# Patient Record
Sex: FEMALE | Race: WHITE | Employment: OTHER | URBAN - NONMETROPOLITAN AREA
[De-identification: names, ages, dates, MRNs, and addresses within clinical notes are randomized per-mention and may not be internally consistent; named-entity substitution may affect disease eponyms.]

---

## 2017-06-29 ENCOUNTER — TELEPHONE (OUTPATIENT)
Dept: PULMONOLOGY | Age: 67
End: 2017-06-29

## 2017-10-31 ENCOUNTER — OFFICE VISIT (OUTPATIENT)
Dept: PHYSICAL MEDICINE AND REHAB | Age: 67
End: 2017-10-31
Payer: MEDICARE

## 2017-10-31 VITALS
HEART RATE: 88 BPM | DIASTOLIC BLOOD PRESSURE: 83 MMHG | SYSTOLIC BLOOD PRESSURE: 170 MMHG | WEIGHT: 160.05 LBS | BODY MASS INDEX: 29.45 KG/M2 | HEIGHT: 62 IN

## 2017-10-31 DIAGNOSIS — M47.816 SPONDYLOSIS OF LUMBAR REGION WITHOUT MYELOPATHY OR RADICULOPATHY: Primary | ICD-10-CM

## 2017-10-31 DIAGNOSIS — M48.061 SPINAL STENOSIS OF LUMBAR REGION WITHOUT NEUROGENIC CLAUDICATION: ICD-10-CM

## 2017-10-31 DIAGNOSIS — M46.1 SI (SACROILIAC) JOINT INFLAMMATION (HCC): ICD-10-CM

## 2017-10-31 PROCEDURE — 4040F PNEUMOC VAC/ADMIN/RCVD: CPT | Performed by: NURSE PRACTITIONER

## 2017-10-31 PROCEDURE — G8419 CALC BMI OUT NRM PARAM NOF/U: HCPCS | Performed by: NURSE PRACTITIONER

## 2017-10-31 PROCEDURE — G8427 DOCREV CUR MEDS BY ELIG CLIN: HCPCS | Performed by: NURSE PRACTITIONER

## 2017-10-31 PROCEDURE — G8598 ASA/ANTIPLAT THER USED: HCPCS | Performed by: NURSE PRACTITIONER

## 2017-10-31 PROCEDURE — G8484 FLU IMMUNIZE NO ADMIN: HCPCS | Performed by: NURSE PRACTITIONER

## 2017-10-31 PROCEDURE — 99215 OFFICE O/P EST HI 40 MIN: CPT | Performed by: NURSE PRACTITIONER

## 2017-10-31 PROCEDURE — G8400 PT W/DXA NO RESULTS DOC: HCPCS | Performed by: NURSE PRACTITIONER

## 2017-10-31 PROCEDURE — 3014F SCREEN MAMMO DOC REV: CPT | Performed by: NURSE PRACTITIONER

## 2017-10-31 PROCEDURE — 3017F COLORECTAL CA SCREEN DOC REV: CPT | Performed by: NURSE PRACTITIONER

## 2017-10-31 PROCEDURE — 1036F TOBACCO NON-USER: CPT | Performed by: NURSE PRACTITIONER

## 2017-10-31 PROCEDURE — 1090F PRES/ABSN URINE INCON ASSESS: CPT | Performed by: NURSE PRACTITIONER

## 2017-10-31 PROCEDURE — 1123F ACP DISCUSS/DSCN MKR DOCD: CPT | Performed by: NURSE PRACTITIONER

## 2017-10-31 RX ORDER — LOSARTAN POTASSIUM 50 MG/1
50 TABLET ORAL DAILY
Refills: 0 | COMMUNITY
Start: 2017-10-20

## 2017-10-31 ASSESSMENT — ENCOUNTER SYMPTOMS
DIARRHEA: 0
BACK PAIN: 1
CONSTIPATION: 0
NAUSEA: 0
ABDOMINAL PAIN: 0
PHOTOPHOBIA: 0
SINUS PRESSURE: 0
CHEST TIGHTNESS: 0
VOMITING: 0
SORE THROAT: 0
SHORTNESS OF BREATH: 0
COLOR CHANGE: 0
RHINORRHEA: 0
EYE PAIN: 0
WHEEZING: 0
COUGH: 0

## 2017-10-31 NOTE — LETTER
[facility]  [address]  Phone: [phone]  Fax: [address], CNP        October 31, 2017     Karol Sanabria MD  [facility] [address]    Patient: Jenny Metz  MR Number: [id number]  YOB: 1950  Date of Visit: 10/31/2017    Dear Dr. Carlos Ellison: Thank you for the request for consultation for [facility] to me for the evaluation of low back pain. Below are the relevant portions of my assessment and plan of care. If you have questions, please do not hesitate to call me. I look forward to following Suly Husbands along with you.     Sincerely,        Tammy Gonzalez CNP

## 2017-10-31 NOTE — PROGRESS NOTES
[facility]  [facility]  [address]  Dept: [phone]  Dept Fax: [phone]  Loc: [phone]    Visit Date: 10/31/2017    Jenny Metz is a 79 y.o. female who is referred for pain management evaluation and treatment per Dr. Hermilo Guadalupe. CAGE and CAGE-AID Questions   1. In the last three months, have you felt you should cut down or stop drinking or using drugs? Yes []        No [x]     2. In the last three months, has anyone annoyed you or gotten on your nerves by telling you to cut down or stop drinking or using drugs? Yes []        No [x]     3. In the last three months, have you felt guilty or bad about how much you drink or use drugs? Yes []        No [x]     4. In the last three months, have you been waking up wanting to have an alcoholic drink or use drugs? Yes []        No [x]        Opioid Risk Tool:  Clinician Form       1. Family History of Substance Abuse: Female Male    Alcohol   []1   []3    Illegal drugs   [x]2   [x]3    Prescription drugs     []4   []4   2. Personal History of Substance Abuse:          Alcohol   []3   []3    Illegal drugs   []4   []4    Prescription drugs     []5   []5   3. Age (art box if between 12 and 39):     []1   []1   4. History of Preadolescent Sexual Abuse:     []3   []0   5. Psychological Disease:      Attention deficit disorder, obsessive-compulsive disorder, bipolar, schizophrenia   []2   []2      Depression     []1   []1    Scoring Totals 2      Total Score  Low Risk  Moderate Risk  High Risk   Risk Category   0 - 3   4 - 7   8 or Above      Patient states symptoms interfere with:  A.  General Activity:  yes   B. Mood: yes    C. Walking Ability:   yes   D. Normal Work (Includes both work outside the home and housework):   yes    E.  Relations with Other People:  yes   F. Sleep:   yes   G.  Enjoyment of Life:  yes       HPI:     Chief Complaint: low back pain    HPI  New patient here foramina.            L2-3:  Endplate spondylosis.  Decreased disc height, dehydration and      moderate circumferential disc bulge.  Degenerative changes of the      bilateral facet joints.  Severe narrowing of the central canal and      moderate narrowing of the bilateral intervertebral neural foramina more      prominent on the right side.            L3-4:  Endplate spondylosis.  Decreased disc height, dehydration and      moderate circumferential disc bulge.  Degenerative changes of the      bilateral facet joints.  Severe narrowing of the central canal and      moderate narrowing of the bilateral intervertebral neural foramina more      prominent on the right side.            L4-5:  Endplate spondylosis.  Decreased disc height, dehydration and small      circumferential disc bulge.  Degenerative changes of the bilateral facet      joints.  Mild narrowing of the central canal and bilateral intervertebral      neural foramina.            L5-S1:  Normal endplates.  Normal disc height, hydration and morphology.      Degenerative changes of the bilateral facet joints.  Normal central canal      and bilateral lateral recesses.  Normal bilateral intervertebral neural      foramina.            Normal visualized sacral ala.            There is a left renal cyst.      ___________________________________            IMPRESSION:      Multilevel degenerative changes, as described above.      Severe spinal canal stenosis at L2-3 and L3-4.           The patient is allergic to chantix [varenicline tartrate]; codeine; lipitor; simvastatin; and tiotropium bromide monohydrate. Past Medical History  Di King  has a past medical history of Bladder dysfunction; CAD (coronary artery disease); Cerebrovascular disease; CHF (congestive heart failure) ([facility]); COPD (chronic obstructive pulmonary disease) ([facility] [address] DDD (degenerative disc disease); Depression; GERD (gastroesophageal reflux disease); Hyperlipidemia; Hypertension; Obesity;  Other screening mammogram; Pap smear, as part of routine gynecological examination; Pap smear, as part of routine gynecological examination; Peripheral edema; and Unspecified sleep apnea. Past Surgical History  The patient  has a past surgical history that includes Dilation and curettage of uterus; Colonoscopy (7-2009); eye surgery (2013); back surgery; and other surgical history (2016). Family History  This patient's family history includes Alzheimer's Disease in her father; COPD in her sister; Cancer in her brother and daughter; Heart Disease in her father and mother; Heart Failure in her father and mother; Parkinsonism in her father; Stroke in her brother. Social History  Radha Mccain  reports that she quit smoking about 6 years ago. Her smoking use included Cigarettes. She has a 80.00 pack-year smoking history. She has never used smokeless tobacco. She reports that she does not drink alcohol or use drugs.     Medications    Current Outpatient Prescriptions:     losartan (COZAAR) 50 MG tablet, , Disp: , Rfl: 0    trimethoprim-polymyxin b (POLYTRIM) 81430-8.1 UNIT/ML-% ophthalmic solution, Place 2 drops into both eyes every 4 hours While awake, Disp: 1 Bottle, Rfl: 0    albuterol sulfate HFA (PROAIR HFA) 108 (90 BASE) MCG/ACT inhaler, Inhale 2 puffs into the lungs every 6 hours as needed for Shortness of Breath, Disp: 1 Inhaler, Rfl: 11    albuterol (PROVENTIL) (2.5 MG/3ML) 0.083% nebulizer solution, Take 3 mLs by nebulization every 6 hours as needed for Wheezing or Shortness of Breath, Disp: 120 mL, Rfl: 11    ibuprofen (ADVIL;MOTRIN) 400 MG tablet, Take 1 tablet by mouth every 6 hours as needed for Pain, Disp: 90 tablet, Rfl: 0    omeprazole (PRILOSEC) 40 MG capsule, Take 1 capsule by mouth daily, Disp: 30 capsule, Rfl: 1    fluticasone (FLONASE) 50 MCG/ACT nasal spray, 1 spray by Nasal route 2 times daily (Patient taking differently: 1 spray by Nasal route 2 times daily as needed ), Disp: 3 Bottle, Rfl: 1   busPIRone (BUSPAR) 5 MG tablet, Take 1 tablet by mouth 2 times daily (Patient taking differently: Take 5 mg by mouth 2 times daily as needed ), Disp: 60 tablet, Rfl: 3    pseudoephedrine (SUDAFED) 120 MG CR tablet, Take 120 mg by mouth every 12 hours as needed , Disp: , Rfl:     OXYGEN,  Inhale into the lungs 3 lpm, per patient at home and when walking, Disp: , Rfl:     Handicap Placard MISC, by Does not apply route., Disp: 1 each, Rfl: 0    Nebulizer MISC, by Does not apply route., Disp: 1 each, Rfl: 0    aspirin 81 MG EC tablet, Take 81 mg by mouth daily. , Disp: , Rfl:     Respiratory Therapy Supplies (NEBULIZER COMPRESSOR) KIT, 1 kit by Does not apply route once for 1 dose, Disp: 1 kit, Rfl: 0    Subjective:      Review of Systems   Constitutional: Positive for activity change. Negative for appetite change, chills, diaphoresis, fatigue, fever and unexpected weight change. HENT: Negative for congestion, ear pain, hearing loss, mouth sores, nosebleeds, rhinorrhea, sinus pressure and sore throat. Eyes: Negative for photophobia, pain and visual disturbance. Respiratory: Negative for cough, chest tightness, shortness of breath and wheezing. Cardiovascular: Negative for chest pain and palpitations. Gastrointestinal: Negative for abdominal pain, constipation, diarrhea, nausea and vomiting. Endocrine: Negative for cold intolerance, heat intolerance, polydipsia, polyphagia and polyuria. Genitourinary: Negative for decreased urine volume, difficulty urinating, frequency and hematuria. Musculoskeletal: Positive for arthralgias, back pain, gait problem and myalgias. Negative for joint swelling, neck pain and neck stiffness. Skin: Negative for color change and rash. Allergic/Immunologic: Negative for food allergies and immunocompromised state. Neurological: Positive for weakness and numbness.  Negative for dizziness, tremors, seizures, syncope, facial asymmetry, speech difficulty, light-headedness and headaches. Hematological: Does not bruise/bleed easily. Psychiatric/Behavioral: Negative for agitation, behavioral problems, confusion, decreased concentration, dysphoric mood, hallucinations, self-injury, sleep disturbance and suicidal ideas. The patient is not nervous/anxious and is not hyperactive. Objective:     Vitals:    10/31/17 1022   BP: (!) 170/83   Site: Right Arm   Position: Sitting   Pulse: 88   Weight: 160 lb 0.9 oz (72.6 kg)   Height: 5' 2.01\" (1.575 m)       Physical Exam   Constitutional: She is oriented to person, place, and time. She appears well-developed and well-nourished. No distress. HENT:   Head: Normocephalic and atraumatic. Right Ear: External ear normal.   Left Ear: External ear normal.   Nose: Nose normal.   Mouth/Throat: Oropharynx is clear and moist. No oropharyngeal exudate. Eyes: Conjunctivae and EOM are normal. Pupils are equal, round, and reactive to light. Right eye exhibits no discharge. Left eye exhibits no discharge. No scleral icterus. Neck: Normal range of motion and full passive range of motion without pain. Neck supple. No muscular tenderness present. No neck rigidity. No edema, no erythema and normal range of motion present. No thyromegaly present. Cardiovascular: Normal rate, regular rhythm, normal heart sounds and intact distal pulses. Exam reveals no gallop and no friction rub. No murmur heard. Pulmonary/Chest: Effort normal. No respiratory distress. She has decreased breath sounds in the right upper field, the right middle field, the right lower field, the left upper field, the left middle field and the left lower field. She has wheezes in the right upper field and the left upper field. She has no rales. She exhibits no tenderness. 2 liters NC portable oxygen   Abdominal: Soft. Bowel sounds are normal. She exhibits no distension. There is no tenderness. There is no rebound and no guarding.    Musculoskeletal:        Right shoulder: She exhibits decreased range of motion and tenderness. Left shoulder: She exhibits decreased range of motion and tenderness. Right elbow: Normal.       Left elbow: Normal.        Right wrist: Normal.        Left wrist: Normal.        Right hip: Normal.        Left hip: She exhibits decreased range of motion, decreased strength and tenderness. Right knee: Normal.        Left knee: Normal.        Cervical back: She exhibits decreased range of motion, tenderness, pain and spasm. Thoracic back: She exhibits tenderness. Lumbar back: She exhibits tenderness, pain and spasm. Back:         Right upper leg: She exhibits tenderness. Left upper leg: She exhibits tenderness. Neurological: She is alert and oriented to person, place, and time. She has normal strength and normal reflexes. She is not disoriented. She displays no atrophy. No cranial nerve deficit or sensory deficit. She exhibits normal muscle tone. She displays a negative Romberg sign. Coordination and gait normal. She displays no Babinski's sign on the right side. SLR-  Motor 5/5 BUE BLE   Skin: Skin is warm. No rash noted. She is not diaphoretic. No erythema. No pallor. Psychiatric: She has a normal mood and affect. Her speech is normal and behavior is normal. Judgment and thought content normal. Her mood appears not anxious. Her affect is not angry, not blunt, not labile and not inappropriate. She is not agitated, not aggressive, not hyperactive, not slowed, not withdrawn, not actively hallucinating and not combative. Thought content is not paranoid and not delusional. Cognition and memory are normal. Cognition and memory are not impaired. She does not express impulsivity or inappropriate judgment. She does not exhibit a depressed mood. She expresses no homicidal and no suicidal ideation. She expresses no suicidal plans and no homicidal plans.  She exhibits normal recent memory and normal remote

## 2017-11-14 ENCOUNTER — TELEPHONE (OUTPATIENT)
Dept: OPERATING ROOM | Age: 67
End: 2017-11-14

## 2017-11-20 ENCOUNTER — OFFICE VISIT (OUTPATIENT)
Dept: PULMONOLOGY | Age: 67
End: 2017-11-20
Payer: MEDICARE

## 2017-11-20 VITALS
OXYGEN SATURATION: 92 % | TEMPERATURE: 98 F | SYSTOLIC BLOOD PRESSURE: 138 MMHG | DIASTOLIC BLOOD PRESSURE: 64 MMHG | HEIGHT: 62 IN | BODY MASS INDEX: 28.82 KG/M2 | HEART RATE: 107 BPM | WEIGHT: 156.6 LBS

## 2017-11-20 DIAGNOSIS — J01.90 ACUTE SINUSITIS, RECURRENCE NOT SPECIFIED, UNSPECIFIED LOCATION: ICD-10-CM

## 2017-11-20 DIAGNOSIS — J96.11 CHRONIC RESPIRATORY FAILURE WITH HYPOXIA (HCC): ICD-10-CM

## 2017-11-20 DIAGNOSIS — G47.33 OSA ON CPAP: Primary | ICD-10-CM

## 2017-11-20 DIAGNOSIS — J41.1 MUCOPURULENT CHRONIC BRONCHITIS (HCC): ICD-10-CM

## 2017-11-20 DIAGNOSIS — J42 CHRONIC BRONCHITIS, UNSPECIFIED CHRONIC BRONCHITIS TYPE (HCC): ICD-10-CM

## 2017-11-20 DIAGNOSIS — Z87.891 HISTORY OF TOBACCO ABUSE: ICD-10-CM

## 2017-11-20 DIAGNOSIS — Z87.891 PERSONAL HISTORY OF TOBACCO USE: ICD-10-CM

## 2017-11-20 DIAGNOSIS — Z99.89 OSA ON CPAP: Primary | ICD-10-CM

## 2017-11-20 PROCEDURE — G0296 VISIT TO DETERM LDCT ELIG: HCPCS | Performed by: NURSE PRACTITIONER

## 2017-11-20 PROCEDURE — 99214 OFFICE O/P EST MOD 30 MIN: CPT | Performed by: NURSE PRACTITIONER

## 2017-11-20 RX ORDER — ALBUTEROL SULFATE 90 UG/1
2 AEROSOL, METERED RESPIRATORY (INHALATION) EVERY 6 HOURS PRN
Qty: 1 INHALER | Refills: 11 | Status: ON HOLD | OUTPATIENT
Start: 2017-11-20 | End: 2018-07-01

## 2017-11-20 RX ORDER — ALBUTEROL SULFATE 2.5 MG/3ML
2.5 SOLUTION RESPIRATORY (INHALATION) EVERY 6 HOURS PRN
Qty: 120 ML | Refills: 11 | Status: SHIPPED | OUTPATIENT
Start: 2017-11-20 | End: 2018-09-25 | Stop reason: SDUPTHER

## 2017-11-20 ASSESSMENT — ENCOUNTER SYMPTOMS
HEMOPTYSIS: 0
COUGH: 1
SHORTNESS OF BREATH: 1
SPUTUM PRODUCTION: 1
VOMITING: 0
NAUSEA: 0
DIARRHEA: 0
SINUS PAIN: 1
WHEEZING: 1
ABDOMINAL PAIN: 0
EYES NEGATIVE: 1
SORE THROAT: 1

## 2017-11-20 NOTE — PROGRESS NOTES
[facility]    Patient: Darren Castillo, 79 y.o.   : 2017   Previous  Pt of Dr. Clarissa Gitelman   Patient presents with    COPD     1 yr f/u no tests, Q N-17.75 M-3, having a hard time off and on, usually first thing in the morning and at night, has a cold right now, 3L continuous, was on advair a while ago, but stopped because she didn't think she needed it because her breathing was doing so well    Medication Refill     albuterol neb, proair        HPI  Don Bonds is here for 1 year COPD follow up, currently using:  Albuterol 0.083 % Nebulizer Q6H PRN- has increased using 2 times per day right now past 2 weeks. Albuterol 108 (ProAir) HFA- as needed, 1-2 times per day  Was taking Advair- quit due to feeling better about 2 years ago. 80 pk/yr smoking history - quit in - is exposed to second hand smoke daily   Has been on 1 week of Amoxicillin for increased cough, prescribed by per PCP, just got 1 more week refill. Not on steroids. Productive cough with thick yellow phelm. Has had fever off and on, has not checked actual temp. No hemoptysis,  Denies ill contacts. Has not had influenza vaccine but is planning on getting from her PCP. Has had pneumonia vaccine at age 72. Has been more SOB with her recent cold. Is on 3L/min NC O2 continuously at home. Progress History:   Since last visit any new medical issues? No  New ER or hospitlal visits? No  Any new or changes in medicines? Yes amoxicillin for sinusitis  Using inhalers? Yes (see above)  Are they helpful?  Yes - did feel better when she was on Advair in past   Past Medical hx   PMH:  Past Medical History:   Diagnosis Date    Bladder dysfunction     CAD (coronary artery disease)     Cerebrovascular disease     [facility])     COPD (chronic obstructive pulmonary disease) ([facility])     uses oxygen 24 hours per day    DDD (degenerative disc inhaler Inhale 2 puffs into the lungs every 6 hours as needed for Shortness of Breath 1 Inhaler 11    amoxicillin (AMOXIL) 875 MG tablet Take 875 mg by mouth 2 times daily      losartan (COZAAR) 50 MG tablet   0    trimethoprim-polymyxin b (POLYTRIM) 01936-2.1 UNIT/ML-% ophthalmic solution Place 2 drops into both eyes every 4 hours While awake 1 Bottle 0    ibuprofen (ADVIL;MOTRIN) 400 MG tablet Take 1 tablet by mouth every 6 hours as needed for Pain 90 tablet 0    omeprazole (PRILOSEC) 40 MG capsule Take 1 capsule by mouth daily 30 capsule 1    fluticasone (FLONASE) 50 MCG/ACT nasal spray 1 spray by Nasal route 2 times daily (Patient taking differently: 1 spray by Nasal route 2 times daily as needed ) 3 Bottle 1    busPIRone (BUSPAR) 5 MG tablet Take 1 tablet by mouth 2 times daily (Patient taking differently: Take 5 mg by mouth 2 times daily as needed ) 60 tablet 3    pseudoephedrine (SUDAFED) 120 MG CR tablet Take 120 mg by mouth every 12 hours as needed       OXYGEN   Inhale into the lungs 3 lpm, per patient at home and when walking      Handicap Placard MISC by Does not apply route. 1 each 0    Nebulizer MISC by Does not apply route. 1 each 0    aspirin 81 MG EC tablet Take 81 mg by mouth daily.  Respiratory Therapy Supplies (NEBULIZER COMPRESSOR) KIT 1 kit by Does not apply route once for 1 dose 1 kit 0     No current facility-administered medications for this visit. [place] Setting ROS   Review of Systems   Constitutional: Negative for chills, fever, malaise/fatigue and weight loss. HENT: Positive for congestion, sinus pain and sore throat. Negative for ear discharge and ear pain. Eyes: Negative. Respiratory: Positive for cough, sputum production, shortness of breath and wheezing. Negative for hemoptysis. Cardiovascular: Negative for chest pain, palpitations and leg swelling. Gastrointestinal: Negative for abdominal pain, diarrhea, nausea and vomiting. Genitourinary: Negative. risk from radiation exposure. Patients with comorbidities resulting in life expectancy of < 10 years, or that would preclude treatment of an abnormality identified on CT, should not be screened due to lack of benefit.     To obtain maximal benefit from this screening, smoking cessation and long-term abstinence from smoking is critical

## 2017-12-12 ENCOUNTER — APPOINTMENT (OUTPATIENT)
Dept: GENERAL RADIOLOGY | Age: 67
End: 2017-12-12
Attending: PAIN MEDICINE
Payer: MEDICARE

## 2017-12-12 ENCOUNTER — ANESTHESIA EVENT (OUTPATIENT)
Dept: OPERATING ROOM | Age: 67
End: 2017-12-12
Payer: MEDICARE

## 2017-12-12 ENCOUNTER — HOSPITAL ENCOUNTER (OUTPATIENT)
Age: 67
Setting detail: OUTPATIENT SURGERY
Discharge: HOME | End: 2017-12-12
Attending: PAIN MEDICINE | Admitting: PAIN MEDICINE
Payer: MEDICARE

## 2017-12-12 ENCOUNTER — ANESTHESIA (OUTPATIENT)
Dept: OPERATING ROOM | Age: 67
End: 2017-12-12
Payer: MEDICARE

## 2017-12-12 VITALS
OXYGEN SATURATION: 96 % | HEIGHT: 62 IN | SYSTOLIC BLOOD PRESSURE: 134 MMHG | WEIGHT: 157.4 LBS | HEART RATE: 89 BPM | DIASTOLIC BLOOD PRESSURE: 66 MMHG | BODY MASS INDEX: 28.97 KG/M2 | RESPIRATION RATE: 12 BRPM | TEMPERATURE: 98.9 F

## 2017-12-12 VITALS — TEMPERATURE: 97.7 F | DIASTOLIC BLOOD PRESSURE: 64 MMHG | SYSTOLIC BLOOD PRESSURE: 160 MMHG | OXYGEN SATURATION: 95 %

## 2017-12-12 PROCEDURE — A6402 STERILE GAUZE <= 16 SQ IN: HCPCS | Performed by: PAIN MEDICINE

## 2017-12-12 PROCEDURE — 3600000058 HC PAIN LEVEL 5 BASE: Performed by: PAIN MEDICINE

## 2017-12-12 PROCEDURE — 7100000011 HC PHASE II RECOVERY - ADDTL 15 MIN: Performed by: PAIN MEDICINE

## 2017-12-12 PROCEDURE — 3209999900 FLUORO FOR SURGICAL PROCEDURES

## 2017-12-12 PROCEDURE — 64635 DESTROY LUMB/SAC FACET JNT: CPT | Performed by: PAIN MEDICINE

## 2017-12-12 PROCEDURE — 3600000059 HC PAIN LEVEL 5 ADDL 15 MIN: Performed by: PAIN MEDICINE

## 2017-12-12 PROCEDURE — 2500000003 HC RX 250 WO HCPCS: Performed by: PAIN MEDICINE

## 2017-12-12 PROCEDURE — 3700000001 HC ADD 15 MINUTES (ANESTHESIA): Performed by: PAIN MEDICINE

## 2017-12-12 PROCEDURE — 3700000000 HC ANESTHESIA ATTENDED CARE: Performed by: PAIN MEDICINE

## 2017-12-12 PROCEDURE — 64636 DESTROY L/S FACET JNT ADDL: CPT | Performed by: PAIN MEDICINE

## 2017-12-12 PROCEDURE — 7100000010 HC PHASE II RECOVERY - FIRST 15 MIN: Performed by: PAIN MEDICINE

## 2017-12-12 PROCEDURE — 6360000002 HC RX W HCPCS: Performed by: PAIN MEDICINE

## 2017-12-12 PROCEDURE — 6360000002 HC RX W HCPCS: Performed by: NURSE ANESTHETIST, CERTIFIED REGISTERED

## 2017-12-12 RX ORDER — LIDOCAINE HYDROCHLORIDE 10 MG/ML
INJECTION, SOLUTION EPIDURAL; INFILTRATION; INTRACAUDAL; PERINEURAL PRN
Status: DISCONTINUED | OUTPATIENT
Start: 2017-12-12 | End: 2017-12-12 | Stop reason: HOSPADM

## 2017-12-12 RX ORDER — BUPIVACAINE HYDROCHLORIDE 2.5 MG/ML
INJECTION, SOLUTION EPIDURAL; INFILTRATION; INTRACAUDAL PRN
Status: DISCONTINUED | OUTPATIENT
Start: 2017-12-12 | End: 2017-12-12 | Stop reason: HOSPADM

## 2017-12-12 RX ORDER — BETAMETHASONE SODIUM PHOSPHATE AND BETAMETHASONE ACETATE 3; 3 MG/ML; MG/ML
INJECTION, SUSPENSION INTRA-ARTICULAR; INTRALESIONAL; INTRAMUSCULAR; SOFT TISSUE PRN
Status: DISCONTINUED | OUTPATIENT
Start: 2017-12-12 | End: 2017-12-12 | Stop reason: HOSPADM

## 2017-12-12 RX ORDER — FENTANYL CITRATE 50 UG/ML
INJECTION, SOLUTION INTRAMUSCULAR; INTRAVENOUS PRN
Status: DISCONTINUED | OUTPATIENT
Start: 2017-12-12 | End: 2017-12-12 | Stop reason: SDUPTHER

## 2017-12-12 RX ADMIN — FENTANYL CITRATE 50 MCG: 50 INJECTION, SOLUTION INTRAMUSCULAR; INTRAVENOUS at 11:40

## 2017-12-12 RX ADMIN — PROPOFOL 40 MG: 10 INJECTION, EMULSION INTRAVENOUS at 11:40

## 2017-12-12 ASSESSMENT — PULMONARY FUNCTION TESTS
PIF_VALUE: 0

## 2017-12-12 ASSESSMENT — PAIN DESCRIPTION - DESCRIPTORS: DESCRIPTORS: ACHING;CONSTANT;THROBBING

## 2017-12-12 ASSESSMENT — PAIN SCALES - GENERAL: PAINLEVEL_OUTOF10: 4

## 2017-12-12 ASSESSMENT — PAIN - FUNCTIONAL ASSESSMENT: PAIN_FUNCTIONAL_ASSESSMENT: 0-10

## 2017-12-12 ASSESSMENT — ENCOUNTER SYMPTOMS: SHORTNESS OF BREATH: 1

## 2017-12-12 NOTE — OP NOTE
pain relief with confirmatory median branch blocks. Hence we decided to do radiofrequency abalation of median branches for long term pain releif. The procedure and risks  were discussed with the patient and an informed consent was obtained. Procedure:  Left side    A meaningful communication was kept up with the patient throughout the procedure. The patient is placed in prone position and skin over the back was prepped and draped in sterile manner. Then using fluoroscopy the junction of the transverse process of the vertebra with the superior process of the facet joint was observed and the view was optimized. The skin and deep tissues posterior were infiltrated with 12 ml of  1% xylocaine. The RF canula with the 10 mm active tip was introduced through the skin wheal under fluoroscopy guidance such that the tip of the needle lies in the groove of the transverse process with the superior processes of the facet joint. Then a lateral view of the lumbar spine was obtained to make sure the tip of needle is not in the neural foramen. Then electric impedence was checked to make sure it is acceptable. Then a sensory stimulus was applied at 50 Hz up to 1 volt and concordant pain symptoms were reproduced. Then a motor stimulus was applied at 2 Hz up to 2 volts and no motor stimulation was seen in lower extremities. Some multifidus stimulus was seen. Then after negative aspiration a mixture of Celestone 12 mg and 0.25%  Marcaine 2 cc was injected through the needle. Then a initial lesion was done at 80 degrees centigrade for 90 seconds. For L5 median branch block the junction of the ala of  the sacrum with the superior articular process of the facet joint was taken as a reference point.   For the L4 median branch the junction of the transverse process of L5 with the superolateral possible facet joint was taken as a reference point and for S1 median branch the most lateral and superior aspect of S1 foramina was

## 2017-12-12 NOTE — PROGRESS NOTES
1154-  Patient arrived to phase II via cart. Spontaneous respirations even and unlabored. Placed on monitor--VSS. Report received from [facility].    1155-  Assessment completed. Patient is alert and oriented x4. Injection site clean and dry. IV capped off. Patient states pain is tolerable at this time. [address] given to patient. Family brought to room. 1106-  Belongings brought to patient. 1215-  Discharge instructions given. Understanding verbalized. 1218-  Patient discharged in stable condition with all belongings.

## 2017-12-12 NOTE — ANESTHESIA PRE PROCEDURE
Inhale into the lungs 3 lpm, per patient at home and when walking   Yes Historical Provider, MD   Handicap Placard MISC by Does not apply route. 6/6/14  Yes Veda Clay PA-C   Nebulizer MISC by Does not apply route. 10/16/12  Yes Tiburcio Marinelli MD   aspirin 81 MG EC tablet Take 81 mg by mouth daily. Yes Historical Provider, MD   Respiratory Therapy Supplies (NEBULIZER COMPRESSOR) KIT 1 kit by Does not apply route once for 1 dose 6/29/17 6/29/17  Long Tierney MD       Current medications:    Current Facility-Administered Medications   Medication Dose Route Frequency Provider Last Rate Last Dose    betamethasone acetate-betamethasone sodium phosphate (CELESTONE) injection    PRN Raz Mccain MD   12 mg at 12/12/17 1040    bupivacaine (PF) (MARCAINE) 0.25 % injection    PRN Raz Mccain MD   1.5 mL at 12/12/17 1040    lidocaine PF 1 % injection    PRN Raz Mccain MD   3 mL at 12/12/17 1047       Allergies:     Allergies   Allergen Reactions    Chantix [Varenicline Tartrate] Itching    Codeine Itching     Pt cant remember    Lipitor Itching    Simvastatin Itching     Stomach pain    Tiotropium Bromide Monohydrate Itching       Problem List:    Patient Active Problem List   Diagnosis Code    GERD (gastroesophageal reflux disease) K21.9    Hyperlipidemia E78.5    Peripheral edema R60.9    CHF (congestive heart failure) ([facility]) I50.9    Depression F32.9    DDD (degenerative disc disease) [id number]    CAD (coronary artery disease) I25.10    Cerebrovascular disease I67.9    Diabetes type 2, controlled ([facility] [address] E11.9    Bladder dysfunction N31.9    COPD (chronic obstructive pulmonary disease) ([facility]) J44.9    Nasal congestion R09.81    Acute sinusitis J01.90    Hypertrophy of both inferior nasal turbinates J34.3    Osteoarthritis of lumbar spine M47.816    History of panniculitis Z87.39    Hypertension I10    Dyspnea R06.00    Sinus disease J34.9    Obstructive sleep apnea on CPAP G47.33, Z99.89    Snoring R06.83    Obesity (BMI 30.0-34. 9) E66.9    Witnessed apneic spells R06.81    Chronic respiratory failure with hypoxia (HCC) J96.11       Past Medical History:        Diagnosis Date    Bladder dysfunction     CAD (coronary artery disease)     Cerebrovascular disease     CHF (congestive heart failure) (HCC)     COPD (chronic obstructive pulmonary disease) (HCC)     uses oxygen 24 hours per day    DDD (degenerative disc disease)     Depression     GERD (gastroesophageal reflux disease)     Hyperlipidemia     Hypertension     Obesity     Other screening mammogram 2011    Pap smear, as part of routine gynecological examination 2007    Pap smear, as part of routine gynecological examination 2007    Peripheral edema     with ascites    PONV (postoperative nausea and vomiting)     Unspecified sleep apnea     c-pap at 10 cm wtr       Past Surgical History:        Procedure Laterality Date    COLONOSCOPY  7-2009    DILATION AND CURETTAGE OF UTERUS  1970's    EYE SURGERY  2013    Laser surgery     OTHER SURGICAL HISTORY  2016    burning nerves in back - lumbar @ [place]       Social History:    Social History   Substance Use Topics    Smoking status: Former Smoker     Packs/day: 2.00     Years: 40.00     Types: Cigarettes     Quit date: 11/14/2010    Smokeless tobacco: Never Used    Alcohol use No      Comment: pt hasn't drank since 2007                                Counseling given: Not Answered      Vital Signs (Current):   Vitals:    12/12/17 0957   BP: 134/64   Pulse: 84   Resp: 18   Temp: 98.2 °F (36.8 °C)   TempSrc: Temporal   SpO2: 96%   Weight: 157 lb 6.4 oz (71.4 kg)   Height: 5' 2\" (1.575 m)                                              BP Readings from Last 3 Encounters:   12/12/17 134/64   11/20/17 138/64   10/31/17 (!) 170/83       NPO Status: Time of last liquid consumption: 2000                        Time of last solid consumption: 2000                        Date of last liquid consumption: 12/11/17                        Date of last solid food consumption: 12/11/17    BMI:   Wt Readings from Last 3 Encounters:   12/12/17 157 lb 6.4 oz (71.4 kg)   11/20/17 156 lb 9.6 oz (71 kg)   10/31/17 160 lb 0.9 oz (72.6 kg)     Body mass index is 28.79 kg/m². CBC:   Lab Results   Component Value Date    WBC 10.6 07/09/2014    RBC 3.36 07/09/2014    RBC 4.30 03/31/2012    HGB 10.1 07/09/2014    HCT 30.5 07/09/2014    MCV 90.8 07/09/2014    RDW 13.7 07/09/2014     07/09/2014       CMP:   Lab Results   Component Value Date     07/27/2015    K 4.5 07/27/2015    CL 93 07/27/2015    CO2 37 07/27/2015    BUN 8 07/27/2015    CREATININE 0.5 09/29/2015    LABGLOM >90 09/29/2015    GLUCOSE 111 07/27/2015    PROT 7.7 07/27/2015    CALCIUM 9.5 07/27/2015    BILITOT 0.3 07/27/2015    BILITOT NEGATIVE 03/31/2012    ALKPHOS 84 07/27/2015    AST 17 07/27/2015    ALT 12 07/27/2015       POC Tests: No results for input(s): POCGLU, POCNA, POCK, POCCL, POCBUN, POCHEMO, POCHCT in the last 72 hours. Coags: No results found for: PROTIME, INR, APTT    HCG (If Applicable): No results found for: PREGTESTUR, PREGSERUM, HCG, HCGQUANT     ABGs: No results found for: PHART, PO2ART, VOB4FPD, HFN9QVW, BEART, Y1IEQSZS     Type & Screen (If Applicable):  No results found for: LABABO, [address]    Anesthesia Evaluation  Patient summary reviewed and Nursing notes reviewed   history of anesthetic complications: PONV.   Airway: Mallampati: II  TM distance: >3 FB   Neck ROM: full  Mouth opening: > = 3 FB Dental:    (+) upper dentures      Pulmonary:normal exam  breath sounds clear to auscultation  (+) COPD:  shortness of breath: chronic,  sleep apnea: on CPAP,                            ROS comment: Home O2 3 LPM 24/7   Cardiovascular:  Exercise tolerance: good (>4 METS),   (+) hypertension:, CAD:, CHF:,         Rhythm: regular  Rate: normal                    Neuro/Psych:
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
## 2018-01-15 ENCOUNTER — HOSPITAL ENCOUNTER (OUTPATIENT)
Dept: PULMONOLOGY | Age: 68
Discharge: HOME | End: 2018-01-15
Payer: MEDICARE

## 2018-02-05 ENCOUNTER — TELEPHONE (OUTPATIENT)
Dept: PHYSICAL MEDICINE AND REHAB | Age: 68
End: 2018-02-05

## 2018-02-05 NOTE — TELEPHONE ENCOUNTER
Returned call to victorino to reschedule procedure and she said she would call back when ready to schedule

## 2018-02-07 ENCOUNTER — HOSPITAL ENCOUNTER (OUTPATIENT)
Dept: PULMONOLOGY | Age: 68
Discharge: HOME | End: 2018-02-07
Payer: MEDICARE

## 2018-03-11 ENCOUNTER — HOSPITAL ENCOUNTER (INPATIENT)
Age: 68
LOS: 2 days | Discharge: HOME | DRG: 189 | End: 2018-03-13
Attending: FAMILY MEDICINE | Admitting: FAMILY MEDICINE
Payer: MEDICARE

## 2018-03-11 ENCOUNTER — APPOINTMENT (OUTPATIENT)
Dept: GENERAL RADIOLOGY | Age: 68
DRG: 189 | End: 2018-03-11
Payer: MEDICARE

## 2018-03-11 ENCOUNTER — APPOINTMENT (OUTPATIENT)
Dept: CT IMAGING | Age: 68
DRG: 189 | End: 2018-03-11
Payer: MEDICARE

## 2018-03-11 DIAGNOSIS — J96.02 ACUTE HYPERCAPNIC RESPIRATORY FAILURE (HCC): ICD-10-CM

## 2018-03-11 DIAGNOSIS — J44.1 COPD EXACERBATION (HCC): Primary | ICD-10-CM

## 2018-03-11 PROBLEM — J96.00 ACUTE RESPIRATORY FAILURE (HCC): Status: ACTIVE | Noted: 2018-03-11

## 2018-03-11 LAB
ALLEN TEST: POSITIVE
ALLEN TEST: POSITIVE
ANION GAP SERPL CALCULATED.3IONS-SCNC: 6 MEQ/L (ref 8–16)
AVERAGE GLUCOSE: 108 MG/DL (ref 70–126)
BASE EXCESS (CALCULATED): 16.4 MMOL/L (ref -2.5–2.5)
BASE EXCESS (CALCULATED): 18.1 MMOL/L (ref -2.5–2.5)
BASOPHILS # BLD: 0.3 %
BASOPHILS ABSOLUTE: 0 THOU/MM3 (ref 0–0.1)
BUN BLDV-MCNC: 8 MG/DL (ref 7–22)
CALCIUM SERPL-MCNC: 9.8 MG/DL (ref 8.5–10.5)
CHLORIDE BLD-SCNC: 90 MEQ/L (ref 98–111)
CO2: 45 MEQ/L (ref 23–33)
COLLECTED BY:: ABNORMAL
COLLECTED BY:: ABNORMAL
CREAT SERPL-MCNC: 0.5 MG/DL (ref 0.4–1.2)
D-DIMER QUANTITATIVE: 705 NG/ML FEU (ref 0–500)
DEVICE: ABNORMAL
DEVICE: ABNORMAL
EKG ATRIAL RATE: 99 BPM
EKG P AXIS: 72 DEGREES
EKG P-R INTERVAL: 176 MS
EKG Q-T INTERVAL: 350 MS
EKG QRS DURATION: 94 MS
EKG QTC CALCULATION (BAZETT): 449 MS
EKG R AXIS: 48 DEGREES
EKG T AXIS: 79 DEGREES
EKG VENTRICULAR RATE: 99 BPM
EOSINOPHIL # BLD: 4 %
EOSINOPHILS ABSOLUTE: 0.4 THOU/MM3 (ref 0–0.4)
FLU A ANTIGEN: NEGATIVE
FLU B ANTIGEN: NEGATIVE
GFR SERPL CREATININE-BSD FRML MDRD: > 90 ML/MIN/1.73M2
GLUCOSE BLD-MCNC: 125 MG/DL (ref 70–108)
HBA1C MFR BLD: 5.6 % (ref 4.4–6.4)
HCO3: 47 MMOL/L (ref 23–28)
HCO3: 50 MMOL/L (ref 23–28)
HCT VFR BLD CALC: 36.7 % (ref 37–47)
HEMOGLOBIN: 11.5 GM/DL (ref 12–16)
IFIO2: 35
IFIO2: 4
LACTIC ACID: 1.4 MMOL/L (ref 0.5–2.2)
LIPASE: 13.2 U/L (ref 5.6–51.3)
LYMPHOCYTES # BLD: 26.6 %
LYMPHOCYTES ABSOLUTE: 2.7 THOU/MM3 (ref 1–4.8)
MCH RBC QN AUTO: 28.5 PG (ref 27–31)
MCHC RBC AUTO-ENTMCNC: 31.4 GM/DL (ref 33–37)
MCV RBC AUTO: 90.6 FL (ref 81–99)
MONOCYTES # BLD: 11 %
MONOCYTES ABSOLUTE: 1.1 THOU/MM3 (ref 0.4–1.3)
NUCLEATED RED BLOOD CELLS: 0 /100 WBC
O2 SATURATION: 91 %
O2 SATURATION: 97 %
OSMOLALITY CALCULATION: 281.1 MOSMOL/KG (ref 275–300)
PCO2: 114 MMHG (ref 35–45)
PCO2: 95 MMHG (ref 35–45)
PDW BLD-RTO: 13.9 % (ref 11.5–14.5)
PH BLOOD GAS: 7.25 (ref 7.35–7.45)
PH BLOOD GAS: 7.3 (ref 7.35–7.45)
PLATELET # BLD: 283 THOU/MM3 (ref 130–400)
PMV BLD AUTO: 8.5 FL (ref 7.4–10.4)
PO2: 109 MMHG (ref 71–104)
PO2: 72 MMHG (ref 71–104)
POTASSIUM SERPL-SCNC: 3.9 MEQ/L (ref 3.5–5.2)
PRO-BNP: 145.7 PG/ML (ref 0–900)
PROCALCITONIN: 0.02 NG/ML (ref 0.01–0.09)
RBC # BLD: 4.05 MILL/MM3 (ref 4.2–5.4)
SEG NEUTROPHILS: 58.1 %
SEGMENTED NEUTROPHILS ABSOLUTE COUNT: 6 THOU/MM3 (ref 1.8–7.7)
SET PEEP: 6 MMHG
SET PRESS SUPP: 12 CMH2O
SODIUM BLD-SCNC: 141 MEQ/L (ref 135–145)
SOURCE, BLOOD GAS: ABNORMAL
SOURCE, BLOOD GAS: ABNORMAL
TOTAL CK: 85 U/L (ref 30–135)
TROPONIN T: < 0.01 NG/ML
TROPONIN T: < 0.01 NG/ML
TSH SERPL DL<=0.05 MIU/L-ACNC: 1.18 UIU/ML (ref 0.4–4.2)
WBC # BLD: 10.3 THOU/MM3 (ref 4.8–10.8)

## 2018-03-11 PROCEDURE — 94660 CPAP INITIATION&MGMT: CPT

## 2018-03-11 PROCEDURE — 6370000000 HC RX 637 (ALT 250 FOR IP): Performed by: FAMILY MEDICINE

## 2018-03-11 PROCEDURE — 85025 COMPLETE CBC W/AUTO DIFF WBC: CPT

## 2018-03-11 PROCEDURE — 36415 COLL VENOUS BLD VENIPUNCTURE: CPT

## 2018-03-11 PROCEDURE — 80048 BASIC METABOLIC PNL TOTAL CA: CPT

## 2018-03-11 PROCEDURE — 84443 ASSAY THYROID STIM HORMONE: CPT

## 2018-03-11 PROCEDURE — 99285 EMERGENCY DEPT VISIT HI MDM: CPT

## 2018-03-11 PROCEDURE — 96374 THER/PROPH/DIAG INJ IV PUSH: CPT

## 2018-03-11 PROCEDURE — 84484 ASSAY OF TROPONIN QUANT: CPT

## 2018-03-11 PROCEDURE — 82803 BLOOD GASES ANY COMBINATION: CPT

## 2018-03-11 PROCEDURE — 6360000002 HC RX W HCPCS: Performed by: FAMILY MEDICINE

## 2018-03-11 PROCEDURE — 87804 INFLUENZA ASSAY W/OPTIC: CPT

## 2018-03-11 PROCEDURE — 85379 FIBRIN DEGRADATION QUANT: CPT

## 2018-03-11 PROCEDURE — 93005 ELECTROCARDIOGRAM TRACING: CPT | Performed by: FAMILY MEDICINE

## 2018-03-11 PROCEDURE — 83880 ASSAY OF NATRIURETIC PEPTIDE: CPT

## 2018-03-11 PROCEDURE — 71045 X-RAY EXAM CHEST 1 VIEW: CPT

## 2018-03-11 PROCEDURE — 6360000004 HC RX CONTRAST MEDICATION: Performed by: FAMILY MEDICINE

## 2018-03-11 PROCEDURE — 36600 WITHDRAWAL OF ARTERIAL BLOOD: CPT

## 2018-03-11 PROCEDURE — 83690 ASSAY OF LIPASE: CPT

## 2018-03-11 PROCEDURE — 84145 PROCALCITONIN (PCT): CPT

## 2018-03-11 PROCEDURE — 99223 1ST HOSP IP/OBS HIGH 75: CPT | Performed by: FAMILY MEDICINE

## 2018-03-11 PROCEDURE — 82550 ASSAY OF CK (CPK): CPT

## 2018-03-11 PROCEDURE — 71275 CT ANGIOGRAPHY CHEST: CPT

## 2018-03-11 PROCEDURE — 94640 AIRWAY INHALATION TREATMENT: CPT

## 2018-03-11 PROCEDURE — 2140000000 HC CCU INTERMEDIATE R&B

## 2018-03-11 PROCEDURE — 83605 ASSAY OF LACTIC ACID: CPT

## 2018-03-11 PROCEDURE — 83036 HEMOGLOBIN GLYCOSYLATED A1C: CPT

## 2018-03-11 RX ORDER — PANTOPRAZOLE SODIUM 40 MG/1
40 TABLET, DELAYED RELEASE ORAL
Status: DISCONTINUED | OUTPATIENT
Start: 2018-03-12 | End: 2018-03-13 | Stop reason: HOSPADM

## 2018-03-11 RX ORDER — ASPIRIN 81 MG/1
81 TABLET ORAL DAILY
Status: DISCONTINUED | OUTPATIENT
Start: 2018-03-11 | End: 2018-03-13 | Stop reason: HOSPADM

## 2018-03-11 RX ORDER — SODIUM CHLORIDE 0.9 % (FLUSH) 0.9 %
10 SYRINGE (ML) INJECTION EVERY 12 HOURS SCHEDULED
Status: DISCONTINUED | OUTPATIENT
Start: 2018-03-11 | End: 2018-03-13 | Stop reason: HOSPADM

## 2018-03-11 RX ORDER — ACETAMINOPHEN 325 MG/1
650 TABLET ORAL EVERY 4 HOURS PRN
Status: DISCONTINUED | OUTPATIENT
Start: 2018-03-11 | End: 2018-03-13 | Stop reason: HOSPADM

## 2018-03-11 RX ORDER — LOSARTAN POTASSIUM 50 MG/1
50 TABLET ORAL DAILY
Status: DISCONTINUED | OUTPATIENT
Start: 2018-03-11 | End: 2018-03-13 | Stop reason: HOSPADM

## 2018-03-11 RX ORDER — FLUTICASONE PROPIONATE 50 MCG
1 SPRAY, SUSPENSION (ML) NASAL DAILY
Status: DISCONTINUED | OUTPATIENT
Start: 2018-03-11 | End: 2018-03-13 | Stop reason: HOSPADM

## 2018-03-11 RX ORDER — ONDANSETRON 2 MG/ML
4 INJECTION INTRAMUSCULAR; INTRAVENOUS EVERY 6 HOURS PRN
Status: DISCONTINUED | OUTPATIENT
Start: 2018-03-11 | End: 2018-03-13 | Stop reason: HOSPADM

## 2018-03-11 RX ORDER — METHYLPREDNISOLONE SODIUM SUCCINATE 125 MG/2ML
125 INJECTION, POWDER, LYOPHILIZED, FOR SOLUTION INTRAMUSCULAR; INTRAVENOUS ONCE
Status: COMPLETED | OUTPATIENT
Start: 2018-03-11 | End: 2018-03-11

## 2018-03-11 RX ORDER — OMEPRAZOLE 40 MG/1
40 CAPSULE, DELAYED RELEASE ORAL DAILY
Status: DISCONTINUED | OUTPATIENT
Start: 2018-03-11 | End: 2018-03-11 | Stop reason: CLARIF

## 2018-03-11 RX ORDER — ALBUTEROL SULFATE 2.5 MG/3ML
2.5 SOLUTION RESPIRATORY (INHALATION) EVERY 6 HOURS PRN
Status: DISCONTINUED | OUTPATIENT
Start: 2018-03-11 | End: 2018-03-13 | Stop reason: HOSPADM

## 2018-03-11 RX ORDER — ALBUTEROL SULFATE 90 UG/1
2 AEROSOL, METERED RESPIRATORY (INHALATION) EVERY 6 HOURS PRN
Status: DISCONTINUED | OUTPATIENT
Start: 2018-03-11 | End: 2018-03-13 | Stop reason: HOSPADM

## 2018-03-11 RX ORDER — METHYLPREDNISOLONE SODIUM SUCCINATE 40 MG/ML
40 INJECTION, POWDER, LYOPHILIZED, FOR SOLUTION INTRAMUSCULAR; INTRAVENOUS EVERY 6 HOURS
Status: DISCONTINUED | OUTPATIENT
Start: 2018-03-12 | End: 2018-03-12

## 2018-03-11 RX ORDER — SODIUM CHLORIDE 0.9 % (FLUSH) 0.9 %
10 SYRINGE (ML) INJECTION PRN
Status: DISCONTINUED | OUTPATIENT
Start: 2018-03-11 | End: 2018-03-13 | Stop reason: HOSPADM

## 2018-03-11 RX ORDER — BUSPIRONE HYDROCHLORIDE 5 MG/1
5 TABLET ORAL 2 TIMES DAILY
Status: DISCONTINUED | OUTPATIENT
Start: 2018-03-11 | End: 2018-03-13 | Stop reason: HOSPADM

## 2018-03-11 RX ADMIN — IOPAMIDOL 80 ML: 755 INJECTION, SOLUTION INTRAVENOUS at 18:42

## 2018-03-11 RX ADMIN — ALBUTEROL SULFATE 2.5 MG: 2.5 SOLUTION RESPIRATORY (INHALATION) at 17:37

## 2018-03-11 RX ADMIN — METHYLPREDNISOLONE SODIUM SUCCINATE 125 MG: 125 INJECTION, POWDER, FOR SOLUTION INTRAMUSCULAR; INTRAVENOUS at 17:47

## 2018-03-11 RX ADMIN — BUSPIRONE HYDROCHLORIDE 5 MG: 5 TABLET ORAL at 23:33

## 2018-03-11 RX ADMIN — LOSARTAN POTASSIUM 50 MG: 50 TABLET, FILM COATED ORAL at 21:12

## 2018-03-11 RX ADMIN — ASPIRIN 81 MG: 81 TABLET, COATED ORAL at 23:33

## 2018-03-11 ASSESSMENT — ENCOUNTER SYMPTOMS
SHORTNESS OF BREATH: 1
COUGH: 0
WHEEZING: 0
DIARRHEA: 0
RHINORRHEA: 0
ABDOMINAL PAIN: 0
NAUSEA: 0
SORE THROAT: 0
VOMITING: 0
EYE PAIN: 0
BACK PAIN: 0
EYE DISCHARGE: 0

## 2018-03-11 NOTE — ED NOTES
Patient presents to ED with daughter. Sung states she is very short of breath and it started yesterday. Snug states she wears 3L 02 all the time except for her portbale O2 is a 2L because it burns her nose.  Patient to room and hooked up to monitor and is 74% and turned up to 5 L O2. sung states she is very short of breath and respiratory called      Rocky Jenkins RN  03/11/18 [id number]

## 2018-03-11 NOTE — ED PROVIDER NOTES
[facility]  eMERGENCY dEPARTMENT eNCOUnter          CHIEF COMPLAINT       Chief Complaint   Patient presents with    Shortness of Breath       Nurses Notes reviewed and I agree except as noted in the HPI. HISTORY OF PRESENT ILLNESS    Rosemarie Rhodes is a 79 y.o. female who presents to the Emergency Department for the evaluation of shortness of breath. The patient states she has been having shortness of  breath for a week. The patient states she had an appointment with her PCP march 5, 2018 who diagnosed her with sinusitis and was prescribed amoxicillin and informed her she was pre-daibetic. The patient states her shortness of breath has been worsening. She states she has COPD. She states she takes 3L of oxygen continuously at home. She states prior to arrival to the ED she took albuterol that did not improve her shortness of breath. She states she is being treated by a pulmonologist. Family states today the patient was walking to the bathroom where she had weakness of her upper and lower extremities. She denies having leg edema, chest pain, abdominal pain, diaphoresis, or back pain. The HPI was provided by the patient. REVIEW OF SYSTEMS     Review of Systems   Constitutional: Negative for appetite change, chills, fatigue and fever. HENT: Negative for congestion, ear pain, rhinorrhea and sore throat. Eyes: Negative for pain, discharge and visual disturbance. Respiratory: Positive for shortness of breath. Negative for cough and wheezing. Cardiovascular: Negative for chest pain, palpitations and leg swelling. Gastrointestinal: Negative for abdominal pain, diarrhea, nausea and vomiting. Genitourinary: Negative for difficulty urinating, dysuria and vaginal discharge. Musculoskeletal: Negative for arthralgias, back pain, joint swelling and neck pain. Skin: Negative for pallor and rash. Neurological: Positive for weakness (upper and lower ).  Negative for dizziness, syncope, omeprazole (PRILOSEC) 40 MG capsule Take 1 capsule by mouth daily  Qty: 30 capsule, Refills: 1      fluticasone (FLONASE) 50 MCG/ACT nasal spray 1 spray by Nasal route 2 times daily  Qty: 3 Bottle, Refills: 1      busPIRone (BUSPAR) 5 MG tablet Take 1 tablet by mouth 2 times daily  Qty: 60 tablet, Refills: 3      pseudoephedrine (SUDAFED) 120 MG CR tablet Take 120 mg by mouth every 12 hours as needed       aspirin 81 MG EC tablet Take 81 mg by mouth daily. fluticasone-salmeterol (ADVAIR DISKUS) 250-50 MCG/DOSE AEPB Inhale 1 puff into the lungs every 12 hours  Qty: 60 each, Refills: 5      Respiratory Therapy Supplies (NEBULIZER COMPRESSOR) KIT 1 kit by Does not apply route once for 1 dose  Qty: 1 kit, Refills: 0      OXYGEN   Inhale into the lungs 3 lpm, per patient at home and when walking      Handicap Placard MISC by Does not apply route. Qty: 1 each, Refills: 0      Nebulizer MISC by Does not apply route. Qty: 1 each, Refills: 0    Comments: Nebulizer supplies             ALLERGIES     is allergic to chantix [varenicline tartrate]; codeine; lipitor; simvastatin; and tiotropium bromide monohydrate. FAMILY HISTORY     indicated that her mother is . She indicated that her father is . She indicated that the status of her sister is unknown. She indicated that the status of her brother is unknown. She indicated that the status of her daughter is unknown.    family history includes Alzheimer's Disease in her father; COPD in her sister; Cancer in her brother and daughter; Heart Disease in her father and mother; Heart Failure in her father and mother; Parkinsonism in her father; Stroke in her brother. SOCIAL HISTORY      reports that she quit smoking about 7 years ago. Her smoking use included Cigarettes. She has a 80.00 pack-year smoking history. She has never used smokeless tobacco. She reports that she does not drink alcohol or use drugs.     PHYSICAL EXAM     INITIAL VITALS:  height is

## 2018-03-12 LAB
ALLEN TEST: POSITIVE
ANION GAP SERPL CALCULATED.3IONS-SCNC: 9 MEQ/L (ref 8–16)
BASE EXCESS (CALCULATED): 15 MMOL/L (ref -2.5–2.5)
BASOPHILS # BLD: 0.1 %
BASOPHILS ABSOLUTE: 0 THOU/MM3 (ref 0–0.1)
BUN BLDV-MCNC: 13 MG/DL (ref 7–22)
CALCIUM SERPL-MCNC: 9.6 MG/DL (ref 8.5–10.5)
CHLORIDE BLD-SCNC: 89 MEQ/L (ref 98–111)
CO2: 41 MEQ/L (ref 23–33)
COLLECTED BY:: ABNORMAL
CREAT SERPL-MCNC: 0.5 MG/DL (ref 0.4–1.2)
DEVICE: ABNORMAL
EOSINOPHIL # BLD: 0.1 %
EOSINOPHILS ABSOLUTE: 0 THOU/MM3 (ref 0–0.4)
GFR SERPL CREATININE-BSD FRML MDRD: > 90 ML/MIN/1.73M2
GLUCOSE BLD-MCNC: 150 MG/DL (ref 70–108)
GLUCOSE BLD-MCNC: 165 MG/DL (ref 70–108)
GLUCOSE BLD-MCNC: 198 MG/DL (ref 70–108)
GLUCOSE BLD-MCNC: 199 MG/DL (ref 70–108)
GLUCOSE BLD-MCNC: 213 MG/DL (ref 70–108)
HCO3: 45 MMOL/L (ref 23–28)
HCT VFR BLD CALC: 33.1 % (ref 37–47)
HEMOGLOBIN: 11 GM/DL (ref 12–16)
IFIO2: 35
LYMPHOCYTES # BLD: 12 %
LYMPHOCYTES ABSOLUTE: 1 THOU/MM3 (ref 1–4.8)
MCH RBC QN AUTO: 29.7 PG (ref 27–31)
MCHC RBC AUTO-ENTMCNC: 33.1 GM/DL (ref 33–37)
MCV RBC AUTO: 89.8 FL (ref 81–99)
MODE: ABNORMAL
MONOCYTES # BLD: 1.6 %
MONOCYTES ABSOLUTE: 0.1 THOU/MM3 (ref 0.4–1.3)
NUCLEATED RED BLOOD CELLS: 0 /100 WBC
O2 SATURATION: 92 %
PCO2: 85 MMHG (ref 35–45)
PDW BLD-RTO: 13.5 % (ref 11.5–14.5)
PH BLOOD GAS: 7.33 (ref 7.35–7.45)
PLATELET # BLD: 256 THOU/MM3 (ref 130–400)
PMV BLD AUTO: 8.9 FL (ref 7.4–10.4)
PO2: 74 MMHG (ref 71–104)
POTASSIUM REFLEX MAGNESIUM: 5.1 MEQ/L (ref 3.5–5.2)
RBC # BLD: 3.69 MILL/MM3 (ref 4.2–5.4)
SEG NEUTROPHILS: 86.2 %
SEGMENTED NEUTROPHILS ABSOLUTE COUNT: 7.3 THOU/MM3 (ref 1.8–7.7)
SET PEEP: 6 MMHG
SET PRESS SUPP: 12 CMH2O
SODIUM BLD-SCNC: 139 MEQ/L (ref 135–145)
SOURCE, BLOOD GAS: ABNORMAL
TROPONIN T: < 0.01 NG/ML
WBC # BLD: 8.5 THOU/MM3 (ref 4.8–10.8)

## 2018-03-12 PROCEDURE — 84484 ASSAY OF TROPONIN QUANT: CPT

## 2018-03-12 PROCEDURE — 6370000000 HC RX 637 (ALT 250 FOR IP): Performed by: INTERNAL MEDICINE

## 2018-03-12 PROCEDURE — 94640 AIRWAY INHALATION TREATMENT: CPT

## 2018-03-12 PROCEDURE — 99233 SBSQ HOSP IP/OBS HIGH 50: CPT | Performed by: INTERNAL MEDICINE

## 2018-03-12 PROCEDURE — 36415 COLL VENOUS BLD VENIPUNCTURE: CPT

## 2018-03-12 PROCEDURE — 6370000000 HC RX 637 (ALT 250 FOR IP): Performed by: FAMILY MEDICINE

## 2018-03-12 PROCEDURE — 85025 COMPLETE CBC W/AUTO DIFF WBC: CPT

## 2018-03-12 PROCEDURE — 6360000002 HC RX W HCPCS: Performed by: FAMILY MEDICINE

## 2018-03-12 PROCEDURE — 80048 BASIC METABOLIC PNL TOTAL CA: CPT

## 2018-03-12 PROCEDURE — 2580000003 HC RX 258: Performed by: FAMILY MEDICINE

## 2018-03-12 PROCEDURE — 82803 BLOOD GASES ANY COMBINATION: CPT

## 2018-03-12 PROCEDURE — 82948 REAGENT STRIP/BLOOD GLUCOSE: CPT

## 2018-03-12 PROCEDURE — 2140000000 HC CCU INTERMEDIATE R&B

## 2018-03-12 PROCEDURE — 99223 1ST HOSP IP/OBS HIGH 75: CPT | Performed by: INTERNAL MEDICINE

## 2018-03-12 PROCEDURE — 36600 WITHDRAWAL OF ARTERIAL BLOOD: CPT

## 2018-03-12 PROCEDURE — 93010 ELECTROCARDIOGRAM REPORT: CPT | Performed by: INTERNAL MEDICINE

## 2018-03-12 PROCEDURE — 94660 CPAP INITIATION&MGMT: CPT

## 2018-03-12 PROCEDURE — 2700000000 HC OXYGEN THERAPY PER DAY

## 2018-03-12 RX ORDER — METHYLPREDNISOLONE SODIUM SUCCINATE 40 MG/ML
40 INJECTION, POWDER, LYOPHILIZED, FOR SOLUTION INTRAMUSCULAR; INTRAVENOUS EVERY 12 HOURS
Status: DISCONTINUED | OUTPATIENT
Start: 2018-03-13 | End: 2018-03-13 | Stop reason: HOSPADM

## 2018-03-12 RX ORDER — DEXTROSE MONOHYDRATE 25 G/50ML
12.5 INJECTION, SOLUTION INTRAVENOUS PRN
Status: DISCONTINUED | OUTPATIENT
Start: 2018-03-12 | End: 2018-03-13 | Stop reason: HOSPADM

## 2018-03-12 RX ORDER — DEXTROSE AND SODIUM CHLORIDE 5; .9 G/100ML; G/100ML
100 INJECTION, SOLUTION INTRAVENOUS PRN
Status: DISCONTINUED | OUTPATIENT
Start: 2018-03-12 | End: 2018-03-13 | Stop reason: HOSPADM

## 2018-03-12 RX ORDER — NICOTINE POLACRILEX 4 MG
15 LOZENGE BUCCAL PRN
Status: DISCONTINUED | OUTPATIENT
Start: 2018-03-12 | End: 2018-03-13 | Stop reason: HOSPADM

## 2018-03-12 RX ADMIN — INSULIN LISPRO 1 UNITS: 100 INJECTION, SOLUTION INTRAVENOUS; SUBCUTANEOUS at 20:48

## 2018-03-12 RX ADMIN — BUSPIRONE HYDROCHLORIDE 5 MG: 5 TABLET ORAL at 20:46

## 2018-03-12 RX ADMIN — PANTOPRAZOLE SODIUM 40 MG: 40 TABLET, DELAYED RELEASE ORAL at 09:32

## 2018-03-12 RX ADMIN — ACETAMINOPHEN 650 MG: 325 TABLET ORAL at 06:06

## 2018-03-12 RX ADMIN — LOSARTAN POTASSIUM 50 MG: 50 TABLET, FILM COATED ORAL at 09:32

## 2018-03-12 RX ADMIN — Medication 2 PUFF: at 09:53

## 2018-03-12 RX ADMIN — BUSPIRONE HYDROCHLORIDE 5 MG: 5 TABLET ORAL at 09:32

## 2018-03-12 RX ADMIN — METHYLPREDNISOLONE SODIUM SUCCINATE 40 MG: 40 INJECTION, POWDER, FOR SOLUTION INTRAMUSCULAR; INTRAVENOUS at 05:36

## 2018-03-12 RX ADMIN — Medication 10 ML: at 20:46

## 2018-03-12 RX ADMIN — Medication 10 ML: at 09:32

## 2018-03-12 RX ADMIN — ASPIRIN 81 MG: 81 TABLET, COATED ORAL at 09:32

## 2018-03-12 RX ADMIN — METHYLPREDNISOLONE SODIUM SUCCINATE 40 MG: 40 INJECTION, POWDER, FOR SOLUTION INTRAMUSCULAR; INTRAVENOUS at 12:17

## 2018-03-12 ASSESSMENT — PAIN SCALES - GENERAL: PAINLEVEL_OUTOF10: 2

## 2018-03-12 NOTE — PROGRESS NOTES
Skin color, texture, turgor normal.  No rashes or lesions. Neurologic:  Neurovascularly intact without any focal sensory/motor deficits. Cranial nerves: II-XII intact, grossly non-focal.  Psychiatric: Alert and oriented, thought content appropriate, normal insight  Capillary Refill: Brisk,< 3 seconds   Peripheral Pulses: +2 palpable, equal bilaterally       Labs:   Recent Labs      03/11/18   1725  03/12/18   0447   WBC  10.3  8.5   HGB  11.5*  11.0*   HCT  36.7*  33.1*   PLT  283  256     Recent Labs      03/11/18   1725  03/12/18   0447   NA  141  139   K  3.9  5.1   CL  90*  89*   CO2  45*  41*   BUN  8  13   CREATININE  0.5  0.5   CALCIUM  9.8  9.6     No results for input(s): AST, ALT, BILIDIR, BILITOT, ALKPHOS in the last 72 hours. No results for input(s): INR in the last 72 hours. Recent Labs      03/11/18   2125   CKTOTAL  85       Urinalysis:      Lab Results   Component Value Date    BLOODU neg 04/03/2013    BLOODU NEGATIVE 03/31/2012    SPECGRAV 1.015 04/03/2013    SPECGRAV 1.015 03/31/2012    GLUCOSEU neg 04/03/2013       Radiology:  CTA Chest W WO Contrast   Final Result   1. No pulmonary emboli are seen. 2. Mild bibasilar atelectatic/fibrotic stranding. Mild chronic mediastinal and bilateral hilar adenopathy, likely postinflammatory and unchanged from prior study               **This report has been created using voice recognition software. It may contain minor errors which are inherent in voice recognition technology. **          Final report electronically signed by Dr. Anthony Blanchard on 3/11/2018 6:59 PM      XR CHEST PORTABLE   Final Result   1. Mild cardiac megaly. Fibroemphysematous lungs. 2. Questionable tiny effusion right side. Minimal atelectasis/pneumonia right lung base. **This report has been created using voice recognition software. It may contain minor errors which are inherent in voice recognition technology. **      Final report electronically signed by Dr. Nura Storm

## 2018-03-12 NOTE — CONSULTS
[place] [facility]    Patient - Kaylee Reed   MRN -  [id number]   [facility]   - 1950      Date of Admission -  3/11/2018  5:20 PM  Date of evaluation -  3/12/2018  Room - 3B-35/035-A   Hospital Day - [facility], DO Primary Care Physician - Brendan Arteaga MD   Chief Complaint   SOB  Active Hospital Problem List      Active Hospital Problems    Diagnosis Date Noted    COPD exacerbation [facility]) [J44.1] 2018    Acute respiratory failure ([facility] [J96.00] 2018    Dyspnea [R06.00] 2013    Diabetes type 2, controlled ([facility] [E11.9]      HPI   Kaylee Reed is a 79 y.o. female admitted for SOB. PMH significant for CAD, CVA, CHF, COPD on 3 LPM ATC, DDD, GERD, HLD, HTN, PETE with CPAP. Patient reports that she has had increased SOB x1 week prior to admission. Berhane Olivera reports that she was evaluated by her PCP on 3/5/18 and diagnosed with sinusitis and prescribed amoxicillin. She is a former patient of Dr. Emiliana Lopez. Patient has a history of non-compliance with CPAP reports she has not been wearing it recently last appointment for PETE in 2016.    Past Medical History         Diagnosis Date    Bladder dysfunction     CAD (coronary artery disease)     Cerebrovascular disease     CHF (congestive heart failure) (HCC)     COPD (chronic obstructive pulmonary disease) (HCC)     uses oxygen 24 hours per day    DDD (degenerative disc disease)     Depression     GERD (gastroesophageal reflux disease)     Hyperlipidemia     Hypertension     Obesity     Other screening mammogram     Pap smear, as part of routine gynecological examination     Pap smear, as part of routine gynecological examination     Peripheral edema     with ascites    PONV (postoperative nausea and vomiting)     Unspecified sleep apnea     c-pap at 10 cm wtr      Past Surgical History           Procedure Laterality Date    person, place, and time. Skin: Warm and dry. Labs   ABG  Lab Results   Component Value Date    PH 7.33 03/12/2018    PO2 74 03/12/2018    PCO2 85 03/12/2018    HCO3 45 03/12/2018    O2SAT 92 03/12/2018     Lab Results   Component Value Date    IFIO2 35 03/12/2018    MODE CPAP/PS 03/12/2018    SETPEEP 6.0 03/12/2018     CBC  Recent Labs      03/11/18   1725  03/12/18   0447   WBC  10.3  8.5   RBC  4.05*  3.69*   HGB  11.5*  11.0*   HCT  36.7*  33.1*   MCV  90.6  89.8   MCH  28.5  29.7   MCHC  31.4*  33.1   RDW  13.9  13.5   PLT  283  256   MPV  8.5  8.9      BMP  Recent Labs      03/11/18   1725  03/12/18   0447   NA  141  139   K  3.9  5.1   CL  90*  89*   CO2  45*  41*   BUN  8  13   CREATININE  0.5  0.5   GLUCOSE  125*  165*   CALCIUM  9.8  9.6     LFT  Recent Labs      03/11/18   2125   LIPASE  13.2     TROP  Lab Results   Component Value Date    TROPONINT < 0.010 03/12/2018    TROPONINT < 0.010 03/11/2018    TROPONINT < 0.010 03/11/2018     BNP  Lab Results   Component Value Date    PROBNP 145.7 03/11/2018    PROBNP 158.5 07/08/2014     D-Dimer  Lab Results   Component Value Date    DDIMER 705.00 03/11/2018     Lactic Acid  Recent Labs      03/11/18   2125   LACTA  1.4     INR  No results for input(s): INR, PROTIME in the last 72 hours. PTT  No results for input(s): APTT in the last 72 hours. Glucose  Recent Labs      03/12/18   0615  03/12/18   1058   POCGLU  150*  213*     UA No results for input(s): SPECGRAV, PHUR, COLORU, CLARITYU, MUCUS, PROTEINU, BLOODU, RBCUA, WBCUA, BACTERIA, NITRU, GLUCOSEU, BILIRUBINUR, UROBILINOGEN, KETUA, LABCAST, LABCASTTY, AMORPHOS in the last 72 hours. Invalid input(s): CRYSTALS. PFTs                 Echo                      Cultures    Procalcitonin  Lab Results   Component Value Date    PROCAL 0.02 03/11/2018      Rapid Flu A/B-Negative  Radiology    CXR  3/11/2018   1. Mild cardiac megaly. Fibroemphysematous lungs. 2. Questionable tiny effusion right side.  Minimal

## 2018-03-12 NOTE — H&P
History & Physical    Patient:  Lan Magaña  YOB: 1950  Date of Service: 3/11/2018  MRN: [id number]   Acct:  [de-identified]   Primary Care Physician: Tangela Palacios MD    Chief Complaint: Shortness    History of Present Illness:   History obtained from chart review and the patient. The patient is a 79 y.o. female with CAD, CVA, CHF, COPD, DDD, Depression; GERD, Hyperlipidemia; Hypertension; Obesity; Sleep Apnea who presents to the Emergency Department for the evaluation of shortness of breath that has worsened in the past 1 week. The relates to associated cough and fatigue. She denies sick contacts. The patient  states she is on 3L of oxygen continuously at home. She states  she took albuterol treatment at home today but this did not improve her shortness of breath. She denies fever, chest pain, abdominal pain, diaphoresis,nausea, vomiting, dysuria  or back pain. CXR shows COPD. CTA chest was negative for PE/ pneumonia. Patient states she no longer smokes cigarettes. She denies alcohol or illicit drug use. Patient is being admitted for further care.       Past Medical History:        Diagnosis Date    Bladder dysfunction     CAD (coronary artery disease)     Cerebrovascular disease     CHF (congestive heart failure) (HCC)     COPD (chronic obstructive pulmonary disease) (HCC)     uses oxygen 24 hours per day    DDD (degenerative disc disease)     Depression     GERD (gastroesophageal reflux disease)     Hyperlipidemia     Hypertension     Obesity     Other screening mammogram 2011    Pap smear, as part of routine gynecological examination 2007    Pap smear, as part of routine gynecological examination 2007    Peripheral edema     with ascites    PONV (postoperative nausea and vomiting)     Unspecified sleep apnea     c-pap at 10 cm wtr       Past Surgical History:        Procedure Laterality Date    COLONOSCOPY  7-2009    DILATION AND CURETTAGE OF UTERUS  1970's Vitals:   Vitals:    03/11/18 1925   BP: (!) 175/62   Pulse: 86   Resp: 24   Temp:    SpO2: 96%      BMI: Body mass index is 28.17 kg/m².                 Exam:  Physical Examination: General appearance - alert, ill appearing, and in some distress  Mental status - alert, oriented to person, place, and time  Neck - supple, no significant adenopathy, no JVD, or carotid bruits  Chest - diminished aeration to auscultation, no wheezes, rales or rhonchi, symmetric air entry  Heart - normal rate, regular rhythm, normal S1, S2, no murmurs, rubs, clicks or gallops  Abdomen - soft, nontender, nondistended, no masses or organomegaly  Neurological - alert, oriented, normal speech, no focal findings or movement disorder noted  Musculoskeletal - no joint tenderness, deformity or swelling  Extremities - peripheral pulses normal, no pedal edema, no clubbing or cyanosis  Skin - normal coloration and turgor, no rashes, no suspicious skin lesions noted      Review of Labs and Diagnostic Testing:    Recent Results (from the past 24 hour(s))   EKG 12 Lead    Collection Time: 03/11/18  5:21 PM   Result Value Ref Range    Ventricular Rate 99 BPM    Atrial Rate 99 BPM    P-R Interval 176 ms    QRS Duration 94 ms    Q-T Interval 350 ms    QTc Calculation (Bazett) 449 ms    P Axis 72 degrees    R Axis 48 degrees    T Axis 79 degrees   CBC auto differential    Collection Time: 03/11/18  5:25 PM   Result Value Ref Range    WBC 10.3 4.8 - 10.8 thou/mm3    RBC 4.05 (L) 4.20 - 5.40 mill/mm3    Hemoglobin 11.5 (L) 12.0 - 16.0 gm/dl    Hematocrit 36.7 (L) 37.0 - 47.0 %    MCV 90.6 81.0 - 99.0 fL    MCH 28.5 27.0 - 31.0 pg    MCHC 31.4 (L) 33.0 - 37.0 gm/dl    RDW 13.9 11.5 - 14.5 %    Platelets 562 845 - 366 thou/mm3    MPV 8.5 7.4 - 10.4 fL    Seg Neutrophils 58.1 %    Lymphocytes 26.6 %    Monocytes 11.0 %    Eosinophils 4.0 %    Basophils 0.3 %    nRBC 0 /100 wbc    Segs Absolute 6.0 1.8 - 7.7 thou/mm3    Lymphocytes # 2.7 1.0 - 4.8 thou/mm3 Nursing Facility                                                                         [] [address] [place] [address],[address]          Electronically signed by Ivon Mckeon DO on 3/11/2018 at 8:10 PM

## 2018-03-13 VITALS
SYSTOLIC BLOOD PRESSURE: 158 MMHG | BODY MASS INDEX: 28.96 KG/M2 | HEART RATE: 96 BPM | TEMPERATURE: 98.6 F | HEIGHT: 62 IN | RESPIRATION RATE: 18 BRPM | WEIGHT: 157.38 LBS | OXYGEN SATURATION: 91 % | DIASTOLIC BLOOD PRESSURE: 70 MMHG

## 2018-03-13 LAB
ALLEN TEST: POSITIVE
BASE EXCESS (CALCULATED): 17.3 MMOL/L (ref -2.5–2.5)
COLLECTED BY:: ABNORMAL
DEVICE: ABNORMAL
GLUCOSE BLD-MCNC: 155 MG/DL (ref 70–108)
GLUCOSE BLD-MCNC: 167 MG/DL (ref 70–108)
GLUCOSE BLD-MCNC: 181 MG/DL (ref 70–108)
HCO3: 45 MMOL/L (ref 23–28)
IFIO2: 30
MODE: ABNORMAL
O2 SATURATION: 92 %
PCO2: 72 MMHG (ref 35–45)
PH BLOOD GAS: 7.41 (ref 7.35–7.45)
PO2: 67 MMHG (ref 71–104)
SET PEEP: 6 MMHG
SET PRESS SUPP: 12 CMH2O
SOURCE, BLOOD GAS: ABNORMAL

## 2018-03-13 PROCEDURE — 6360000002 HC RX W HCPCS: Performed by: NURSE PRACTITIONER

## 2018-03-13 PROCEDURE — 36600 WITHDRAWAL OF ARTERIAL BLOOD: CPT

## 2018-03-13 PROCEDURE — 99238 HOSP IP/OBS DSCHRG MGMT 30/<: CPT | Performed by: INTERNAL MEDICINE

## 2018-03-13 PROCEDURE — 6370000000 HC RX 637 (ALT 250 FOR IP): Performed by: FAMILY MEDICINE

## 2018-03-13 PROCEDURE — 82803 BLOOD GASES ANY COMBINATION: CPT

## 2018-03-13 PROCEDURE — 6360000002 HC RX W HCPCS: Performed by: FAMILY MEDICINE

## 2018-03-13 PROCEDURE — 94640 AIRWAY INHALATION TREATMENT: CPT

## 2018-03-13 PROCEDURE — 99232 SBSQ HOSP IP/OBS MODERATE 35: CPT | Performed by: INTERNAL MEDICINE

## 2018-03-13 PROCEDURE — 94660 CPAP INITIATION&MGMT: CPT

## 2018-03-13 PROCEDURE — 2700000000 HC OXYGEN THERAPY PER DAY

## 2018-03-13 PROCEDURE — 2580000003 HC RX 258: Performed by: FAMILY MEDICINE

## 2018-03-13 PROCEDURE — 82948 REAGENT STRIP/BLOOD GLUCOSE: CPT

## 2018-03-13 RX ORDER — PREDNISONE 20 MG/1
40 TABLET ORAL DAILY
Qty: 14 TABLET | Refills: 0 | Status: SHIPPED | OUTPATIENT
Start: 2018-03-13 | End: 2018-03-20

## 2018-03-13 RX ADMIN — Medication 10 ML: at 09:04

## 2018-03-13 RX ADMIN — Medication 1 UNITS: at 11:54

## 2018-03-13 RX ADMIN — METHYLPREDNISOLONE SODIUM SUCCINATE 40 MG: 40 INJECTION, POWDER, FOR SOLUTION INTRAMUSCULAR; INTRAVENOUS at 00:13

## 2018-03-13 RX ADMIN — ALBUTEROL SULFATE 2.5 MG: 2.5 SOLUTION RESPIRATORY (INHALATION) at 06:04

## 2018-03-13 RX ADMIN — ASPIRIN 81 MG: 81 TABLET, COATED ORAL at 08:39

## 2018-03-13 RX ADMIN — METHYLPREDNISOLONE SODIUM SUCCINATE 40 MG: 40 INJECTION, POWDER, FOR SOLUTION INTRAMUSCULAR; INTRAVENOUS at 11:57

## 2018-03-13 RX ADMIN — LOSARTAN POTASSIUM 50 MG: 50 TABLET, FILM COATED ORAL at 08:39

## 2018-03-13 RX ADMIN — BUSPIRONE HYDROCHLORIDE 5 MG: 5 TABLET ORAL at 08:39

## 2018-03-13 RX ADMIN — Medication 1 UNITS: at 07:43

## 2018-03-13 RX ADMIN — PANTOPRAZOLE SODIUM 40 MG: 40 TABLET, DELAYED RELEASE ORAL at 07:33

## 2018-03-13 RX ADMIN — Medication 1 UNITS: at 16:53

## 2018-03-13 NOTE — PLAN OF CARE
Problem: Impaired respiratory status  Goal: Clear lung sounds  Outcome: Ongoing  Resting on bipap at this time

## 2018-03-13 NOTE — DISCHARGE SUMMARY
every 12 hours             Handicap Placard MISC  by Does not apply route. ibuprofen (ADVIL;MOTRIN) 400 MG tablet  Take 1 tablet by mouth every 6 hours as needed for Pain             losartan (COZAAR) 50 MG tablet               Nebulizer MISC  by Does not apply route. omeprazole (PRILOSEC) 40 MG capsule  Take 1 capsule by mouth daily             OXYGEN    Inhale into the lungs 3 lpm, per patient at home and when walking             predniSONE (DELTASONE) 20 MG tablet  Take 2 tablets by mouth daily for 7 days Prednisone 40mg po daily for 14days ( 2 weeks). Please take after food. pseudoephedrine (SUDAFED) 120 MG CR tablet  Take 120 mg by mouth every 12 hours as needed              Respiratory Therapy Supplies (NEBULIZER COMPRESSOR) KIT  1 kit by Does not apply route once for 1 dose             trimethoprim-polymyxin b (POLYTRIM) 30519-1.1 UNIT/ML-% ophthalmic solution  Place 2 drops into both eyes every 4 hours While awake                 Time Spent on discharge is more than 20 minutes in the examination, evaluation, counseling and review of medications and discharge plan. Signed: Thank you Valeria Ledezma MD for the opportunity to be involved in this patient's care.     Electronically signed by Vlad Del Rio DO on 3/13/2018 at 3:33 PM

## 2018-03-13 NOTE — PROGRESS NOTES
[place] [facility] Pul[facility], Critical Care and Sleep Medicine    Patient - Yosi Sherman   MRN -  [id number]   [facility]   - 1950      Date of Admission -  3/11/2018  5:20 PM  Date of evaluation -  3/13/2018  Room - -/[facility]-A   Hospital Day - [facility] 117, DO Primary Care Physician - Galileo Deleon MD   Chief Complaint   Shortness of breath   [address] Problems    Diagnosis Date Noted    Acute on chronic respiratory failure with hypercapnia ([address] [J96.22]     COPD exacerbation ([address] [J44.1] 2018    Acute respiratory failure ([address] [J96.00] 2018    Dyspnea [R06.00] 2013    Diabetes type 2, controlled ([address] [E11.9]      HPI   Yosi Sherman is a 79 y.o. female admitted for SOB. PMH significant for CAD, CVA, CHF, COPD on 3 LPM ATC, DDD, GERD, HLD, HTN, PETE with CPAP. Patient reports that she has had increased SOB x1 week prior to admission. Reyes Peels reports that she was evaluated by her PCP on 3/5/18 and diagnosed with sinusitis and prescribed amoxicillin. She is a former patient of Dr. Felix Garcia. Patient has a history of non-compliance with CPAP reports she has not been wearing it recently last appointment for PETE in 2016.    Past 24 Hours    No SOB  OOB, ambulated in awad without SORTO  At baseline O2  Afebrile    Meds    Current Medications    methylPREDNISolone  40 mg Intravenous Q12H    insulin lispro  0-6 Units Subcutaneous TID WC    insulin lispro  0-3 Units Subcutaneous Nightly    aspirin  81 mg Oral Daily    busPIRone  5 mg Oral BID    fluticasone  1 spray Nasal Daily    losartan  50 mg Oral Daily    sodium chloride flush  10 mL Intravenous 2 times per day    enoxaparin  40 mg Subcutaneous Q24H    pantoprazole  40 mg Oral QAM AC     glucose, dextrose, glucagon (rDNA), dextrose 5 % and 0.9 % NaCl, albuterol sulfate HFA, albuterol, sodium chloride flush, acetaminophen, magnesium hydroxide, ondansetron  IV Drips/Infusions   dextrose 5 % and 0.9 % NaCl       Vitals    Vitals    height is 5' 2\" (1.575 m) and weight is 157 lb 6 oz (71.4 kg). Her oral temperature is 98.6 °F (37 °C). Her blood pressure is 178/75 (abnormal) and her pulse is 96. Her respiration is 18 and oxygen saturation is 91%. O2 Flow Rate (L/min): 2 L/min  I/O    Intake/Output Summary (Last 24 hours) at 03/13/18 1559  Last data filed at 03/13/18 1425   Gross per 24 hour   Intake              780 ml   Output                0 ml   Net              780 ml     Patient Vitals for the past 96 hrs (Last 3 readings):   Weight   03/12/18 0539 157 lb 6 oz (71.4 kg)   03/11/18 2040 158 lb 5 oz (71.8 kg)   03/11/18 1731 154 lb (69.9 kg)     Exam   Physical Exam   Constitutional: No distress on O2 per NC. Mouth/Throat: Oropharynx is clear and moist.  No oral thrush. Eyes: Conjunctivae are normal. Pupils are equal, round   Neck: Neck supple. No tracheal deviation present. Cardiovascular: Regular rate, regular rhythm, S1 and S2 with no murmur. No peripheral edema  Pulmonary/Chest: Normal effort with clear breath sounds. No stridor. No respiratory distress. Patient exhibits no tenderness. Abdominal: Soft. Bowel sounds audible. No distension or tenderness to palp. Neurological: Patient is alert and oriented to person, place, and time. Skin: Warm and dry.    Labs   ABG  Lab Results   Component Value Date    PH 7.41 03/13/2018    PO2 67 03/13/2018    PCO2 72 03/13/2018    HCO3 45 03/13/2018    O2SAT 92 03/13/2018     Lab Results   Component Value Date    IFIO2 30 03/13/2018    MODE CPAP/PS 03/13/2018    SETPEEP 6.0 03/13/2018     CBC  Recent Labs      03/11/18   1725  03/12/18   0447   WBC  10.3  8.5   RBC  4.05*  3.69*   HGB  11.5*  11.0*   HCT  36.7*  33.1*   MCV  90.6  89.8   MCH  28.5  29.7   MCHC  31.4*  33.1   RDW  13.9  13.5   PLT  283  256   MPV  8.5  8.9      BMP  Recent Labs      03/11/18   1725  03/12/18   0447   NA  141  139   K plan.    Electronically signed by     Ashlee Uribe MD on 3/13/2018 at 5:13 PM

## 2018-03-13 NOTE — PROGRESS NOTES
Patient was not willing to participate in COPD program. I did some education regarding COPD and staying in contact with physicians when a exacerbation occurs.

## 2018-03-13 NOTE — PROGRESS NOTES
non-tender, non-distended with normal bowel sounds. Musculoskeletal: passive and active ROM x 4 extremities. Skin: Skin color, texture, turgor normal.  No rashes or lesions. Neurologic:  Neurovascularly intact without any focal sensory/motor deficits. Cranial nerves: II-XII intact, grossly non-focal.  Psychiatric: Alert and oriented, thought content appropriate, normal insight  Capillary Refill: Brisk,< 3 seconds   Peripheral Pulses: +2 palpable, equal bilaterally       Labs:   Recent Labs      03/11/18   1725  03/12/18   0447   WBC  10.3  8.5   HGB  11.5*  11.0*   HCT  36.7*  33.1*   PLT  283  256     Recent Labs      03/11/18   1725  03/12/18   0447   NA  141  139   K  3.9  5.1   CL  90*  89*   CO2  45*  41*   BUN  8  13   CREATININE  0.5  0.5   CALCIUM  9.8  9.6     No results for input(s): AST, ALT, BILIDIR, BILITOT, ALKPHOS in the last 72 hours. No results for input(s): INR in the last 72 hours. Recent Labs      03/11/18   2125   CKTOTAL  85       Urinalysis:      Lab Results   Component Value Date    BLOODU neg 04/03/2013    BLOODU NEGATIVE 03/31/2012    SPECGRAV 1.015 04/03/2013    SPECGRAV 1.015 03/31/2012    GLUCOSEU neg 04/03/2013       Radiology:  CTA Chest W WO Contrast   Final Result   1. No pulmonary emboli are seen. 2. Mild bibasilar atelectatic/fibrotic stranding. Mild chronic mediastinal and bilateral hilar adenopathy, likely postinflammatory and unchanged from prior study               **This report has been created using voice recognition software. It may contain minor errors which are inherent in voice recognition technology. **          Final report electronically signed by Dr. Sukhwinder Chowdhury on 3/11/2018 6:59 PM      XR CHEST PORTABLE   Final Result   1. Mild cardiac megaly. Fibroemphysematous lungs. 2. Questionable tiny effusion right side. Minimal atelectasis/pneumonia right lung base. **This report has been created using voice recognition software.   It may contain minor

## 2018-03-14 NOTE — CARE COORDINATION
3/14/18, 7:21 AM    Discharge plan discussed by  and . Discharge plan reviewed with patient/ family. Patient/ family verbalize understanding of discharge plan and are in agreement with plan. Understanding was demonstrated using the teach back method.        IMM Letter  IMM Letter date given[de-identified] 03/11/18  IMM Letter time given[de-identified] 2042

## 2018-03-30 ENCOUNTER — HOSPITAL ENCOUNTER (EMERGENCY)
Age: 68
Discharge: HOME | End: 2018-03-30
Attending: FAMILY MEDICINE
Payer: MEDICARE

## 2018-03-30 ENCOUNTER — APPOINTMENT (OUTPATIENT)
Dept: GENERAL RADIOLOGY | Age: 68
End: 2018-03-30
Payer: MEDICARE

## 2018-03-30 VITALS
HEART RATE: 97 BPM | HEIGHT: 62 IN | BODY MASS INDEX: 28.34 KG/M2 | DIASTOLIC BLOOD PRESSURE: 74 MMHG | RESPIRATION RATE: 24 BRPM | OXYGEN SATURATION: 82 % | WEIGHT: 154 LBS | SYSTOLIC BLOOD PRESSURE: 163 MMHG

## 2018-03-30 DIAGNOSIS — R09.02 HYPOXIA: ICD-10-CM

## 2018-03-30 DIAGNOSIS — J44.1 COPD EXACERBATION (HCC): Primary | ICD-10-CM

## 2018-03-30 LAB
ANION GAP SERPL CALCULATED.3IONS-SCNC: 6 MEQ/L (ref 8–16)
BASOPHILS # BLD: 0.7 %
BASOPHILS ABSOLUTE: 0.1 THOU/MM3 (ref 0–0.1)
BUN BLDV-MCNC: 13 MG/DL (ref 7–22)
CALCIUM SERPL-MCNC: 10.2 MG/DL (ref 8.5–10.5)
CHLORIDE BLD-SCNC: 92 MEQ/L (ref 98–111)
CO2: 40 MEQ/L (ref 23–33)
CREAT SERPL-MCNC: 0.6 MG/DL (ref 0.4–1.2)
EKG ATRIAL RATE: 96 BPM
EKG P AXIS: 71 DEGREES
EKG P-R INTERVAL: 170 MS
EKG Q-T INTERVAL: 354 MS
EKG QRS DURATION: 96 MS
EKG QTC CALCULATION (BAZETT): 447 MS
EKG R AXIS: 46 DEGREES
EKG T AXIS: 87 DEGREES
EKG VENTRICULAR RATE: 96 BPM
EOSINOPHIL # BLD: 5.4 %
EOSINOPHILS ABSOLUTE: 0.5 THOU/MM3 (ref 0–0.4)
GFR SERPL CREATININE-BSD FRML MDRD: > 90 ML/MIN/1.73M2
GLUCOSE BLD-MCNC: 111 MG/DL (ref 70–108)
HCT VFR BLD CALC: 30.9 % (ref 37–47)
HEMOGLOBIN: 10.2 GM/DL (ref 12–16)
LACTIC ACID: 0.7 MMOL/L (ref 0.5–2.2)
LYMPHOCYTES # BLD: 26.6 %
LYMPHOCYTES ABSOLUTE: 2.4 THOU/MM3 (ref 1–4.8)
MCH RBC QN AUTO: 29.5 PG (ref 27–31)
MCHC RBC AUTO-ENTMCNC: 32.8 GM/DL (ref 33–37)
MCV RBC AUTO: 89.8 FL (ref 81–99)
MONOCYTES # BLD: 9.7 %
MONOCYTES ABSOLUTE: 0.9 THOU/MM3 (ref 0.4–1.3)
NUCLEATED RED BLOOD CELLS: 0 /100 WBC
OSMOLALITY CALCULATION: 276.5 MOSMOL/KG (ref 275–300)
PDW BLD-RTO: 13.3 % (ref 11.5–14.5)
PLATELET # BLD: 311 THOU/MM3 (ref 130–400)
PMV BLD AUTO: 9 FL (ref 7.4–10.4)
POTASSIUM SERPL-SCNC: 4.4 MEQ/L (ref 3.5–5.2)
PRO-BNP: 131.6 PG/ML (ref 0–900)
RBC # BLD: 3.44 MILL/MM3 (ref 4.2–5.4)
SEG NEUTROPHILS: 57.6 %
SEGMENTED NEUTROPHILS ABSOLUTE COUNT: 5.1 THOU/MM3 (ref 1.8–7.7)
SODIUM BLD-SCNC: 138 MEQ/L (ref 135–145)
TROPONIN T: < 0.01 NG/ML
WBC # BLD: 8.9 THOU/MM3 (ref 4.8–10.8)

## 2018-03-30 PROCEDURE — 93005 ELECTROCARDIOGRAM TRACING: CPT | Performed by: FAMILY MEDICINE

## 2018-03-30 PROCEDURE — 6360000002 HC RX W HCPCS: Performed by: FAMILY MEDICINE

## 2018-03-30 PROCEDURE — 83880 ASSAY OF NATRIURETIC PEPTIDE: CPT

## 2018-03-30 PROCEDURE — 84484 ASSAY OF TROPONIN QUANT: CPT

## 2018-03-30 PROCEDURE — 36415 COLL VENOUS BLD VENIPUNCTURE: CPT

## 2018-03-30 PROCEDURE — 80048 BASIC METABOLIC PNL TOTAL CA: CPT

## 2018-03-30 PROCEDURE — 96374 THER/PROPH/DIAG INJ IV PUSH: CPT

## 2018-03-30 PROCEDURE — 71045 X-RAY EXAM CHEST 1 VIEW: CPT

## 2018-03-30 PROCEDURE — 99285 EMERGENCY DEPT VISIT HI MDM: CPT

## 2018-03-30 PROCEDURE — 94640 AIRWAY INHALATION TREATMENT: CPT

## 2018-03-30 PROCEDURE — 85025 COMPLETE CBC W/AUTO DIFF WBC: CPT

## 2018-03-30 PROCEDURE — 83605 ASSAY OF LACTIC ACID: CPT

## 2018-03-30 RX ORDER — NITROFURANTOIN 25; 75 MG/1; MG/1
100 CAPSULE ORAL 2 TIMES DAILY
Qty: 14 CAPSULE | Refills: 0 | Status: SHIPPED | OUTPATIENT
Start: 2018-03-30 | End: 2018-04-03

## 2018-03-30 RX ORDER — METHYLPREDNISOLONE SODIUM SUCCINATE 125 MG/2ML
125 INJECTION, POWDER, LYOPHILIZED, FOR SOLUTION INTRAMUSCULAR; INTRAVENOUS ONCE
Status: COMPLETED | OUTPATIENT
Start: 2018-03-30 | End: 2018-03-30

## 2018-03-30 RX ORDER — PREDNISONE 20 MG/1
20 TABLET ORAL 2 TIMES DAILY
Qty: 20 TABLET | Refills: 0 | Status: SHIPPED | OUTPATIENT
Start: 2018-03-30 | End: 2018-04-09

## 2018-03-30 RX ORDER — ALBUTEROL SULFATE 2.5 MG/3ML
2.5 SOLUTION RESPIRATORY (INHALATION)
Status: COMPLETED | OUTPATIENT
Start: 2018-03-30 | End: 2018-03-30

## 2018-03-30 RX ORDER — MONTELUKAST SODIUM 10 MG/1
10 TABLET ORAL NIGHTLY
Qty: 30 TABLET | Refills: 3 | Status: SHIPPED | OUTPATIENT
Start: 2018-03-30 | End: 2020-03-16 | Stop reason: SDUPTHER

## 2018-03-30 RX ADMIN — METHYLPREDNISOLONE SODIUM SUCCINATE 125 MG: 125 INJECTION, POWDER, FOR SOLUTION INTRAMUSCULAR; INTRAVENOUS at 20:15

## 2018-03-30 RX ADMIN — ALBUTEROL SULFATE 2.5 MG: 2.5 SOLUTION RESPIRATORY (INHALATION) at 20:11

## 2018-03-30 RX ADMIN — ALBUTEROL SULFATE 2.5 MG: 2.5 SOLUTION RESPIRATORY (INHALATION) at 20:18

## 2018-03-30 RX ADMIN — ALBUTEROL SULFATE 2.5 MG: 2.5 SOLUTION RESPIRATORY (INHALATION) at 20:19

## 2018-03-30 ASSESSMENT — ENCOUNTER SYMPTOMS
EYE PAIN: 0
ABDOMINAL PAIN: 0
ABDOMINAL DISTENTION: 0
PHOTOPHOBIA: 0
SHORTNESS OF BREATH: 1
CONSTIPATION: 0
SORE THROAT: 0
CHEST TIGHTNESS: 0
COUGH: 0

## 2018-04-12 ENCOUNTER — OFFICE VISIT (OUTPATIENT)
Dept: PHYSICAL MEDICINE AND REHAB | Age: 68
End: 2018-04-12
Payer: MEDICARE

## 2018-04-12 VITALS
WEIGHT: 154 LBS | SYSTOLIC BLOOD PRESSURE: 96 MMHG | DIASTOLIC BLOOD PRESSURE: 58 MMHG | BODY MASS INDEX: 28.34 KG/M2 | HEIGHT: 62 IN | HEART RATE: 86 BPM

## 2018-04-12 DIAGNOSIS — M47.816 SPONDYLOSIS OF LUMBAR REGION WITHOUT MYELOPATHY OR RADICULOPATHY: Primary | ICD-10-CM

## 2018-04-12 DIAGNOSIS — M46.1 SI (SACROILIAC) JOINT INFLAMMATION (HCC): ICD-10-CM

## 2018-04-12 DIAGNOSIS — G89.4 CHRONIC PAIN SYNDROME: ICD-10-CM

## 2018-04-12 DIAGNOSIS — M48.061 SPINAL STENOSIS OF LUMBAR REGION WITHOUT NEUROGENIC CLAUDICATION: ICD-10-CM

## 2018-04-12 PROCEDURE — G8400 PT W/DXA NO RESULTS DOC: HCPCS | Performed by: NURSE PRACTITIONER

## 2018-04-12 PROCEDURE — 1123F ACP DISCUSS/DSCN MKR DOCD: CPT | Performed by: NURSE PRACTITIONER

## 2018-04-12 PROCEDURE — 3017F COLORECTAL CA SCREEN DOC REV: CPT | Performed by: NURSE PRACTITIONER

## 2018-04-12 PROCEDURE — 1036F TOBACCO NON-USER: CPT | Performed by: NURSE PRACTITIONER

## 2018-04-12 PROCEDURE — 99213 OFFICE O/P EST LOW 20 MIN: CPT | Performed by: NURSE PRACTITIONER

## 2018-04-12 PROCEDURE — 3014F SCREEN MAMMO DOC REV: CPT | Performed by: NURSE PRACTITIONER

## 2018-04-12 PROCEDURE — G8427 DOCREV CUR MEDS BY ELIG CLIN: HCPCS | Performed by: NURSE PRACTITIONER

## 2018-04-12 PROCEDURE — G8598 ASA/ANTIPLAT THER USED: HCPCS | Performed by: NURSE PRACTITIONER

## 2018-04-12 PROCEDURE — G8419 CALC BMI OUT NRM PARAM NOF/U: HCPCS | Performed by: NURSE PRACTITIONER

## 2018-04-12 PROCEDURE — 4040F PNEUMOC VAC/ADMIN/RCVD: CPT | Performed by: NURSE PRACTITIONER

## 2018-04-12 PROCEDURE — 1090F PRES/ABSN URINE INCON ASSESS: CPT | Performed by: NURSE PRACTITIONER

## 2018-04-12 PROCEDURE — 1111F DSCHRG MED/CURRENT MED MERGE: CPT | Performed by: NURSE PRACTITIONER

## 2018-04-12 RX ORDER — AMLODIPINE BESYLATE 5 MG/1
5 TABLET ORAL DAILY
COMMUNITY

## 2018-04-12 ASSESSMENT — ENCOUNTER SYMPTOMS: BACK PAIN: 1

## 2018-04-26 ENCOUNTER — HOSPITAL ENCOUNTER (EMERGENCY)
Age: 68
Discharge: HOME | End: 2018-04-26
Payer: MEDICARE

## 2018-04-26 VITALS
OXYGEN SATURATION: 89 % | HEART RATE: 100 BPM | RESPIRATION RATE: 20 BRPM | DIASTOLIC BLOOD PRESSURE: 66 MMHG | SYSTOLIC BLOOD PRESSURE: 153 MMHG | TEMPERATURE: 98.2 F

## 2018-04-26 DIAGNOSIS — L02.91 ABSCESS: Primary | ICD-10-CM

## 2018-04-26 PROCEDURE — 99214 OFFICE O/P EST MOD 30 MIN: CPT

## 2018-04-26 PROCEDURE — 10060 I&D ABSCESS SIMPLE/SINGLE: CPT | Performed by: NURSE PRACTITIONER

## 2018-04-26 PROCEDURE — 87070 CULTURE OTHR SPECIMN AEROBIC: CPT

## 2018-04-26 PROCEDURE — 87205 SMEAR GRAM STAIN: CPT

## 2018-04-26 PROCEDURE — 87147 CULTURE TYPE IMMUNOLOGIC: CPT

## 2018-04-26 PROCEDURE — 99214 OFFICE O/P EST MOD 30 MIN: CPT | Performed by: NURSE PRACTITIONER

## 2018-04-26 RX ORDER — SULFAMETHOXAZOLE AND TRIMETHOPRIM 800; 160 MG/1; MG/1
1 TABLET ORAL 2 TIMES DAILY
Qty: 20 TABLET | Refills: 0 | Status: SHIPPED | OUTPATIENT
Start: 2018-04-26 | End: 2018-05-06

## 2018-04-26 ASSESSMENT — ENCOUNTER SYMPTOMS
COUGH: 0
DIARRHEA: 0
SHORTNESS OF BREATH: 0
VOMITING: 0
NAUSEA: 0
ROS SKIN COMMENTS: SEE HPI

## 2018-04-26 ASSESSMENT — PAIN SCALES - GENERAL: PAINLEVEL_OUTOF10: 4

## 2018-04-26 ASSESSMENT — PAIN DESCRIPTION - LOCATION: LOCATION: BACK

## 2018-04-26 ASSESSMENT — PAIN DESCRIPTION - ORIENTATION: ORIENTATION: LEFT;UPPER

## 2018-04-26 ASSESSMENT — PAIN DESCRIPTION - PAIN TYPE: TYPE: ACUTE PAIN

## 2018-04-26 ASSESSMENT — PAIN DESCRIPTION - DESCRIPTORS: DESCRIPTORS: ACHING

## 2018-04-29 LAB
AEROBIC CULTURE: NORMAL
GRAM STAIN RESULT: NORMAL

## 2018-05-18 ENCOUNTER — HOSPITAL ENCOUNTER (OUTPATIENT)
Dept: CT IMAGING | Age: 68
Discharge: HOME | End: 2018-05-18
Payer: MEDICARE

## 2018-05-18 ENCOUNTER — HOSPITAL ENCOUNTER (OUTPATIENT)
Dept: PULMONOLOGY | Age: 68
Discharge: HOME | End: 2018-05-18
Payer: MEDICARE

## 2018-05-18 DIAGNOSIS — J41.1 MUCOPURULENT CHRONIC BRONCHITIS (HCC): ICD-10-CM

## 2018-05-18 DIAGNOSIS — J96.11 CHRONIC RESPIRATORY FAILURE WITH HYPOXIA (HCC): ICD-10-CM

## 2018-05-18 DIAGNOSIS — J42 CHRONIC BRONCHITIS, UNSPECIFIED CHRONIC BRONCHITIS TYPE (HCC): ICD-10-CM

## 2018-05-18 DIAGNOSIS — Z87.891 PERSONAL HISTORY OF TOBACCO USE: ICD-10-CM

## 2018-05-18 PROCEDURE — G0297 LDCT FOR LUNG CA SCREEN: HCPCS

## 2018-05-18 PROCEDURE — 94060 EVALUATION OF WHEEZING: CPT

## 2018-06-29 ENCOUNTER — APPOINTMENT (OUTPATIENT)
Dept: GENERAL RADIOLOGY | Age: 68
DRG: 190 | End: 2018-06-29
Payer: MEDICARE

## 2018-06-29 ENCOUNTER — HOSPITAL ENCOUNTER (INPATIENT)
Age: 68
LOS: 1 days | Discharge: HOME | DRG: 190 | End: 2018-07-01
Attending: FAMILY MEDICINE | Admitting: FAMILY MEDICINE
Payer: MEDICARE

## 2018-06-29 DIAGNOSIS — J96.02 ACUTE RESPIRATORY FAILURE WITH HYPOXIA AND HYPERCAPNIA (HCC): Primary | ICD-10-CM

## 2018-06-29 DIAGNOSIS — G47.33 OSA ON CPAP: ICD-10-CM

## 2018-06-29 DIAGNOSIS — J96.01 ACUTE RESPIRATORY FAILURE WITH HYPOXIA AND HYPERCAPNIA (HCC): Primary | ICD-10-CM

## 2018-06-29 DIAGNOSIS — J44.1 COPD EXACERBATION (HCC): ICD-10-CM

## 2018-06-29 DIAGNOSIS — Z99.89 OSA ON CPAP: ICD-10-CM

## 2018-06-29 PROBLEM — R07.9 CHEST PAIN: Status: ACTIVE | Noted: 2018-06-29

## 2018-06-29 LAB
ALBUMIN SERPL-MCNC: 3.8 G/DL (ref 3.5–5.1)
ALLEN TEST: POSITIVE
ALP BLD-CCNC: 80 U/L (ref 38–126)
ALT SERPL-CCNC: 9 U/L (ref 11–66)
ANION GAP SERPL CALCULATED.3IONS-SCNC: 10 MEQ/L (ref 8–16)
AST SERPL-CCNC: 14 U/L (ref 5–40)
BASE EXCESS (CALCULATED): 14.5 MMOL/L (ref -2.5–2.5)
BASOPHILS # BLD: 0.3 %
BASOPHILS ABSOLUTE: 0 THOU/MM3 (ref 0–0.1)
BILIRUB SERPL-MCNC: 0.2 MG/DL (ref 0.3–1.2)
BUN BLDV-MCNC: 12 MG/DL (ref 7–22)
CALCIUM SERPL-MCNC: 9.8 MG/DL (ref 8.5–10.5)
CHLORIDE BLD-SCNC: 94 MEQ/L (ref 98–111)
CO2: 39 MEQ/L (ref 23–33)
COLLECTED BY:: ABNORMAL
CREAT SERPL-MCNC: 0.6 MG/DL (ref 0.4–1.2)
DEVICE: ABNORMAL
EKG ATRIAL RATE: 94 BPM
EKG P AXIS: 72 DEGREES
EKG P-R INTERVAL: 182 MS
EKG Q-T INTERVAL: 358 MS
EKG QRS DURATION: 88 MS
EKG QTC CALCULATION (BAZETT): 447 MS
EKG R AXIS: 53 DEGREES
EKG T AXIS: 90 DEGREES
EKG VENTRICULAR RATE: 94 BPM
EOSINOPHIL # BLD: 7.2 %
EOSINOPHILS ABSOLUTE: 0.6 THOU/MM3 (ref 0–0.4)
ERYTHROCYTE [DISTWIDTH] IN BLOOD BY AUTOMATED COUNT: 12.9 % (ref 11.5–14.5)
ERYTHROCYTE [DISTWIDTH] IN BLOOD BY AUTOMATED COUNT: 44 FL (ref 35–45)
GFR SERPL CREATININE-BSD FRML MDRD: > 90 ML/MIN/1.73M2
GLUCOSE BLD-MCNC: 164 MG/DL (ref 70–108)
HCO3: 44 MMOL/L (ref 23–28)
HCT VFR BLD CALC: 38.5 % (ref 37–47)
HEMOGLOBIN: 11.7 GM/DL (ref 12–16)
IFIO2: 2
IMMATURE GRANS (ABS): 0.03 THOU/MM3 (ref 0–0.07)
IMMATURE GRANULOCYTES: 0.3 %
LYMPHOCYTES # BLD: 33.2 %
LYMPHOCYTES ABSOLUTE: 2.9 THOU/MM3 (ref 1–4.8)
MAGNESIUM: 2.1 MG/DL (ref 1.6–2.4)
MCH RBC QN AUTO: 28.6 PG (ref 26–33)
MCHC RBC AUTO-ENTMCNC: 30.4 GM/DL (ref 32.2–35.5)
MCV RBC AUTO: 94.1 FL (ref 81–99)
MONOCYTES # BLD: 8.7 %
MONOCYTES ABSOLUTE: 0.8 THOU/MM3 (ref 0.4–1.3)
NUCLEATED RED BLOOD CELLS: 0 /100 WBC
O2 SATURATION: 82 %
OSMOLALITY CALCULATION: 288.4 MOSMOL/KG (ref 275–300)
PCO2: 82 MMHG (ref 35–45)
PH BLOOD GAS: 7.34 (ref 7.35–7.45)
PLATELET # BLD: 230 THOU/MM3 (ref 130–400)
PMV BLD AUTO: 10.9 FL (ref 9.4–12.4)
PO2: 52 MMHG (ref 71–104)
POTASSIUM SERPL-SCNC: 4.1 MEQ/L (ref 3.5–5.2)
PRO-BNP: 128.4 PG/ML (ref 0–900)
RBC # BLD: 4.09 MILL/MM3 (ref 4.2–5.4)
SEG NEUTROPHILS: 50.3 %
SEGMENTED NEUTROPHILS ABSOLUTE COUNT: 4.4 THOU/MM3 (ref 1.8–7.7)
SODIUM BLD-SCNC: 143 MEQ/L (ref 135–145)
SOURCE, BLOOD GAS: ABNORMAL
TOTAL PROTEIN: 7.5 G/DL (ref 6.1–8)
TROPONIN T: < 0.01 NG/ML
WBC # BLD: 8.7 THOU/MM3 (ref 4.8–10.8)

## 2018-06-29 PROCEDURE — 96375 TX/PRO/DX INJ NEW DRUG ADDON: CPT

## 2018-06-29 PROCEDURE — 93010 ELECTROCARDIOGRAM REPORT: CPT | Performed by: NUCLEAR MEDICINE

## 2018-06-29 PROCEDURE — 94660 CPAP INITIATION&MGMT: CPT

## 2018-06-29 PROCEDURE — 96374 THER/PROPH/DIAG INJ IV PUSH: CPT

## 2018-06-29 PROCEDURE — 83735 ASSAY OF MAGNESIUM: CPT

## 2018-06-29 PROCEDURE — 93005 ELECTROCARDIOGRAM TRACING: CPT | Performed by: FAMILY MEDICINE

## 2018-06-29 PROCEDURE — 99285 EMERGENCY DEPT VISIT HI MDM: CPT

## 2018-06-29 PROCEDURE — 71046 X-RAY EXAM CHEST 2 VIEWS: CPT

## 2018-06-29 PROCEDURE — 82803 BLOOD GASES ANY COMBINATION: CPT

## 2018-06-29 PROCEDURE — 6360000002 HC RX W HCPCS: Performed by: FAMILY MEDICINE

## 2018-06-29 PROCEDURE — 80053 COMPREHEN METABOLIC PANEL: CPT

## 2018-06-29 PROCEDURE — 2700000000 HC OXYGEN THERAPY PER DAY

## 2018-06-29 PROCEDURE — 36415 COLL VENOUS BLD VENIPUNCTURE: CPT

## 2018-06-29 PROCEDURE — 84484 ASSAY OF TROPONIN QUANT: CPT

## 2018-06-29 PROCEDURE — G0378 HOSPITAL OBSERVATION PER HR: HCPCS

## 2018-06-29 PROCEDURE — 36600 WITHDRAWAL OF ARTERIAL BLOOD: CPT

## 2018-06-29 PROCEDURE — 94640 AIRWAY INHALATION TREATMENT: CPT

## 2018-06-29 PROCEDURE — G0378 HOSPITAL OBSERVATION PER HR: HCPCS | Performed by: INTERNAL MEDICINE

## 2018-06-29 PROCEDURE — 99223 1ST HOSP IP/OBS HIGH 75: CPT | Performed by: FAMILY MEDICINE

## 2018-06-29 PROCEDURE — 85025 COMPLETE CBC W/AUTO DIFF WBC: CPT

## 2018-06-29 PROCEDURE — 83880 ASSAY OF NATRIURETIC PEPTIDE: CPT

## 2018-06-29 RX ORDER — METHYLPREDNISOLONE SODIUM SUCCINATE 125 MG/2ML
125 INJECTION, POWDER, LYOPHILIZED, FOR SOLUTION INTRAMUSCULAR; INTRAVENOUS ONCE
Status: COMPLETED | OUTPATIENT
Start: 2018-06-29 | End: 2018-06-29

## 2018-06-29 RX ADMIN — METHYLPREDNISOLONE SODIUM SUCCINATE 125 MG: 125 INJECTION, POWDER, FOR SOLUTION INTRAMUSCULAR; INTRAVENOUS at 21:07

## 2018-06-29 RX ADMIN — ALBUTEROL SULFATE 2.5 MG: 2.5 SOLUTION RESPIRATORY (INHALATION) at 20:28

## 2018-06-29 ASSESSMENT — PAIN DESCRIPTION - LOCATION: LOCATION: CHEST

## 2018-06-29 ASSESSMENT — PAIN SCALES - GENERAL
PAINLEVEL_OUTOF10: 8
PAINLEVEL_OUTOF10: 0

## 2018-06-29 ASSESSMENT — PAIN DESCRIPTION - PAIN TYPE: TYPE: ACUTE PAIN

## 2018-06-29 NOTE — ED TRIAGE NOTES
Pt to ed with c/o sob and chest pressure that pt rates discomfort 8/10. Pt states that she took sudafed and has used her inhalers multiple times throughout the day without relief.  EKG obtained in intake

## 2018-06-30 ENCOUNTER — APPOINTMENT (OUTPATIENT)
Dept: CT IMAGING | Age: 68
DRG: 190 | End: 2018-06-30
Payer: MEDICARE

## 2018-06-30 LAB
ALLEN TEST: POSITIVE
ANION GAP SERPL CALCULATED.3IONS-SCNC: 10 MEQ/L (ref 8–16)
AVERAGE GLUCOSE: 117 MG/DL (ref 70–126)
BACTERIA: ABNORMAL /HPF
BASE EXCESS (CALCULATED): 12.2 MMOL/L (ref -2.5–2.5)
BASE EXCESS (CALCULATED): 14.6 MMOL/L (ref -2.5–2.5)
BASE EXCESS (CALCULATED): 15.3 MMOL/L (ref -2.5–2.5)
BASE EXCESS (CALCULATED): 16.3 MMOL/L (ref -2.5–2.5)
BASOPHILS # BLD: 0 %
BASOPHILS ABSOLUTE: 0 THOU/MM3 (ref 0–0.1)
BILIRUBIN URINE: NEGATIVE
BLOOD, URINE: NEGATIVE
BUN BLDV-MCNC: 12 MG/DL (ref 7–22)
CALCIUM SERPL-MCNC: 9.8 MG/DL (ref 8.5–10.5)
CASTS 2: ABNORMAL /LPF
CASTS UA: ABNORMAL /LPF
CHARACTER, URINE: CLEAR
CHLORIDE BLD-SCNC: 94 MEQ/L (ref 98–111)
CO2: 37 MEQ/L (ref 23–33)
COLLECTED BY:: ABNORMAL
COLOR: YELLOW
CREAT SERPL-MCNC: 0.4 MG/DL (ref 0.4–1.2)
CRYSTALS, UA: ABNORMAL
D-DIMER QUANTITATIVE: 539 NG/ML FEU (ref 0–500)
DEVICE: ABNORMAL
EOSINOPHIL # BLD: 0 %
EOSINOPHILS ABSOLUTE: 0 THOU/MM3 (ref 0–0.4)
EPITHELIAL CELLS, UA: ABNORMAL /HPF
ERYTHROCYTE [DISTWIDTH] IN BLOOD BY AUTOMATED COUNT: 12.9 % (ref 11.5–14.5)
ERYTHROCYTE [DISTWIDTH] IN BLOOD BY AUTOMATED COUNT: 44.2 FL (ref 35–45)
GFR SERPL CREATININE-BSD FRML MDRD: > 90 ML/MIN/1.73M2
GLUCOSE BLD-MCNC: 148 MG/DL (ref 70–108)
GLUCOSE BLD-MCNC: 153 MG/DL (ref 70–108)
GLUCOSE BLD-MCNC: 164 MG/DL (ref 70–108)
GLUCOSE BLD-MCNC: 169 MG/DL (ref 70–108)
GLUCOSE BLD-MCNC: 177 MG/DL (ref 70–108)
GLUCOSE BLD-MCNC: 215 MG/DL (ref 70–108)
GLUCOSE URINE: NEGATIVE MG/DL
HBA1C MFR BLD: 5.9 % (ref 4.4–6.4)
HCO3: 41 MMOL/L (ref 23–28)
HCO3: 44 MMOL/L (ref 23–28)
HCO3: 45 MMOL/L (ref 23–28)
HCO3: 46 MMOL/L (ref 23–28)
HCT VFR BLD CALC: 38.6 % (ref 37–47)
HEMOGLOBIN: 11.6 GM/DL (ref 12–16)
IFIO2: 30
IFIO2: 32
IFIO2: 40
IFIO2: 40
IMMATURE GRANS (ABS): 0.02 THOU/MM3 (ref 0–0.07)
IMMATURE GRANULOCYTES: 0.4 %
KETONES, URINE: NEGATIVE
LACTIC ACID: 1 MMOL/L (ref 0.5–2.2)
LEUKOCYTE ESTERASE, URINE: ABNORMAL
LIPASE: 15.4 U/L (ref 5.6–51.3)
LYMPHOCYTES # BLD: 18.3 %
LYMPHOCYTES ABSOLUTE: 1 THOU/MM3 (ref 1–4.8)
MCH RBC QN AUTO: 28.3 PG (ref 26–33)
MCHC RBC AUTO-ENTMCNC: 30.1 GM/DL (ref 32.2–35.5)
MCV RBC AUTO: 94.1 FL (ref 81–99)
MISCELLANEOUS 2: ABNORMAL
MODE: ABNORMAL
MONOCYTES # BLD: 1 %
MONOCYTES ABSOLUTE: 0.1 THOU/MM3 (ref 0.4–1.3)
NITRITE, URINE: NEGATIVE
NUCLEATED RED BLOOD CELLS: 0 /100 WBC
O2 SATURATION: 86 %
O2 SATURATION: 91 %
O2 SATURATION: 92 %
O2 SATURATION: 97 %
PCO2: 74 MMHG (ref 35–45)
PCO2: 78 MMHG (ref 35–45)
PCO2: 83 MMHG (ref 35–45)
PCO2: 94 MMHG (ref 35–45)
PH BLOOD GAS: 7.3 (ref 7.35–7.45)
PH BLOOD GAS: 7.33 (ref 7.35–7.45)
PH BLOOD GAS: 7.34 (ref 7.35–7.45)
PH BLOOD GAS: 7.39 (ref 7.35–7.45)
PH UA: 7.5
PIP: 18 CMH2O
PLATELET # BLD: 231 THOU/MM3 (ref 130–400)
PMV BLD AUTO: 11 FL (ref 9.4–12.4)
PO2: 104 MMHG (ref 71–104)
PO2: 58 MMHG (ref 71–104)
PO2: 67 MMHG (ref 71–104)
PO2: 77 MMHG (ref 71–104)
POTASSIUM REFLEX MAGNESIUM: 5.2 MEQ/L (ref 3.5–5.2)
PROCALCITONIN: 0.02 NG/ML (ref 0.01–0.09)
PROTEIN UA: NEGATIVE
RBC # BLD: 4.1 MILL/MM3 (ref 4.2–5.4)
RBC URINE: ABNORMAL /HPF
RENAL EPITHELIAL, UA: ABNORMAL
SEG NEUTROPHILS: 80.3 %
SEGMENTED NEUTROPHILS ABSOLUTE COUNT: 4.2 THOU/MM3 (ref 1.8–7.7)
SET PEEP: 6 MMHG
SET PEEP: 6 MMHG
SET PEEP: 8 MMHG
SET PEEP: 8 MMHG
SET PRESS SUPP: 10 CMH2O
SET PRESS SUPP: 18 CMH2O
SET PRESS SUPP: 6 CMH2O
SODIUM BLD-SCNC: 141 MEQ/L (ref 135–145)
SOURCE, BLOOD GAS: ABNORMAL
SPECIFIC GRAVITY, URINE: 1.01 (ref 1–1.03)
TROPONIN T: < 0.01 NG/ML
TROPONIN T: < 0.01 NG/ML
UROBILINOGEN, URINE: 1 EU/DL
WBC # BLD: 5.2 THOU/MM3 (ref 4.8–10.8)
WBC UA: ABNORMAL /HPF
YEAST: ABNORMAL

## 2018-06-30 PROCEDURE — 94660 CPAP INITIATION&MGMT: CPT

## 2018-06-30 PROCEDURE — 81001 URINALYSIS AUTO W/SCOPE: CPT

## 2018-06-30 PROCEDURE — 96372 THER/PROPH/DIAG INJ SC/IM: CPT

## 2018-06-30 PROCEDURE — 94640 AIRWAY INHALATION TREATMENT: CPT

## 2018-06-30 PROCEDURE — 99233 SBSQ HOSP IP/OBS HIGH 50: CPT | Performed by: INTERNAL MEDICINE

## 2018-06-30 PROCEDURE — 85025 COMPLETE CBC W/AUTO DIFF WBC: CPT

## 2018-06-30 PROCEDURE — 2580000003 HC RX 258: Performed by: FAMILY MEDICINE

## 2018-06-30 PROCEDURE — 6360000002 HC RX W HCPCS: Performed by: INTERNAL MEDICINE

## 2018-06-30 PROCEDURE — 82803 BLOOD GASES ANY COMBINATION: CPT

## 2018-06-30 PROCEDURE — 87086 URINE CULTURE/COLONY COUNT: CPT

## 2018-06-30 PROCEDURE — 6370000000 HC RX 637 (ALT 250 FOR IP): Performed by: NURSE PRACTITIONER

## 2018-06-30 PROCEDURE — 6370000000 HC RX 637 (ALT 250 FOR IP): Performed by: FAMILY MEDICINE

## 2018-06-30 PROCEDURE — 96365 THER/PROPH/DIAG IV INF INIT: CPT

## 2018-06-30 PROCEDURE — 80048 BASIC METABOLIC PNL TOTAL CA: CPT

## 2018-06-30 PROCEDURE — 85379 FIBRIN DEGRADATION QUANT: CPT

## 2018-06-30 PROCEDURE — 83605 ASSAY OF LACTIC ACID: CPT

## 2018-06-30 PROCEDURE — 99223 1ST HOSP IP/OBS HIGH 75: CPT | Performed by: INTERNAL MEDICINE

## 2018-06-30 PROCEDURE — 84484 ASSAY OF TROPONIN QUANT: CPT

## 2018-06-30 PROCEDURE — 36415 COLL VENOUS BLD VENIPUNCTURE: CPT

## 2018-06-30 PROCEDURE — 6370000000 HC RX 637 (ALT 250 FOR IP): Performed by: INTERNAL MEDICINE

## 2018-06-30 PROCEDURE — 83036 HEMOGLOBIN GLYCOSYLATED A1C: CPT

## 2018-06-30 PROCEDURE — 2700000000 HC OXYGEN THERAPY PER DAY

## 2018-06-30 PROCEDURE — 6360000004 HC RX CONTRAST MEDICATION: Performed by: FAMILY MEDICINE

## 2018-06-30 PROCEDURE — 84145 PROCALCITONIN (PCT): CPT

## 2018-06-30 PROCEDURE — 6360000002 HC RX W HCPCS: Performed by: FAMILY MEDICINE

## 2018-06-30 PROCEDURE — 71275 CT ANGIOGRAPHY CHEST: CPT

## 2018-06-30 PROCEDURE — 1200000003 HC TELEMETRY R&B

## 2018-06-30 PROCEDURE — 82948 REAGENT STRIP/BLOOD GLUCOSE: CPT

## 2018-06-30 PROCEDURE — 36600 WITHDRAWAL OF ARTERIAL BLOOD: CPT

## 2018-06-30 PROCEDURE — 83690 ASSAY OF LIPASE: CPT

## 2018-06-30 RX ORDER — SODIUM CHLORIDE 0.9 % (FLUSH) 0.9 %
10 SYRINGE (ML) INJECTION EVERY 12 HOURS SCHEDULED
Status: DISCONTINUED | OUTPATIENT
Start: 2018-06-30 | End: 2018-07-01 | Stop reason: HOSPADM

## 2018-06-30 RX ORDER — AMLODIPINE BESYLATE 5 MG/1
5 TABLET ORAL DAILY
Status: DISCONTINUED | OUTPATIENT
Start: 2018-06-30 | End: 2018-07-01 | Stop reason: HOSPADM

## 2018-06-30 RX ORDER — FAMOTIDINE 20 MG/1
20 TABLET, FILM COATED ORAL 2 TIMES DAILY
Status: DISCONTINUED | OUTPATIENT
Start: 2018-06-30 | End: 2018-07-01 | Stop reason: HOSPADM

## 2018-06-30 RX ORDER — DEXTROSE MONOHYDRATE 50 MG/ML
100 INJECTION, SOLUTION INTRAVENOUS PRN
Status: DISCONTINUED | OUTPATIENT
Start: 2018-06-30 | End: 2018-07-01 | Stop reason: HOSPADM

## 2018-06-30 RX ORDER — ACETAMINOPHEN 325 MG/1
650 TABLET ORAL EVERY 4 HOURS PRN
Status: DISCONTINUED | OUTPATIENT
Start: 2018-06-30 | End: 2018-07-01 | Stop reason: HOSPADM

## 2018-06-30 RX ORDER — NICOTINE POLACRILEX 4 MG
15 LOZENGE BUCCAL PRN
Status: DISCONTINUED | OUTPATIENT
Start: 2018-06-30 | End: 2018-07-01 | Stop reason: HOSPADM

## 2018-06-30 RX ORDER — ASPIRIN 81 MG/1
81 TABLET ORAL DAILY
Status: DISCONTINUED | OUTPATIENT
Start: 2018-06-30 | End: 2018-07-01 | Stop reason: HOSPADM

## 2018-06-30 RX ORDER — BUSPIRONE HYDROCHLORIDE 5 MG/1
5 TABLET ORAL 2 TIMES DAILY PRN
Status: DISCONTINUED | OUTPATIENT
Start: 2018-06-30 | End: 2018-07-01 | Stop reason: HOSPADM

## 2018-06-30 RX ORDER — ALBUTEROL SULFATE 2.5 MG/3ML
2.5 SOLUTION RESPIRATORY (INHALATION)
Status: DISCONTINUED | OUTPATIENT
Start: 2018-06-30 | End: 2018-06-30

## 2018-06-30 RX ORDER — MONTELUKAST SODIUM 10 MG/1
10 TABLET ORAL NIGHTLY
Status: DISCONTINUED | OUTPATIENT
Start: 2018-06-30 | End: 2018-07-01 | Stop reason: HOSPADM

## 2018-06-30 RX ORDER — DEXTROSE MONOHYDRATE 25 G/50ML
12.5 INJECTION, SOLUTION INTRAVENOUS PRN
Status: DISCONTINUED | OUTPATIENT
Start: 2018-06-30 | End: 2018-07-01 | Stop reason: HOSPADM

## 2018-06-30 RX ORDER — ALBUTEROL SULFATE 90 UG/1
2 AEROSOL, METERED RESPIRATORY (INHALATION) EVERY 6 HOURS PRN
Status: DISCONTINUED | OUTPATIENT
Start: 2018-06-30 | End: 2018-07-01 | Stop reason: HOSPADM

## 2018-06-30 RX ORDER — PREDNISONE 20 MG/1
40 TABLET ORAL DAILY
Status: DISCONTINUED | OUTPATIENT
Start: 2018-06-30 | End: 2018-07-01 | Stop reason: HOSPADM

## 2018-06-30 RX ORDER — ALBUTEROL SULFATE 2.5 MG/3ML
2.5 SOLUTION RESPIRATORY (INHALATION)
Status: DISCONTINUED | OUTPATIENT
Start: 2018-06-30 | End: 2018-07-01 | Stop reason: HOSPADM

## 2018-06-30 RX ORDER — SODIUM CHLORIDE 0.9 % (FLUSH) 0.9 %
10 SYRINGE (ML) INJECTION PRN
Status: DISCONTINUED | OUTPATIENT
Start: 2018-06-30 | End: 2018-07-01 | Stop reason: HOSPADM

## 2018-06-30 RX ORDER — LOSARTAN POTASSIUM 50 MG/1
50 TABLET ORAL DAILY
Status: DISCONTINUED | OUTPATIENT
Start: 2018-06-30 | End: 2018-07-01 | Stop reason: HOSPADM

## 2018-06-30 RX ORDER — ONDANSETRON 2 MG/ML
4 INJECTION INTRAMUSCULAR; INTRAVENOUS EVERY 6 HOURS PRN
Status: DISCONTINUED | OUTPATIENT
Start: 2018-06-30 | End: 2018-07-01 | Stop reason: HOSPADM

## 2018-06-30 RX ORDER — AZITHROMYCIN 250 MG/1
500 TABLET, FILM COATED ORAL DAILY
Status: DISCONTINUED | OUTPATIENT
Start: 2018-06-30 | End: 2018-06-30

## 2018-06-30 RX ORDER — ALBUTEROL SULFATE 2.5 MG/3ML
2.5 SOLUTION RESPIRATORY (INHALATION) EVERY 6 HOURS PRN
Status: DISCONTINUED | OUTPATIENT
Start: 2018-06-30 | End: 2018-06-30

## 2018-06-30 RX ORDER — NITROGLYCERIN 0.4 MG/1
0.4 TABLET SUBLINGUAL EVERY 5 MIN PRN
Status: DISCONTINUED | OUTPATIENT
Start: 2018-06-30 | End: 2018-07-01 | Stop reason: HOSPADM

## 2018-06-30 RX ADMIN — PREDNISONE 40 MG: 20 TABLET ORAL at 13:03

## 2018-06-30 RX ADMIN — ACETAMINOPHEN 650 MG: 325 TABLET ORAL at 08:13

## 2018-06-30 RX ADMIN — ALBUTEROL SULFATE 2.5 MG: 2.5 SOLUTION RESPIRATORY (INHALATION) at 20:33

## 2018-06-30 RX ADMIN — ENOXAPARIN SODIUM 40 MG: 40 INJECTION SUBCUTANEOUS at 08:14

## 2018-06-30 RX ADMIN — INSULIN LISPRO 1 UNITS: 100 INJECTION, SOLUTION INTRAVENOUS; SUBCUTANEOUS at 17:50

## 2018-06-30 RX ADMIN — Medication 10 ML: at 08:14

## 2018-06-30 RX ADMIN — Medication 2 PUFF: at 20:40

## 2018-06-30 RX ADMIN — Medication 1 UNITS: at 22:25

## 2018-06-30 RX ADMIN — MONTELUKAST SODIUM 10 MG: 10 TABLET, FILM COATED ORAL at 01:59

## 2018-06-30 RX ADMIN — ASPIRIN 81 MG: 81 TABLET ORAL at 08:13

## 2018-06-30 RX ADMIN — IOPAMIDOL 80 ML: 755 INJECTION, SOLUTION INTRAVENOUS at 03:34

## 2018-06-30 RX ADMIN — INSULIN LISPRO 1 UNITS: 100 INJECTION, SOLUTION INTRAVENOUS; SUBCUTANEOUS at 13:01

## 2018-06-30 RX ADMIN — ALBUTEROL SULFATE 2.5 MG: 2.5 SOLUTION RESPIRATORY (INHALATION) at 15:16

## 2018-06-30 RX ADMIN — LOSARTAN POTASSIUM 50 MG: 50 TABLET ORAL at 08:14

## 2018-06-30 RX ADMIN — Medication 10 ML: at 22:23

## 2018-06-30 RX ADMIN — AZITHROMYCIN 500 MG: 250 TABLET, FILM COATED ORAL at 08:13

## 2018-06-30 RX ADMIN — INSULIN LISPRO 1 UNITS: 100 INJECTION, SOLUTION INTRAVENOUS; SUBCUTANEOUS at 08:10

## 2018-06-30 RX ADMIN — AMLODIPINE BESYLATE 5 MG: 5 TABLET ORAL at 13:03

## 2018-06-30 RX ADMIN — FAMOTIDINE 20 MG: 20 TABLET ORAL at 22:23

## 2018-06-30 RX ADMIN — MONTELUKAST SODIUM 10 MG: 10 TABLET, FILM COATED ORAL at 22:23

## 2018-06-30 RX ADMIN — ALBUTEROL SULFATE 2.5 MG: 2.5 SOLUTION RESPIRATORY (INHALATION) at 12:00

## 2018-06-30 RX ADMIN — FAMOTIDINE 20 MG: 20 TABLET ORAL at 08:13

## 2018-06-30 RX ADMIN — FAMOTIDINE 20 MG: 20 TABLET ORAL at 01:59

## 2018-06-30 RX ADMIN — CEFTRIAXONE SODIUM 1 G: 1 INJECTION, POWDER, FOR SOLUTION INTRAMUSCULAR; INTRAVENOUS at 01:59

## 2018-06-30 RX ADMIN — Medication 1 UNITS: at 02:04

## 2018-06-30 ASSESSMENT — ENCOUNTER SYMPTOMS
SHORTNESS OF BREATH: 1
ABDOMINAL PAIN: 0
STRIDOR: 0
BACK PAIN: 0
DIARRHEA: 0
APNEA: 0
CHOKING: 0
CONSTIPATION: 0
CHEST TIGHTNESS: 0
COUGH: 0
VOMITING: 0
NAUSEA: 0
WHEEZING: 0

## 2018-06-30 ASSESSMENT — PAIN SCALES - GENERAL
PAINLEVEL_OUTOF10: 0
PAINLEVEL_OUTOF10: 8
PAINLEVEL_OUTOF10: 8
PAINLEVEL_OUTOF10: 0

## 2018-06-30 ASSESSMENT — PAIN DESCRIPTION - PAIN TYPE: TYPE: ACUTE PAIN

## 2018-06-30 ASSESSMENT — PAIN DESCRIPTION - LOCATION: LOCATION: HEAD

## 2018-06-30 ASSESSMENT — PAIN DESCRIPTION - DESCRIPTORS: DESCRIPTORS: ACHING;DISCOMFORT

## 2018-06-30 NOTE — CONSULTS
[place] [facility] Pul[facility], Critical Care and Sleep Medicine    Patient - Sofía Fermin   MRN -  [id number]   [facility]   - 1950      Date of Admission -  2018  7:35 PM  Date of evaluation -  2018  [id number] Perry MD Primary Care Physician - Nicol Vogel MD   Chief Complaint   Shortness of breath/ COPD exacerbation   Active Hospital Problem List      Active Hospital Problems    Diagnosis Date Noted    PETE and COPD overlap syndrome ([facility] [G47.33, J44.9] 2015     Priority: High    Chest pain [R07.9] 2018    Acute on chronic respiratory failure with hypoxia and hypercapnia (HCC) [J96.21, J96.22]     COPD exacerbation ([facility] [J44.1] 2018    Acute respiratory failure ([facility] [J96.00] 2018    CAD (coronary artery disease) [I25.10]     GERD (gastroesophageal reflux disease) [K21.9]     Hyperlipidemia [E78.5]     Diabetes type 2, controlled ([facility] [E11.9]      HPI   Sofía Fermin is a 79 y.o. female established patient with our office. Follows with me/ Dr Natalie Araya MD for COPD. Last hospitalized for COPD exacerbation in 2018- required BiPAP for short amount of time. ,quickly bounced back. Came to ED with this time with c/o SOB and productive cough x2 days with grayish sputum. No relief at home with use of her inhalers/ nebulizers. admitted for COPD exacerbation, acute on chronic respiratory failure. On Home O2 at 3L/min NC. On NC In ER. her ABG revealed pH 7.34, PCO2 82, PO2 52, HCO3 44, BE 14.5. She was placed on BiPAP 12/6 with no improvement,  BiPAP then increased to 18/8- last ABG drawn at 0500 this am - showed worsening respiratory acidosis.   She is awake and oriented in no distress on BiPAP during time of my evaluation   Past Medical History          Diagnosis Date    Bladder dysfunction     CAD (coronary artery disease)     Cerebrovascular disease     [facility]) [facility]     COPD (chronic obstructive pulmonary disease) ([facility]     uses oxygen 24 hours per day    DDD (degenerative disc disease)     Depression     GERD (gastroesophageal reflux disease)     Hyperlipidemia     Hypertension     Obesity     Other screening mammogram 2011    Pap smear, as part of routine gynecological examination 2007    Pap smear, as part of routine gynecological examination 2007    Peripheral edema     with ascites    Pneumonia     PONV (postoperative nausea and vomiting)     Unspecified sleep apnea     c-pap at 10 cm wtr      Past Surgical History           Procedure Laterality Date    COLONOSCOPY  7-2009    DILATION AND CURETTAGE OF UTERUS  1970's    ENDOSCOPY, COLON, DIAGNOSTIC      EYE SURGERY  2013    Laser surgery     LUMBAR SPINE SURGERY Left 12/12/2017    LUMBAR RFA L2-3, 3-4, 4-5, L5-S1 LEFT SIDE performed by Emmanuel Stroud MD at [place] [address]  2016    burning nerves in back - lumbar @ [place]    OTHER SURGICAL HISTORY Left 12/12/2017    Lumbar RFA L2-3, L3-4, L4-5, L5-S1     Diet    DIET CARB CONTROL; Allergies    Chantix [varenicline tartrate];  Codeine; Lipitor; Simvastatin; and Tiotropium bromide monohydrate  Social History     Social History     Social History    Marital status:      Spouse name: N/A    Number of children: 4    Years of education: 15     Occupational History    disability      Social History Main Topics    Smoking status: Former Smoker     Packs/day: 2.00     Years: 40.00     Types: Cigarettes     Quit date: 11/14/2010    Smokeless tobacco: Never Used    Alcohol use No      Comment: pt hasn't drank since 2007    Drug use: No    Sexual activity: Not Currently     Other Topics Concern    Not on file     Social History Narrative    No narrative on file     Family History      Family History   Problem Relation Age of Onset    Heart Failure Mother     Heart Disease Mother     High Blood Pressure Mother     Heart Failure Father     Heart Disease Father     Alzheimer's Disease Father     Parkinsonism Father     High Blood Pressure Father     COPD Sister     Cancer Brother         skin    Stroke Brother     Cancer Daughter         colon    Diabetes Daughter      Sleep History    H/o PETE has CPAP at home, has not used in over 1 year. Per patient Dr Lizzie Mcneil had recommended repeat sleep study with PAP titration but she has not completed it yet. ROS    General/Constitutional: No recent loss of weight or appetite changes. No fever or chills. HENT: Negative. Eyes: Negative. Upper respiratory tract: No nasal stuffiness or post nasal drip. Lower respiratory tract/ lungs: + cough, + sputum , + SOB ,  No hemoptysis. Cardiovascular: No palpitations, chest pain.  + mild swelling in legs, improved per patient   Gastrointestinal: No nausea or vomiting. Neurological: No focal neurological weakness. Extremities: No tenderness. Musculoskeletal: no complaints  Genitourinary: No complaints. Hematological: Negative. Denies easy buising  Skin: No itching. Meds    Current Medications    sodium chloride flush  10 mL Intravenous 2 times per day    enoxaparin  40 mg Subcutaneous Daily    famotidine  20 mg Oral BID    aspirin  81 mg Oral Daily    losartan  50 mg Oral Daily    montelukast  10 mg Oral Nightly    insulin lispro  0-6 Units Subcutaneous TID WC    insulin lispro  0-3 Units Subcutaneous Nightly    predniSONE  40 mg Oral Daily    albuterol  2.5 mg Nebulization Q4H While awake    amLODIPine  5 mg Oral Daily    mometasone-formoterol  2 puff Inhalation BID     nitroGLYCERIN, sodium chloride flush, magnesium hydroxide, ondansetron, acetaminophen, albuterol sulfate HFA, busPIRone, glucose, dextrose, glucagon (rDNA), dextrose  IV Drips/Infusions   dextrose       Vitals    Vitals    height is 5' 2\" (1.575 m) and weight is 158 lb 3.2 oz (71.8 kg). Her axillary temperature is 98 °F (36.7 °C).

## 2018-06-30 NOTE — ED NOTES
Patient transported to [facility] via cart in stable condition.      Contact made with floor prior to transport        Margarita Hanna, EMT-P  06/29/18 0416

## 2018-06-30 NOTE — H&P
History & Physical    Patient:  Sofía Fermin  YOB: 1950  Date of Service: 6/29/2018  MRN: [id number]   Acct:  [de-identified]   Primary Care Physician: Nicol Vogel MD    Chief Complaint: Chest pain, Shortness of breath    History of Present Illness:   History obtained from chart review and the patient. The patient is a 79 y.o. female with CAD, COPD, HTN, HLD, GERD, CVA who presents to the ED with complaint of shortness of breath and chest pressure since 2 days ago. Patient relates that sthe chest pressure is on the left side without radiation. She rates the chest discomfort an 8/10. She denies modifying factors. She states that she has been coughing with thick white sputum production. Patient denies fever, chills, nausea, vomiting, diarrhea, abdominal pain, sick contacts, dizziness or falls. Patient states that she was to use a CPAP machine at night but her machine has not been  functioning for ~ 1 year. Patient is scheduled for repeat sleep study and CPAP fitting on July 7th. Patient is being admitted for further care.         Past Medical History:        Diagnosis Date    Bladder dysfunction     CAD (coronary artery disease)     Cerebrovascular disease     CHF (congestive heart failure) (HCC)     COPD (chronic obstructive pulmonary disease) (HCC)     uses oxygen 24 hours per day    DDD (degenerative disc disease)     Depression     GERD (gastroesophageal reflux disease)     Hyperlipidemia     Hypertension     Obesity     Other screening mammogram 2011    Pap smear, as part of routine gynecological examination 2007    Pap smear, as part of routine gynecological examination 2007    Peripheral edema     with ascites    PONV (postoperative nausea and vomiting)     Unspecified sleep apnea     c-pap at 10 cm wtr       Past Surgical History:        Procedure Laterality Date    COLONOSCOPY  7-2009    DILATION AND CURETTAGE OF UTERUS  1970's    EYE SURGERY  2013 no distress  Mental status - alert, oriented to person, place, and time  Neck - supple, no significant adenopathy, no JVD, or carotid bruits  Chest - clear to auscultation, no wheezes, rales or rhonchi, symmetric air entry  Heart - normal rate, regular rhythm, normal S1, S2, no murmurs, rubs, clicks or gallops  Abdomen - soft, nontender, nondistended, no masses or organomegaly  Neurological - alert, oriented, normal speech, no focal findings or movement disorder noted  Musculoskeletal - no joint tenderness, deformity or swelling  Extremities - peripheral pulses normal, no pedal edema, no clubbing or cyanosis  Skin - normal coloration and turgor, no rashes, no suspicious skin lesions noted      Review of Labs and Diagnostic Testing:    Recent Results (from the past 24 hour(s))   EKG Chest Pain    Collection Time: 06/29/18  7:43 PM   Result Value Ref Range    Ventricular Rate 94 BPM    Atrial Rate 94 BPM    P-R Interval 182 ms    QRS Duration 88 ms    Q-T Interval 358 ms    QTc Calculation (Bazett) 447 ms    P Axis 72 degrees    R Axis 53 degrees    T Axis 90 degrees   CBC auto differential    Collection Time: 06/29/18  7:56 PM   Result Value Ref Range    WBC 8.7 4.8 - 10.8 thou/mm3    RBC 4.09 (L) 4.20 - 5.40 mill/mm3    Hemoglobin 11.7 (L) 12.0 - 16.0 gm/dl    Hematocrit 38.5 37.0 - 47.0 %    MCV 94.1 81.0 - 99.0 fL    MCH 28.6 26.0 - 33.0 pg    MCHC 30.4 (L) 32.2 - 35.5 gm/dl    RDW-CV 12.9 11.5 - 14.5 %    RDW-SD 44.0 35.0 - 45.0 fL    Platelets 859 238 - 874 thou/mm3    MPV 10.9 9.4 - 12.4 fL    Seg Neutrophils 50.3 %    Lymphocytes 33.2 %    Monocytes 8.7 %    Eosinophils 7.2 %    Basophils 0.3 %    Immature Granulocytes 0.3 %    Segs Absolute 4.4 1.8 - 7.7 thou/mm3    Lymphocytes # 2.9 1.0 - 4.8 thou/mm3    Monocytes # 0.8 0.4 - 1.3 thou/mm3    Eosinophils # 0.6 (H) 0.0 - 0.4 thou/mm3    Basophils # 0.0 0.0 - 0.1 thou/mm3    Immature Grans (Abs) 0.03 0.00 - 0.07 thou/mm3    nRBC 0 /100 wbc   Brain Natriuretic Peptide    Collection Time: 06/29/18  7:56 PM   Result Value Ref Range    Pro-.4 0.0 - 900.0 pg/mL   Comprehensive Metabolic Panel    Collection Time: 06/29/18  7:56 PM   Result Value Ref Range    Glucose 164 (H) 70 - 108 mg/dL    CREATININE 0.6 0.4 - 1.2 mg/dL    BUN 12 7 - 22 mg/dL    Sodium 143 135 - 145 meq/L    Potassium 4.1 3.5 - 5.2 meq/L    Chloride 94 (L) 98 - 111 meq/L    CO2 39 (H) 23 - 33 meq/L    Calcium 9.8 8.5 - 10.5 mg/dL    AST 14 5 - 40 U/L    Alkaline Phosphatase 80 38 - 126 U/L    Total Protein 7.5 6.1 - 8.0 g/dL    Alb 3.8 3.5 - 5.1 g/dL    Total Bilirubin 0.2 (L) 0.3 - 1.2 mg/dL    ALT 9 (L) 11 - 66 U/L   Magnesium    Collection Time: 06/29/18  7:56 PM   Result Value Ref Range    Magnesium 2.1 1.6 - 2.4 mg/dL   Troponin    Collection Time: 06/29/18  7:56 PM   Result Value Ref Range    Troponin T < 0.010 ng/ml   Anion Gap    Collection Time: 06/29/18  7:56 PM   Result Value Ref Range    Anion Gap 10.0 8.0 - 16.0 meq/L   Glomerular Filtration Rate, Estimated    Collection Time: 06/29/18  7:56 PM   Result Value Ref Range    Est, Glom Filt Rate >90 ml/min/1.73m2   Osmolality    Collection Time: 06/29/18  7:56 PM   Result Value Ref Range    Osmolality Calc 288.4 275.0 - 300 mOsmol/kg   Blood gas, arterial    Collection Time: 06/29/18  8:14 PM   Result Value Ref Range    pH, Blood Gas 7.34 (L) 7.35 - 7.45    PCO2 82 (HH) 35 - 45 mmhg    PO2 52 (L) 71 - 104 mmhg    HCO3 44 (H) 23 - 28 mmol/l    Base Excess (Calculated) 14.5 (H) -2.5 - 2.5 mmol/l    O2 Sat 82 %    IFIO2 2     DEVICE Cannula     Pollo Test Positive     Source: R Radial     COLLECTED BY: [id number]        Radiology:     Xr Chest Standard (2 Vw)    Result Date: 6/29/2018  PROCEDURE: XR CHEST (2 VW) CLINICAL INFORMATION: shortness of breath. copd, . COMPARISON: March 11, 2018 TECHNIQUE: PA and lateral views the chest. FINDINGS: Moderate dextroscoliosis. Vague hazy opacity in the lung bases possible small amount of layering fluid. Enlarged cardiac silhouette, similar to prior study. Hyperinflated lungs suggesting COPD with fibroemphysematous changes. . Degenerative changes thoracic spine and both shoulders. 1. Vague hazy opacity in the lung bases possible small amount of layering pleural fluid. 2. Minimal bibasilar opacity which could represent atelectasis/chronic changes or minimal infiltrate **This report has been created using voice recognition software. It may contain minor errors which are inherent in voice recognition technology. ** Final report electronically signed by Dr. Vanessa Brush on 6/29/2018 8:55 PM        EKG: Normal sinus rhythm      Assessment / Plan:    1. Chest pain, r/o acs- trend troponin, EKg, BNP, D dimer, CTA chest, NTG sl prn  2. Shortness of breath- CXR, BNP, Troponin, EKG, D dimer, ABGs, 2 d echo, UA  3. PETE and COPD overlap syndrome ([facility] [address]- ABGs, BiPAP, Pulm consult  4. GERD (gastroesophageal reflux disease), treated  5. Hyperlipidemia- lipid panel  6. CAD (coronary artery disease)- resume ASA, losartan  7. COPD exacerbation ([address]- duonebs prn, ABGs, pulse ox to keep 02 > 92%        Dispo: Admit, await pending labs, Pulm consult.  Hospitalist service will followw        DVT prophylaxis: [x] Lovenox                                 [] SCDs                                 [] SQ Heparin                                 [] Encourage ambulation, low risk for DVT, no chemical or mechanical prophylaxis necessary              [] Already on Anticoagulation                Anticipated Disposition upon discharge: [x] Home                                                                         [] Home with Home Health                                                                         [] [facility]                                                                         [] [address]          Electronically signed by Sandy Mcfarlane DO on 6/29/2018 at 11:16 PM

## 2018-06-30 NOTE — ED PROVIDER NOTES
patient is nervous/anxious. PAST MEDICAL HISTORY    has a past medical history of Bladder dysfunction; CAD (coronary artery disease); Cerebrovascular disease; CHF (congestive heart failure) ([facility]); COPD (chronic obstructive pulmonary disease) ([facility] [address] DDD (degenerative disc disease); Depression; GERD (gastroesophageal reflux disease); Hyperlipidemia; Hypertension; Obesity; Other screening mammogram; Pap smear, as part of routine gynecological examination; Pap smear, as part of routine gynecological examination; Peripheral edema; Pneumonia; PONV (postoperative nausea and vomiting); and Unspecified sleep apnea. SURGICAL HISTORY      has a past surgical history that includes Dilation and curettage of uterus (1970's); eye surgery (2013); other surgical history (2016); Colonoscopy (7-2009); other surgical history (Left, 12/12/2017); Lumbar spine surgery (Left, 12/12/2017); and Endoscopy, colon, diagnostic.     CURRENT MEDICATIONS       Current Discharge Medication List      CONTINUE these medications which have NOT CHANGED    Details   montelukast (SINGULAIR) 10 MG tablet Take 1 tablet by mouth nightly  Qty: 30 tablet, Refills: 3      albuterol (PROVENTIL) (2.5 MG/3ML) 0.083% nebulizer solution Take 3 mLs by nebulization every 6 hours as needed for Wheezing or Shortness of Breath  Qty: 120 mL, Refills: 11    Associated Diagnoses: PETE on CPAP      albuterol sulfate HFA (PROAIR HFA) 108 (90 Base) MCG/ACT inhaler Inhale 2 puffs into the lungs every 6 hours as needed for Shortness of Breath  Qty: 1 Inhaler, Refills: 11    Associated Diagnoses: PETE on CPAP      losartan (COZAAR) 50 MG tablet Refills: 0      ibuprofen (ADVIL;MOTRIN) 400 MG tablet Take 1 tablet by mouth every 6 hours as needed for Pain  Qty: 90 tablet, Refills: 0      omeprazole (PRILOSEC) 40 MG capsule Take 1 capsule by mouth daily  Qty: 30 capsule, Refills: 1      busPIRone (BUSPAR) 5 MG tablet Take 1 tablet by mouth 2 times daily  Qty: 60 tablet, Refills: 3      OXYGEN   Inhale into the lungs 3 lpm, per patient at home and when walking      Handicap Placard MISC by Does not apply route. Qty: 1 each, Refills: 0      Nebulizer MISC by Does not apply route. Qty: 1 each, Refills: 0    Comments: Nebulizer supplies      aspirin 81 MG EC tablet Take 81 mg by mouth daily. amLODIPine (NORVASC) 5 MG tablet Take 5 mg by mouth daily      Respiratory Therapy Supplies (NEBULIZER COMPRESSOR) KIT 1 kit by Does not apply route once for 1 dose  Qty: 1 kit, Refills: 0      fluticasone (FLONASE) 50 MCG/ACT nasal spray 1 spray by Nasal route 2 times daily  Qty: 3 Bottle, Refills: 1             ALLERGIES     is allergic to chantix [varenicline tartrate]; codeine; lipitor; simvastatin; and tiotropium bromide monohydrate. FAMILY HISTORY     indicated that her mother is . She indicated that her father is . She indicated that the status of her sister is unknown. She indicated that the status of her brother is unknown. She indicated that the status of her daughter is unknown.    family history includes Alzheimer's Disease in her father; COPD in her sister; Cancer in her brother and daughter; Diabetes in her daughter; Heart Disease in her father and mother; Heart Failure in her father and mother; High Blood Pressure in her father and mother; Parkinsonism in her father; Stroke in her brother. SOCIAL HISTORY      reports that she quit smoking about 7 years ago. Her smoking use included Cigarettes. She has a 80.00 pack-year smoking history. She has never used smokeless tobacco. She reports that she does not drink alcohol or use drugs. PHYSICAL EXAM     INITIAL VITALS:  height is 5' 2\" (1.575 m) and weight is 158 lb 3.2 oz (71.8 kg). Her oral temperature is 99.1 °F (37.3 °C). Her blood pressure is 97/54 (abnormal) and her pulse is 104. Her respiration is 19 and oxygen saturation is 95%.     Physical Exam   Constitutional: She is oriented to person, place, and time. She appears well-developed and well-nourished. No distress. HENT:   Head: Normocephalic and atraumatic. Nose: Nose normal.   Mouth/Throat: Oropharynx is clear and moist. No oropharyngeal exudate. Eyes: Conjunctivae and EOM are normal. Pupils are equal, round, and reactive to light. Right eye exhibits no discharge. Left eye exhibits no discharge. No scleral icterus. Neck: Normal range of motion. Neck supple. No JVD present. No tracheal deviation present. No thyromegaly present. Cardiovascular: Normal rate, regular rhythm, normal heart sounds and intact distal pulses. Exam reveals no gallop and no friction rub. No murmur heard. Pulmonary/Chest: Effort normal and breath sounds normal. No stridor. No respiratory distress. She has no wheezes. She has no rales. She exhibits no tenderness. Abdominal: Soft. Bowel sounds are normal. She exhibits no distension and no mass. There is no tenderness. There is no rebound and no guarding. Lymphadenopathy:     She has no cervical adenopathy. Neurological: She is alert and oriented to person, place, and time. No cranial nerve deficit. Skin: Skin is warm and dry. No rash noted. She is not diaphoretic. Psychiatric: She has a normal mood and affect. Her behavior is normal.       DIFFERENTIAL DIAGNOSIS:   Including but not limited to COPD, CHF, pneumonia, bronchitis, respiratory falure   DIAGNOSTIC RESULTS     EKG: All EKG's are interpreted by the Emergency Department Physician who either signs or Co-signs this chart in the absence of a cardiologist.  EKG interpreted by Denise Peña MD:    EKG shows normal sinus rhythm without acute EKG changes. No STEMI. RADIOLOGY: non-plain film images(s) such as CT, Ultrasound and MRI are read by the radiologist.    CTA CHEST W WO CONTRAST   Final Result      No acute pulmonary embolism. No acute pneumonia. 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    Improved mediastinal adenopathy and stable left hilar adenopathy. **This report has been created using voice recognition software. It may contain minor errors which are inherent in voice recognition technology. **      Final report electronically signed by Dr. Mimi Harris on 6/30/2018 4:25 AM      XR CHEST STANDARD (2 VW)   Final Result      1. Vague hazy opacity in the lung bases possible small amount of layering pleural fluid. 2. Minimal bibasilar opacity which could represent atelectasis/chronic changes or minimal infiltrate         **This report has been created using voice recognition software. It may contain minor errors which are inherent in voice recognition technology. **      Final report electronically signed by Dr. Mitchell Beck on 6/29/2018 8:55 PM          LABS:   Labs Reviewed   CBC WITH AUTO DIFFERENTIAL - Abnormal; Notable for the following:        Result Value    RBC 4.09 (*)     Hemoglobin 11.7 (*)     MCHC 30.4 (*)     Eosinophils # 0.6 (*)     All other components within normal limits   BLOOD GAS, ARTERIAL - Abnormal; Notable for the following:     pH, Blood Gas 7.34 (*)     PCO2 82 (*)     PO2 52 (*)     HCO3 44 (*)     Base Excess (Calculated) 14.5 (*)     All other components within normal limits   COMPREHENSIVE METABOLIC PANEL - Abnormal; Notable for the following:     Glucose 164 (*)     Chloride 94 (*)     CO2 39 (*)     Total Bilirubin 0.2 (*)     ALT 9 (*)     All other components within normal limits   BLOOD GAS, ARTERIAL - Abnormal; Notable for the following:     pH, Blood Gas 7.34 (*)     PCO2 83 (*)     PO2 58 (*)     HCO3 44 (*)     Base Excess (Calculated) 14.6 (*)     All other components within normal limits   BASIC METABOLIC PANEL W/ REFLEX TO MG FOR LOW K  - Abnormal; Notable for the following:     Chloride 94 (*)     CO2 37 (*)     Glucose 153 (*)     All other components within normal limits   CBC WITH AUTO DIFFERENTIAL - Abnormal; Notable for the following:     RBC 4.10 (*)

## 2018-06-30 NOTE — CONSULTS
--   15.4     TROP  Lab Results   Component Value Date    TROPONINT < 0.010 06/30/2018    TROPONINT < 0.010 06/29/2018    TROPONINT < 0.010 03/30/2018     BNP  Lab Results   Component Value Date    PROBNP 128.4 06/29/2018    PROBNP 131.6 03/30/2018    PROBNP 145.7 03/11/2018     D-Dimer  Lab Results   Component Value Date    DDIMER 539.00 06/30/2018     Lactic Acid  Recent Labs      06/30/18   0057   LACTA  1.0     INR  No results for input(s): INR, PROTIME in the last 72 hours. PTT  No results for input(s): APTT in the last 72 hours. Glucose  Recent Labs      06/30/18   0203  06/30/18   0745   POCGLU  177*  148*     UA Recent Labs      06/30/18   0015   PHUR  7.5   COLORU  YELLOW   PROTEINU  NEGATIVE   BLOODU  NEGATIVE   RBCUA  0-2   WBCUA  2-4   BACTERIA  NONE   NITRU  NEGATIVE   GLUCOSEU  NEGATIVE   BILIRUBINUR  NEGATIVE   UROBILINOGEN  1.0   KETUA  NEGATIVE   . PFTs  Spirometry 5/2018 2013- FULL PFT                   Echo    Last echo from 2012    EKG 6/29/18:  Normal sinus rhythm  Nonspecific T wave abnormality  Abnormal ECG  When compared with ECG of 30-MAR-2018 19:27,  Premature ventricular complexes are no longer Present  Confirmed by GUERITA ABAD ([id number]) on 6/29/2018 8:29:51 PM  Cultures    Procalcitonin  Lab Results   Component Value Date    PROCAL 0.02 06/30/2018    PROCAL 0.02 03/11/2018       Radiology    CXR 6/29/18     1. Vague hazy opacity in the lung bases possible small amount of layering pleural fluid.       2. Minimal bibasilar opacity which could represent atelectasis/chronic changes or minimal infiltrate         CT Scans  CTA CHEST 6/30/18      No acute pulmonary embolism. No acute pneumonia. 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    Improved mediastinal adenopathy and stable left hilar adenopathy.           (See actual reports for details)    Assessment   COPD- moderate- Severe w/ acute exacerbation-  Severe restriction/ severe Diffusion Defect Acute on chronic hypoxic/hypercapnic respiratory failure- on BiPAP   Use of home O2 - 3L/min NC continuous as baseline  PETE- non compliant with CPAP   Overlap Syndrome  Former Smoker  Obesity  CHF - ProBNP WNL   Recommendations   Repeat ABG now : slow improvement but still remains acidotic  Will increased BiPAP to 20/8 and repeat ABG in 3 hours (this was discussed with RT)   Wean FIO2 as able to keep sats 92%  No Duonebs due to reported allergy to Tiotropium  Albuterol neb Q4 H WA  Was prescribed Advair at discharge in March- patient thinks her insurance would not cover so she has not been on any inhalers expect Proventil and/or albuterol Nebs  Add Dulera 200/5 HFA 2 puffs BID  In patient consult to pharmacy to assist with discharge med coverage for ICS combo  Agree with oral prednisone 40 mg po daily x 5 days  Negative procal, afebrile, clear CT- does not appear infectious    Recommend admission for continued treatment/ monitoring    Thank you for the consult and allowing us to participate in the care of your patient. Case discussed with nurse and patient/family. Questions and concerns addressed. Meds and Orders reviewed.     Electronically signed by     CASSANDRA Correa CNP on 6/30/2018 at 8:21 AM

## 2018-06-30 NOTE — PLAN OF CARE
Problem: Falls - Risk of:  Goal: Will remain free from falls  Will remain free from falls   Outcome: Ongoing  No falls throughout the shift. Falling star program within place. Assistance provided with ambulation. Bed alarm on. Call light within reach. Goal: Absence of physical injury  Absence of physical injury   Outcome: Ongoing  No falls throughout the shift. Falling star program within place. Assistance provided with ambulation. Bed alarm on. Call light within reach. Problem: OXYGENATION/RESPIRATORY FUNCTION  Goal: Patient will maintain patent airway  Outcome: Ongoing  Pt on BiPap, settings increased. ABGs completed. Pulmonology consulted, waiting to see. Strict I and O's. Goal: Patient will achieve/maintain normal respiratory rate/effort  Respiratory rate and effort will be within normal limits for the patient   Outcome: Ongoing  Pt on BiPap, settings increased. ABGs completed. Pulmonology consulted, waiting to see. Strict I and O's. Problem: HEMODYNAMIC STATUS  Goal: Patient has stable vital signs and fluid balance  Outcome: Ongoing  Pt on BiPap, settings increased. ABGs completed. Pulmonology consulted, waiting to see. Strict I and O's. VS monitored. Problem: FLUID AND ELECTROLYTE IMBALANCE  Goal: Fluid and electrolyte balance are achieved/maintained  Outcome: Ongoing  Pt on BiPap, settings increased. ABGs completed. Pulmonology consulted, waiting to see. Strict I and O's. Problem: ACTIVITY INTOLERANCE/IMPAIRED MOBILITY  Goal: Mobility/activity is maintained at optimum level for patient  Outcome: Ongoing  Pt on BiPap, settings increased. ABGs completed. Pulmonology consulted, waiting to see. Strict I and O's. Assistance with ambulation. Problem: Discharge Planning:  Goal: Discharged to appropriate level of care  Discharged to appropriate level of care   Outcome: Ongoing  Plans to be discharged to home. Comments: Care plan reviewed with patient.   Patient verbalize understanding of the plan

## 2018-06-30 NOTE — ED NOTES
Pt resting in bed quietly. Respirations easy & unlabored; skin warm & dry. NAD noted. Pt denies needs for restroom or repositioning at present time. Pt's pain needs addressed. Pt updated re: plan of care.          Jayjay Salas RN  06/29/18 [id number]

## 2018-06-30 NOTE — PROGRESS NOTES
Hospitalist Progress Note    Patient:  Paul Villasenor      Unit/Bed:8B-38/038-A    YOB: 1950    MRN: [id number]       Acct: [de-identified]     PCP: Shira Nair MD    Date of Admission: 6/29/2018    Chief Complaint: sob x 2 days; vague chest pain    Hospital Course: Just arrived. Pt with severe copd, quit smoking 7 yr ago. Sob x 2 days, cough white sputum; around second hand smoke. Hx chf, hbp. Has left subclavian stenosis on CT scan    Subjective:  sob       Medications:  Reviewed    Infusion Medications    dextrose       Scheduled Medications    sodium chloride flush  10 mL Intravenous 2 times per day    enoxaparin  40 mg Subcutaneous Daily    famotidine  20 mg Oral BID    aspirin  81 mg Oral Daily    losartan  50 mg Oral Daily    montelukast  10 mg Oral Nightly    insulin lispro  0-6 Units Subcutaneous TID WC    insulin lispro  0-3 Units Subcutaneous Nightly    predniSONE  40 mg Oral Daily    albuterol  2.5 mg Nebulization Q4H While awake    amLODIPine  5 mg Oral Daily     PRN Meds: nitroGLYCERIN, sodium chloride flush, magnesium hydroxide, ondansetron, acetaminophen, albuterol sulfate HFA, busPIRone, glucose, dextrose, glucagon (rDNA), dextrose      Intake/Output Summary (Last 24 hours) at 06/30/18 1035  Last data filed at 06/30/18 0948   Gross per 24 hour   Intake              500 ml   Output              625 ml   Net             -125 ml       Diet:  DIET CARB CONTROL; Exam:  BP (!) 151/70   Pulse 72   Temp 98 °F (36.7 °C) (Axillary)   Resp 12   Ht 5' 2\" (1.575 m)   Wt 158 lb 3.2 oz (71.8 kg)   SpO2 99%   BMI 28.94 kg/m²     General appearance: No apparent distress, appears stated age and cooperative. HEENT: Pupils equal, round, and reactive to light. Conjunctivae/corneas clear. Neck: Supple, with full range of motion. No jugular venous distention. Trachea midline.   Respiratory:   Markedly diminished breath sounds  Cardiovascular: Regular rate and

## 2018-06-30 NOTE — PLAN OF CARE
Problem: RESPIRATORY  Goal: Effective breathing pattern  Outcome: Ongoing  Pt remains on bipap this am.  Tolerating well

## 2018-07-01 VITALS
WEIGHT: 157.9 LBS | HEIGHT: 62 IN | BODY MASS INDEX: 29.06 KG/M2 | RESPIRATION RATE: 16 BRPM | OXYGEN SATURATION: 91 % | DIASTOLIC BLOOD PRESSURE: 51 MMHG | SYSTOLIC BLOOD PRESSURE: 94 MMHG | HEART RATE: 100 BPM | TEMPERATURE: 98.3 F

## 2018-07-01 DIAGNOSIS — G47.33 OSA ON CPAP: Primary | ICD-10-CM

## 2018-07-01 DIAGNOSIS — Z99.89 OSA ON CPAP: Primary | ICD-10-CM

## 2018-07-01 LAB
ALLEN TEST: POSITIVE
BASE EXCESS (CALCULATED): 14 MMOL/L (ref -2.5–2.5)
COLLECTED BY:: ABNORMAL
DEVICE: ABNORMAL
GLUCOSE BLD-MCNC: 102 MG/DL (ref 70–108)
HCO3: 42 MMOL/L (ref 23–28)
IFIO2: 35
MODE: ABNORMAL
O2 SATURATION: 89 %
ORGANISM: ABNORMAL
PCO2: 68 MMHG (ref 35–45)
PH BLOOD GAS: 7.4 (ref 7.35–7.45)
PO2: 59 MMHG (ref 71–104)
SET PEEP: 6 MMHG
SET PRESS SUPP: 12 CMH2O
SOURCE, BLOOD GAS: ABNORMAL
URINE CULTURE REFLEX: ABNORMAL

## 2018-07-01 PROCEDURE — 6370000000 HC RX 637 (ALT 250 FOR IP): Performed by: INTERNAL MEDICINE

## 2018-07-01 PROCEDURE — 99232 SBSQ HOSP IP/OBS MODERATE 35: CPT | Performed by: INTERNAL MEDICINE

## 2018-07-01 PROCEDURE — 6370000000 HC RX 637 (ALT 250 FOR IP): Performed by: NURSE PRACTITIONER

## 2018-07-01 PROCEDURE — 6370000000 HC RX 637 (ALT 250 FOR IP): Performed by: FAMILY MEDICINE

## 2018-07-01 PROCEDURE — 82803 BLOOD GASES ANY COMBINATION: CPT

## 2018-07-01 PROCEDURE — 94660 CPAP INITIATION&MGMT: CPT

## 2018-07-01 PROCEDURE — 6360000002 HC RX W HCPCS: Performed by: INTERNAL MEDICINE

## 2018-07-01 PROCEDURE — 6360000002 HC RX W HCPCS: Performed by: FAMILY MEDICINE

## 2018-07-01 PROCEDURE — 36600 WITHDRAWAL OF ARTERIAL BLOOD: CPT

## 2018-07-01 PROCEDURE — 94761 N-INVAS EAR/PLS OXIMETRY MLT: CPT

## 2018-07-01 PROCEDURE — 82948 REAGENT STRIP/BLOOD GLUCOSE: CPT

## 2018-07-01 PROCEDURE — 99238 HOSP IP/OBS DSCHRG MGMT 30/<: CPT | Performed by: INTERNAL MEDICINE

## 2018-07-01 PROCEDURE — 2700000000 HC OXYGEN THERAPY PER DAY

## 2018-07-01 PROCEDURE — 2580000003 HC RX 258: Performed by: FAMILY MEDICINE

## 2018-07-01 PROCEDURE — 94640 AIRWAY INHALATION TREATMENT: CPT

## 2018-07-01 RX ORDER — ALBUTEROL SULFATE 90 UG/1
2 AEROSOL, METERED RESPIRATORY (INHALATION) 4 TIMES DAILY
Qty: 1 INHALER | Refills: 11
Start: 2018-07-01 | End: 2018-07-09 | Stop reason: SDUPTHER

## 2018-07-01 RX ORDER — PREDNISONE 20 MG/1
40 TABLET ORAL DAILY
Qty: 10 TABLET | Refills: 0 | Status: SHIPPED | OUTPATIENT
Start: 2018-07-02 | End: 2018-07-07

## 2018-07-01 RX ADMIN — LOSARTAN POTASSIUM 50 MG: 50 TABLET ORAL at 07:56

## 2018-07-01 RX ADMIN — Medication 10 ML: at 07:56

## 2018-07-01 RX ADMIN — PREDNISONE 40 MG: 20 TABLET ORAL at 07:56

## 2018-07-01 RX ADMIN — ACETAMINOPHEN 650 MG: 325 TABLET ORAL at 04:19

## 2018-07-01 RX ADMIN — ASPIRIN 81 MG: 81 TABLET ORAL at 07:55

## 2018-07-01 RX ADMIN — ENOXAPARIN SODIUM 40 MG: 40 INJECTION SUBCUTANEOUS at 07:56

## 2018-07-01 RX ADMIN — ALBUTEROL SULFATE 2.5 MG: 2.5 SOLUTION RESPIRATORY (INHALATION) at 11:31

## 2018-07-01 RX ADMIN — AMLODIPINE BESYLATE 5 MG: 5 TABLET ORAL at 07:56

## 2018-07-01 RX ADMIN — FAMOTIDINE 20 MG: 20 TABLET ORAL at 07:55

## 2018-07-01 RX ADMIN — Medication 2 PUFF: at 08:33

## 2018-07-01 RX ADMIN — ALBUTEROL SULFATE 2.5 MG: 2.5 SOLUTION RESPIRATORY (INHALATION) at 08:32

## 2018-07-01 ASSESSMENT — PAIN DESCRIPTION - PAIN TYPE: TYPE: ACUTE PAIN

## 2018-07-01 ASSESSMENT — PAIN DESCRIPTION - DESCRIPTORS: DESCRIPTORS: HEADACHE

## 2018-07-01 ASSESSMENT — PAIN SCALES - GENERAL
PAINLEVEL_OUTOF10: 7
PAINLEVEL_OUTOF10: 0
PAINLEVEL_OUTOF10: 0

## 2018-07-01 ASSESSMENT — PAIN DESCRIPTION - LOCATION: LOCATION: HEAD

## 2018-07-01 NOTE — PROGRESS NOTES
Educated on discharge instructions, medications, and follow up appointments. Educated to where 2 L of oxygen at all times, and to wear CPAP at night, supplied script provided for patient, educated to have family smoke outside of the home. Also educated patient to go to Dr. Liang Carballo office for [facility] sample. No further questions or concerns voiced at this time.

## 2018-07-01 NOTE — PLAN OF CARE
Problem: RESPIRATORY  Goal: Effective breathing pattern  Outcome: Ongoing  bipap continued for shelley while patient sleeps

## 2018-07-01 NOTE — PLAN OF CARE
Problem: RESPIRATORY  Goal: Effective breathing pattern  Outcome: Ongoing  Pt had no questions on purpose or side effects of medication  Will continue with treatments to help improve aeration t/o lungs

## 2018-07-01 NOTE — PLAN OF CARE
Problem: RESPIRATORY  Goal: Effective breathing pattern  Outcome: Ongoing  Patient continues on breathing treatments and bipap; tolerating well.   Medications reviewed

## 2018-07-01 NOTE — PLAN OF CARE
Problem: Falls - Risk of:  Goal: Will remain free from falls  Will remain free from falls   Outcome: Ongoing  Free from falls. Indep. Goal: Absence of physical injury  Absence of physical injury   Outcome: Ongoing  Free from injury    Problem: OXYGENATION/RESPIRATORY FUNCTION  Goal: Patient will maintain patent airway  Outcome: Ongoing  3L 02  Goal: Patient will achieve/maintain normal respiratory rate/effort  Respiratory rate and effort will be within normal limits for the patient   Outcome: Ongoing  See vitals. Problem: HEMODYNAMIC STATUS  Goal: Patient has stable vital signs and fluid balance  Outcome: Ongoing  See assessment/vitals    Problem: FLUID AND ELECTROLYTE IMBALANCE  Goal: Fluid and electrolyte balance are achieved/maintained  Outcome: Ongoing  See vitals    Problem: ACTIVITY INTOLERANCE/IMPAIRED MOBILITY  Goal: Mobility/activity is maintained at optimum level for patient  Outcome: Ongoing  Up per self. Problem: Discharge Planning:  Goal: Discharged to appropriate level of care  Discharged to appropriate level of care   Outcome: Ongoing  Discharge to home. Problem: Pain:  Goal: Pain level will decrease  Pain level will decrease   Outcome: Ongoing  Denies pain    Comments: Care plan reviewed with patient. Patient verbalize understanding of the plan of care and contribute to goal setting.

## 2018-07-01 NOTE — PROGRESS NOTES
clear.  Neck: Supple, with full range of motion. No jugular venous distention. Trachea midline. Respiratory:   Markedly diminished breath sounds  Cardiovascular: Regular rate and rhythm with normal S1/S2 without murmurs, rubs or gallops. Abdomen: Soft, non-tender, non-distended with normal bowel sounds. Musculoskeletal: passive and active ROM x 4 extremities. Skin: Skin color, texture, turgor normal.  No rashes or lesions. Neurologic:  Neurovascularly intact without any focal sensory/motor deficits. Cranial nerves: II-XII intact, grossly non-focal.  Psychiatric: Alert and oriented, thought content appropriate, normal insight  Capillary Refill: Brisk,< 3 seconds   Peripheral Pulses: +2 palpable, equal bilaterally       Labs:   Recent Labs      06/29/18 1956 06/30/18   0643   WBC  8.7  5.2   HGB  11.7*  11.6*   HCT  38.5  38.6   PLT  230  231     Recent Labs      06/29/18 1956 06/30/18   0755   NA  143  141   K  4.1  5.2   CL  94*  94*   CO2  39*  37*   BUN  12  12   CREATININE  0.6  0.4   CALCIUM  9.8  9.8     Recent Labs      06/29/18 1956   AST  14   ALT  9*   BILITOT  0.2*   ALKPHOS  80     No results for input(s): INR in the last 72 hours. No results for input(s): Irma Beau in the last 72 hours. Urinalysis:      Lab Results   Component Value Date    NITRU NEGATIVE 06/30/2018    WBCUA 2-4 06/30/2018    BACTERIA NONE 06/30/2018    RBCUA 0-2 06/30/2018    BLOODU NEGATIVE 06/30/2018    SPECGRAV 1.015 04/03/2013    SPECGRAV 1.015 03/31/2012    GLUCOSEU NEGATIVE 06/30/2018       Radiology:  CTA CHEST W WO CONTRAST   Final Result      No acute pulmonary embolism. No acute pneumonia. 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 Improved mediastinal adenopathy and stable left hilar adenopathy. **This report has been created using voice recognition software. It may contain minor errors which are inherent in voice recognition technology. **      Final report electronically signed by Dr. Gregg Caldwell on 6/30/2018 4:25 AM      XR CHEST STANDARD (2 VW)   Final Result      1. Vague hazy opacity in the lung bases possible small amount of layering pleural fluid. 2. Minimal bibasilar opacity which could represent atelectasis/chronic changes or minimal infiltrate         **This report has been created using voice recognition software. It may contain minor errors which are inherent in voice recognition technology. **      Final report electronically signed by Dr. Vanessa Brush on 6/29/2018 8:55 PM          Diet: DIET CARB CONTROL;    DVT prophylaxis: [x] Lovenox                                 [] SCDs                                 [] SQ Heparin                                 [] Encourage ambulation           [] Already on Anticoagulation     Disposition:    [x] Home       [] TCU       [] Rehab       [] Psych       [] SNF       [] [place]       [] Other-    Code Status: Full Code        Assessment/Plan:    Anticipated Discharge in : several days    Active Hospital Problems    Diagnosis Date Noted    PETE and COPD overlap syndrome ([facility] [G47.33, J44.9] 04/16/2015     Priority: High    Chest pain [R07.9] 06/29/2018    Acute on chronic respiratory failure with hypoxia and hypercapnia (HCC) [J96.21, J96.22]     COPD exacerbation ([address] [J44.1] 03/11/2018    Acute respiratory failure ([address] [J96.00] 03/11/2018    CAD (coronary artery disease) [I25.10]     GERD (gastroesophageal reflux disease) [K21.9]     Hyperlipidemia [E78.5]     Diabetes type 2, controlled ([address] [E11.9]        1. Acute on chronic resp failure- bipap, steroids; aerosols, consulted pulmonary  2. Echo can be done as outpt  3. Will recheck abg on cannula  4.  Discharge planning per pulmonary input        Electronically signed by Chika Mancuso MD on 7/1/2018 at 6:03 AM

## 2018-07-01 NOTE — DISCHARGE SUMMARY
Hospital Medicine Discharge Summary      Patient Identification:   Darlene Naranjo   : 1950  MRN: [id number]   Account: [de-identified]      Patient's PCP: Colt Cuevas MD    Admit Date: 2018     Discharge Date:   2018      Admitting Physician: Nelson Lazo DO     Discharge Physician: Jayjay Reid MD     Discharge Diagnoses: Active Hospital Problems    Diagnosis Date Noted    PETE and COPD overlap syndrome (HCC) [G47.33, J44.9] 2015     Priority: High    Chest pain [R07.9] 2018    Acute on chronic respiratory failure with hypoxia and hypercapnia (HCC) [J96.21, J96.22]     COPD exacerbation (HCC) [J44.1] 2018    Acute respiratory failure (HCC) [J96.00] 2018    CAD (coronary artery disease) [I25.10]     GERD (gastroesophageal reflux disease) [K21.9]     Hyperlipidemia [E78.5]     Diabetes type 2, controlled ([facility] [address] [E11.9]        The patient was seen and examined on day of discharge and this discharge summary is in conjunction with any daily progress note from day of discharge. Hospital Course:   Darlene Naranjo is a 79 y.o. female admitted to [address] [place] [address] on 2018 for increasing cough and sob. She has a background hx of copd and uses home 02. She came to ER and was admitted to Hospitalist Service. She was treated with steroids, bronchodilators. Her procalcitonin was low so antibiotics were not used. She gradually improved and her pCO2 stabilized. The patient will follow up with pcp and consultants as appropriate. .        She is an exsmoker but is exposed to passive smoke from family members and thus is always at risk for copd exacerbations.       Exam:     Vitals:  Vitals:    18 0414 18 0630 18 0730 18 1145   BP: 133/65  (!) 146/53 (!) 94/51   Pulse: 83  75 100   Resp:   16 16   Temp: 98.3 °F (36.8 °C)  98.3 °F (36.8 °C)    TempSrc: Axillary  Oral    SpO2: 100%  100% 91%   Weight:

## 2018-07-01 NOTE — PROGRESS NOTES
[place] [facility]    Patient - Darren Blanco   MRN -  [id number]   [facility]   - 1950      Date of Admission -  2018  7:35 PM  Date of evaluation -  2018  Room - -[facility]   Hospital Day - 1  Consulting - No att. providers found Primary Care Physician - Kelsea Walls MD   Chief Complaint   COPD exacerbation and hypercapnia  Active Hospital Problem List      Active Hospital Problems    Diagnosis Date Noted    PETE and COPD overlap syndrome ([facility] [G47.33, J44.9] 2015     Priority: High    Chest pain [R07.9] 2018    Acute on chronic respiratory failure with hypoxia and hypercapnia (HCC) [J96.21, J96.22]     [facility] [J44.1] 2018    Acute respiratory failure (HCC) [J96.00] 2018    CAD (coronary artery disease) [I25.10]     GERD (gastroesophageal reflux disease) [K21.9]     Hyperlipidemia [E78.5]     Diabetes type 2, controlled ([facility] [E11.9]      HPI   Darren Blanco is a 79 y.o. female established patient with our office. Follows with me/ Dr Simón Lin MD for COPD. Last hospitalized for COPD exacerbation in 2018- required BiPAP for short amount of time. ,quickly bounced back. Came to ED with this time with c/o SOB and productive cough x2 days with grayish sputum. No relief at home with use of her inhalers/ nebulizers. admitted for COPD exacerbation, acute on chronic respiratory failure. On Home O2 at 3L/min NC. On NC In ER. her ABG revealed pH 7.34, PCO2 82, PO2 52, HCO3 44, BE 14.5. She was placed on BiPAP 12/6 with no improvement,  BiPAP then increased to 18/8- last ABG drawn at 0500 this am - showed worsening respiratory acidosis. She is awake and oriented in no distress on BiPAP during time of my evaluation. Subjective/Events Past 24 hours/ROS   She is off BiPAP. Improved shortness of breath. No chest pain. Want to go home. Rest of the body systems were reviewed. Past Medical History          Diagnosis Date    Bladder dysfunction     CAD (coronary artery disease)     Cerebrovascular disease     CHF (congestive heart failure) (HCC)     [facility]     uses oxygen 24 hours per day    DDD (degenerative disc disease)     Depression     GERD (gastroesophageal reflux disease)     Hyperlipidemia     Hypertension     Obesity     Other screening mammogram 2011    Pap smear, as part of routine gynecological examination 2007    Pap smear, as part of routine gynecological examination 2007    Peripheral edema     with ascites    Pneumonia     PONV (postoperative nausea and vomiting)     Unspecified sleep apnea     c-pap at 10 cm wtr     Diet       Allergies    Chantix [varenicline tartrate]; Codeine; Lipitor; Simvastatin; and Tiotropium bromide monohydrate  Sleep History    H/o PETE has CPAP at home, has not used in over 1 year. Per patient Dr Isaura Frias had recommended repeat sleep study with PAP titration but she has not completed it yet. Meds    Current Medications       IV Drips/Infusions    Vitals    Vitals    height is 5' 2\" (1.575 m) and weight is 157 lb 14.4 oz (71.6 kg). Her oral temperature is 98.3 °F (36.8 °C). Her blood pressure is 94/51 (abnormal) and her pulse is 100. Her respiration is 16 and oxygen saturation is 91%. O2 Flow Rate (L/min): 2 L/min  I/O    Intake/Output Summary (Last 24 hours) at 07/01/18 1548  Last data filed at 07/01/18 0418   Gross per 24 hour   Intake              250 ml   Output              550 ml   Net             -300 ml     Patient Vitals for the past 96 hrs (Last 3 readings):   Weight   07/01/18 0630 157 lb 14.4 oz (71.6 kg)   06/30/18 0413 158 lb 3.2 oz (71.8 kg)   06/29/18 1940 157 lb (71.2 kg)     Exam   Constitutional: Patient appears in no distress. Mouth/Throat: Oropharynx is clear and moist.  No oral thrush. Neck: Neck supple. Cardiovascular: S1 and S2 heard. No murmurs.

## 2018-07-05 ENCOUNTER — TELEPHONE (OUTPATIENT)
Dept: PULMONOLOGY | Age: 68
End: 2018-07-05

## 2018-07-06 NOTE — CARE COORDINATION
7/6/18, 7:37 AM    Discharge plan discussed by  and . Discharge plan reviewed with patient/ family. Patient/ family verbalize understanding of discharge plan and are in agreement with plan. Understanding was demonstrated using the teach back method. Patient admitted and discharged over the weekend. No further needs noted.

## 2018-07-09 ENCOUNTER — OFFICE VISIT (OUTPATIENT)
Dept: PULMONOLOGY | Age: 68
End: 2018-07-09
Payer: MEDICARE

## 2018-07-09 ENCOUNTER — TELEPHONE (OUTPATIENT)
Dept: PULMONOLOGY | Age: 68
End: 2018-07-09

## 2018-07-09 VITALS
OXYGEN SATURATION: 94 % | HEART RATE: 87 BPM | BODY MASS INDEX: 29.08 KG/M2 | WEIGHT: 158 LBS | DIASTOLIC BLOOD PRESSURE: 64 MMHG | SYSTOLIC BLOOD PRESSURE: 104 MMHG | HEIGHT: 62 IN

## 2018-07-09 DIAGNOSIS — Z99.89 OSA ON CPAP: ICD-10-CM

## 2018-07-09 DIAGNOSIS — Z87.891 HISTORY OF TOBACCO ABUSE: ICD-10-CM

## 2018-07-09 DIAGNOSIS — I50.9 CHRONIC CONGESTIVE HEART FAILURE, UNSPECIFIED CONGESTIVE HEART FAILURE TYPE: ICD-10-CM

## 2018-07-09 DIAGNOSIS — J44.9 COPD, SEVERE (HCC): Primary | ICD-10-CM

## 2018-07-09 DIAGNOSIS — M35.1 OVERLAP SYNDROME (HCC): ICD-10-CM

## 2018-07-09 DIAGNOSIS — G47.33 OSA ON CPAP: ICD-10-CM

## 2018-07-09 DIAGNOSIS — J96.11 CHRONIC RESPIRATORY FAILURE WITH HYPOXIA (HCC): ICD-10-CM

## 2018-07-09 PROCEDURE — G8427 DOCREV CUR MEDS BY ELIG CLIN: HCPCS | Performed by: NURSE PRACTITIONER

## 2018-07-09 PROCEDURE — G8926 SPIRO NO PERF OR DOC: HCPCS | Performed by: NURSE PRACTITIONER

## 2018-07-09 PROCEDURE — 4040F PNEUMOC VAC/ADMIN/RCVD: CPT | Performed by: NURSE PRACTITIONER

## 2018-07-09 PROCEDURE — 1111F DSCHRG MED/CURRENT MED MERGE: CPT | Performed by: NURSE PRACTITIONER

## 2018-07-09 PROCEDURE — G8419 CALC BMI OUT NRM PARAM NOF/U: HCPCS | Performed by: NURSE PRACTITIONER

## 2018-07-09 PROCEDURE — G8598 ASA/ANTIPLAT THER USED: HCPCS | Performed by: NURSE PRACTITIONER

## 2018-07-09 PROCEDURE — 3017F COLORECTAL CA SCREEN DOC REV: CPT | Performed by: NURSE PRACTITIONER

## 2018-07-09 PROCEDURE — 1036F TOBACCO NON-USER: CPT | Performed by: NURSE PRACTITIONER

## 2018-07-09 PROCEDURE — G8400 PT W/DXA NO RESULTS DOC: HCPCS | Performed by: NURSE PRACTITIONER

## 2018-07-09 PROCEDURE — 3023F SPIROM DOC REV: CPT | Performed by: NURSE PRACTITIONER

## 2018-07-09 PROCEDURE — 99214 OFFICE O/P EST MOD 30 MIN: CPT | Performed by: NURSE PRACTITIONER

## 2018-07-09 PROCEDURE — 1090F PRES/ABSN URINE INCON ASSESS: CPT | Performed by: NURSE PRACTITIONER

## 2018-07-09 PROCEDURE — 1123F ACP DISCUSS/DSCN MKR DOCD: CPT | Performed by: NURSE PRACTITIONER

## 2018-07-09 RX ORDER — ALBUTEROL SULFATE 90 UG/1
2 AEROSOL, METERED RESPIRATORY (INHALATION) 4 TIMES DAILY
Qty: 1 INHALER | Refills: 11 | Status: ON HOLD | OUTPATIENT
Start: 2018-07-09 | End: 2018-08-15 | Stop reason: DRUGHIGH

## 2018-07-09 RX ORDER — BUDESONIDE AND FORMOTEROL FUMARATE DIHYDRATE 160; 4.5 UG/1; UG/1
2 AEROSOL RESPIRATORY (INHALATION) 2 TIMES DAILY
Qty: 1 INHALER | Refills: 11 | Status: SHIPPED | OUTPATIENT
Start: 2018-07-09 | End: 2018-09-25 | Stop reason: SINTOL

## 2018-07-09 ASSESSMENT — ENCOUNTER SYMPTOMS
HEMOPTYSIS: 0
EYES NEGATIVE: 1
VOMITING: 0
WHEEZING: 0
NAUSEA: 0
SPUTUM PRODUCTION: 0
SHORTNESS OF BREATH: 1
DIARRHEA: 0
ABDOMINAL PAIN: 0
COUGH: 1

## 2018-07-09 NOTE — PATIENT INSTRUCTIONS
Health Maintenance Due   Topic Date Due    Hepatitis C screen  1950    Diabetic foot exam  09/20/1960    Diabetic retinal exam  09/20/1960    DTaP/Tdap/Td vaccine (1 - Tdap) 09/20/1969    Shingles Vaccine (1 of 2 - 2 Dose Series) 09/20/2000    Breast cancer screen  01/30/2014    Diabetic microalbuminuria test  03/21/2014    DEXA (modify frequency per FRAX score)  09/20/2015    Pneumococcal low/med risk (2 of 2 - PPSV23) 09/20/2015    Lipid screen  07/27/2016

## 2018-07-09 NOTE — PROGRESS NOTES
Negative for hemoptysis, sputum production and wheezing. Cardiovascular: Negative for chest pain, palpitations and leg swelling. Gastrointestinal: Negative for abdominal pain, diarrhea, nausea and vomiting. Genitourinary: Negative. Musculoskeletal: Negative. Skin: Negative. Neurological: Negative. Endo/Heme/Allergies: Does not bruise/bleed easily. Psychiatric/Behavioral: Negative for depression and suicidal ideas. Physical exam   /64   Pulse 87   Ht 5' 2\" (1.575 m)   Wt 158 lb (71.7 kg)   SpO2 94% Comment: 1L continuous O2  BMI 28.90 kg/m²     -     Physical Exam   Constitutional: She is oriented to person, place, and time and well-developed, well-nourished, and in no distress. HENT:   Head: Normocephalic and atraumatic. Mouth/Throat: Oropharynx is clear and moist.   Eyes: Pupils are equal, round, and reactive to light. Neck: Neck supple. No JVD present. No tracheal deviation present. Cardiovascular: Normal rate and regular rhythm. No murmur heard. Pulmonary/Chest: Effort normal. No respiratory distress. She has no wheezes. She has no rales. She exhibits no tenderness. Abdominal: Soft. Bowel sounds are normal. She exhibits no distension. There is no tenderness. Musculoskeletal: She exhibits edema (trace non pitting edema bilateral LE ). Neurological: She is alert and oriented to person, place, and time. Skin: Skin is warm and dry. Psychiatric: Mood and affect normal.   Nursing note and vitals reviewed. Test results   Lung Nodule Screening     [x] Qualifies    [] Does not qualify   [] Declined    [x] Completed- CTA completed 6/30/18     CT Scans  CTA CHEST 6/30/18      No acute pulmonary embolism. No acute pneumonia. 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 Improved mediastinal adenopathy and stable left hilar adenopathy.          CTA of chest:         Assessment      Diagnosis Orders   1. COPD, severe ([address]     2.  PETE on CPAP  albuterol sulfate HFA (PROAIR HFA) 108 (90 Base) MCG/ACT inhaler    ECHO Complete 2D W Doppler W Color   3. Chronic congestive heart failure, unspecified congestive heart failure type (HCC)  ECHO Complete 2D W Doppler W Color   4. History of tobacco abuse     5. Chronic respiratory failure with hypoxia (HCC)     6.  Overlap syndrome ([facility]          Plan   2D complete ECHO  Patient to call and schedule split night sleep study on her own, she has all the contact information at home  Start symbicort 160/4.5 mcg 2 puffs BID- script sent to local pharmacy (hopefully insurance will cover)  2 samples Symbicort given to patient today- instructed on proper use   Stop [id number] O2 as prescribed  PFT from May reviewed and results discussed with patient  Agreeable to pulmonary rehab referral - order [facility] in [facility]     Will see Uriel Memos back in: 2 months to review ECHO and Symbicort med     Electronically signed by CASSANDRA Childers CNP on 7/9/2018 at 11:59 AM  7/9/2018

## 2018-07-11 ENCOUNTER — TELEPHONE (OUTPATIENT)
Dept: PHYSICAL MEDICINE AND REHAB | Age: 68
End: 2018-07-11

## 2018-08-08 ENCOUNTER — TELEPHONE (OUTPATIENT)
Dept: CARDIOLOGY CLINIC | Age: 68
End: 2018-08-08

## 2018-08-14 ENCOUNTER — APPOINTMENT (OUTPATIENT)
Dept: GENERAL RADIOLOGY | Age: 68
DRG: 189 | End: 2018-08-14
Payer: MEDICARE

## 2018-08-14 ENCOUNTER — HOSPITAL ENCOUNTER (INPATIENT)
Age: 68
LOS: 1 days | Discharge: HOME | DRG: 189 | End: 2018-08-15
Attending: INTERNAL MEDICINE | Admitting: INTERNAL MEDICINE
Payer: MEDICARE

## 2018-08-14 DIAGNOSIS — E87.29 COMPENSATED RESPIRATORY ACIDOSIS: ICD-10-CM

## 2018-08-14 DIAGNOSIS — J96.02 ACUTE RESPIRATORY FAILURE WITH HYPERCAPNIA (HCC): Primary | ICD-10-CM

## 2018-08-14 LAB
ALBUMIN SERPL-MCNC: 3.9 G/DL (ref 3.5–5.1)
ALLEN TEST: NEGATIVE
ALLEN TEST: POSITIVE
ALLEN TEST: POSITIVE
ALP BLD-CCNC: 91 U/L (ref 38–126)
ALT SERPL-CCNC: 10 U/L (ref 11–66)
ANION GAP SERPL CALCULATED.3IONS-SCNC: 10 MEQ/L (ref 8–16)
ANION GAP SERPL CALCULATED.3IONS-SCNC: 11 MEQ/L (ref 8–16)
AST SERPL-CCNC: 15 U/L (ref 5–40)
BASE EXCESS (CALCULATED): 15 MMOL/L (ref -2.5–2.5)
BASE EXCESS (CALCULATED): 16.6 MMOL/L (ref -2.5–2.5)
BASE EXCESS (CALCULATED): 18 MMOL/L (ref -2.5–2.5)
BASE EXCESS MIXED: 16.8 MMOL/L (ref -2–3)
BASOPHILS # BLD: 0.3 %
BASOPHILS ABSOLUTE: 0 THOU/MM3 (ref 0–0.1)
BILIRUB SERPL-MCNC: 0.2 MG/DL (ref 0.3–1.2)
BUN BLDV-MCNC: 12 MG/DL (ref 7–22)
BUN BLDV-MCNC: 9 MG/DL (ref 7–22)
CALCIUM SERPL-MCNC: 9.5 MG/DL (ref 8.5–10.5)
CALCIUM SERPL-MCNC: 9.9 MG/DL (ref 8.5–10.5)
CHLORIDE BLD-SCNC: 88 MEQ/L (ref 98–111)
CHLORIDE BLD-SCNC: 91 MEQ/L (ref 98–111)
CO2: 37 MEQ/L (ref 23–33)
CO2: 41 MEQ/L (ref 23–33)
COLLECTED BY:: ABNORMAL
CREAT SERPL-MCNC: 0.6 MG/DL (ref 0.4–1.2)
CREAT SERPL-MCNC: 0.7 MG/DL (ref 0.4–1.2)
D-DIMER QUANTITATIVE: 603 NG/ML FEU (ref 0–500)
DEVICE: ABNORMAL
EKG ATRIAL RATE: 104 BPM
EKG P AXIS: 64 DEGREES
EKG P-R INTERVAL: 162 MS
EKG Q-T INTERVAL: 330 MS
EKG QRS DURATION: 100 MS
EKG QTC CALCULATION (BAZETT): 433 MS
EKG R AXIS: 52 DEGREES
EKG T AXIS: 92 DEGREES
EKG VENTRICULAR RATE: 104 BPM
EOSINOPHIL # BLD: 5.7 %
EOSINOPHILS ABSOLUTE: 0.6 THOU/MM3 (ref 0–0.4)
ERYTHROCYTE [DISTWIDTH] IN BLOOD BY AUTOMATED COUNT: 12.9 % (ref 11.5–14.5)
ERYTHROCYTE [DISTWIDTH] IN BLOOD BY AUTOMATED COUNT: 42.9 FL (ref 35–45)
FIO2, MIXED VENOUS: 3
GFR SERPL CREATININE-BSD FRML MDRD: 83 ML/MIN/1.73M2
GFR SERPL CREATININE-BSD FRML MDRD: > 90 ML/MIN/1.73M2
GLUCOSE BLD-MCNC: 298 MG/DL (ref 70–108)
GLUCOSE BLD-MCNC: 329 MG/DL (ref 70–108)
GLUCOSE BLD-MCNC: 96 MG/DL (ref 70–108)
HCO3, MIXED: 47 MMOL/L (ref 23–28)
HCO3: 44 MMOL/L (ref 23–28)
HCO3: 47 MMOL/L (ref 23–28)
HCO3: 49 MMOL/L (ref 23–28)
HCT VFR BLD CALC: 39.4 % (ref 37–47)
HEMOGLOBIN: 12.3 GM/DL (ref 12–16)
IFIO2: 3
IFIO2: 35
IFIO2: 35
IMMATURE GRANS (ABS): 0.02 THOU/MM3 (ref 0–0.07)
IMMATURE GRANULOCYTES: 0.2 %
LACTIC ACID: 1.1 MMOL/L (ref 0.5–2.2)
LYMPHOCYTES # BLD: 22.8 %
LYMPHOCYTES ABSOLUTE: 2.3 THOU/MM3 (ref 1–4.8)
MAGNESIUM: 2 MG/DL (ref 1.6–2.4)
MCH RBC QN AUTO: 28.7 PG (ref 26–33)
MCHC RBC AUTO-ENTMCNC: 31.2 GM/DL (ref 32.2–35.5)
MCV RBC AUTO: 91.8 FL (ref 81–99)
MONOCYTES # BLD: 9.6 %
MONOCYTES ABSOLUTE: 1 THOU/MM3 (ref 0.4–1.3)
NUCLEATED RED BLOOD CELLS: 0 /100 WBC
O2 SAT, MIXED: 73 %
O2 SATURATION: 93 %
O2 SATURATION: 93 %
O2 SATURATION: 96 %
OSMOLALITY CALCULATION: 281.7 MOSMOL/KG (ref 275–300)
PCO2, MIXED VENOUS: 85 MMHG (ref 41–51)
PCO2: 79 MMHG (ref 35–45)
PCO2: 89 MMHG (ref 35–45)
PCO2: 91 MMHG (ref 35–45)
PH BLOOD GAS: 7.33 (ref 7.35–7.45)
PH BLOOD GAS: 7.35 (ref 7.35–7.45)
PH BLOOD GAS: 7.36 (ref 7.35–7.45)
PH, MIXED: 7.35 (ref 7.31–7.41)
PLATELET # BLD: 200 THOU/MM3 (ref 130–400)
PMV BLD AUTO: 10.8 FL (ref 9.4–12.4)
PO2 MIXED: 44 MMHG (ref 25–40)
PO2: 76 MMHG (ref 71–104)
PO2: 77 MMHG (ref 71–104)
PO2: 93 MMHG (ref 71–104)
POTASSIUM REFLEX MAGNESIUM: 4.2 MEQ/L (ref 3.5–5.2)
POTASSIUM SERPL-SCNC: 4.1 MEQ/L (ref 3.5–5.2)
PRO-BNP: 83.3 PG/ML (ref 0–900)
PROCALCITONIN: 0.04 NG/ML (ref 0.01–0.09)
RBC # BLD: 4.29 MILL/MM3 (ref 4.2–5.4)
SEG NEUTROPHILS: 61.4 %
SEGMENTED NEUTROPHILS ABSOLUTE COUNT: 6.3 THOU/MM3 (ref 1.8–7.7)
SET PEEP: 6 MMHG
SET PEEP: 6 MMHG
SET PRESS SUPP: 12 CMH2O
SET PRESS SUPP: 12 CMH2O
SITE: ABNORMAL
SODIUM BLD-SCNC: 136 MEQ/L (ref 135–145)
SODIUM BLD-SCNC: 142 MEQ/L (ref 135–145)
SOURCE, BLOOD GAS: ABNORMAL
TOTAL PROTEIN: 7.8 G/DL (ref 6.1–8)
TROPONIN T: < 0.01 NG/ML
WBC # BLD: 10.3 THOU/MM3 (ref 4.8–10.8)

## 2018-08-14 PROCEDURE — 85379 FIBRIN DEGRADATION QUANT: CPT

## 2018-08-14 PROCEDURE — 83735 ASSAY OF MAGNESIUM: CPT

## 2018-08-14 PROCEDURE — 2709999900 HC NON-CHARGEABLE SUPPLY

## 2018-08-14 PROCEDURE — 2060000000 HC ICU INTERMEDIATE R&B

## 2018-08-14 PROCEDURE — 93005 ELECTROCARDIOGRAM TRACING: CPT | Performed by: PHYSICIAN ASSISTANT

## 2018-08-14 PROCEDURE — 99285 EMERGENCY DEPT VISIT HI MDM: CPT

## 2018-08-14 PROCEDURE — 82803 BLOOD GASES ANY COMBINATION: CPT

## 2018-08-14 PROCEDURE — 6360000002 HC RX W HCPCS: Performed by: INTERNAL MEDICINE

## 2018-08-14 PROCEDURE — 36600 WITHDRAWAL OF ARTERIAL BLOOD: CPT

## 2018-08-14 PROCEDURE — 96374 THER/PROPH/DIAG INJ IV PUSH: CPT

## 2018-08-14 PROCEDURE — 83605 ASSAY OF LACTIC ACID: CPT

## 2018-08-14 PROCEDURE — 94640 AIRWAY INHALATION TREATMENT: CPT

## 2018-08-14 PROCEDURE — 2700000000 HC OXYGEN THERAPY PER DAY

## 2018-08-14 PROCEDURE — 6370000000 HC RX 637 (ALT 250 FOR IP): Performed by: INTERNAL MEDICINE

## 2018-08-14 PROCEDURE — 85025 COMPLETE CBC W/AUTO DIFF WBC: CPT

## 2018-08-14 PROCEDURE — 80048 BASIC METABOLIC PNL TOTAL CA: CPT

## 2018-08-14 PROCEDURE — 6360000002 HC RX W HCPCS: Performed by: STUDENT IN AN ORGANIZED HEALTH CARE EDUCATION/TRAINING PROGRAM

## 2018-08-14 PROCEDURE — 84484 ASSAY OF TROPONIN QUANT: CPT

## 2018-08-14 PROCEDURE — 99221 1ST HOSP IP/OBS SF/LOW 40: CPT | Performed by: INTERNAL MEDICINE

## 2018-08-14 PROCEDURE — 80053 COMPREHEN METABOLIC PANEL: CPT

## 2018-08-14 PROCEDURE — 83880 ASSAY OF NATRIURETIC PEPTIDE: CPT

## 2018-08-14 PROCEDURE — 6370000000 HC RX 637 (ALT 250 FOR IP): Performed by: STUDENT IN AN ORGANIZED HEALTH CARE EDUCATION/TRAINING PROGRAM

## 2018-08-14 PROCEDURE — 84145 PROCALCITONIN (PCT): CPT

## 2018-08-14 PROCEDURE — 94660 CPAP INITIATION&MGMT: CPT

## 2018-08-14 PROCEDURE — 2580000003 HC RX 258: Performed by: INTERNAL MEDICINE

## 2018-08-14 PROCEDURE — 71046 X-RAY EXAM CHEST 2 VIEWS: CPT

## 2018-08-14 PROCEDURE — 93010 ELECTROCARDIOGRAM REPORT: CPT | Performed by: NUCLEAR MEDICINE

## 2018-08-14 PROCEDURE — 36415 COLL VENOUS BLD VENIPUNCTURE: CPT

## 2018-08-14 PROCEDURE — 82948 REAGENT STRIP/BLOOD GLUCOSE: CPT

## 2018-08-14 RX ORDER — ONDANSETRON 2 MG/ML
4 INJECTION INTRAMUSCULAR; INTRAVENOUS EVERY 6 HOURS PRN
Status: DISCONTINUED | OUTPATIENT
Start: 2018-08-14 | End: 2018-08-14 | Stop reason: RX

## 2018-08-14 RX ORDER — IPRATROPIUM BROMIDE AND ALBUTEROL SULFATE 2.5; .5 MG/3ML; MG/3ML
1 SOLUTION RESPIRATORY (INHALATION)
Status: DISCONTINUED | OUTPATIENT
Start: 2018-08-14 | End: 2018-08-15 | Stop reason: CLARIF

## 2018-08-14 RX ORDER — PANTOPRAZOLE SODIUM 40 MG/1
40 TABLET, DELAYED RELEASE ORAL
Status: DISCONTINUED | OUTPATIENT
Start: 2018-08-15 | End: 2018-08-15 | Stop reason: HOSPADM

## 2018-08-14 RX ORDER — SODIUM CHLORIDE 0.9 % (FLUSH) 0.9 %
10 SYRINGE (ML) INJECTION EVERY 12 HOURS SCHEDULED
Status: DISCONTINUED | OUTPATIENT
Start: 2018-08-14 | End: 2018-08-15 | Stop reason: HOSPADM

## 2018-08-14 RX ORDER — METHYLPREDNISOLONE SODIUM SUCCINATE 125 MG/2ML
80 INJECTION, POWDER, LYOPHILIZED, FOR SOLUTION INTRAMUSCULAR; INTRAVENOUS ONCE
Status: COMPLETED | OUTPATIENT
Start: 2018-08-14 | End: 2018-08-14

## 2018-08-14 RX ORDER — BUSPIRONE HYDROCHLORIDE 5 MG/1
5 TABLET ORAL 2 TIMES DAILY
Status: DISCONTINUED | OUTPATIENT
Start: 2018-08-14 | End: 2018-08-15 | Stop reason: HOSPADM

## 2018-08-14 RX ORDER — ONDANSETRON 4 MG/1
4 TABLET, ORALLY DISINTEGRATING ORAL EVERY 6 HOURS PRN
Status: DISCONTINUED | OUTPATIENT
Start: 2018-08-14 | End: 2018-08-15 | Stop reason: HOSPADM

## 2018-08-14 RX ORDER — PREDNISONE 20 MG/1
40 TABLET ORAL DAILY
Status: DISCONTINUED | OUTPATIENT
Start: 2018-08-15 | End: 2018-08-15 | Stop reason: HOSPADM

## 2018-08-14 RX ORDER — IPRATROPIUM BROMIDE AND ALBUTEROL SULFATE 2.5; .5 MG/3ML; MG/3ML
1 SOLUTION RESPIRATORY (INHALATION) ONCE
Status: COMPLETED | OUTPATIENT
Start: 2018-08-14 | End: 2018-08-14

## 2018-08-14 RX ORDER — LOSARTAN POTASSIUM 50 MG/1
50 TABLET ORAL DAILY
Status: DISCONTINUED | OUTPATIENT
Start: 2018-08-15 | End: 2018-08-15 | Stop reason: HOSPADM

## 2018-08-14 RX ORDER — AMLODIPINE BESYLATE 5 MG/1
5 TABLET ORAL DAILY
Status: DISCONTINUED | OUTPATIENT
Start: 2018-08-15 | End: 2018-08-15 | Stop reason: HOSPADM

## 2018-08-14 RX ORDER — MONTELUKAST SODIUM 10 MG/1
10 TABLET ORAL NIGHTLY
Status: DISCONTINUED | OUTPATIENT
Start: 2018-08-14 | End: 2018-08-15 | Stop reason: HOSPADM

## 2018-08-14 RX ORDER — ASPIRIN 81 MG/1
81 TABLET ORAL DAILY
Status: DISCONTINUED | OUTPATIENT
Start: 2018-08-15 | End: 2018-08-15 | Stop reason: HOSPADM

## 2018-08-14 RX ORDER — DEXTROSE MONOHYDRATE 50 MG/ML
100 INJECTION, SOLUTION INTRAVENOUS PRN
Status: DISCONTINUED | OUTPATIENT
Start: 2018-08-14 | End: 2018-08-15 | Stop reason: HOSPADM

## 2018-08-14 RX ORDER — INSULIN GLARGINE 100 [IU]/ML
10 INJECTION, SOLUTION SUBCUTANEOUS NIGHTLY
Status: DISCONTINUED | OUTPATIENT
Start: 2018-08-14 | End: 2018-08-15 | Stop reason: HOSPADM

## 2018-08-14 RX ORDER — NICOTINE POLACRILEX 4 MG
15 LOZENGE BUCCAL PRN
Status: DISCONTINUED | OUTPATIENT
Start: 2018-08-14 | End: 2018-08-15 | Stop reason: HOSPADM

## 2018-08-14 RX ORDER — DEXTROSE MONOHYDRATE 25 G/50ML
12.5 INJECTION, SOLUTION INTRAVENOUS PRN
Status: DISCONTINUED | OUTPATIENT
Start: 2018-08-14 | End: 2018-08-15 | Stop reason: HOSPADM

## 2018-08-14 RX ORDER — FLUTICASONE PROPIONATE 50 MCG
1 SPRAY, SUSPENSION (ML) NASAL 2 TIMES DAILY
Status: DISCONTINUED | OUTPATIENT
Start: 2018-08-14 | End: 2018-08-15 | Stop reason: HOSPADM

## 2018-08-14 RX ORDER — SODIUM CHLORIDE 0.9 % (FLUSH) 0.9 %
10 SYRINGE (ML) INJECTION PRN
Status: DISCONTINUED | OUTPATIENT
Start: 2018-08-14 | End: 2018-08-15 | Stop reason: HOSPADM

## 2018-08-14 RX ADMIN — BUSPIRONE HYDROCHLORIDE 5 MG: 5 TABLET ORAL at 20:17

## 2018-08-14 RX ADMIN — Medication 4 UNITS: at 21:56

## 2018-08-14 RX ADMIN — MONTELUKAST SODIUM 10 MG: 10 TABLET, FILM COATED ORAL at 20:17

## 2018-08-14 RX ADMIN — ENOXAPARIN SODIUM 40 MG: 40 INJECTION SUBCUTANEOUS at 18:18

## 2018-08-14 RX ADMIN — IPRATROPIUM BROMIDE AND ALBUTEROL SULFATE 1 AMPULE: .5; 3 SOLUTION RESPIRATORY (INHALATION) at 12:39

## 2018-08-14 RX ADMIN — Medication 10 ML: at 20:17

## 2018-08-14 RX ADMIN — IPRATROPIUM BROMIDE AND ALBUTEROL SULFATE 1 AMPULE: .5; 3 SOLUTION RESPIRATORY (INHALATION) at 12:46

## 2018-08-14 RX ADMIN — INSULIN GLARGINE 10 UNITS: 100 INJECTION, SOLUTION SUBCUTANEOUS at 21:56

## 2018-08-14 RX ADMIN — METHYLPREDNISOLONE SODIUM SUCCINATE 80 MG: 125 INJECTION, POWDER, FOR SOLUTION INTRAMUSCULAR; INTRAVENOUS at 13:25

## 2018-08-14 RX ADMIN — IPRATROPIUM BROMIDE AND ALBUTEROL SULFATE 1 AMPULE: .5; 3 SOLUTION RESPIRATORY (INHALATION) at 21:39

## 2018-08-14 RX ADMIN — Medication 2 PUFF: at 21:39

## 2018-08-14 ASSESSMENT — ENCOUNTER SYMPTOMS
EYE PAIN: 0
WHEEZING: 0
COUGH: 0
SHORTNESS OF BREATH: 1
BACK PAIN: 0
NAUSEA: 0
DIARRHEA: 0
SORE THROAT: 0
VOMITING: 0
EYE DISCHARGE: 0
ABDOMINAL PAIN: 0
RHINORRHEA: 0

## 2018-08-14 ASSESSMENT — PAIN SCALES - GENERAL: PAINLEVEL_OUTOF10: 0

## 2018-08-14 NOTE — ED NOTES
Patient is resting in bed. Updated on plan of care. Denies any needs. Call light in reach.       Tigre Ayon RN  08/14/18 [id number]

## 2018-08-14 NOTE — ED PROVIDER NOTES
[facility]  eMERGENCY dEPARTMENT eNCOUnter          CHIEF COMPLAINT       Chief Complaint   Patient presents with    Shortness of Breath       Nurses Notes reviewed and I agree except as noted in the HPI. HISTORY OF PRESENT ILLNESS    Sherin Wyatt is a 79 y.o. female who presents to the Emergency Department for the evaluation of shortness of breath. The patient reports that she has been short of breath for about 5 days. She states that is is with exertion and is gradually worsening. She reports that she is normally able to walk around her house without being short of breath, but over the last 5 days she can only walk about 15 steps. She states that she has taken an old Lasix prescription, which gave her slight relief. She is on 3 L of O2, and is prescribed Pulmicort daily, but has not been compliant. She denies chest pain, cough, rhinorrhea, fevers, or chills. She denies smoking, but is exposed to a small amount of second hand smoke. She states that she has an appointment for a cardiac ultrasound in the near future, and an appointment to see Dr. Sadiq Barth (cardiologist) on September 5th. Patient denies any further complaints at initial encounter. The HPI was provided by the patient. REVIEW OF SYSTEMS     Review of Systems   Constitutional: Negative for appetite change, chills, fatigue and fever. HENT: Negative for congestion, ear pain, rhinorrhea and sore throat. Eyes: Negative for pain, discharge and visual disturbance. Respiratory: Positive for shortness of breath. Negative for cough and wheezing. Cardiovascular: Negative for chest pain, palpitations and leg swelling. Gastrointestinal: Negative for abdominal pain, diarrhea, nausea and vomiting. Genitourinary: Negative for difficulty urinating, dysuria, hematuria and vaginal discharge. Musculoskeletal: Negative for arthralgias, back pain, joint swelling and neck pain. Skin: Negative for pallor and rash.    Neurological: Negative for dizziness, syncope, weakness, light-headedness, numbness and headaches. Hematological: Negative for adenopathy. Psychiatric/Behavioral: Negative for confusion and suicidal ideas. The patient is not nervous/anxious. PAST MEDICAL HISTORY    has a past medical history of Bladder dysfunction; CAD (coronary artery disease); Cerebrovascular disease; CHF (congestive heart failure) ([facility]); COPD (chronic obstructive pulmonary disease) ([address] DDD (degenerative disc disease); Depression; GERD (gastroesophageal reflux disease); Hyperlipidemia; Hypertension; Obesity; Other disorders of kidney and ureter in diseases classified elsewhere; Other screening mammogram; Pap smear, as part of routine gynecological examination; Pap smear, as part of routine gynecological examination; Peripheral edema; Pneumonia; PONV (postoperative nausea and vomiting); and Unspecified sleep apnea. SURGICAL HISTORY      has a past surgical history that includes Dilation and curettage of uterus (1970's); eye surgery (2013); other surgical history (2016); Colonoscopy (7-2009); other surgical history (Left, 12/12/2017); Lumbar spine surgery (Left, 12/12/2017); and Endoscopy, colon, diagnostic.     CURRENT MEDICATIONS       Current Discharge Medication List      CONTINUE these medications which have NOT CHANGED    Details   amLODIPine (NORVASC) 5 MG tablet Take 5 mg by mouth daily      montelukast (SINGULAIR) 10 MG tablet Take 1 tablet by mouth nightly  Qty: 30 tablet, Refills: 3      albuterol (PROVENTIL) (2.5 MG/3ML) 0.083% nebulizer solution Take 3 mLs by nebulization every 6 hours as needed for Wheezing or Shortness of Breath  Qty: 120 mL, Refills: 11    Associated Diagnoses: PETE on CPAP      losartan (COZAAR) 50 MG tablet Refills: 0      omeprazole (PRILOSEC) 40 MG capsule Take 1 capsule by mouth daily  Qty: 30 capsule, Refills: 1      busPIRone (BUSPAR) 5 MG tablet Take 1 tablet by mouth 2 times daily  Qty: 60 tablet, Refills: tobacco. She reports that she does not drink alcohol or use drugs. PHYSICAL EXAM     INITIAL VITALS:  height is 5' 2\" (1.575 m) and weight is 155 lb 14.4 oz (70.7 kg). Her axillary temperature is 98.2 °F (36.8 °C). Her blood pressure is 130/61 and her pulse is 88. Her respiration is 13 and oxygen saturation is 97%. Physical Exam   Constitutional: She is oriented to person, place, and time. She appears well-developed and well-nourished. Non-toxic appearance. HENT:   Head: Normocephalic and atraumatic. Right Ear: Tympanic membrane and external ear normal.   Left Ear: Tympanic membrane and external ear normal.   Nose: Nose normal.   Mouth/Throat: Oropharynx is clear and moist and mucous membranes are normal. No oropharyngeal exudate, posterior oropharyngeal edema or posterior oropharyngeal erythema. Eyes: Conjunctivae and EOM are normal.   Neck: Normal range of motion. Neck supple. No JVD present. Cardiovascular: Normal rate, regular rhythm, intact distal pulses and normal pulses. Exam reveals no gallop and no friction rub. Murmur heard. Systolic murmur is present with a grade of 5/6   Pulses:       Radial pulses are 2+ on the right side, and 2+ on the left side. Pulmonary/Chest: Effort normal. No respiratory distress. She has decreased breath sounds. She has no wheezes. She has no rhonchi. She has no rales. Diminished breath sounds. No crackle wheezes. Abdominal: Soft. Bowel sounds are normal. She exhibits no distension. There is no tenderness. There is no rebound, no guarding and no CVA tenderness. Musculoskeletal: Normal range of motion. She exhibits no edema. Edema to lower extremities bilateral 1+ trace edema. Neurological: She is alert and oriented to person, place, and time. She exhibits normal muscle tone. Coordination normal.   Skin: Skin is warm and dry. No rash noted. She is not diaphoretic. Nursing note and vitals reviewed.       DIFFERENTIAL DIAGNOSIS:   COPD exacerbation, CHF exacerbation, pneumonia     DIAGNOSTIC RESULTS     EKG: All EKG's are interpreted by the Emergency Department Physician who either signs or Co-signs this chart in the absence of a cardiologist.    Darin Gonsalez. Rate: 104 bpm  UT interval: 162 ms  QRS duration: 100 ms  QTc: 433 ms  P-R-T axes: 64, 52, 92  Sinus tachycardia. No STEMI      RADIOLOGY: non-plain film images(s) such as CT, Ultrasound and MRI are read by the radiologist.    XR CHEST STANDARD (2 VW)   Final Result   1. Bibasilar opacities which likely represent atelectasis or early infiltrate. 2. Blunting at the left costophrenic angle which could be on the basis of scarring and/or a small pleural effusion. **This report has been created using voice recognition software. It may contain minor errors which are inherent in voice recognition technology. **      Final report electronically signed by Dr Lashonda Hein on 8/14/2018 12:20 PM            LABS:     Labs Reviewed   CBC WITH AUTO DIFFERENTIAL - Abnormal; Notable for the following:        Result Value    MCHC 31.2 (*)     Eosinophils # 0.6 (*)     All other components within normal limits   COMPREHENSIVE METABOLIC PANEL - Abnormal; Notable for the following:     Chloride 91 (*)     CO2 41 (*)     Total Bilirubin 0.2 (*)     ALT 10 (*)     All other components within normal limits   D-DIMER, QUANTITATIVE - Abnormal; Notable for the following:     D-Dimer, Quant 603.00 (*)     All other components within normal limits   BLOOD GAS, ARTERIAL - Abnormal; Notable for the following:     PCO2 89 (*)     HCO3 49 (*)     Base Excess (Calculated) 18.0 (*)     All other components within normal limits   BLOOD GAS, ARTERIAL - Abnormal; Notable for the following:     pH, Blood Gas 7.33 (*)     PCO2 91 (*)     HCO3 47 (*)     Base Excess (Calculated) 16.6 (*)     All other components within normal limits   BASIC METABOLIC PANEL W/ REFLEX TO MG FOR LOW K  - Abnormal; Notable for the following: Chloride 88 (*)     CO2 37 (*)     Glucose 298 (*)     All other components within normal limits   BASIC METABOLIC PANEL W/ REFLEX TO MG FOR LOW K  - Abnormal; Notable for the following:     Chloride 89 (*)     CO2 37 (*)     Glucose 241 (*)     All other components within normal limits   BLOOD GAS, VENOUS - Abnormal; Notable for the following:     PCO2, MIXED VENOUS 85 (*)     PO2, Mixed 44 (*)     HCO3, Mixed 47 (*)     Base Exc, Mixed 16.8 (*)     All other components within normal limits   GLOMERULAR FILTRATION RATE, ESTIMATED - Abnormal; Notable for the following:     Est, Glom Filt Rate 83 (*)     All other components within normal limits   BLOOD GAS, ARTERIAL - Abnormal; Notable for the following:     PCO2 79 (*)     HCO3 44 (*)     Base Excess (Calculated) 15.0 (*)     All other components within normal limits   GLOMERULAR FILTRATION RATE, ESTIMATED - Abnormal; Notable for the following:     Est, Glom Filt Rate 83 (*)     All other components within normal limits   POCT GLUCOSE - Abnormal; Notable for the following:     POC Glucose 329 (*)     All other components within normal limits   BRAIN NATRIURETIC PEPTIDE   TROPONIN   MAGNESIUM   PROCALCITONIN   ANION GAP   OSMOLALITY   GLOMERULAR FILTRATION RATE, ESTIMATED   LACTIC ACID, PLASMA   ANION GAP   ANION GAP   BASIC METABOLIC PANEL W/ REFLEX TO MG FOR LOW K    CBC       EMERGENCY DEPARTMENT COURSE:   Vitals:    Vitals:    08/14/18 2030 08/14/18 2138 08/14/18 2300 08/15/18 0018   BP:   130/61    Pulse: 101  88    Resp:  16 16 13   Temp:   98.2 °F (36.8 °C)    TempSrc:   Axillary    SpO2:   97%    Weight:       Height:           12:08 PM: The patient was seen and evaluated. MDM:  The patient was seen and evaluated within the ED today following shortness of breath. Within the department, I observed the patient's vital signs to be within acceptable range. On exam, I appreciated normal lung sounds.  Patient has a 5/6 systolic murmur and 2+ radial actions.     Kait Potter PA-C 8/14/18 12:32 AM              Ariane Gracia PA-C  08/15/18 [id number]

## 2018-08-14 NOTE — ED TRIAGE NOTES
Patient presents to ED with c/o shortness of breath on exertion x2 weeks. States that she is on 3L O2 via nasal cannula all the time but feels like it isn't really helping her. Denies any pain. Call light in reach.

## 2018-08-15 VITALS
OXYGEN SATURATION: 91 % | DIASTOLIC BLOOD PRESSURE: 60 MMHG | TEMPERATURE: 98.7 F | WEIGHT: 155.9 LBS | RESPIRATION RATE: 18 BRPM | HEIGHT: 62 IN | HEART RATE: 100 BPM | BODY MASS INDEX: 28.69 KG/M2 | SYSTOLIC BLOOD PRESSURE: 134 MMHG

## 2018-08-15 LAB
ANION GAP SERPL CALCULATED.3IONS-SCNC: 10 MEQ/L (ref 8–16)
ANION GAP SERPL CALCULATED.3IONS-SCNC: 8 MEQ/L (ref 8–16)
BUN BLDV-MCNC: 16 MG/DL (ref 7–22)
BUN BLDV-MCNC: 18 MG/DL (ref 7–22)
CALCIUM SERPL-MCNC: 9.9 MG/DL (ref 8.5–10.5)
CALCIUM SERPL-MCNC: 9.9 MG/DL (ref 8.5–10.5)
CHLORIDE BLD-SCNC: 89 MEQ/L (ref 98–111)
CHLORIDE BLD-SCNC: 90 MEQ/L (ref 98–111)
CO2: 37 MEQ/L (ref 23–33)
CO2: 38 MEQ/L (ref 23–33)
CREAT SERPL-MCNC: 0.6 MG/DL (ref 0.4–1.2)
CREAT SERPL-MCNC: 0.7 MG/DL (ref 0.4–1.2)
ERYTHROCYTE [DISTWIDTH] IN BLOOD BY AUTOMATED COUNT: 12.8 % (ref 11.5–14.5)
ERYTHROCYTE [DISTWIDTH] IN BLOOD BY AUTOMATED COUNT: 43.7 FL (ref 35–45)
GFR SERPL CREATININE-BSD FRML MDRD: 83 ML/MIN/1.73M2
GFR SERPL CREATININE-BSD FRML MDRD: > 90 ML/MIN/1.73M2
GLUCOSE BLD-MCNC: 124 MG/DL (ref 70–108)
GLUCOSE BLD-MCNC: 156 MG/DL (ref 70–108)
GLUCOSE BLD-MCNC: 160 MG/DL (ref 70–108)
GLUCOSE BLD-MCNC: 241 MG/DL (ref 70–108)
GLUCOSE BLD-MCNC: 252 MG/DL (ref 70–108)
HCT VFR BLD CALC: 38.5 % (ref 37–47)
HEMOGLOBIN: 11.8 GM/DL (ref 12–16)
LV EF: 50 %
LVEF MODALITY: NORMAL
MCH RBC QN AUTO: 28.6 PG (ref 26–33)
MCHC RBC AUTO-ENTMCNC: 30.6 GM/DL (ref 32.2–35.5)
MCV RBC AUTO: 93.2 FL (ref 81–99)
PLATELET # BLD: 215 THOU/MM3 (ref 130–400)
PMV BLD AUTO: 11.3 FL (ref 9.4–12.4)
POTASSIUM REFLEX MAGNESIUM: 4.2 MEQ/L (ref 3.5–5.2)
POTASSIUM REFLEX MAGNESIUM: 4.4 MEQ/L (ref 3.5–5.2)
RBC # BLD: 4.13 MILL/MM3 (ref 4.2–5.4)
SODIUM BLD-SCNC: 136 MEQ/L (ref 135–145)
SODIUM BLD-SCNC: 136 MEQ/L (ref 135–145)
WBC # BLD: 7.1 THOU/MM3 (ref 4.8–10.8)

## 2018-08-15 PROCEDURE — 99239 HOSP IP/OBS DSCHRG MGMT >30: CPT | Performed by: INTERNAL MEDICINE

## 2018-08-15 PROCEDURE — G8989 SELF CARE D/C STATUS: HCPCS

## 2018-08-15 PROCEDURE — 94640 AIRWAY INHALATION TREATMENT: CPT

## 2018-08-15 PROCEDURE — G8987 SELF CARE CURRENT STATUS: HCPCS

## 2018-08-15 PROCEDURE — 2700000000 HC OXYGEN THERAPY PER DAY

## 2018-08-15 PROCEDURE — 93306 TTE W/DOPPLER COMPLETE: CPT

## 2018-08-15 PROCEDURE — 97165 OT EVAL LOW COMPLEX 30 MIN: CPT

## 2018-08-15 PROCEDURE — 2580000003 HC RX 258: Performed by: INTERNAL MEDICINE

## 2018-08-15 PROCEDURE — 97530 THERAPEUTIC ACTIVITIES: CPT

## 2018-08-15 PROCEDURE — 80048 BASIC METABOLIC PNL TOTAL CA: CPT

## 2018-08-15 PROCEDURE — 2709999900 HC NON-CHARGEABLE SUPPLY

## 2018-08-15 PROCEDURE — 85027 COMPLETE CBC AUTOMATED: CPT

## 2018-08-15 PROCEDURE — 82948 REAGENT STRIP/BLOOD GLUCOSE: CPT

## 2018-08-15 PROCEDURE — 6360000002 HC RX W HCPCS: Performed by: INTERNAL MEDICINE

## 2018-08-15 PROCEDURE — 36415 COLL VENOUS BLD VENIPUNCTURE: CPT

## 2018-08-15 PROCEDURE — 99222 1ST HOSP IP/OBS MODERATE 55: CPT | Performed by: INTERNAL MEDICINE

## 2018-08-15 PROCEDURE — 94660 CPAP INITIATION&MGMT: CPT

## 2018-08-15 PROCEDURE — 6370000000 HC RX 637 (ALT 250 FOR IP): Performed by: INTERNAL MEDICINE

## 2018-08-15 PROCEDURE — G8988 SELF CARE GOAL STATUS: HCPCS

## 2018-08-15 RX ORDER — ALBUTEROL SULFATE 2.5 MG/3ML
2.5 SOLUTION RESPIRATORY (INHALATION)
Status: DISCONTINUED | OUTPATIENT
Start: 2018-08-15 | End: 2018-08-15 | Stop reason: HOSPADM

## 2018-08-15 RX ORDER — BUSPIRONE HYDROCHLORIDE 5 MG/1
5 TABLET ORAL 2 TIMES DAILY
Status: ON HOLD | COMMUNITY
End: 2018-11-26 | Stop reason: ALTCHOICE

## 2018-08-15 RX ORDER — DOCUSATE SODIUM 100 MG/1
100 CAPSULE, LIQUID FILLED ORAL DAILY
COMMUNITY
End: 2018-11-20 | Stop reason: ALTCHOICE

## 2018-08-15 RX ORDER — PREDNISONE 20 MG/1
40 TABLET ORAL DAILY
Qty: 20 TABLET | Refills: 0 | Status: SHIPPED | OUTPATIENT
Start: 2018-08-16 | End: 2018-08-26

## 2018-08-15 RX ORDER — FLUTICASONE PROPIONATE 50 MCG
1 SPRAY, SUSPENSION (ML) NASAL DAILY PRN
COMMUNITY
End: 2019-09-30 | Stop reason: ALTCHOICE

## 2018-08-15 RX ORDER — ALBUTEROL SULFATE 90 UG/1
2 AEROSOL, METERED RESPIRATORY (INHALATION) 4 TIMES DAILY PRN
COMMUNITY
End: 2020-03-16 | Stop reason: SDUPTHER

## 2018-08-15 RX ORDER — PSEUDOEPHEDRINE HCL 120 MG/1
120 TABLET, FILM COATED, EXTENDED RELEASE ORAL EVERY 12 HOURS PRN
COMMUNITY

## 2018-08-15 RX ADMIN — ALBUTEROL SULFATE 2.5 MG: 2.5 SOLUTION RESPIRATORY (INHALATION) at 17:20

## 2018-08-15 RX ADMIN — BUSPIRONE HYDROCHLORIDE 5 MG: 5 TABLET ORAL at 10:01

## 2018-08-15 RX ADMIN — Medication 10 ML: at 10:02

## 2018-08-15 RX ADMIN — ALBUTEROL SULFATE 2.5 MG: 2.5 SOLUTION RESPIRATORY (INHALATION) at 14:06

## 2018-08-15 RX ADMIN — ALBUTEROL SULFATE 2.5 MG: 2.5 SOLUTION RESPIRATORY (INHALATION) at 09:04

## 2018-08-15 RX ADMIN — TIOTROPIUM BROMIDE 18 MCG: 18 CAPSULE ORAL; RESPIRATORY (INHALATION) at 09:04

## 2018-08-15 RX ADMIN — ASPIRIN 81 MG: 81 TABLET, COATED ORAL at 10:02

## 2018-08-15 RX ADMIN — PANTOPRAZOLE SODIUM 40 MG: 40 TABLET, DELAYED RELEASE ORAL at 06:17

## 2018-08-15 RX ADMIN — AMLODIPINE BESYLATE 5 MG: 5 TABLET ORAL at 10:01

## 2018-08-15 RX ADMIN — INSULIN LISPRO 2 UNITS: 100 INJECTION, SOLUTION INTRAVENOUS; SUBCUTANEOUS at 17:52

## 2018-08-15 RX ADMIN — INSULIN LISPRO 6 UNITS: 100 INJECTION, SOLUTION INTRAVENOUS; SUBCUTANEOUS at 12:33

## 2018-08-15 RX ADMIN — PREDNISONE 40 MG: 20 TABLET ORAL at 10:01

## 2018-08-15 RX ADMIN — Medication 2 PUFF: at 09:04

## 2018-08-15 RX ADMIN — LOSARTAN POTASSIUM 50 MG: 50 TABLET, FILM COATED ORAL at 10:01

## 2018-08-15 NOTE — H&P
gynecological examination 2007    Peripheral edema     with ascites    Pneumonia     PONV (postoperative nausea and vomiting)     Unspecified sleep apnea     c-pap at 10 cm wtr       Past Surgical History:          Procedure Laterality Date    COLONOSCOPY  7-2009    DILATION AND CURETTAGE OF UTERUS  1970's    ENDOSCOPY, COLON, DIAGNOSTIC      EYE SURGERY  2013    Laser surgery     LUMBAR SPINE SURGERY Left 12/12/2017    LUMBAR RFA L2-3, 3-4, 4-5, L5-S1 LEFT SIDE performed by Dayana Martinez MD at [place] [address]  2016    burning nerves in back - lumbar @ [place]    OTHER SURGICAL HISTORY Left 12/12/2017    Lumbar RFA L2-3, L3-4, L4-5, L5-S1       Medications Prior to Admission:      Prior to Admission medications    Medication Sig Start Date End Date Taking? Authorizing Provider   amLODIPine (NORVASC) 5 MG tablet Take 5 mg by mouth daily   Yes Historical Provider, MD   montelukast (SINGULAIR) 10 MG tablet Take 1 tablet by mouth nightly 3/30/18  Yes Kelly Gonzales MD   albuterol (PROVENTIL) (2.5 MG/3ML) 0.083% nebulizer solution Take 3 mLs by nebulization every 6 hours as needed for Wheezing or Shortness of Breath 11/20/17  Yes CASSANDRA Carolina - CNP   losartan (COZAAR) 50 MG tablet  10/20/17  Yes Historical Provider, MD   omeprazole (PRILOSEC) 40 MG capsule Take 1 capsule by mouth daily 5/4/16  Yes Tamiko Harrell MD   busPIRone (BUSPAR) 5 MG tablet Take 1 tablet by mouth 2 times daily  Patient taking differently: Take 5 mg by mouth 2 times daily as needed  9/29/15  Yes Manny Pierce MD   OXYGEN   Inhale into the lungs 3 lpm, per patient at home and when walking   Yes Historical Provider, MD   Handicap Placard MISC by Does not apply route. 6/6/14  Yes Dhiraj Smith PA-C   Nebulizer MISC by Does not apply route. 10/16/12  Yes Manny Pierce MD   aspirin 81 MG EC tablet Take 81 mg by mouth daily.      Yes Historical Provider, MD budesonide-formoterol (SYMBICORT) 160-4.5 MCG/ACT AERO Inhale 2 puffs into the lungs 2 times daily Rinse mouth after its use. 7/9/18 7/9/19  CASSANDRA Mccoy CNP   albuterol sulfate HFA (PROAIR HFA) 108 (90 Base) MCG/ACT inhaler Inhale 2 puffs into the lungs 4 times daily 7/9/18   CASSANDRA Plunkett CNP   Respiratory Therapy Supplies (NEBULIZER COMPRESSOR) KIT 1 kit by Does not apply route once for 1 dose 6/29/17 6/29/17  Miles Lopez MD   ibuprofen (ADVIL;MOTRIN) 400 MG tablet Take 1 tablet by mouth every 6 hours as needed for Pain 10/4/16   CASSANDRA Bullock CNP   fluticasone (FLONASE) 50 MCG/ACT nasal spray 1 spray by Nasal route 2 times daily  Patient taking differently: 1 spray by Nasal route 2 times daily as needed  5/4/16   Colt Cuevas MD       Allergies:  Chantix [varenicline tartrate]; Codeine; Lipitor; Simvastatin; and Tiotropium bromide monohydrate    Social History:      The patient currently lives at home    TOBACCO:   reports that she quit smoking about 7 years ago. Her smoking use included Cigarettes. She has a 80.00 pack-year smoking history. She has never used smokeless tobacco.  ETOH:   reports that she does not drink alcohol. Family History:        Family History   Problem Relation Age of Onset    Heart Failure Mother     Heart Disease Mother     High Blood Pressure Mother     Heart Failure Father     Heart Disease Father     Alzheimer's Disease Father     Parkinsonism Father     High Blood Pressure Father     COPD Sister     Cancer Brother         skin    Stroke Brother     Cancer Daughter         colon    Diabetes Daughter        Diet:  DIET CARDIAC; Carb Control: 3 carb choices (45 gms)/meal    REVIEW OF SYSTEMS:   Pertinent positives as noted in the HPI. All other systems reviewed and negative.     PHYSICAL EXAM:    BP (!) 141/65   Pulse 101   Temp 98.4 °F (36.9 °C) (Oral)   Resp 18   Ht 5' 2\" (1.575 m)   Wt 155 lb 14.4 oz (70.7 kg)   SpO2 94%   BMI 28.51 kg/m²     General appearance:  No apparent distress, appears stated age and cooperative. Wearing BiPAP  HEENT:  Normal cephalic, atraumatic without obvious deformity. Pupils equal, round, and reactive to light. Extra ocular muscles intact. Conjunctivae/corneas clear. Neck: Supple, with full range of motion. No jugular venous distention. Trachea midline. Respiratory:  Normal respiratory effort. NO wheezes, poor air movement  Cardiovascular:  Regular rate and rhythm with normal S1/S2 without murmurs, rubs or gallops. Abdomen: Soft, non-tender, non-distended with normal bowel sounds. Musculoskeletal:  No clubbing, cyanosis or edema bilaterally. Full range of motion without deformity. Skin: Skin color, texture, turgor normal.  No rashes or lesions. Neurologic:  Neurovascularly intact without any focal sensory/motor deficits. Cranial nerves: II-XII intact, grossly non-focal.  Psychiatric:  Alert and oriented, thought content appropriate, normal insight  Capillary Refill: Brisk,< 3 seconds   Peripheral Pulses: +2 palpable, equal bilaterally       Labs:     Recent Labs      08/14/18   1157   WBC  10.3   HGB  12.3   HCT  39.4   PLT  200     Recent Labs      08/14/18   1157  08/14/18   1851   NA  142  136   K  4.1  4.2   CL  91*  88*   CO2  41*  37*   BUN  9  12   CREATININE  0.6  0.7   CALCIUM  9.9  9.5     Recent Labs      08/14/18   1157   AST  15   ALT  10*   BILITOT  0.2*   ALKPHOS  91     No results for input(s): INR in the last 72 hours. No results for input(s): [place] in the last 72 hours. Urinalysis:      Lab Results   Component Value Date    NITRU NEGATIVE 06/30/2018    WBCUA 2-4 06/30/2018    BACTERIA NONE 06/30/2018    RBCUA 0-2 06/30/2018    BLOODU NEGATIVE 06/30/2018    SPECGRAV 1.015 04/03/2013    SPECGRAV 1.015 03/31/2012    GLUCOSEU NEGATIVE 06/30/2018       Intake & Output:  No intake/output data recorded.   I/O this shift:  In: 350 [P.O.:350]  Out: -       Radiology: CXR: I have reviewed the CXR with the following interpretation: bibasilar atelectasis  EKG:  I have reviewed the EKG with the following interpretation: sinus tach at 104 bpm    XR CHEST STANDARD (2 VW)   Final Result   1. Bibasilar opacities which likely represent atelectasis or early infiltrate. 2. Blunting at the left costophrenic angle which could be on the basis of scarring and/or a small pleural effusion. **This report has been created using voice recognition software. It may contain minor errors which are inherent in voice recognition technology. **      Final report electronically signed by Dr Chantale Lopes on 8/14/2018 12:20 PM               DVT prophylaxis: [x] Lovenox                                 [] SCDs                                 [] SQ Heparin                                 [] Encourage ambulation           [] Already on Anticoagulation    Code Status: Full Code      PT/OT Eval Status: pend    Disposition:    [x] Home       [] TCU       [] Rehab       [] Psych       [] SNF       [] [place]       [] Other-    ASSESSMENT:    Active Hospital Problems    Diagnosis Date Noted    COPD exacerbation ([facility] [J44.1] 03/11/2018       PLAN:    Ms. Erasmo Ahn is a 79year old female who presents with SOB consistent with COPD exacerbation. COPD exacerbation: Recently started following with pulmonology. At that time plan was to undergo sleep study, start symbicort, obtain 2D echo and undergo pulm rehab. Symptoms started 5 days ago and at that time she stopped her inhalers. Hypercarbia is chronic given her normal pH with pCO2 of 90. Uses 3L oxygen at home. - procal negative, hold abx  - s/p solumedrol. Will give 40 mg prednisone daily for 5 days. - continue [facility] inpatient and add on spiriva (allergy says itching, low risk, will try again)  - scheduled nebs  - will need close pulm follow up  - endorses some LE edema.  Will check echo here (systolic CHF as pro-bnp

## 2018-08-15 NOTE — PROGRESS NOTES
Hospitalist Progress Note    Patient:  Chapo Bartholomew      Unit/Bed:4K-26/026-A    YOB: 1950    MRN: [id number]       Acct: [de-identified]     PCP: Joe Dawn MD    Date of Admission: 8/14/2018    Chief Complaint: copd exacerbation    Hospital Course: Pt is a 80 y/o admitted with COPD exacerbation. Pt had recent medication changes and thinks that it precipitated this attack. Now back to baseline. Pulm to evaluate and recommend appropriate meds    Subjective: Pt breathing better, tolerating orals. Medications:  Reviewed    Infusion Medications    dextrose       Scheduled Medications    albuterol  2.5 mg Nebulization Q4H WA    amLODIPine  5 mg Oral Daily    aspirin  81 mg Oral Daily    mometasone-formoterol  2 puff Inhalation BID    busPIRone  5 mg Oral BID    fluticasone  1 spray Nasal BID    losartan  50 mg Oral Daily    montelukast  10 mg Oral Nightly    pantoprazole  40 mg Oral QAM AC    sodium chloride flush  10 mL Intravenous 2 times per day    enoxaparin  40 mg Subcutaneous Q24H    predniSONE  40 mg Oral Daily    insulin lispro  0-12 Units Subcutaneous TID WC    insulin lispro  0-6 Units Subcutaneous Nightly    tiotropium  18 mcg Inhalation Daily    insulin glargine  10 Units Subcutaneous Nightly     PRN Meds: sodium chloride flush, magnesium hydroxide, ondansetron, glucose, dextrose, glucagon (rDNA), dextrose      Intake/Output Summary (Last 24 hours) at 08/15/18 1200  Last data filed at 08/15/18 0359   Gross per 24 hour   Intake              550 ml   Output                0 ml   Net              550 ml       Diet:  DIET CARDIAC; Carb Control: 3 carb choices (45 gms)/meal    Exam:  /62   Pulse 99   Temp 99.2 °F (37.3 °C) (Oral)   Resp 18   Ht 5' 2\" (1.575 m)   Wt 155 lb 14.4 oz (70.7 kg)   SpO2 98%   BMI 28.51 kg/m²     General appearance: No apparent distress, appears stated age and cooperative.   HEENT: Pupils equal, round, and reactive to Ramesh Madrigal on 8/14/2018 12:20 PM          Diet: DIET CARDIAC; Carb Control: 3 carb choices (45 gms)/meal    DVT prophylaxis: [x] Lovenox                                 [] SCDs                                 [] SQ Heparin                                 [] Encourage ambulation           [] Already on Anticoagulation     Disposition:    [] Home       [] TCU       [] Rehab       [] Psych       [] SNF       [] [place]       [] Other-    Code Status: Full Code    PT/OT Eval Status: y    Assessment/Plan:    Anticipated Discharge in : 2-3 days    Active Hospital Problems    Diagnosis Date Noted    PETE and COPD overlap syndrome ([facility] [G47.33, J44.9] 04/16/2015     Priority: High    Obesity (BMI 30.0-34. 9) [E66.9] 04/16/2015     Priority: Medium    Acute on chronic respiratory failure with hypoxia and hypercapnia (HCC) [J96.21, J96.22]     COPD exacerbation (HCC) [J44.1] 03/11/2018    Hypertension [I10] 12/06/2013    Depression [F32.9]     GERD (gastroesophageal reflux disease) [K21.9]     Hyperlipidemia [E78.5]        1. COPD exacerbation: COPD pathway. Pulm consult  2. PETE: CPAP  3. Acute/chronic respiratory failure: Resolving, at baseline O2  4.  Monitor        Electronically signed by Debi Hubbard MD on 8/15/2018 at 12:00 PM

## 2018-08-15 NOTE — PLAN OF CARE
Offered Home Health COPD or Pulmonary Rehab to patient? Yes, refused    Zone management tool for COPD Diagnosis given to the patient as an education reference. Patient verbalizes understanding that the care team will be referencing this tool throughout their hospital stay and again on discharge. Nurse Chandni Blunt notified of the reference tool being received by the patient.

## 2018-08-15 NOTE — PROGRESS NOTES
[address] [place] [address]  PHYSICAL THERAPY MISSED TREATMENT NOTE  ACUTE CARE  [facility] ICU STEPDOWN TELEMETRY 4K              Missed Treatment  Pt seen by OT and pt moving well on her own. At baseline. can ambulate with nsg. Will discharge .

## 2018-08-15 NOTE — CARE COORDINATION
8/15/18, 1:36 PM      [id number]       Admitted from: ED 2018/ [address] [place] day: 1   Location: [address] [place] [address] Reason for admit: COPD exacerbation ([facility] [J44.1] Status: IP  Admit order signed?: yes  PMH:  has a past medical history of Bladder dysfunction; CAD (coronary artery disease); Cerebrovascular disease; CHF (congestive heart failure) ([facility]); COPD (chronic obstructive pulmonary disease) ([facility] DDD (degenerative disc disease); Depression; GERD (gastroesophageal reflux disease); Hyperlipidemia; Hypertension; Obesity; Other disorders of kidney and ureter in diseases classified elsewhere; Other screening mammogram; Pap smear, as part of routine gynecological examination; Pap smear, as part of routine gynecological examination; Peripheral edema; Pneumonia; PONV (postoperative nausea and vomiting); and Unspecified sleep apnea. Procedure: 8/15 ECHO  Pertinent abnormal Imagin/14 Possible Infiltrate  Scheduled Meds:   albuterol  2.5 mg Nebulization Q4H WA    amLODIPine  5 mg Oral Daily    aspirin  81 mg Oral Daily    mometasone-formoterol  2 puff Inhalation BID    busPIRone  5 mg Oral BID    fluticasone  1 spray Nasal BID    losartan  50 mg Oral Daily    montelukast  10 mg Oral Nightly    pantoprazole  40 mg Oral QAM AC    sodium chloride flush  10 mL Intravenous 2 times per day    enoxaparin  40 mg Subcutaneous Q24H    predniSONE  40 mg Oral Daily    insulin lispro  0-12 Units Subcutaneous TID WC    insulin lispro  0-6 Units Subcutaneous Nightly    tiotropium  18 mcg Inhalation Daily    insulin glargine  10 Units Subcutaneous Nightly     Continuous Infusions:   dextrose        Pertinent Info/Orders/Treatment Plan:  Pulmonary following  Diet: DIET CARDIAC; Carb Control: 3 carb choices (45 gms)/meal   DVT Prophylaxis: Lovenox  Smoking status:  reports that she quit smoking about 7 years ago. Her smoking use included Cigarettes. She has a 80.00 pack-year smoking history.  She has never used

## 2018-08-15 NOTE — PROGRESS NOTES
Pharmacy Medication History Note      List of current medications patient is taking is complete. Source of information: [employer], patient, RX bottles    Changes made to medication list:  Medications removed (include reason, ex. therapy complete or physician discontinued):  · none    Medications added/doses adjusted:  · Added docusate 100 mg daily  · Added pseudoephedrine 120 mg ER q12h prn    · Adjusted losartan to 50 mg daily  · Adjusted buspirone to 5 mg BID but patient taking daily  · Adjusted albuterol inhaler to 4x daily prn  · Adjusted fluticasone nasal spray to daily prn    Other notes (ex. Recent course of antibiotics, Coumadin dosing):  · Denies use of other OTC or herbal medications.       Allergies reviewed      Electronically signed by Rae Mccormick, [facility] on 8/15/2018 at 3:08 PM

## 2018-08-15 NOTE — PROGRESS NOTES
Left 12/12/2017    LUMBAR RFA L2-3, 3-4, 4-5, L5-S1 LEFT SIDE performed by Sergei Henriquez MD at [place] [address]  2016    burning nerves in back - lumbar @ [place]    OTHER SURGICAL HISTORY Left 12/12/2017    Lumbar RFA L2-3, L3-4, L4-5, L5-S1           Subjective  Chart Reviewed: Yes (H&P, orders, progress notes)  Patient assessed for rehabilitation services?: Yes  Family / Caregiver Present: No    Subjective: Pt seated in bed upon arrival, agreeable to OT session. Comments: RN ok'd OT. General:  Overall Orientation Status: Within Normal Limits    Vision: Within Functional Limits    Hearing: Within functional limits         Pain:  Pain Assessment  Patient Currently in Pain: Denies       Social/Functional History:  Lives With: Daughter (and 2 grandkids, 2 greatgrandkids)  Type of Home: House  Home Layout: Two level, Performs ADL's on one level, Able to Live on Main level with bedroom/bathroom  Home Access: Stairs to enter without rails  Entrance Stairs - Number of Steps: 1-2  Home Equipment: Oxygen     Bathroom Shower/Tub: Tub/Shower unit  Bathroom Toilet: Standard  Bathroom Equipment:  (none)       ADL Assistance: [facility] [address]: Needs assistance (pt assists with washing dishes, dusting, vaccumming/sweeping as able)  Meal Prep: Total  Laundry: Minimal (family transports in/out of dryer then pt able to fold/put away)  Vacuuming: Minimal  Cleaning: Minimal  Driving:  (independent)  Shopping:  (daughter primarily completes grocery shopping; pt able to run simple errands)       Ambulation Assistance: Independent  Transfer Assistance: Independent    Active : Yes     Additional Comments: Pt reported wears 2L O2 at rest, 3L with activity at home.      Objective        Overall Cognitive Status: WNL    Perception  Overall Perceptual Status: WFL         Observation: 2L O2, increased to 3L per pt request during activity, tele    Observation/Palpation  Observation: 2L O2, increased to 3L per pt request during activity, tele                 LUE AROM (degrees)  LUE AROM : WFL          RUE AROM (degrees)  RUE AROM : WFL       LUE Strength  Gross LUE Strength: WFL                RUE Strength  Gross RUE Strength: WFL                     RUE Tone: Normotonic  LUE Tone: Normotonic    Movements Are Fluid And Coordinated: Yes               ADL  LE Dressing: Independent (donning socks while seated at EOB)  Additional Comments: Pt declined any other ADLs at this time. Bed mobility  Supine to Sit: Independent  Scooting: Independent    Transfers  Sit to stand: Independent (from EOB)  Stand to sit: Independent (to bedside chair)    Balance  Sitting Balance: Independent (at EOB)  Standing Balance: Independent     Time: X1 minute  Activity: prep for mobility     Functional Mobility  Functional - Mobility Device: No device  Activity: Other (around nursing module)  Assist Level: Supervision (for mgmt of O2 only)  Functional Mobility Comments: Steady throughout, no LOB. Pt reported limited by pain in back/hips (chronic) reported no increase in SOB. Apparatus Needs: O2 (3L)                                                                    Activity Tolerance:  Activity Tolerance: Patient Tolerated treatment well  Activity Tolerance: Reviewed ECT/modified techniques to increase activity tolerance for ADL/IADLs at home. Recommendations given to avoid bending towards floor for LB ADLs/retrieving times (due to restriction of lung expansion and potential increase in back/hip pain) with recommendations given for use of reacher (althoughpt declined at this time). Pt demoed abilityto cross 1 LE over the other when donning socks. Reinforced importance of taking rest breaks throughout activity, potential use of shower chair if increased pain/SOB with prolonged standing in shower.  Discussed sitting vs standing when completing ADL/IADL tasks, having family complete heavier IADL tasks, and breaking up IADL tasks throughout day or at times when feeling better. Pt vebalized understanding of all, reporting is currently implementing ECT/modified techniques into ADL/IADL routine and declined any further questions/concerns re: potential challenges with these tasks. Assessment:  Assessment: Pt currently at baseline for AdL/mobility. No further OT services warranted at this time and pt is safe to return home when medically stable. Prognosis: Good  Discharge Recommendations: Home with assist PRN  No Skilled OT: Independent with functional mobility, Independent with ADL's, At baseline function, Safe to return home, No OT goals identified    Clinical Decision Making: Clinical Decision making was of Low Complexity as the result of analysis of data from a problem focused assessment, a consideration of a limited number of treatment options, no significant comorbidities affecting the plan of care and no modification or assistance required to complete the evaluation. Patient Education:  Patient Education: OT role, increasing activity upon returning home and continuing to walk with staff during admission, ECT, modified ADL/IADL    Equipment Recommendations:  Equipment Needed: No    Safety:  Safety Devices in place: Yes  Type of devices: Call light within reach, Chair alarm in place, Left in chair, Nurse notified    Plan:       Goals:            Evaluation Complexity: Based on the findings of patient history, examination, clinical presentation, and decision making during this evaluation, this patient is of low complexity.     OT G-codes  Functional Assessment Tool Used: [facility]  Functional Limitation: Self care  Self Care Current Status (): 0 percent impaired, limited or restricted  Self Care Goal Status (): 0 percent impaired, limited or restricted  Self Care Discharge Status (): 0 percent impaired, limited or restricted  AM-PAC Inpatient Daily Activity Raw Score:

## 2018-08-15 NOTE — PROGRESS NOTES
Discharge teaching and instructions for diagnosis/procedure of COPD exacerbation completed with patient using teachback method. AVS reviewed. Printed prescriptions given to patient. Patient voiced understanding regarding prescriptions, follow up appointments, and care of self at home.  Discharged ambulatory to  home with support per family

## 2018-08-15 NOTE — CONSULTS
[facility]    Patient - Mer Juarez   MRN -  [id number]   [facility]   - 1950      Date of Admission -  2018 11:11 AM  Date of evaluation -  8/15/2018  Room - [address] [place] [address] Day - 1  Consulting - Leonard Ronquillo MD Primary Care Physician - Tri Cisneros MD     Problem List      Active Hospital Problems    Diagnosis Date Noted    PETE and COPD overlap syndrome ([address] [G47.33, J44.9] 2015     Priority: High    Obesity (BMI 30.0-34. 9) [E66.9] 2015     Priority: Medium    Acute on chronic respiratory failure with hypoxia and hypercapnia (HCC) [J96.21, J96.22]     COPD exacerbation (HCC) [J44.1] 2018    Hypertension [I10] 2013    Depression [F32.9]     GERD (gastroesophageal reflux disease) [K21.9]     Hyperlipidemia [E78.5]      Reason for Consult    For management of COPD and exacerbation  HPI   History Obtained From: Patient and electronic medical record. Mer Juarez is a 79 y.o. female  was initially admitted under hospitalist service. Pulmonary medicine was consulted for further management of COPD/exacerbation. The patient is a 79 y.o. female who presented with worsening of shortness of breath for the last 3 days. She was recently seen by Ms Sung Berny on 18 for her COPD. She was started Symbicort 160/4.5mcg spray MDI, 2 puffs via inhalation BID. How ever she quit taking her Symbicort ~1week back due to no improvement in her symptoms. She started having worsening of shortness of breath with cough and wheezing. She also noticed decline in her functional status. She started using her rescue inhalers/nebulizations more frequently at home with no significant improvement in her short ness of breath, cough or wheezing. Due to worsening of her above symptoms she presented to the Emergency room at [facility]. She denies any history of hemoptysis.  She denies any history of orthopnea or paroxysmal nocturnal dyspnea. She denies any fever, chills or rigors at this time. She feels better and want to go home. She uses 2LPM of homeO2 at rest and 3LPM with exercise. She uses her CPAP machine at night time.       PMHx   Past Medical History      Diagnosis Date    Bladder dysfunction     CAD (coronary artery disease)     Cerebrovascular disease     CHF (congestive heart failure) (HCC)     COPD (chronic obstructive pulmonary disease) (HCC)     uses oxygen 24 hours per day    DDD (degenerative disc disease)     Depression     GERD (gastroesophageal reflux disease)     Hyperlipidemia     Hypertension     Obesity     Other disorders of kidney and ureter in diseases classified elsewhere     Other screening mammogram 2011    Pap smear, as part of routine gynecological examination 2007    Pap smear, as part of routine gynecological examination 2007    Peripheral edema     with ascites    Pneumonia     PONV (postoperative nausea and vomiting)     Unspecified sleep apnea     c-pap at 10 cm wtr      Past Surgical History        Procedure Laterality Date    COLONOSCOPY  7-2009    DILATION AND CURETTAGE OF UTERUS  1970's    ENDOSCOPY, COLON, DIAGNOSTIC      EYE SURGERY  2013    Laser surgery     LUMBAR SPINE SURGERY Left 12/12/2017    LUMBAR RFA L2-3, 3-4, 4-5, L5-S1 LEFT SIDE performed by Leighton Doss MD at [place] [id number]  2016    burning nerves in back - lumbar @ [place]    OTHER SURGICAL HISTORY Left 12/12/2017    Lumbar RFA L2-3, L3-4, L4-5, L5-S1     Meds    Current Medications    albuterol  2.5 mg Nebulization Q4H WA    amLODIPine  5 mg Oral Daily    aspirin  81 mg Oral Daily    mometasone-formoterol  2 puff Inhalation BID    busPIRone  5 mg Oral BID    fluticasone  1 spray Nasal BID    losartan  50 mg Oral Daily    montelukast  10 mg Oral Nightly    pantoprazole  40 mg Oral QAM AC    sodium chloride flush  10 mL Intravenous 2 times per day    enoxaparin  40 mg Subcutaneous Q24H    predniSONE  40 mg Oral Daily    insulin lispro  0-12 Units Subcutaneous TID WC    insulin lispro  0-6 Units Subcutaneous Nightly    tiotropium  18 mcg Inhalation Daily    insulin glargine  10 Units Subcutaneous Nightly     sodium chloride flush, magnesium hydroxide, ondansetron, glucose, dextrose, glucagon (rDNA), dextrose  IV Drips/Infusions   dextrose       Home Medications  Prescriptions Prior to Admission: albuterol sulfate  (90 Base) MCG/ACT inhaler, Inhale 2 puffs into the lungs 4 times daily as needed for Wheezing  busPIRone (BUSPAR) 5 MG tablet, Take 5 mg by mouth 2 times daily RX for BID but patient only takes daily  docusate sodium (COLACE) 100 MG capsule, Take 100 mg by mouth daily  pseudoephedrine (SUDAFED 12 HR) 120 MG extended release tablet, Take 120 mg by mouth every 12 hours as needed for Congestion  fluticasone (FLONASE) 50 MCG/ACT nasal spray, 1 spray by Nasal route daily as needed for Rhinitis  budesonide-formoterol (SYMBICORT) 160-4.5 MCG/ACT AERO, Inhale 2 puffs into the lungs 2 times daily Rinse mouth after its use. amLODIPine (NORVASC) 5 MG tablet, Take 5 mg by mouth daily  montelukast (SINGULAIR) 10 MG tablet, Take 1 tablet by mouth nightly  albuterol (PROVENTIL) (2.5 MG/3ML) 0.083% nebulizer solution, Take 3 mLs by nebulization every 6 hours as needed for Wheezing or Shortness of Breath  losartan (COZAAR) 50 MG tablet, Take 50 mg by mouth daily   ibuprofen (ADVIL;MOTRIN) 400 MG tablet, Take 1 tablet by mouth every 6 hours as needed for Pain  omeprazole (PRILOSEC) 40 MG capsule, Take 1 capsule by mouth daily  OXYGEN,  Inhale into the lungs 3 lpm, per patient at home and when walking  Handicap Placard MISC, by Does not apply route. Nebulizer MISC, by Does not apply route. aspirin 81 MG EC tablet, Take 81 mg by mouth daily.     Respiratory Therapy Supplies (NEBULIZER COMPRESSOR) KIT, 1 kit by Does not apply [id number] 08/14/2018    MODE CPAP/PS 07/01/2018    SETPEEP 6.0 08/14/2018     CBC  Recent Labs      08/14/18   1157  08/15/18   0357   WBC  10.3  7.1   RBC  4.29  4.13*   HGB  12.3  11.8*   HCT  39.4  38.5   MCV  91.8  93.2   MCH  28.7  28.6   MCHC  31.2*  30.6*   PLT  200  215   MPV  10.8  11.3      BMP  Recent Labs      08/14/18   1157  08/14/18   1851  08/14/18   2341  08/15/18   0357   NA  142  136  136  136   K  4.1  4.2  4.2  4.4   CL  91*  88*  89*  90*   CO2  41*  37*  37*  38*   BUN  9  12 16  18   CREATININE  0.6  0.7  0.7  0.6   GLUCOSE  96  298*  241*  156*   MG  2.0   --    --    --    CALCIUM  9.9  9.5  9.9  9.9     LFT  Recent Labs      08/14/18   1157   AST  15   ALT  10*   BILITOT  0.2*   ALKPHOS  91     TROP  Lab Results   Component Value Date    TROPONINT < 0.010 08/14/2018    TROPONINT < 0.010 06/30/2018    TROPONINT < 0.010 06/30/2018     BNP  No results for input(s): BNP in the last 72 hours. Lactic Acid  Recent Labs      08/14/18   1405   LACTA  1.1     INR  No results for input(s): INR, PROTIME in the last 72 hours. PTT  No results for input(s): APTT in the last 72 hours. Glucose  Recent Labs      08/15/18   0652  08/15/18   1127  08/15/18   1645   POCGLU  124*  252*  160*     UA No results for input(s): SPECGRAV, PHUR, COLORU, CLARITYU, MUCUS, PROTEINU, BLOODU, RBCUA, WBCUA, BACTERIA, NITRU, GLUCOSEU, BILIRUBINUR, UROBILINOGEN, KETUA, LABCAST, LABCASTTY, AMORPHOS in the last 72 hours. Invalid input(s): CRYSTALS. PFTs  Spirometry 5/2018 2013- FULL PFT                           Sleep studies                                                                   Cultures    None   Pro calcitonin level: 0.04 ( normal)  Echocardiogram   Echocardiogram: 8/15/18- pending    Radiology    CXR  Aug 14, 2018  PROCEDURE: XR CHEST (2 VW)  1. Bibasilar opacities which likely represent atelectasis or early infiltrate.  2. Blunting at the left costophrenic angle which could be on the basis of

## 2018-08-15 NOTE — PLAN OF CARE
Problem: RESPIRATORY  Goal: Lung sounds clear or within normal limits for patient  Outcome: Ongoing  Faint expiratory wheeze before treatment, cleared after treatment. Goal: Normal spontaneous ventilation  Outcome: Met This Shift  Patient breathing well, did not need BIPAP. Continue at night for PETE.

## 2018-08-23 ENCOUNTER — OFFICE VISIT (OUTPATIENT)
Dept: PHYSICAL MEDICINE AND REHAB | Age: 68
End: 2018-08-23
Payer: MEDICARE

## 2018-08-23 VITALS
HEART RATE: 68 BPM | BODY MASS INDEX: 28.52 KG/M2 | SYSTOLIC BLOOD PRESSURE: 134 MMHG | WEIGHT: 155 LBS | DIASTOLIC BLOOD PRESSURE: 57 MMHG | HEIGHT: 62 IN

## 2018-08-23 DIAGNOSIS — M48.061 SPINAL STENOSIS OF LUMBAR REGION WITHOUT NEUROGENIC CLAUDICATION: ICD-10-CM

## 2018-08-23 DIAGNOSIS — M46.1 SI (SACROILIAC) JOINT INFLAMMATION (HCC): ICD-10-CM

## 2018-08-23 DIAGNOSIS — M47.816 SPONDYLOSIS OF LUMBAR REGION WITHOUT MYELOPATHY OR RADICULOPATHY: Primary | ICD-10-CM

## 2018-08-23 DIAGNOSIS — G89.4 CHRONIC PAIN SYNDROME: ICD-10-CM

## 2018-08-23 PROCEDURE — 99213 OFFICE O/P EST LOW 20 MIN: CPT | Performed by: NURSE PRACTITIONER

## 2018-08-23 PROCEDURE — 4040F PNEUMOC VAC/ADMIN/RCVD: CPT | Performed by: NURSE PRACTITIONER

## 2018-08-23 PROCEDURE — 1111F DSCHRG MED/CURRENT MED MERGE: CPT | Performed by: NURSE PRACTITIONER

## 2018-08-23 PROCEDURE — G8400 PT W/DXA NO RESULTS DOC: HCPCS | Performed by: NURSE PRACTITIONER

## 2018-08-23 PROCEDURE — G8598 ASA/ANTIPLAT THER USED: HCPCS | Performed by: NURSE PRACTITIONER

## 2018-08-23 PROCEDURE — 1036F TOBACCO NON-USER: CPT | Performed by: NURSE PRACTITIONER

## 2018-08-23 PROCEDURE — G8427 DOCREV CUR MEDS BY ELIG CLIN: HCPCS | Performed by: NURSE PRACTITIONER

## 2018-08-23 PROCEDURE — 3017F COLORECTAL CA SCREEN DOC REV: CPT | Performed by: NURSE PRACTITIONER

## 2018-08-23 PROCEDURE — 1101F PT FALLS ASSESS-DOCD LE1/YR: CPT | Performed by: NURSE PRACTITIONER

## 2018-08-23 PROCEDURE — 1090F PRES/ABSN URINE INCON ASSESS: CPT | Performed by: NURSE PRACTITIONER

## 2018-08-23 PROCEDURE — G8419 CALC BMI OUT NRM PARAM NOF/U: HCPCS | Performed by: NURSE PRACTITIONER

## 2018-08-23 PROCEDURE — 1123F ACP DISCUSS/DSCN MKR DOCD: CPT | Performed by: NURSE PRACTITIONER

## 2018-08-23 ASSESSMENT — ENCOUNTER SYMPTOMS: BACK PAIN: 1

## 2018-08-23 NOTE — PROGRESS NOTES
7/10.    Drug screen reviewed from 4/12/2018 and was appropriate      The patient is allergic to chantix [varenicline tartrate]; codeine; lipitor; and simvastatin. Past Medical History  Sherwin Jackson  has a past medical history of Bladder dysfunction; CAD (coronary artery disease); Cerebrovascular disease; CHF (congestive heart failure) ([facility]); COPD (chronic obstructive pulmonary disease) ([address] DDD (degenerative disc disease); Depression; GERD (gastroesophageal reflux disease); Hyperlipidemia; Hypertension; Obesity; Other disorders of kidney and ureter in diseases classified elsewhere; Other screening mammogram; Pap smear, as part of routine gynecological examination; Pap smear, as part of routine gynecological examination; Peripheral edema; Pneumonia; PONV (postoperative nausea and vomiting); and Unspecified sleep apnea. Past Surgical History  The patient  has a past surgical history that includes Dilation and curettage of uterus (1970's); eye surgery (2013); other surgical history (2016); Colonoscopy (7-2009); other surgical history (Left, 12/12/2017); Lumbar spine surgery (Left, 12/12/2017); and Endoscopy, colon, diagnostic. Family History  This patient's family history includes Alzheimer's Disease in her father; COPD in her sister; Cancer in her brother and daughter; Diabetes in her daughter; Heart Disease in her father and mother; Heart Failure in her father and mother; High Blood Pressure in her father and mother; Parkinsonism in her father; Stroke in her brother. Social History  Sherwin Jackson  reports that she quit smoking about 7 years ago. Her smoking use included Cigarettes. She has a 80.00 pack-year smoking history. She has never used smokeless tobacco. She reports that she does not drink alcohol or use drugs.     Medications    Current Outpatient Prescriptions:     albuterol sulfate  (90 Base) MCG/ACT inhaler, Inhale 2 puffs into the lungs 4 times daily as needed for Wheezing, Disp: , Rfl:    busPIRone (BUSPAR) 5 MG tablet, Take 5 mg by mouth 2 times daily RX for BID but patient only takes daily, Disp: , Rfl:     docusate sodium (COLACE) 100 MG capsule, Take 100 mg by mouth daily, Disp: , Rfl:     pseudoephedrine (SUDAFED 12 HR) 120 MG extended release tablet, Take 120 mg by mouth every 12 hours as needed for Congestion, Disp: , Rfl:     fluticasone (FLONASE) 50 MCG/ACT nasal spray, 1 spray by Nasal route daily as needed for Rhinitis, Disp: , Rfl:     predniSONE (DELTASONE) 20 MG tablet, Take 2 tablets by mouth daily for 10 days, Disp: 20 tablet, Rfl: 0    tiotropium (SPIRIVA) 18 MCG inhalation capsule, Inhale 1 capsule into the lungs daily, Disp: 30 capsule, Rfl: 3    budesonide-formoterol (SYMBICORT) 160-4.5 MCG/ACT AERO, Inhale 2 puffs into the lungs 2 times daily Rinse mouth after its use., Disp: 1 Inhaler, Rfl: 11    amLODIPine (NORVASC) 5 MG tablet, Take 5 mg by mouth daily, Disp: , Rfl:     montelukast (SINGULAIR) 10 MG tablet, Take 1 tablet by mouth nightly, Disp: 30 tablet, Rfl: 3    albuterol (PROVENTIL) (2.5 MG/3ML) 0.083% nebulizer solution, Take 3 mLs by nebulization every 6 hours as needed for Wheezing or Shortness of Breath, Disp: 120 mL, Rfl: 11    losartan (COZAAR) 50 MG tablet, Take 50 mg by mouth daily , Disp: , Rfl: 0    ibuprofen (ADVIL;MOTRIN) 400 MG tablet, Take 1 tablet by mouth every 6 hours as needed for Pain, Disp: 90 tablet, Rfl: 0    omeprazole (PRILOSEC) 40 MG capsule, Take 1 capsule by mouth daily, Disp: 30 capsule, Rfl: 1    OXYGEN,  Inhale into the lungs 3 lpm, per patient at home and when walking, Disp: , Rfl:     Handicap Placard MISC, by Does not apply route., Disp: 1 each, Rfl: 0    Nebulizer MISC, by Does not apply route., Disp: 1 each, Rfl: 0    aspirin 81 MG EC tablet, Take 81 mg by mouth daily.   , Disp: , Rfl:     Respiratory Therapy Supplies (NEBULIZER COMPRESSOR) KIT, 1 kit by Does not apply route once for 1 dose, Disp: 1 kit, Rfl: 0    Subjective:      Review of Systems   Musculoskeletal: Positive for arthralgias, back pain, gait problem and myalgias. Negative for joint swelling. Neurological: Positive for weakness. Objective:     Vitals:    08/23/18 0812   BP: (!) 134/57   Pulse: 68   Weight: 155 lb (70.3 kg)   Height: 5' 2\" (1.575 m)       Physical Exam   Constitutional: She is oriented to person, place, and time. She appears well-developed and well-nourished. No distress. HENT:   Head: Normocephalic and atraumatic. Right Ear: External ear normal.   Left Ear: External ear normal.   Nose: Nose normal.   Mouth/Throat: Oropharynx is clear and moist. No oropharyngeal exudate. Eyes: Pupils are equal, round, and reactive to light. Conjunctivae and EOM are normal. Right eye exhibits no discharge. Left eye exhibits no discharge. No scleral icterus. Neck: Normal range of motion and full passive range of motion without pain. Neck supple. No muscular tenderness present. No neck rigidity. No edema, no erythema and normal range of motion present. No thyromegaly present. Cardiovascular: Normal rate, regular rhythm, normal heart sounds and intact distal pulses. Exam reveals no gallop and no friction rub. No murmur heard. Pulmonary/Chest: Effort normal. No respiratory distress. She has decreased breath sounds in the right upper field, the right middle field, the right lower field, the left upper field, the left middle field and the left lower field. She has wheezes in the right upper field and the left upper field. She has no rales. She exhibits no tenderness. 2 liters NC portable oxygen   Abdominal: Soft. Bowel sounds are normal. She exhibits no distension. There is no tenderness. There is no rebound and no guarding. Musculoskeletal:        Right shoulder: She exhibits decreased range of motion and tenderness. Left shoulder: She exhibits decreased range of motion and tenderness.         Right elbow: Normal.       Left elbow: Normal.        Right wrist: Normal.        Left wrist: Normal.        Right hip: Normal.        Left hip: She exhibits decreased range of motion, decreased strength and tenderness. Right knee: Normal.        Left knee: Normal.        Cervical back: She exhibits decreased range of motion, tenderness, pain and spasm. Thoracic back: She exhibits tenderness. Lumbar back: She exhibits tenderness, pain and spasm. Back:         Right upper leg: She exhibits tenderness. Left upper leg: She exhibits tenderness. Neurological: She is alert and oriented to person, place, and time. She has normal strength and normal reflexes. She is not disoriented. She displays no atrophy. No cranial nerve deficit or sensory deficit. She exhibits normal muscle tone. She displays a negative Romberg sign. Coordination and gait normal.   SLR-  Motor 5/5 BUE BLE   Skin: Skin is warm. No rash noted. She is not diaphoretic. No erythema. No pallor. Psychiatric: She has a normal mood and affect. Her speech is normal and behavior is normal. Judgment and thought content normal. Her mood appears not anxious. Her affect is not angry, not blunt, not labile and not inappropriate. She is not agitated, not aggressive, not hyperactive, not slowed, not withdrawn, not actively hallucinating and not combative. Thought content is not paranoid and not delusional. Cognition and memory are normal. Cognition and memory are not impaired. She does not express impulsivity or inappropriate judgment. She does not exhibit a depressed mood. She expresses no homicidal and no suicidal ideation. She expresses no suicidal plans and no homicidal plans. She exhibits normal recent memory and normal remote memory. She is attentive. Nursing note and vitals reviewed. ANASTACIA test: Positive   Yeomans test: Positive   Gaenslen test: Positive      Assessment:     1. Spondylosis of lumbar region without myelopathy or radiculopathy    2.  SI (sacroiliac) joint inflammation ([facility] [address]    3. Spinal stenosis of lumbar region without neurogenic claudication    4. Chronic pain syndrome            Plan:      · OARRS reviewed. · Discussed long term side effects of medications. · Discussed tolerance, dependency and addiction. · Previous UDS reviewed  · Patient told can not receive any pain medications from any other source. · Discussed with patient that they may not be pain free. · No evidence of abuse, diversion or aberrant behavior. · Medications and/or procedures improve function and quality of life. · Procedure note reviewed in detail. · Receiving 80% relief of right lower back pain from Right L-RFA and continues to receive that relief. Effects from L-RFA waning and main complaint is pain in left lower back- received 70% relief of pain from Left L-RFA for about 8 months   · Plan L-RFA Left side @ L2-3, L3-4, L4-5, L5-S1 for long term pain relief. Procedure and risks discussed in detail with patient. · Discussed possible SI MBB in future     Previous Treatments tried:  · PT: Yes,  any benefit? Yes, how many weeks? 6-8, last date done:2016  · NSAIDs: Yes,  any benefit? No,  how long taken: months to years  · Chiropractic: Yes,  any benefit? Yes short term  · Muscle relaxants: Yes,  any benefit? No  · Narcotics: Yes,  any benefit? No  · Spine surgeon consult: Yes Dr Gerhardt Daniels, recommended surgery unable to clear by Pulmonologist  · Any Implants: No       Meds. Prescribed:   No orders of the defined types were placed in this encounter. Return for L-RFA Left side @ L2-3, L3-4, L4-5, L5-S1.  , follow up after procedure.          Electronically signed by CASSANDRA Cotto CNP on 8/23/2018 at 8:33 AM

## 2018-09-10 ENCOUNTER — OFFICE VISIT (OUTPATIENT)
Dept: CARDIOLOGY CLINIC | Age: 68
End: 2018-09-10
Payer: MEDICARE

## 2018-09-10 VITALS
HEART RATE: 100 BPM | HEIGHT: 62 IN | SYSTOLIC BLOOD PRESSURE: 170 MMHG | WEIGHT: 163 LBS | DIASTOLIC BLOOD PRESSURE: 72 MMHG | BODY MASS INDEX: 30 KG/M2

## 2018-09-10 DIAGNOSIS — I79.8 OTHER DISORDERS OF ARTERIES, ARTERIOLES AND CAPILLARIES IN DISEASES CLASSIFIED ELSEWHERE (HCC): ICD-10-CM

## 2018-09-10 DIAGNOSIS — I73.9 PAD (PERIPHERAL ARTERY DISEASE) (HCC): ICD-10-CM

## 2018-09-10 DIAGNOSIS — R06.00 DYSPNEA, UNSPECIFIED TYPE: ICD-10-CM

## 2018-09-10 DIAGNOSIS — I25.10 ASCVD (ARTERIOSCLEROTIC CARDIOVASCULAR DISEASE): Primary | ICD-10-CM

## 2018-09-10 PROCEDURE — 1090F PRES/ABSN URINE INCON ASSESS: CPT | Performed by: INTERNAL MEDICINE

## 2018-09-10 PROCEDURE — 99204 OFFICE O/P NEW MOD 45 MIN: CPT | Performed by: INTERNAL MEDICINE

## 2018-09-10 PROCEDURE — 1123F ACP DISCUSS/DSCN MKR DOCD: CPT | Performed by: INTERNAL MEDICINE

## 2018-09-10 PROCEDURE — 3017F COLORECTAL CA SCREEN DOC REV: CPT | Performed by: INTERNAL MEDICINE

## 2018-09-10 PROCEDURE — G8419 CALC BMI OUT NRM PARAM NOF/U: HCPCS | Performed by: INTERNAL MEDICINE

## 2018-09-10 PROCEDURE — G8400 PT W/DXA NO RESULTS DOC: HCPCS | Performed by: INTERNAL MEDICINE

## 2018-09-10 PROCEDURE — 4040F PNEUMOC VAC/ADMIN/RCVD: CPT | Performed by: INTERNAL MEDICINE

## 2018-09-10 PROCEDURE — G8598 ASA/ANTIPLAT THER USED: HCPCS | Performed by: INTERNAL MEDICINE

## 2018-09-10 PROCEDURE — 1101F PT FALLS ASSESS-DOCD LE1/YR: CPT | Performed by: INTERNAL MEDICINE

## 2018-09-10 PROCEDURE — 1111F DSCHRG MED/CURRENT MED MERGE: CPT | Performed by: INTERNAL MEDICINE

## 2018-09-10 PROCEDURE — 1036F TOBACCO NON-USER: CPT | Performed by: INTERNAL MEDICINE

## 2018-09-10 PROCEDURE — G8427 DOCREV CUR MEDS BY ELIG CLIN: HCPCS | Performed by: INTERNAL MEDICINE

## 2018-09-10 RX ORDER — POTASSIUM CHLORIDE 7.45 MG/ML
10 INJECTION INTRAVENOUS ONCE
COMMUNITY
End: 2018-09-24 | Stop reason: DRUGHIGH

## 2018-09-10 RX ORDER — FUROSEMIDE 20 MG/1
20 TABLET ORAL DAILY PRN
COMMUNITY
End: 2019-04-16 | Stop reason: SDUPTHER

## 2018-09-10 RX ORDER — POTASSIUM CHLORIDE 750 MG/1
10 CAPSULE, EXTENDED RELEASE ORAL PRN
COMMUNITY
End: 2018-09-24 | Stop reason: DRUGHIGH

## 2018-09-10 RX ORDER — ATORVASTATIN CALCIUM 10 MG/1
20 TABLET, FILM COATED ORAL DAILY
Qty: 30 TABLET | Refills: 3 | Status: ON HOLD | OUTPATIENT
Start: 2018-09-10 | End: 2018-10-04

## 2018-09-10 ASSESSMENT — ENCOUNTER SYMPTOMS
EYE PAIN: 0
BOWEL INCONTINENCE: 0
CHANGE IN BOWEL HABIT: 0
BACK PAIN: 0
COUGH: 0
HEMOPTYSIS: 0
BLOATING: 0
SPUTUM PRODUCTION: 0
HOARSE VOICE: 0
DOUBLE VISION: 0
DIARRHEA: 0
ORTHOPNEA: 0
SHORTNESS OF BREATH: 1
CONSTIPATION: 0
BLURRED VISION: 0
ABDOMINAL PAIN: 0
EYE DISCHARGE: 0

## 2018-09-10 NOTE — PROGRESS NOTES
[facility] CARDIOLOGY  [address] [address]  Dept: [phone]  Dept Fax: [phone]  Loc: [phone]    Visit Date: 9/10/2018    Ms. Prasad Belle is a 79 y.o. female  who presented for:  Chief Complaint   Patient presents with   [facility] Patient     pt had echo done in [facility] Cardiology   -Uneven blood pressures in upper extremities    HPI:   80 yo F c hx of COPD on O2 3L, former smoking, HTN, HLD, PETE on CPAP is here for a evaluation and established cardiologist.  Patient denies any chest pain, has shortness of breath for a long period of time. Denies any chest pain,  palpitations, lightheadedness, dizziness, orthopnea, PND or pedal edema. Patient is concerned about her BP being uneven, has hx of long term smoking. It is 170/70 on right arm and 110/62 in the left arm. Patient underwent CT-chest when she was in the ER for shortness of breath showed moderate to severe stenosis of proximal left subclavian artery. Also the aorta is somewhat calcified.          Current Outpatient Prescriptions:     furosemide (LASIX) 20 MG tablet, Take 20 mg by mouth as needed, Disp: , Rfl:     potassium chloride 10 MEQ/100ML, Infuse 10 mEq intravenously once, Disp: , Rfl:     potassium chloride (MICRO-K) 10 MEQ extended release capsule, Take 10 mEq by mouth as needed, Disp: , Rfl:     atorvastatin (LIPITOR) 10 MG tablet, Take 2 tablets by mouth daily, Disp: 30 tablet, Rfl: 3    albuterol sulfate  (90 Base) MCG/ACT inhaler, Inhale 2 puffs into the lungs 4 times daily as needed for Wheezing, Disp: , Rfl:     busPIRone (BUSPAR) 5 MG tablet, Take 5 mg by mouth 2 times daily RX for BID but patient only takes daily, Disp: , Rfl:     docusate sodium (COLACE) 100 MG capsule, Take 100 mg by mouth daily, Disp: , Rfl:     pseudoephedrine (SUDAFED 12 HR) 120 MG extended release tablet, Take 120 mg by mouth every 12 hours as needed for Congestion, Lab Results   Component Value Date    WBC 7.1 08/15/2018    RBC 4.13 08/15/2018    RBC 4.30 03/31/2012    HGB 11.8 08/15/2018    HCT 38.5 08/15/2018    MCV 93.2 08/15/2018    MCH 28.6 08/15/2018    MCHC 30.6 08/15/2018    RDW 13.3 03/30/2018     08/15/2018    MPV 11.3 08/15/2018       Lab Results   Component Value Date     08/15/2018    K 4.4 08/15/2018    CL 90 08/15/2018    CO2 38 08/15/2018    BUN 18 08/15/2018    LABALBU 3.9 08/14/2018    LABALBU 4.4 03/31/2012    CREATININE 0.6 08/15/2018    CALCIUM 9.9 08/15/2018    LABGLOM >90 08/15/2018    GLUCOSE 156 08/15/2018       Lab Results   Component Value Date    ALKPHOS 91 08/14/2018    ALT 10 08/14/2018    AST 15 08/14/2018    PROT 7.8 08/14/2018    BILITOT 0.2 08/14/2018    BILITOT NEGATIVE 03/31/2012    BILIDIR <0.2 07/27/2015    LABALBU 3.9 08/14/2018    LABALBU 4.4 03/31/2012       Lab Results   Component Value Date    MG 2.0 08/14/2018       No results found for: INR, PROTIME      Lab Results   Component Value Date    LABA1C 5.9 06/30/2018       Lab Results   Component Value Date    TRIG 227 07/27/2015    HDL 49 07/27/2015    LDLCALC 199 07/27/2015       Lab Results   Component Value Date    TSH 1.180 03/11/2018         Testing Reviewed:      I have individually reviewed the below cardiac tests    EKG:ST, nonspecific ST-T changes    ECHO:   Results for orders placed during the hospital encounter of 08/14/18   Echocardiogram 2D/ M-Mode/ Colorflow/ Do    Narrative Transthoracic Echocardiography Report (TTE)     Demographics      Patient Name    [facility]  Gender               Female                   K      MR #            [id number]      Race                                                      Ethnicity      Account #       [de-identified]      Room Number          3295      Accession       [id number]      Date of Study        08/15/2018   Number      Date of Birth   1950     Referring Physician  Kesha Coates MD Lateral Velocity:   7.1 cm/s   MV A' Lateral Velocity:   11.3 cm/s   E/E' septal: 15.41   E/E' lateral: 11.07     [email or web address] [id number]         Assessment/Plan       Diagnosis Orders   1. ASCVD (arteriosclerotic cardiovascular disease)     2. PAD (peripheral artery disease) (HCC)         PAD, Left subclavian stenosis  COPD on O2 3L  Former smoker  HTN  HLD  PETE on CPAP    EKG with Nonspecific ST-T changes  CT-T chest shows moderate-severe proximal subclavian stenosis  Unsure if she has any upper extremity hand , ischemia symptoms  Will get carotid ultrasound to look for any steal  Will also get Lexiscan stress test to evaluate for coronary ischemia  Continue Aspirin,   Will add lipitor 20mg daily  Continue amlodipine and losartan  The patient is asked to make an attempt to improve diet and exercise patterns to aid in medical management of this problem. Advised patient to call office or seek immediate medical attention if there is any new onset of  any chest pain, sob, palpitations, lightheadedness, dizziness, orthopnea, PND or pedal edema. Thank you for allowing me to participate in the care of this patient. Please do not hesitate to contact me for any further questions. Return in about 8 weeks (around 11/5/2018), or if symptoms worsen or fail to improve, for Review testing, Regular follow up.        Electronically signed by Clinton Avery MD [facility]  9/10/2018 at 4:14 PM

## 2018-09-11 ENCOUNTER — TELEPHONE (OUTPATIENT)
Dept: CARDIOLOGY CLINIC | Age: 68
End: 2018-09-11

## 2018-09-11 NOTE — TELEPHONE ENCOUNTER
Patient seen in office on 9/10/2018 and Dr. Sari Piña has ordered a stress test and carotid ultrasound. Called and informed patient of testing and that she will hear from scheduling to get tests scheduled. Orders given to scheduling.

## 2018-09-17 ENCOUNTER — HOSPITAL ENCOUNTER (OUTPATIENT)
Dept: NON INVASIVE DIAGNOSTICS | Age: 68
Discharge: HOME | End: 2018-09-17
Payer: MEDICARE

## 2018-09-17 ENCOUNTER — HOSPITAL ENCOUNTER (OUTPATIENT)
Dept: INTERVENTIONAL RADIOLOGY/VASCULAR | Age: 68
Discharge: HOME | End: 2018-09-17
Payer: MEDICARE

## 2018-09-17 VITALS — HEIGHT: 62 IN | BODY MASS INDEX: 30 KG/M2 | WEIGHT: 163 LBS

## 2018-09-17 DIAGNOSIS — I73.9 PAD (PERIPHERAL ARTERY DISEASE) (HCC): ICD-10-CM

## 2018-09-17 DIAGNOSIS — I79.8 OTHER DISORDERS OF ARTERIES, ARTERIOLES AND CAPILLARIES IN DISEASES CLASSIFIED ELSEWHERE (HCC): ICD-10-CM

## 2018-09-17 DIAGNOSIS — R06.00 DYSPNEA, UNSPECIFIED TYPE: ICD-10-CM

## 2018-09-17 PROCEDURE — 93880 EXTRACRANIAL BILAT STUDY: CPT

## 2018-09-17 PROCEDURE — 3430000000 HC RX DIAGNOSTIC RADIOPHARMACEUTICAL: Performed by: INTERNAL MEDICINE

## 2018-09-17 PROCEDURE — 6360000002 HC RX W HCPCS

## 2018-09-17 PROCEDURE — 78452 HT MUSCLE IMAGE SPECT MULT: CPT

## 2018-09-17 PROCEDURE — 2709999900 HC NON-CHARGEABLE SUPPLY

## 2018-09-17 PROCEDURE — 93017 CV STRESS TEST TRACING ONLY: CPT | Performed by: INTERNAL MEDICINE

## 2018-09-17 PROCEDURE — A9500 TC99M SESTAMIBI: HCPCS | Performed by: INTERNAL MEDICINE

## 2018-09-17 RX ADMIN — Medication 31.1 MILLICURIE: at 12:37

## 2018-09-17 RX ADMIN — Medication 8.8 MILLICURIE: at 11:37

## 2018-09-18 ENCOUNTER — TELEPHONE (OUTPATIENT)
Dept: CARDIOLOGY CLINIC | Age: 68
End: 2018-09-18

## 2018-09-20 ENCOUNTER — OFFICE VISIT (OUTPATIENT)
Dept: CARDIOLOGY CLINIC | Age: 68
End: 2018-09-20
Payer: MEDICARE

## 2018-09-20 VITALS — HEIGHT: 62 IN | HEART RATE: 94 BPM | WEIGHT: 161 LBS | BODY MASS INDEX: 29.63 KG/M2

## 2018-09-20 DIAGNOSIS — I25.118 ATHEROSCLEROTIC HEART DISEASE OF NATIVE CORONARY ARTERY WITH OTHER FORMS OF ANGINA PECTORIS (HCC): ICD-10-CM

## 2018-09-20 DIAGNOSIS — R06.00 DYSPNEA, UNSPECIFIED TYPE: Primary | ICD-10-CM

## 2018-09-20 PROCEDURE — 1090F PRES/ABSN URINE INCON ASSESS: CPT | Performed by: INTERNAL MEDICINE

## 2018-09-20 PROCEDURE — 3017F COLORECTAL CA SCREEN DOC REV: CPT | Performed by: INTERNAL MEDICINE

## 2018-09-20 PROCEDURE — G8400 PT W/DXA NO RESULTS DOC: HCPCS | Performed by: INTERNAL MEDICINE

## 2018-09-20 PROCEDURE — G8598 ASA/ANTIPLAT THER USED: HCPCS | Performed by: INTERNAL MEDICINE

## 2018-09-20 PROCEDURE — G8419 CALC BMI OUT NRM PARAM NOF/U: HCPCS | Performed by: INTERNAL MEDICINE

## 2018-09-20 PROCEDURE — G8427 DOCREV CUR MEDS BY ELIG CLIN: HCPCS | Performed by: INTERNAL MEDICINE

## 2018-09-20 PROCEDURE — 1123F ACP DISCUSS/DSCN MKR DOCD: CPT | Performed by: INTERNAL MEDICINE

## 2018-09-20 PROCEDURE — 99214 OFFICE O/P EST MOD 30 MIN: CPT | Performed by: INTERNAL MEDICINE

## 2018-09-20 PROCEDURE — 1101F PT FALLS ASSESS-DOCD LE1/YR: CPT | Performed by: INTERNAL MEDICINE

## 2018-09-20 PROCEDURE — 1036F TOBACCO NON-USER: CPT | Performed by: INTERNAL MEDICINE

## 2018-09-20 PROCEDURE — 4040F PNEUMOC VAC/ADMIN/RCVD: CPT | Performed by: INTERNAL MEDICINE

## 2018-09-20 ASSESSMENT — ENCOUNTER SYMPTOMS
COUGH: 0
DOUBLE VISION: 0
HEMOPTYSIS: 0
CONSTIPATION: 0
DIARRHEA: 0
SHORTNESS OF BREATH: 1
BOWEL INCONTINENCE: 0
CHANGE IN BOWEL HABIT: 0
ABDOMINAL PAIN: 0
EYE PAIN: 0
BLOATING: 0
SPUTUM PRODUCTION: 0
HOARSE VOICE: 0
BACK PAIN: 0
ORTHOPNEA: 0
EYE DISCHARGE: 0
BLURRED VISION: 0

## 2018-09-20 NOTE — PROGRESS NOTES
Pt here for fu abn stress test   Pt states she is SOB, is on O2 24/7  Has numbness in her left arm
valve leaflets are Mildly calcified. Tricuspid Valve   Tricuspid valve was not well visualized. Trivial tricuspid regurgitation visualized. Pulmonic Valve   The pulmonic valve was not well visualized . Trivial pulmonic regurgitation visualized. Left Atrium   Left atrial size was normal.      Left Ventricle   Ejection fraction is visually estimated at 50%. Overall left ventricular function is normal.      Right Atrium   Right atrial size was normal.      Right Ventricle   The right ventricular size was normal with normal systolic function and   wall thickness. Pericardial Effusion   The pericardium was normal in appearance with no evidence of a pericardial   effusion. Pleural Effusion   No evidence of pleural effusion. Aorta / Great Vessels   -Aortic root dimension within normal limits.   -The Pulmonary artery is within normal limits. -IVC size is within normal limits with normal respiratory phasic changes.      M-Mode/2D Measurements & Calculations      LV Diastolic    LV Systolic Dimension: 3.2  AV Cusp Separation: 1.6 cmLA   Dimension: 4.9  cm                          Dimension: 3.3 cmAO Root   cm              LV Volume Diastolic: 860 ml Dimension: 2.8 cm   LV FS:34.7 %    LV Volume Systolic: 41 ml   LV PW           LV EDV/LV EDV Index: 851   Diastolic: 0.9  WW/45 N^5JL ESV/LV ESV   cm              Index: 41 ml/24 m^2         RV Diastolic Dimension: 2.8 cm   Septum          EF Calculated: 99.8 %   Diastolic: 0.8                              LA/Aorta: 1.18   cm     Doppler Measurements & Calculations      MV Peak E-Wave: 78.6 cm/s  AV Peak Velocity: 157  LVOT Peak Velocity: 85.9   MV Peak A-Wave: 99.4 cm/s  cm/s                   cm/s   MV E/A Ratio: 0.79         AV Peak Gradient: 9.86 LVOT Peak Gradient: 3   MV Peak Gradient: 2.47     mmHg                   mmHg   mmHg      MV Deceleration Time: 254   msec                                                        PV Peak Velocity:

## 2018-09-24 ENCOUNTER — TELEPHONE (OUTPATIENT)
Dept: CARDIOLOGY CLINIC | Age: 68
End: 2018-09-24

## 2018-09-24 ENCOUNTER — TELEPHONE (OUTPATIENT)
Dept: PHYSICAL MEDICINE AND REHAB | Age: 68
End: 2018-09-24

## 2018-09-24 NOTE — TELEPHONE ENCOUNTER
Pre op Risk Assessment    Procedure Lumbar RFA  Physician Neuroscience  Date of surgery/procedure 10/1/2018    Last OV 9/20/2018  Last Stress 9/17/2018  Last Echo 8/15/2018  Last Cath scheduled for 10/4/2018  Last Stent None in Epic  Is patient on blood thinners ASA  Hold Meds/how many days 5 days    Can patient be cleared for procedure?

## 2018-09-25 ENCOUNTER — OFFICE VISIT (OUTPATIENT)
Dept: PULMONOLOGY | Age: 68
End: 2018-09-25
Payer: MEDICARE

## 2018-09-25 VITALS
OXYGEN SATURATION: 85 % | SYSTOLIC BLOOD PRESSURE: 130 MMHG | BODY MASS INDEX: 29.63 KG/M2 | TEMPERATURE: 98.7 F | HEIGHT: 62 IN | HEART RATE: 98 BPM | WEIGHT: 161 LBS | DIASTOLIC BLOOD PRESSURE: 70 MMHG

## 2018-09-25 DIAGNOSIS — Z87.891 HISTORY OF TOBACCO ABUSE: ICD-10-CM

## 2018-09-25 DIAGNOSIS — J96.11 CHRONIC RESPIRATORY FAILURE WITH HYPOXIA (HCC): ICD-10-CM

## 2018-09-25 DIAGNOSIS — Z99.89 OSA ON CPAP: ICD-10-CM

## 2018-09-25 DIAGNOSIS — G47.33 OSA AND COPD OVERLAP SYNDROME (HCC): Primary | ICD-10-CM

## 2018-09-25 DIAGNOSIS — J44.9 OSA AND COPD OVERLAP SYNDROME (HCC): Primary | ICD-10-CM

## 2018-09-25 DIAGNOSIS — G47.33 OSA ON CPAP: ICD-10-CM

## 2018-09-25 PROCEDURE — 1036F TOBACCO NON-USER: CPT | Performed by: NURSE PRACTITIONER

## 2018-09-25 PROCEDURE — 3017F COLORECTAL CA SCREEN DOC REV: CPT | Performed by: NURSE PRACTITIONER

## 2018-09-25 PROCEDURE — 1123F ACP DISCUSS/DSCN MKR DOCD: CPT | Performed by: NURSE PRACTITIONER

## 2018-09-25 PROCEDURE — G8419 CALC BMI OUT NRM PARAM NOF/U: HCPCS | Performed by: NURSE PRACTITIONER

## 2018-09-25 PROCEDURE — G8427 DOCREV CUR MEDS BY ELIG CLIN: HCPCS | Performed by: NURSE PRACTITIONER

## 2018-09-25 PROCEDURE — G8926 SPIRO NO PERF OR DOC: HCPCS | Performed by: NURSE PRACTITIONER

## 2018-09-25 PROCEDURE — G8400 PT W/DXA NO RESULTS DOC: HCPCS | Performed by: NURSE PRACTITIONER

## 2018-09-25 PROCEDURE — G8598 ASA/ANTIPLAT THER USED: HCPCS | Performed by: NURSE PRACTITIONER

## 2018-09-25 PROCEDURE — 99214 OFFICE O/P EST MOD 30 MIN: CPT | Performed by: NURSE PRACTITIONER

## 2018-09-25 PROCEDURE — 1101F PT FALLS ASSESS-DOCD LE1/YR: CPT | Performed by: NURSE PRACTITIONER

## 2018-09-25 PROCEDURE — 1090F PRES/ABSN URINE INCON ASSESS: CPT | Performed by: NURSE PRACTITIONER

## 2018-09-25 PROCEDURE — 3023F SPIROM DOC REV: CPT | Performed by: NURSE PRACTITIONER

## 2018-09-25 PROCEDURE — 4040F PNEUMOC VAC/ADMIN/RCVD: CPT | Performed by: NURSE PRACTITIONER

## 2018-09-25 RX ORDER — ALBUTEROL SULFATE 2.5 MG/3ML
2.5 SOLUTION RESPIRATORY (INHALATION) EVERY 6 HOURS PRN
Qty: 120 ML | Refills: 11 | Status: SHIPPED | OUTPATIENT
Start: 2018-09-25 | End: 2020-03-16 | Stop reason: SDUPTHER

## 2018-09-25 ASSESSMENT — ENCOUNTER SYMPTOMS
DIARRHEA: 0
SHORTNESS OF BREATH: 1
ORTHOPNEA: 1
ABDOMINAL PAIN: 0
VOMITING: 0
HEMOPTYSIS: 0
NAUSEA: 0
SPUTUM PRODUCTION: 0
EYES NEGATIVE: 1
COUGH: 0
WHEEZING: 0

## 2018-09-25 NOTE — PROGRESS NOTES
needed      furosemide (LASIX) 20 MG tablet Take 20 mg by mouth daily as needed       atorvastatin (LIPITOR) 10 MG tablet Take 2 tablets by mouth daily 30 tablet 3    albuterol sulfate  (90 Base) MCG/ACT inhaler Inhale 2 puffs into the lungs 4 times daily as needed for Wheezing      busPIRone (BUSPAR) 5 MG tablet Take 5 mg by mouth 2 times daily RX for BID but patient only takes daily      docusate sodium (COLACE) 100 MG capsule Take 100 mg by mouth daily      pseudoephedrine (SUDAFED 12 HR) 120 MG extended release tablet Take 120 mg by mouth every 12 hours as needed for Congestion      fluticasone (FLONASE) 50 MCG/ACT nasal spray 1 spray by Nasal route daily as needed for Rhinitis      amLODIPine (NORVASC) 5 MG tablet Take 5 mg by mouth daily      montelukast (SINGULAIR) 10 MG tablet Take 1 tablet by mouth nightly 30 tablet 3    losartan (COZAAR) 50 MG tablet Take 50 mg by mouth daily   0    omeprazole (PRILOSEC) 40 MG capsule Take 1 capsule by mouth daily 30 capsule 1    OXYGEN   Inhale into the lungs 3 lpm, per patient at home and when walking      aspirin 81 MG EC tablet Take 81 mg by mouth daily. No current facility-administered medications for this visit. Mc ALEXANDER   Review of Systems   Constitutional: Negative for chills, fever, malaise/fatigue and weight loss. HENT: Negative. Eyes: Negative. Respiratory: Positive for shortness of breath. Negative for cough, hemoptysis, sputum production and wheezing. Cardiovascular: Positive for orthopnea. Negative for chest pain, palpitations and leg swelling. Gastrointestinal: Negative for abdominal pain, diarrhea, nausea and vomiting. Genitourinary: Negative. Musculoskeletal: Negative. Skin: Negative. Neurological: Positive for weakness. Endo/Heme/Allergies: Does not bruise/bleed easily. Psychiatric/Behavioral: Negative for depression and suicidal ideas.         Physical exam   /70   Pulse 98   Temp 98.7 not well visualized.   Aortic valve appears tricuspid.   Aortic valve leaflets are somewhat thickened.   Aortic valve leaflets are Mildly calcified.      Tricuspid Valve   Tricuspid valve was not well visualized.   Trivial tricuspid regurgitation visualized.      Pulmonic Valve   The pulmonic valve was not well visualized .   Trivial pulmonic regurgitation visualized.      Left Atrium   Left atrial size was normal.      Left Ventricle   Ejection fraction is visually estimated at 50%.   Overall left ventricular function is normal.      Right Atrium   Right atrial size was normal.      Right Ventricle   The right ventricular size was normal with normal systolic function and   wall thickness.      Pericardial Effusion   The pericardium was normal in appearance with no evidence of a pericardial   effusion.      Pleural Effusion   No evidence of pleural effusion.      Aorta / Great Vessels   -Aortic root dimension within normal limits.   -The Pulmonary artery is within normal limits.   -IVC size is within normal limits with normal respiratory phasic changes. CXR  Aug 14, 2018  PROCEDURE: XR CHEST (2 VW)  1. Bibasilar opacities which likely represent atelectasis or early infiltrate. 2. Blunting at the left costophrenic angle which could be on the basis of scarring and/or a small pleural effusion.         CT Scans  (See actual reports for details)  Jun 30, 2018  PROCEDURE: CTA CHEST W WO CONTRAST  No acute pulmonary embolism. No acute pneumonia. 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 Improved mediastinal adenopathy and stable left hilar adenopathy.     Carli and mediastinum: There is again mediastinal adenopathy with improvement in the size of the largest node in the AP window region. There is mild left hilar adenopathy which is not significantly changed.           Assessment      Diagnosis Orders   1. PETE and COPD overlap syndrome ([address]  DME Order for Nebulizer as OP   2.  PETE on CPAP

## 2018-09-27 NOTE — TELEPHONE ENCOUNTER
Patient's daughter Mac Llanos notified patient's surgery will need to be rescheduled until after heart cath. Left message on Neuroscience nurse machine to call our office back.

## 2018-09-28 ENCOUNTER — TELEPHONE (OUTPATIENT)
Dept: OPERATING ROOM | Age: 68
End: 2018-09-28

## 2018-10-03 ENCOUNTER — PREP FOR PROCEDURE (OUTPATIENT)
Dept: CARDIOLOGY | Age: 68
End: 2018-10-03

## 2018-10-03 RX ORDER — SODIUM CHLORIDE 0.9 % (FLUSH) 0.9 %
10 SYRINGE (ML) INJECTION EVERY 12 HOURS SCHEDULED
Status: CANCELLED | OUTPATIENT
Start: 2018-10-03 | End: 2019-10-03

## 2018-10-03 RX ORDER — SODIUM CHLORIDE 9 MG/ML
INJECTION, SOLUTION INTRAVENOUS CONTINUOUS
Status: CANCELLED | OUTPATIENT
Start: 2018-10-03 | End: 2019-10-03

## 2018-10-03 RX ORDER — NITROGLYCERIN 0.4 MG/1
0.4 TABLET SUBLINGUAL EVERY 5 MIN PRN
Status: CANCELLED | OUTPATIENT
Start: 2018-10-03 | End: 2018-10-06

## 2018-10-03 RX ORDER — ASPIRIN 325 MG
325 TABLET ORAL ONCE
Status: CANCELLED | OUTPATIENT
Start: 2018-10-03 | End: 2018-10-03

## 2018-10-03 RX ORDER — SODIUM CHLORIDE 0.9 % (FLUSH) 0.9 %
10 SYRINGE (ML) INJECTION PRN
Status: CANCELLED | OUTPATIENT
Start: 2018-10-03 | End: 2019-10-03

## 2018-10-04 ENCOUNTER — HOSPITAL ENCOUNTER (OUTPATIENT)
Dept: INPATIENT UNIT | Age: 68
Discharge: HOME | End: 2018-10-04
Attending: INTERNAL MEDICINE | Admitting: INTERNAL MEDICINE
Payer: MEDICARE

## 2018-10-04 VITALS
SYSTOLIC BLOOD PRESSURE: 121 MMHG | OXYGEN SATURATION: 93 % | HEIGHT: 62 IN | RESPIRATION RATE: 24 BRPM | WEIGHT: 160 LBS | HEART RATE: 86 BPM | BODY MASS INDEX: 29.44 KG/M2 | TEMPERATURE: 98.8 F | DIASTOLIC BLOOD PRESSURE: 75 MMHG

## 2018-10-04 LAB
ABO: NORMAL
ACTIVATED CLOTTING TIME: 301 SECONDS (ref 1–150)
ANION GAP SERPL CALCULATED.3IONS-SCNC: 12 MEQ/L (ref 8–16)
ANTIBODY SCREEN: NORMAL
APTT: 33.3 SECONDS (ref 22–38)
BUN BLDV-MCNC: 8 MG/DL (ref 7–22)
CALCIUM SERPL-MCNC: 9.9 MG/DL (ref 8.5–10.5)
CHLORIDE BLD-SCNC: 93 MEQ/L (ref 98–111)
CO2: 38 MEQ/L (ref 23–33)
CREAT SERPL-MCNC: 0.5 MG/DL (ref 0.4–1.2)
EKG ATRIAL RATE: 97 BPM
EKG P AXIS: 70 DEGREES
EKG P-R INTERVAL: 184 MS
EKG Q-T INTERVAL: 330 MS
EKG QRS DURATION: 100 MS
EKG QTC CALCULATION (BAZETT): 419 MS
EKG R AXIS: 49 DEGREES
EKG T AXIS: 94 DEGREES
EKG VENTRICULAR RATE: 97 BPM
ERYTHROCYTE [DISTWIDTH] IN BLOOD BY AUTOMATED COUNT: 13.3 % (ref 11.5–14.5)
ERYTHROCYTE [DISTWIDTH] IN BLOOD BY AUTOMATED COUNT: 46.4 FL (ref 35–45)
GFR SERPL CREATININE-BSD FRML MDRD: > 90 ML/MIN/1.73M2
GLUCOSE BLD-MCNC: 114 MG/DL (ref 70–108)
HCT VFR BLD CALC: 40.1 % (ref 37–47)
HEMOGLOBIN: 12.1 GM/DL (ref 12–16)
INR BLD: 0.92 (ref 0.85–1.13)
MCH RBC QN AUTO: 28.7 PG (ref 26–33)
MCHC RBC AUTO-ENTMCNC: 30.2 GM/DL (ref 32.2–35.5)
MCV RBC AUTO: 95.2 FL (ref 81–99)
PLATELET # BLD: 192 THOU/MM3 (ref 130–400)
PMV BLD AUTO: 11.1 FL (ref 9.4–12.4)
POTASSIUM REFLEX MAGNESIUM: 3.8 MEQ/L (ref 3.5–5.2)
RBC # BLD: 4.21 MILL/MM3 (ref 4.2–5.4)
RH FACTOR: NORMAL
SODIUM BLD-SCNC: 143 MEQ/L (ref 135–145)
WBC # BLD: 10.7 THOU/MM3 (ref 4.8–10.8)

## 2018-10-04 PROCEDURE — 92928 PRQ TCAT PLMT NTRAC ST 1 LES: CPT | Performed by: INTERNAL MEDICINE

## 2018-10-04 PROCEDURE — C1887 CATHETER, GUIDING: HCPCS

## 2018-10-04 PROCEDURE — 6370000000 HC RX 637 (ALT 250 FOR IP)

## 2018-10-04 PROCEDURE — 85347 COAGULATION TIME ACTIVATED: CPT

## 2018-10-04 PROCEDURE — C1769 GUIDE WIRE: HCPCS

## 2018-10-04 PROCEDURE — 6360000002 HC RX W HCPCS

## 2018-10-04 PROCEDURE — 85730 THROMBOPLASTIN TIME PARTIAL: CPT

## 2018-10-04 PROCEDURE — 2709999900 HC NON-CHARGEABLE SUPPLY

## 2018-10-04 PROCEDURE — 85027 COMPLETE CBC AUTOMATED: CPT

## 2018-10-04 PROCEDURE — 85610 PROTHROMBIN TIME: CPT

## 2018-10-04 PROCEDURE — 86850 RBC ANTIBODY SCREEN: CPT

## 2018-10-04 PROCEDURE — C1725 CATH, TRANSLUMIN NON-LASER: HCPCS

## 2018-10-04 PROCEDURE — 2580000003 HC RX 258: Performed by: PHYSICIAN ASSISTANT

## 2018-10-04 PROCEDURE — 93458 L HRT ARTERY/VENTRICLE ANGIO: CPT

## 2018-10-04 PROCEDURE — 93458 L HRT ARTERY/VENTRICLE ANGIO: CPT | Performed by: INTERNAL MEDICINE

## 2018-10-04 PROCEDURE — 86901 BLOOD TYPING SEROLOGIC RH(D): CPT

## 2018-10-04 PROCEDURE — 2500000003 HC RX 250 WO HCPCS

## 2018-10-04 PROCEDURE — C1894 INTRO/SHEATH, NON-LASER: HCPCS

## 2018-10-04 PROCEDURE — 93005 ELECTROCARDIOGRAM TRACING: CPT | Performed by: PHYSICIAN ASSISTANT

## 2018-10-04 PROCEDURE — 86900 BLOOD TYPING SEROLOGIC ABO: CPT

## 2018-10-04 PROCEDURE — 36415 COLL VENOUS BLD VENIPUNCTURE: CPT

## 2018-10-04 PROCEDURE — C1874 STENT, COATED/COV W/DEL SYS: HCPCS

## 2018-10-04 PROCEDURE — 80048 BASIC METABOLIC PNL TOTAL CA: CPT

## 2018-10-04 PROCEDURE — 6360000004 HC RX CONTRAST MEDICATION: Performed by: INTERNAL MEDICINE

## 2018-10-04 PROCEDURE — C9600 PERC DRUG-EL COR STENT SING: HCPCS

## 2018-10-04 RX ORDER — NITROGLYCERIN 0.4 MG/1
0.4 TABLET SUBLINGUAL EVERY 5 MIN PRN
Status: DISCONTINUED | OUTPATIENT
Start: 2018-10-04 | End: 2018-10-04 | Stop reason: HOSPADM

## 2018-10-04 RX ORDER — ATORVASTATIN CALCIUM 40 MG/1
40 TABLET, FILM COATED ORAL DAILY
Qty: 30 TABLET | Refills: 3 | Status: SHIPPED | OUTPATIENT
Start: 2018-10-04

## 2018-10-04 RX ORDER — SODIUM CHLORIDE 0.9 % (FLUSH) 0.9 %
10 SYRINGE (ML) INJECTION PRN
Status: DISCONTINUED | OUTPATIENT
Start: 2018-10-04 | End: 2018-10-04 | Stop reason: HOSPADM

## 2018-10-04 RX ORDER — ONDANSETRON 2 MG/ML
4 INJECTION INTRAMUSCULAR; INTRAVENOUS EVERY 6 HOURS PRN
Status: DISCONTINUED | OUTPATIENT
Start: 2018-10-04 | End: 2018-10-04 | Stop reason: HOSPADM

## 2018-10-04 RX ORDER — SODIUM CHLORIDE 0.9 % (FLUSH) 0.9 %
10 SYRINGE (ML) INJECTION PRN
Status: DISCONTINUED | OUTPATIENT
Start: 2018-10-04 | End: 2018-10-04 | Stop reason: SDUPTHER

## 2018-10-04 RX ORDER — SODIUM CHLORIDE 9 MG/ML
75 INJECTION, SOLUTION INTRAVENOUS CONTINUOUS
Status: DISCONTINUED | OUTPATIENT
Start: 2018-10-04 | End: 2018-10-04 | Stop reason: SDUPTHER

## 2018-10-04 RX ORDER — ACETAMINOPHEN 325 MG/1
650 TABLET ORAL EVERY 4 HOURS PRN
Status: DISCONTINUED | OUTPATIENT
Start: 2018-10-04 | End: 2018-10-04 | Stop reason: HOSPADM

## 2018-10-04 RX ORDER — SODIUM CHLORIDE 9 MG/ML
INJECTION, SOLUTION INTRAVENOUS CONTINUOUS
Status: DISCONTINUED | OUTPATIENT
Start: 2018-10-04 | End: 2018-10-04 | Stop reason: HOSPADM

## 2018-10-04 RX ORDER — CLOPIDOGREL BISULFATE 75 MG/1
75 TABLET ORAL DAILY
Qty: 30 TABLET | Refills: 3 | Status: SHIPPED | OUTPATIENT
Start: 2018-10-05 | End: 2019-04-22 | Stop reason: SDUPTHER

## 2018-10-04 RX ORDER — ASPIRIN 325 MG
325 TABLET ORAL ONCE
Status: DISCONTINUED | OUTPATIENT
Start: 2018-10-04 | End: 2018-10-04 | Stop reason: HOSPADM

## 2018-10-04 RX ORDER — SODIUM CHLORIDE 0.9 % (FLUSH) 0.9 %
10 SYRINGE (ML) INJECTION EVERY 12 HOURS SCHEDULED
Status: DISCONTINUED | OUTPATIENT
Start: 2018-10-04 | End: 2018-10-04 | Stop reason: HOSPADM

## 2018-10-04 RX ADMIN — SODIUM CHLORIDE: 9 INJECTION, SOLUTION INTRAVENOUS at 09:43

## 2018-10-04 RX ADMIN — IOPAMIDOL 100 ML: 755 INJECTION, SOLUTION INTRAVENOUS at 13:17

## 2018-10-04 NOTE — PROGRESS NOTES
Sedation/Analgesia History & Physical      Pt Name: Marly Lopez  MRN: [id number]  YOB: 1950  Provider Performing Procedure: Carlotta Gonzales MD  Primary Care Physician: August Michael MD      PRE-PROCEDURE   DNR-CCA/DNR-CC []Yes [x]No      PLANNED PROCEDURE     [x]Cath  []PCI                []Pacemaker/AICD  []AKSHAT             []Cardioversion []Peripheral angiography/PTA  []Other:        Consent:   The indication, risks and benefits of the procedure and possible therapeutic consequences and alternatives were discussed with the patient. The patient was given the opportunity to ask questions and to have them answered to his/her satisfaction. Risks of the procedure include but are not limited to the following: Bleeding, hematoma including retroperitoneal hematoma, infection, pain and discomfort, injury to the aorta and other blood vessels, rhythm disturbance, low blood pressure, myocardial infarction, stroke, kidney damage/failure, myocardial perforation, allergic reactions to sedatives/contrast material, loss of pulse/vascular compromise requiring surgery, aneurysm/pseudoaneurysm formation, possible loss of a limb/hand/leg, death. Alternatives to and omission of the suggested procedure were discussed. The patient had no further questions and wished to proceed; the consent form was signed.       Indications for the Procedure:     CAD Presentation:  New Onset Angina <= 2 months    Anginal Classification within 2 weeks:  CCS III - Symptoms with everyday living activities, i.e., moderate limitation    NYHA Heart Failure Class within 2 weeks: No symptoms      Anti- Anginal Meds within 2 weeks:   ANTI-ANGINAL MEDS: Yes: Beta Blockers, Ca Channel Blockers, Aspirin and Statin (Any)    Stress or Imaging Studies Performed:  Stress Test with SPECT Result: Positive:  inferior Risk/Extent of Ischemia:  High    CABG:no            MEDICAL HISTORY        Past Medical History:   Diagnosis Date    Bladder dysfunction     CAD (coronary artery disease)     Cerebral artery occlusion with cerebral infarction ([address]     TIA    Cerebrovascular disease     CHF (congestive heart failure) (HCC)     COPD (chronic obstructive pulmonary disease) (HCC)     uses oxygen 24 hours per day    DDD (degenerative disc disease)     Depression     GERD (gastroesophageal reflux disease)     Hyperlipidemia     Hypertension     Obesity     Other disorders of kidney and ureter in diseases classified elsewhere     Other screening mammogram 2011    Pap smear, as part of routine gynecological examination 2007    Pap smear, as part of routine gynecological examination 2007    Peripheral edema     with ascites    Pneumonia     PONV (postoperative nausea and vomiting)     with back injection in [facility]    Unspecified sleep apnea     c-pap at 10 cm wtr         Past Surgical History:   Procedure Laterality Date    COLONOSCOPY  7-2009    DILATION AND CURETTAGE OF UTERUS  1970's    ENDOSCOPY, COLON, DIAGNOSTIC      EYE SURGERY  2013    Laser surgery     LUMBAR SPINE SURGERY Left 12/12/2017    LUMBAR RFA L2-3, 3-4, 4-5, L5-S1 LEFT SIDE performed by Brittanie Bautista MD at [place] [address]  2016    burning nerves in back - lumbar @ [facility]    OTHER SURGICAL HISTORY Left 12/12/2017    Lumbar RFA L2-3, L3-4, L4-5, L5-S1           Allergies   Allergen Reactions    Codeine Itching     Pt cant remember    Doxycycline      Cant breath     Lipitor Itching    Simvastatin Itching     Stomach pain    Chantix [Varenicline Tartrate] Itching         MEDICATIONS   Coumadin Use Last 5 Days [x]No []Yes  Antiplatelet drug therapy use last 5 days  []No [x]Yes  Other anticoagulant use last 5 days  [x]No []Yes      No current facility-administered medications on file prior to encounter.       Current Outpatient Prescriptions on File Prior to Encounter   Medication Sig Dispense Refill    tiotropium (SPIRIVA) 18 MCG inhalation capsule Inhale 1 capsule into the lungs daily 30 capsule 11    albuterol (PROVENTIL) (2.5 MG/3ML) 0.083% nebulizer solution Take 3 mLs by nebulization every 6 hours as needed for Wheezing or Shortness of Breath 120 mL 11    potassium chloride (KLOR-CON M) 20 MEQ extended release tablet Take 20 mEq by mouth daily as needed      furosemide (LASIX) 20 MG tablet Take 20 mg by mouth daily as needed       albuterol sulfate  (90 Base) MCG/ACT inhaler Inhale 2 puffs into the lungs 4 times daily as needed for Wheezing      busPIRone (BUSPAR) 5 MG tablet Take 5 mg by mouth 2 times daily       docusate sodium (COLACE) 100 MG capsule Take 100 mg by mouth daily      amLODIPine (NORVASC) 5 MG tablet Take 5 mg by mouth daily      montelukast (SINGULAIR) 10 MG tablet Take 1 tablet by mouth nightly 30 tablet 3    losartan (COZAAR) 50 MG tablet Take 50 mg by mouth daily   0    omeprazole (PRILOSEC) 40 MG capsule Take 1 capsule by mouth daily 30 capsule 1    aspirin 81 MG EC tablet Take 81 mg by mouth daily.         atorvastatin (LIPITOR) 10 MG tablet Take 2 tablets by mouth daily (Patient taking differently: Take 20 mg by mouth daily Has not started) 30 tablet 3    pseudoephedrine (SUDAFED 12 HR) 120 MG extended release tablet Take 120 mg by mouth every 12 hours as needed for Congestion      fluticasone (FLONASE) 50 MCG/ACT nasal spray 1 spray by Nasal route daily as needed for Rhinitis      OXYGEN   Inhale into the lungs 3 lpm, per patient at home and when walking         Current Facility-Administered Medications   Medication Dose Route Frequency Provider Last Rate Last Dose    0.9 % sodium chloride infusion   Intravenous Continuous Miriam Ramses, PA-C 75 mL/hr at 10/04/18 0943      aspirin tablet 325 mg  325 mg Oral Once Miriam Ramses, PA-C        nitroGLYCERIN (NITROSTAT) SL tablet 0.4 mg  0.4 mg Sublingual Q5 Min PRN Miriam Ramses, PA-C        sodium chloride flush 0.9 % injection 10 mL  10 mL Intravenous VIRGINIEN Natalia Bowles PA-C                 PHYSICAL:     BP (!) 155/58   Pulse 104   Temp 98.5 °F (36.9 °C) (Oral)   Resp 22   Ht 5' 2\" (1.575 m)   Wt 160 lb (72.6 kg)   SpO2 91%   BMI 29.26 kg/m²     Heart:  [x]Regular rate and rhythm  []Other:    Lungs:  [x]Clear    []Other:    Abdomen: [x]Soft    []Other:    Mental Status: [x]Alert & Oriented  []Other:   Ext:                [x]No edema       []Other:         No results for input(s): CKTOTAL, CKMB, CKMBINDEX, TROPONINI in the last 72 hours.     Lab Results   Component Value Date    WBC 10.7 10/04/2018    RBC 4.21 10/04/2018    RBC 4.30 03/31/2012    HGB 12.1 10/04/2018    HCT 40.1 10/04/2018    MCV 95.2 10/04/2018    MCH 28.7 10/04/2018    MCHC 30.2 10/04/2018    RDW 13.3 03/30/2018     10/04/2018    MPV 11.1 10/04/2018       Lab Results   Component Value Date     10/04/2018    K 3.8 10/04/2018    CL 93 10/04/2018    CO2 38 10/04/2018    BUN 8 10/04/2018    LABALBU 3.9 08/14/2018    LABALBU 4.4 03/31/2012    CREATININE 0.5 10/04/2018    CALCIUM 9.9 10/04/2018    LABGLOM >90 10/04/2018    GLUCOSE 114 10/04/2018       Lab Results   Component Value Date    ALKPHOS 91 08/14/2018    ALT 10 08/14/2018    AST 15 08/14/2018    PROT 7.8 08/14/2018    BILITOT 0.2 08/14/2018    BILITOT NEGATIVE 03/31/2012    BILIDIR <0.2 07/27/2015    LABALBU 3.9 08/14/2018    LABALBU 4.4 03/31/2012       Lab Results   Component Value Date    MG 2.0 08/14/2018       No components found for: Diannia Never    Lab Results   Component Value Date    LABA1C 5.9 06/30/2018       Lab Results   Component Value Date    TRIG 227 07/27/2015    HDL 49 07/27/2015    LDLCALC 199 07/27/2015       Lab Results   Component Value Date    TSH 1.180 03/11/2018            SEDATION  Planned agent:[x]Midazolam []Meperidine [x]Sublimaze []Morphine []Diazepam  []Other:         ASA Classification:  []1 [x]2 []3 []4 []5  Class 1: A normal healthy patient  Class 2: Pt with mild to moderate systemic disease  Class 3: Severe systemic disease or disturbance  Class 4: Severe systemic disorders that are already life threatening. Class 5: Moribund pt with little chances of survival, for more than 24 hours. Mallampati I Airway Classification:   []1 [x]2 []3 []4      [x]Pre-procedure diagnostic studies complete and results available. Comment:    [x]Previous sedation/anesthesia experiences assessed. Comment:    [x]The patient is an appropriate candidate to undergo the planned procedure sedation and anesthesia. (Refer to nursing sedation/analgesia documentation record)  [x]Formulation and discussion of sedation/procedure plan, risks, and expectations with patient and/or responsible adult completed. [x]Patient examined immediately prior to the procedure.  (Refer to nursing sedation/analgesia documentation record)        Ame Benitez MD  Electronically signed 10/4/2018 at 11:30 AM

## 2018-10-05 ENCOUNTER — TELEPHONE (OUTPATIENT)
Dept: CARDIAC REHAB | Age: 68
End: 2018-10-05

## 2018-10-22 ENCOUNTER — OFFICE VISIT (OUTPATIENT)
Dept: PHYSICAL MEDICINE AND REHAB | Age: 68
End: 2018-10-22
Payer: MEDICARE

## 2018-10-22 VITALS
BODY MASS INDEX: 29.44 KG/M2 | DIASTOLIC BLOOD PRESSURE: 82 MMHG | HEART RATE: 105 BPM | WEIGHT: 160 LBS | SYSTOLIC BLOOD PRESSURE: 168 MMHG | HEIGHT: 62 IN

## 2018-10-22 DIAGNOSIS — M47.816 SPONDYLOSIS OF LUMBAR REGION WITHOUT MYELOPATHY OR RADICULOPATHY: Primary | ICD-10-CM

## 2018-10-22 DIAGNOSIS — G89.4 CHRONIC PAIN SYNDROME: ICD-10-CM

## 2018-10-22 DIAGNOSIS — M46.1 SI (SACROILIAC) JOINT INFLAMMATION (HCC): ICD-10-CM

## 2018-10-22 DIAGNOSIS — M48.061 SPINAL STENOSIS OF LUMBAR REGION WITHOUT NEUROGENIC CLAUDICATION: ICD-10-CM

## 2018-10-22 PROCEDURE — 1036F TOBACCO NON-USER: CPT | Performed by: NURSE PRACTITIONER

## 2018-10-22 PROCEDURE — 1123F ACP DISCUSS/DSCN MKR DOCD: CPT | Performed by: NURSE PRACTITIONER

## 2018-10-22 PROCEDURE — G8400 PT W/DXA NO RESULTS DOC: HCPCS | Performed by: NURSE PRACTITIONER

## 2018-10-22 PROCEDURE — 1101F PT FALLS ASSESS-DOCD LE1/YR: CPT | Performed by: NURSE PRACTITIONER

## 2018-10-22 PROCEDURE — 1090F PRES/ABSN URINE INCON ASSESS: CPT | Performed by: NURSE PRACTITIONER

## 2018-10-22 PROCEDURE — G8419 CALC BMI OUT NRM PARAM NOF/U: HCPCS | Performed by: NURSE PRACTITIONER

## 2018-10-22 PROCEDURE — 99213 OFFICE O/P EST LOW 20 MIN: CPT | Performed by: NURSE PRACTITIONER

## 2018-10-22 PROCEDURE — G8598 ASA/ANTIPLAT THER USED: HCPCS | Performed by: NURSE PRACTITIONER

## 2018-10-22 PROCEDURE — G8484 FLU IMMUNIZE NO ADMIN: HCPCS | Performed by: NURSE PRACTITIONER

## 2018-10-22 PROCEDURE — 3017F COLORECTAL CA SCREEN DOC REV: CPT | Performed by: NURSE PRACTITIONER

## 2018-10-22 PROCEDURE — G8427 DOCREV CUR MEDS BY ELIG CLIN: HCPCS | Performed by: NURSE PRACTITIONER

## 2018-10-22 PROCEDURE — 4040F PNEUMOC VAC/ADMIN/RCVD: CPT | Performed by: NURSE PRACTITIONER

## 2018-10-22 RX ORDER — HYDROCODONE BITARTRATE AND ACETAMINOPHEN 5; 325 MG/1; MG/1
.5-1 TABLET ORAL EVERY 8 HOURS PRN
Qty: 42 TABLET | Refills: 0 | Status: SHIPPED | OUTPATIENT
Start: 2018-10-22 | End: 2018-11-05

## 2018-10-22 ASSESSMENT — ENCOUNTER SYMPTOMS: BACK PAIN: 1

## 2018-10-24 ENCOUNTER — TELEPHONE (OUTPATIENT)
Dept: PULMONOLOGY | Age: 68
End: 2018-10-24

## 2018-11-06 DIAGNOSIS — G89.4 CHRONIC PAIN SYNDROME: Primary | ICD-10-CM

## 2018-11-06 RX ORDER — HYDROCODONE BITARTRATE AND ACETAMINOPHEN 5; 325 MG/1; MG/1
1 TABLET ORAL EVERY 8 HOURS PRN
Qty: 90 TABLET | Refills: 0 | Status: SHIPPED | OUTPATIENT
Start: 2018-11-06 | End: 2018-12-03 | Stop reason: SDUPTHER

## 2018-11-19 ENCOUNTER — OFFICE VISIT (OUTPATIENT)
Dept: CARDIOLOGY CLINIC | Age: 68
End: 2018-11-19
Payer: MEDICARE

## 2018-11-19 VITALS
HEIGHT: 62 IN | SYSTOLIC BLOOD PRESSURE: 162 MMHG | DIASTOLIC BLOOD PRESSURE: 72 MMHG | WEIGHT: 160 LBS | HEART RATE: 88 BPM | BODY MASS INDEX: 29.44 KG/M2

## 2018-11-19 DIAGNOSIS — I25.83 CORONARY ARTERY DISEASE DUE TO LIPID RICH PLAQUE: Primary | ICD-10-CM

## 2018-11-19 DIAGNOSIS — I25.10 CORONARY ARTERY DISEASE DUE TO LIPID RICH PLAQUE: Primary | ICD-10-CM

## 2018-11-19 DIAGNOSIS — I10 ESSENTIAL HYPERTENSION: ICD-10-CM

## 2018-11-19 PROCEDURE — 99213 OFFICE O/P EST LOW 20 MIN: CPT | Performed by: INTERNAL MEDICINE

## 2018-11-19 PROCEDURE — G8419 CALC BMI OUT NRM PARAM NOF/U: HCPCS | Performed by: INTERNAL MEDICINE

## 2018-11-19 PROCEDURE — 1123F ACP DISCUSS/DSCN MKR DOCD: CPT | Performed by: INTERNAL MEDICINE

## 2018-11-19 PROCEDURE — 1090F PRES/ABSN URINE INCON ASSESS: CPT | Performed by: INTERNAL MEDICINE

## 2018-11-19 PROCEDURE — 1036F TOBACCO NON-USER: CPT | Performed by: INTERNAL MEDICINE

## 2018-11-19 PROCEDURE — G8400 PT W/DXA NO RESULTS DOC: HCPCS | Performed by: INTERNAL MEDICINE

## 2018-11-19 PROCEDURE — 4040F PNEUMOC VAC/ADMIN/RCVD: CPT | Performed by: INTERNAL MEDICINE

## 2018-11-19 PROCEDURE — 3017F COLORECTAL CA SCREEN DOC REV: CPT | Performed by: INTERNAL MEDICINE

## 2018-11-19 PROCEDURE — 1101F PT FALLS ASSESS-DOCD LE1/YR: CPT | Performed by: INTERNAL MEDICINE

## 2018-11-19 PROCEDURE — G8598 ASA/ANTIPLAT THER USED: HCPCS | Performed by: INTERNAL MEDICINE

## 2018-11-19 PROCEDURE — G8484 FLU IMMUNIZE NO ADMIN: HCPCS | Performed by: INTERNAL MEDICINE

## 2018-11-19 PROCEDURE — G8427 DOCREV CUR MEDS BY ELIG CLIN: HCPCS | Performed by: INTERNAL MEDICINE

## 2018-11-19 ASSESSMENT — ENCOUNTER SYMPTOMS
DIARRHEA: 0
EYE PAIN: 0
ABDOMINAL PAIN: 0
CHANGE IN BOWEL HABIT: 0
SPUTUM PRODUCTION: 0
HEMOPTYSIS: 0
BOWEL INCONTINENCE: 0
HOARSE VOICE: 0
EYE DISCHARGE: 0
DOUBLE VISION: 0
SHORTNESS OF BREATH: 1
ORTHOPNEA: 0
COUGH: 0
BLOATING: 0
BACK PAIN: 0
BLURRED VISION: 0
CONSTIPATION: 0

## 2018-11-19 NOTE — PROGRESS NOTES
Pap smear, as part of routine gynecological examination; Pap smear, as part of routine gynecological examination; Peripheral edema; Pneumonia; PONV (postoperative nausea and vomiting); and Unspecified sleep apnea. Social History  Dacia Gonzalez  reports that she quit smoking about 8 years ago. Her smoking use included Cigarettes. She has a 80.00 pack-year smoking history. She has never used smokeless tobacco. She reports that she does not drink alcohol or use drugs. Family History  Dacia Gonzalez family history includes Alzheimer's Disease in her father; COPD in her sister; Cancer in her brother and daughter; Diabetes in her daughter; Heart Disease in her father and mother; Heart Failure in her father and mother; High Blood Pressure in her father and mother; Parkinsonism in her father; Stroke in her brother. Past Surgical History   Past Surgical History:   Procedure Laterality Date    COLONOSCOPY  7-2009    DILATION AND CURETTAGE OF UTERUS  1970's    ENDOSCOPY, COLON, DIAGNOSTIC      EYE SURGERY  2013    Laser surgery     LUMBAR SPINE SURGERY Left 12/12/2017    LUMBAR RFA L2-3, 3-4, 4-5, L5-S1 LEFT SIDE performed by Eric Parada MD at [place] [address]  2016    burning nerves in back - lumbar @ [facility]    OTHER SURGICAL HISTORY Left 12/12/2017    Lumbar RFA L2-3, L3-4, L4-5, L5-S1       Subjective:     Review of Systems   Constitution: Negative for decreased appetite, diaphoresis, fever, weakness and malaise/fatigue. HENT: Negative for congestion, hearing loss and hoarse voice. Eyes: Negative for blurred vision, discharge, double vision and pain. Cardiovascular: Positive for dyspnea on exertion. Negative for chest pain, claudication, cyanosis, irregular heartbeat, leg swelling, near-syncope, orthopnea, palpitations, paroxysmal nocturnal dyspnea and syncope. Respiratory: Positive for shortness of breath. Negative for cough, hemoptysis and sputum production. was not well visualized. Trivial tricuspid regurgitation visualized. Pulmonic Valve   The pulmonic valve was not well visualized . Trivial pulmonic regurgitation visualized. Left Atrium   Left atrial size was normal.      Left Ventricle   Ejection fraction is visually estimated at 50%. Overall left ventricular function is normal.      Right Atrium   Right atrial size was normal.      Right Ventricle   The right ventricular size was normal with normal systolic function and   wall thickness. Pericardial Effusion   The pericardium was normal in appearance with no evidence of a pericardial   effusion. Pleural Effusion   No evidence of pleural effusion. Aorta / Great Vessels   -Aortic root dimension within normal limits.   -The Pulmonary artery is within normal limits. -IVC size is within normal limits with normal respiratory phasic changes.      M-Mode/2D Measurements & Calculations      LV Diastolic    LV Systolic Dimension: 3.2  AV Cusp Separation: 1.6 cmLA   Dimension: 4.9  cm                          Dimension: 3.3 cmAO Root   cm              LV Volume Diastolic: 198 ml Dimension: 2.8 cm   LV FS:34.7 %    LV Volume Systolic: 41 ml   LV PW           LV EDV/LV EDV Index: 782   Diastolic: 0.9  HH/03 N^7EP ESV/LV ESV   cm              Index: 41 ml/24 m^2         RV Diastolic Dimension: 2.8 cm   Septum          EF Calculated: 89.4 %   Diastolic: 0.8                              LA/Aorta: 1.18   cm     Doppler Measurements & Calculations      MV Peak E-Wave: 78.6 cm/s  AV Peak Velocity: 157  LVOT Peak Velocity: 85.9   MV Peak A-Wave: 99.4 cm/s  cm/s                   cm/s   MV E/A Ratio: 0.79         AV Peak Gradient: 9.86 LVOT Peak Gradient: 3   MV Peak Gradient: 2.47     mmHg                   mmHg   mmHg      MV Deceleration Time: 254   msec                                                        PV Peak Velocity: 82.9   MV E' Septal Velocity: 5.1                        cm/s   cm/s Regular follow up.        Electronically signed by Piero Canales MD [facility]  11/19/2018 at 4:14 PM

## 2018-11-20 ENCOUNTER — OFFICE VISIT (OUTPATIENT)
Dept: PHYSICAL MEDICINE AND REHAB | Age: 68
End: 2018-11-20
Payer: MEDICARE

## 2018-11-20 VITALS
WEIGHT: 160 LBS | SYSTOLIC BLOOD PRESSURE: 137 MMHG | BODY MASS INDEX: 29.44 KG/M2 | HEIGHT: 62 IN | HEART RATE: 101 BPM | DIASTOLIC BLOOD PRESSURE: 67 MMHG

## 2018-11-20 DIAGNOSIS — M47.816 SPONDYLOSIS OF LUMBAR REGION WITHOUT MYELOPATHY OR RADICULOPATHY: Primary | ICD-10-CM

## 2018-11-20 DIAGNOSIS — G89.4 CHRONIC PAIN SYNDROME: ICD-10-CM

## 2018-11-20 DIAGNOSIS — M46.1 SI (SACROILIAC) JOINT INFLAMMATION (HCC): ICD-10-CM

## 2018-11-20 DIAGNOSIS — M48.061 SPINAL STENOSIS OF LUMBAR REGION WITHOUT NEUROGENIC CLAUDICATION: ICD-10-CM

## 2018-11-20 DIAGNOSIS — K59.03 DRUG INDUCED CONSTIPATION: ICD-10-CM

## 2018-11-20 PROCEDURE — 1090F PRES/ABSN URINE INCON ASSESS: CPT | Performed by: NURSE PRACTITIONER

## 2018-11-20 PROCEDURE — 1101F PT FALLS ASSESS-DOCD LE1/YR: CPT | Performed by: NURSE PRACTITIONER

## 2018-11-20 PROCEDURE — G8598 ASA/ANTIPLAT THER USED: HCPCS | Performed by: NURSE PRACTITIONER

## 2018-11-20 PROCEDURE — 1036F TOBACCO NON-USER: CPT | Performed by: NURSE PRACTITIONER

## 2018-11-20 PROCEDURE — G8427 DOCREV CUR MEDS BY ELIG CLIN: HCPCS | Performed by: NURSE PRACTITIONER

## 2018-11-20 PROCEDURE — G8400 PT W/DXA NO RESULTS DOC: HCPCS | Performed by: NURSE PRACTITIONER

## 2018-11-20 PROCEDURE — 1123F ACP DISCUSS/DSCN MKR DOCD: CPT | Performed by: NURSE PRACTITIONER

## 2018-11-20 PROCEDURE — G8484 FLU IMMUNIZE NO ADMIN: HCPCS | Performed by: NURSE PRACTITIONER

## 2018-11-20 PROCEDURE — G8419 CALC BMI OUT NRM PARAM NOF/U: HCPCS | Performed by: NURSE PRACTITIONER

## 2018-11-20 PROCEDURE — 4040F PNEUMOC VAC/ADMIN/RCVD: CPT | Performed by: NURSE PRACTITIONER

## 2018-11-20 PROCEDURE — 3017F COLORECTAL CA SCREEN DOC REV: CPT | Performed by: NURSE PRACTITIONER

## 2018-11-20 PROCEDURE — 99213 OFFICE O/P EST LOW 20 MIN: CPT | Performed by: NURSE PRACTITIONER

## 2018-11-20 RX ORDER — DOCUSATE SODIUM 100 MG/1
100 CAPSULE, LIQUID FILLED ORAL 3 TIMES DAILY
Qty: 90 CAPSULE | Refills: 2 | Status: SHIPPED | OUTPATIENT
Start: 2018-11-20 | End: 2019-02-18

## 2018-11-20 RX ORDER — POLYETHYLENE GLYCOL 3350 17 G/17G
17 POWDER, FOR SOLUTION ORAL DAILY
Qty: 1530 G | Refills: 0 | Status: SHIPPED | OUTPATIENT
Start: 2018-11-20 | End: 2019-02-18

## 2018-11-20 RX ORDER — SENNA PLUS 8.6 MG/1
1 TABLET ORAL 2 TIMES DAILY
Qty: 60 TABLET | Refills: 1 | Status: SHIPPED | OUTPATIENT
Start: 2018-11-20 | End: 2019-01-19

## 2018-11-20 ASSESSMENT — ENCOUNTER SYMPTOMS
SHORTNESS OF BREATH: 1
BACK PAIN: 1
COUGH: 1

## 2018-11-20 NOTE — PROGRESS NOTES
attentive. Nursing note and vitals reviewed. ANASTACIA test: positive   Yeomans test: positive   Gaenslen test: positive      Assessment:     1. Spondylosis of lumbar region without myelopathy or radiculopathy    2. SI (sacroiliac) joint inflammation (HCC)    3. Spinal stenosis of lumbar region without neurogenic claudication    4. Chronic pain syndrome    5. Drug induced constipation            Plan:      · OARRSreviewed. · Discussed long term side effects of medications. · Discussed tolerance, dependency and addiction. · Previous UDS reviewed  · UDS preformed today for compliance. · Patient told can not receive any pain medications from any other source. · Discussed with patient that they may not be pain free. · No evidence of abuse, diversion or aberrant behavior. · Medications and/or procedures improve function and quality of life. · Procedure note reviewed in detail. · Receiving 80% relief of right lower back pain from Right L-RFA and continues to receive that relief. Left lower back pain is elevated. · Unable to have procedures at this time d/t stent placement- on blood thinners. Planning angioplasty   · Continue medications at this time, Larena Nab is very effective, Continue Norco 5/325  1/2 tab to 1 tab every 8 hours as needed for breakthrough pain- filled 11/6/2018  · Patient is compliant  · Bowel program- colace 100 mg TID, Senna 2 tabs at bedtime, and Miralax daily     Previous Treatments tried:  · PT: Yes,  any benefit? Yes, how many weeks? 6-8, last date done:2016  · NSAIDs: Yes,  any benefit? No,  how long taken: months to years  · Chiropractic: Yes,  any benefit? Yes short term  · Muscle relaxants: Yes,  any benefit? No  · Narcotics: Yes,  any benefit? No  · Spine surgeon consult: Yes Dr Meir Hinton, recommended surgery unable to clear by Pulmonologist  · Any Implants: No      Meds.  Prescribed:   Orders Placed This Encounter   Medications    docusate sodium (COLACE) 100 MG capsule     Sig: Take 1

## 2018-11-26 ENCOUNTER — HOSPITAL ENCOUNTER (INPATIENT)
Age: 68
LOS: 2 days | Discharge: HOME | DRG: 190 | End: 2018-11-28
Attending: EMERGENCY MEDICINE | Admitting: HOSPITALIST
Payer: MEDICARE

## 2018-11-26 ENCOUNTER — APPOINTMENT (OUTPATIENT)
Dept: GENERAL RADIOLOGY | Age: 68
DRG: 190 | End: 2018-11-26
Payer: MEDICARE

## 2018-11-26 DIAGNOSIS — J44.9 STAGE 3 SEVERE COPD BY GOLD CLASSIFICATION (HCC): ICD-10-CM

## 2018-11-26 DIAGNOSIS — J44.9 OSA AND COPD OVERLAP SYNDROME (HCC): ICD-10-CM

## 2018-11-26 DIAGNOSIS — J96.02 ACUTE ON CHRONIC RESPIRATORY ACIDOSIS (HCC): ICD-10-CM

## 2018-11-26 DIAGNOSIS — G47.33 OSA (OBSTRUCTIVE SLEEP APNEA): ICD-10-CM

## 2018-11-26 DIAGNOSIS — J96.12 ACUTE ON CHRONIC RESPIRATORY ACIDOSIS (HCC): ICD-10-CM

## 2018-11-26 DIAGNOSIS — F32.2 CURRENT SEVERE EPISODE OF MAJOR DEPRESSIVE DISORDER WITHOUT PSYCHOTIC FEATURES, UNSPECIFIED WHETHER RECURRENT (HCC): ICD-10-CM

## 2018-11-26 DIAGNOSIS — I10 ESSENTIAL HYPERTENSION: ICD-10-CM

## 2018-11-26 DIAGNOSIS — I25.10 CORONARY ARTERY DISEASE INVOLVING NATIVE CORONARY ARTERY OF NATIVE HEART WITHOUT ANGINA PECTORIS: ICD-10-CM

## 2018-11-26 DIAGNOSIS — J96.12 CHRONIC HYPERCAPNIC RESPIRATORY FAILURE (HCC): ICD-10-CM

## 2018-11-26 DIAGNOSIS — R53.83 FATIGUE, UNSPECIFIED TYPE: Primary | ICD-10-CM

## 2018-11-26 DIAGNOSIS — G47.33 OSA AND COPD OVERLAP SYNDROME (HCC): ICD-10-CM

## 2018-11-26 DIAGNOSIS — J44.1 COPD EXACERBATION (HCC): ICD-10-CM

## 2018-11-26 LAB
ALBUMIN SERPL-MCNC: 3.4 G/DL (ref 3.5–5.1)
ALP BLD-CCNC: 87 U/L (ref 38–126)
ALT SERPL-CCNC: 9 U/L (ref 11–66)
ANION GAP SERPL CALCULATED.3IONS-SCNC: 6 MEQ/L (ref 8–16)
APTT: 32.6 SECONDS (ref 22–38)
AST SERPL-CCNC: 13 U/L (ref 5–40)
BACTERIA: ABNORMAL /HPF
BASE EXCESS (CALCULATED): 14.2 MMOL/L (ref -2.5–2.5)
BASOPHILS # BLD: 0.5 %
BASOPHILS ABSOLUTE: 0 THOU/MM3 (ref 0–0.1)
BILIRUB SERPL-MCNC: 0.2 MG/DL (ref 0.3–1.2)
BILIRUBIN URINE: NEGATIVE
BLOOD, URINE: NEGATIVE
BUN BLDV-MCNC: 12 MG/DL (ref 7–22)
CALCIUM SERPL-MCNC: 9.1 MG/DL (ref 8.5–10.5)
CASTS 2: ABNORMAL /LPF
CASTS UA: ABNORMAL /LPF
CHARACTER, URINE: ABNORMAL
CHLORIDE BLD-SCNC: 93 MEQ/L (ref 98–111)
CO2: 38 MEQ/L (ref 23–33)
COLLECTED BY:: ABNORMAL
COLOR: YELLOW
CREAT SERPL-MCNC: 0.4 MG/DL (ref 0.4–1.2)
CRYSTALS, UA: ABNORMAL
DEVICE: ABNORMAL
EKG ATRIAL RATE: 80 BPM
EKG P AXIS: 68 DEGREES
EKG P-R INTERVAL: 176 MS
EKG Q-T INTERVAL: 394 MS
EKG QRS DURATION: 100 MS
EKG QTC CALCULATION (BAZETT): 454 MS
EKG R AXIS: 58 DEGREES
EKG T AXIS: 83 DEGREES
EKG VENTRICULAR RATE: 80 BPM
EOSINOPHIL # BLD: 5 %
EOSINOPHILS ABSOLUTE: 0.4 THOU/MM3 (ref 0–0.4)
EPITHELIAL CELLS, UA: ABNORMAL /HPF
ERYTHROCYTE [DISTWIDTH] IN BLOOD BY AUTOMATED COUNT: 12.9 % (ref 11.5–14.5)
ERYTHROCYTE [DISTWIDTH] IN BLOOD BY AUTOMATED COUNT: 45 FL (ref 35–45)
FLU A ANTIGEN: NEGATIVE
FLU B ANTIGEN: NEGATIVE
GFR SERPL CREATININE-BSD FRML MDRD: > 90 ML/MIN/1.73M2
GLUCOSE BLD-MCNC: 100 MG/DL (ref 70–108)
GLUCOSE URINE: NEGATIVE MG/DL
HCO3: 45 MMOL/L (ref 23–28)
HCT VFR BLD CALC: 35.2 % (ref 37–47)
HEMOGLOBIN: 10.4 GM/DL (ref 12–16)
IFIO2: 3
IMMATURE GRANS (ABS): 0.02 THOU/MM3 (ref 0–0.07)
IMMATURE GRANULOCYTES: 0.3 %
INR BLD: 0.89 (ref 0.85–1.13)
KETONES, URINE: NEGATIVE
LEUKOCYTE ESTERASE, URINE: ABNORMAL
LYMPHOCYTES # BLD: 33.8 %
LYMPHOCYTES ABSOLUTE: 2.6 THOU/MM3 (ref 1–4.8)
MCH RBC QN AUTO: 28.3 PG (ref 26–33)
MCHC RBC AUTO-ENTMCNC: 29.5 GM/DL (ref 32.2–35.5)
MCV RBC AUTO: 95.7 FL (ref 81–99)
MISCELLANEOUS 2: ABNORMAL
MONOCYTES # BLD: 10.4 %
MONOCYTES ABSOLUTE: 0.8 THOU/MM3 (ref 0.4–1.3)
NITRITE, URINE: NEGATIVE
NUCLEATED RED BLOOD CELLS: 0 /100 WBC
O2 SATURATION: 95 %
OSMOLALITY CALCULATION: 273.7 MOSMOL/KG (ref 275–300)
PCO2: 100 MMHG (ref 35–45)
PH BLOOD GAS: 7.26 (ref 7.35–7.45)
PH UA: 7.5
PLATELET # BLD: 206 THOU/MM3 (ref 130–400)
PMV BLD AUTO: 10.9 FL (ref 9.4–12.4)
PO2: 92 MMHG (ref 71–104)
POTASSIUM SERPL-SCNC: 4 MEQ/L (ref 3.5–5.2)
PRO-BNP: 89.3 PG/ML (ref 0–900)
PROTEIN UA: ABNORMAL
RBC # BLD: 3.68 MILL/MM3 (ref 4.2–5.4)
RBC URINE: ABNORMAL /HPF
RENAL EPITHELIAL, UA: ABNORMAL
SEG NEUTROPHILS: 50 %
SEGMENTED NEUTROPHILS ABSOLUTE COUNT: 3.9 THOU/MM3 (ref 1.8–7.7)
SODIUM BLD-SCNC: 137 MEQ/L (ref 135–145)
SPECIFIC GRAVITY, URINE: 1.01 (ref 1–1.03)
TOTAL PROTEIN: 6.6 G/DL (ref 6.1–8)
TROPONIN T: < 0.01 NG/ML
UROBILINOGEN, URINE: 1 EU/DL
WBC # BLD: 7.8 THOU/MM3 (ref 4.8–10.8)
WBC UA: ABNORMAL /HPF
YEAST: ABNORMAL

## 2018-11-26 PROCEDURE — 96375 TX/PRO/DX INJ NEW DRUG ADDON: CPT

## 2018-11-26 PROCEDURE — 82803 BLOOD GASES ANY COMBINATION: CPT

## 2018-11-26 PROCEDURE — 71046 X-RAY EXAM CHEST 2 VIEWS: CPT

## 2018-11-26 PROCEDURE — 85025 COMPLETE CBC W/AUTO DIFF WBC: CPT

## 2018-11-26 PROCEDURE — 6360000002 HC RX W HCPCS: Performed by: HOSPITALIST

## 2018-11-26 PROCEDURE — 93005 ELECTROCARDIOGRAM TRACING: CPT | Performed by: EMERGENCY MEDICINE

## 2018-11-26 PROCEDURE — 87086 URINE CULTURE/COLONY COUNT: CPT

## 2018-11-26 PROCEDURE — 87804 INFLUENZA ASSAY W/OPTIC: CPT

## 2018-11-26 PROCEDURE — 94760 N-INVAS EAR/PLS OXIMETRY 1: CPT

## 2018-11-26 PROCEDURE — 2580000003 HC RX 258: Performed by: HOSPITALIST

## 2018-11-26 PROCEDURE — 94640 AIRWAY INHALATION TREATMENT: CPT

## 2018-11-26 PROCEDURE — 6370000000 HC RX 637 (ALT 250 FOR IP): Performed by: HOSPITALIST

## 2018-11-26 PROCEDURE — 99223 1ST HOSP IP/OBS HIGH 75: CPT | Performed by: HOSPITALIST

## 2018-11-26 PROCEDURE — 80053 COMPREHEN METABOLIC PANEL: CPT

## 2018-11-26 PROCEDURE — 85730 THROMBOPLASTIN TIME PARTIAL: CPT

## 2018-11-26 PROCEDURE — 99285 EMERGENCY DEPT VISIT HI MDM: CPT

## 2018-11-26 PROCEDURE — 81001 URINALYSIS AUTO W/SCOPE: CPT

## 2018-11-26 PROCEDURE — 93010 ELECTROCARDIOGRAM REPORT: CPT | Performed by: INTERNAL MEDICINE

## 2018-11-26 PROCEDURE — 2709999900 HC NON-CHARGEABLE SUPPLY

## 2018-11-26 PROCEDURE — 36415 COLL VENOUS BLD VENIPUNCTURE: CPT

## 2018-11-26 PROCEDURE — 85610 PROTHROMBIN TIME: CPT

## 2018-11-26 PROCEDURE — 84484 ASSAY OF TROPONIN QUANT: CPT

## 2018-11-26 PROCEDURE — 2140000000 HC CCU INTERMEDIATE R&B

## 2018-11-26 PROCEDURE — 83880 ASSAY OF NATRIURETIC PEPTIDE: CPT

## 2018-11-26 PROCEDURE — 96365 THER/PROPH/DIAG IV INF INIT: CPT

## 2018-11-26 PROCEDURE — 94660 CPAP INITIATION&MGMT: CPT

## 2018-11-26 PROCEDURE — 2700000000 HC OXYGEN THERAPY PER DAY

## 2018-11-26 RX ORDER — METHYLPREDNISOLONE SODIUM SUCCINATE 40 MG/ML
40 INJECTION, POWDER, LYOPHILIZED, FOR SOLUTION INTRAMUSCULAR; INTRAVENOUS EVERY 8 HOURS
Status: DISCONTINUED | OUTPATIENT
Start: 2018-11-26 | End: 2018-11-27

## 2018-11-26 RX ORDER — CLOPIDOGREL BISULFATE 75 MG/1
75 TABLET ORAL DAILY
Status: DISCONTINUED | OUTPATIENT
Start: 2018-11-26 | End: 2018-11-28 | Stop reason: HOSPADM

## 2018-11-26 RX ORDER — ONDANSETRON 2 MG/ML
4 INJECTION INTRAMUSCULAR; INTRAVENOUS EVERY 6 HOURS PRN
Status: DISCONTINUED | OUTPATIENT
Start: 2018-11-26 | End: 2018-11-28 | Stop reason: HOSPADM

## 2018-11-26 RX ORDER — HYDROCODONE BITARTRATE AND ACETAMINOPHEN 5; 325 MG/1; MG/1
1 TABLET ORAL EVERY 8 HOURS PRN
Status: DISCONTINUED | OUTPATIENT
Start: 2018-11-26 | End: 2018-11-28 | Stop reason: HOSPADM

## 2018-11-26 RX ORDER — BUSPIRONE HYDROCHLORIDE 5 MG/1
5 TABLET ORAL 2 TIMES DAILY
Status: DISCONTINUED | OUTPATIENT
Start: 2018-11-26 | End: 2018-11-28 | Stop reason: HOSPADM

## 2018-11-26 RX ORDER — POTASSIUM CHLORIDE 7.45 MG/ML
10 INJECTION INTRAVENOUS PRN
Status: DISCONTINUED | OUTPATIENT
Start: 2018-11-26 | End: 2018-11-28 | Stop reason: HOSPADM

## 2018-11-26 RX ORDER — SODIUM CHLORIDE 0.9 % (FLUSH) 0.9 %
10 SYRINGE (ML) INJECTION PRN
Status: DISCONTINUED | OUTPATIENT
Start: 2018-11-26 | End: 2018-11-28 | Stop reason: HOSPADM

## 2018-11-26 RX ORDER — IPRATROPIUM BROMIDE AND ALBUTEROL SULFATE 2.5; .5 MG/3ML; MG/3ML
1 SOLUTION RESPIRATORY (INHALATION)
Status: DISCONTINUED | OUTPATIENT
Start: 2018-11-26 | End: 2018-11-28 | Stop reason: HOSPADM

## 2018-11-26 RX ORDER — LEVOFLOXACIN 5 MG/ML
500 INJECTION, SOLUTION INTRAVENOUS EVERY 24 HOURS
Status: DISCONTINUED | OUTPATIENT
Start: 2018-11-26 | End: 2018-11-27

## 2018-11-26 RX ORDER — SODIUM CHLORIDE 0.9 % (FLUSH) 0.9 %
10 SYRINGE (ML) INJECTION EVERY 12 HOURS SCHEDULED
Status: DISCONTINUED | OUTPATIENT
Start: 2018-11-26 | End: 2018-11-28 | Stop reason: HOSPADM

## 2018-11-26 RX ORDER — ASPIRIN 81 MG/1
81 TABLET ORAL DAILY
Status: DISCONTINUED | OUTPATIENT
Start: 2018-11-26 | End: 2018-11-28 | Stop reason: HOSPADM

## 2018-11-26 RX ORDER — AMLODIPINE BESYLATE 5 MG/1
5 TABLET ORAL DAILY
Status: DISCONTINUED | OUTPATIENT
Start: 2018-11-26 | End: 2018-11-28 | Stop reason: HOSPADM

## 2018-11-26 RX ORDER — POTASSIUM CHLORIDE 20 MEQ/1
40 TABLET, EXTENDED RELEASE ORAL PRN
Status: DISCONTINUED | OUTPATIENT
Start: 2018-11-26 | End: 2018-11-28 | Stop reason: HOSPADM

## 2018-11-26 RX ORDER — METHYLPREDNISOLONE SODIUM SUCCINATE 40 MG/ML
40 INJECTION, POWDER, LYOPHILIZED, FOR SOLUTION INTRAMUSCULAR; INTRAVENOUS ONCE
Status: COMPLETED | OUTPATIENT
Start: 2018-11-26 | End: 2018-11-26

## 2018-11-26 RX ORDER — SENNA PLUS 8.6 MG/1
1 TABLET ORAL 2 TIMES DAILY
Status: DISCONTINUED | OUTPATIENT
Start: 2018-11-26 | End: 2018-11-28 | Stop reason: HOSPADM

## 2018-11-26 RX ORDER — LOSARTAN POTASSIUM 50 MG/1
50 TABLET ORAL DAILY
Status: DISCONTINUED | OUTPATIENT
Start: 2018-11-26 | End: 2018-11-28 | Stop reason: HOSPADM

## 2018-11-26 RX ORDER — POTASSIUM CHLORIDE 20MEQ/15ML
40 LIQUID (ML) ORAL PRN
Status: DISCONTINUED | OUTPATIENT
Start: 2018-11-26 | End: 2018-11-28 | Stop reason: HOSPADM

## 2018-11-26 RX ORDER — FLUTICASONE PROPIONATE 50 MCG
1 SPRAY, SUSPENSION (ML) NASAL DAILY PRN
Status: DISCONTINUED | OUTPATIENT
Start: 2018-11-26 | End: 2018-11-28 | Stop reason: HOSPADM

## 2018-11-26 RX ORDER — PANTOPRAZOLE SODIUM 40 MG/1
40 TABLET, DELAYED RELEASE ORAL
Status: DISCONTINUED | OUTPATIENT
Start: 2018-11-27 | End: 2018-11-28 | Stop reason: HOSPADM

## 2018-11-26 RX ORDER — DOCUSATE SODIUM 100 MG/1
100 CAPSULE, LIQUID FILLED ORAL 3 TIMES DAILY
Status: DISCONTINUED | OUTPATIENT
Start: 2018-11-26 | End: 2018-11-28 | Stop reason: HOSPADM

## 2018-11-26 RX ORDER — ATORVASTATIN CALCIUM 40 MG/1
40 TABLET, FILM COATED ORAL NIGHTLY
Status: DISCONTINUED | OUTPATIENT
Start: 2018-11-26 | End: 2018-11-28 | Stop reason: HOSPADM

## 2018-11-26 RX ORDER — ALBUTEROL SULFATE 2.5 MG/3ML
2.5 SOLUTION RESPIRATORY (INHALATION) EVERY 6 HOURS PRN
Status: DISCONTINUED | OUTPATIENT
Start: 2018-11-26 | End: 2018-11-28 | Stop reason: HOSPADM

## 2018-11-26 RX ORDER — MONTELUKAST SODIUM 10 MG/1
10 TABLET ORAL NIGHTLY
Status: DISCONTINUED | OUTPATIENT
Start: 2018-11-26 | End: 2018-11-28 | Stop reason: HOSPADM

## 2018-11-26 RX ADMIN — CLOPIDOGREL BISULFATE 75 MG: 75 TABLET ORAL at 12:08

## 2018-11-26 RX ADMIN — Medication 10 ML: at 15:56

## 2018-11-26 RX ADMIN — MONTELUKAST SODIUM 10 MG: 10 TABLET, FILM COATED ORAL at 21:16

## 2018-11-26 RX ADMIN — ATORVASTATIN CALCIUM 40 MG: 40 TABLET, FILM COATED ORAL at 21:16

## 2018-11-26 RX ADMIN — IPRATROPIUM BROMIDE AND ALBUTEROL SULFATE 1 AMPULE: .5; 3 SOLUTION RESPIRATORY (INHALATION) at 14:34

## 2018-11-26 RX ADMIN — METHYLPREDNISOLONE SODIUM SUCCINATE 40 MG: 40 INJECTION, POWDER, FOR SOLUTION INTRAMUSCULAR; INTRAVENOUS at 15:56

## 2018-11-26 RX ADMIN — DOCUSATE SODIUM 100 MG: 100 CAPSULE, LIQUID FILLED ORAL at 15:56

## 2018-11-26 RX ADMIN — ENOXAPARIN SODIUM 40 MG: 40 INJECTION SUBCUTANEOUS at 12:08

## 2018-11-26 RX ADMIN — LEVOFLOXACIN 500 MG: 5 INJECTION, SOLUTION INTRAVENOUS at 06:21

## 2018-11-26 RX ADMIN — DOCUSATE SODIUM 100 MG: 100 CAPSULE, LIQUID FILLED ORAL at 21:16

## 2018-11-26 RX ADMIN — LOSARTAN POTASSIUM 50 MG: 50 TABLET, FILM COATED ORAL at 12:05

## 2018-11-26 RX ADMIN — IPRATROPIUM BROMIDE AND ALBUTEROL SULFATE 1 AMPULE: .5; 3 SOLUTION RESPIRATORY (INHALATION) at 18:30

## 2018-11-26 RX ADMIN — AMLODIPINE BESYLATE 5 MG: 5 TABLET ORAL at 12:05

## 2018-11-26 RX ADMIN — Medication 10 ML: at 21:16

## 2018-11-26 RX ADMIN — Medication 10 ML: at 12:10

## 2018-11-26 RX ADMIN — ASPIRIN 81 MG: 81 TABLET ORAL at 12:08

## 2018-11-26 RX ADMIN — IPRATROPIUM BROMIDE AND ALBUTEROL SULFATE 1 AMPULE: .5; 3 SOLUTION RESPIRATORY (INHALATION) at 21:15

## 2018-11-26 RX ADMIN — METHYLPREDNISOLONE SODIUM SUCCINATE 40 MG: 40 INJECTION, POWDER, FOR SOLUTION INTRAMUSCULAR; INTRAVENOUS at 06:21

## 2018-11-26 ASSESSMENT — PAIN SCALES - GENERAL: PAINLEVEL_OUTOF10: 0

## 2018-11-26 ASSESSMENT — ENCOUNTER SYMPTOMS
ABDOMINAL PAIN: 0
NAUSEA: 0
VOMITING: 0
WHEEZING: 0
BACK PAIN: 0
COUGH: 1
SORE THROAT: 0
BLOOD IN STOOL: 0
DIARRHEA: 0
SHORTNESS OF BREATH: 1

## 2018-11-26 NOTE — ED PROVIDER NOTES
[facility]     eMERGENCY dEPARTMENT eNCOUnter         Pt Name: Radha Britton  MRN: [id number]  [place] 1950  Date of evaluation: 11/26/2018  Provider: Rishi Mchugh MD    CHIEF COMPLAINT       Chief Complaint   Patient presents with    Fatigue       Nurses Notes reviewed and I agree except as noted in the HPI. HISTORY OF PRESENT ILLNESS    Radha Britton is a 76 y.o. female who presents For evaluation of fatigue. She states she just doesn't feel right tonight. She has some flu symptoms i.e. cough and nasal congestion but no documented fever. She also has reported coronary disease and is scheduled to undergo angioplasty in January. Family member reports that she also has a blocked artery in her neck. Patient denies headache at the moment but was a little lightheaded. She's felt like she couldn't concentrate and that she was out of it. He feels \"drained. \"  She denies any pain. Earlier she had a little sharp pain under her right breast which resolved spontaneously. This HPI was provided by the patient. REVIEW OF SYSTEMS     Review of Systems   Constitutional: Positive for fatigue. Negative for chills, fever and unexpected weight change. HENT: Positive for congestion. Negative for ear pain and sore throat. Eyes: Positive for visual disturbance. Respiratory: Positive for cough and shortness of breath. Negative for wheezing. Cardiovascular: Negative for chest pain, palpitations and leg swelling. Gastrointestinal: Negative for abdominal pain, blood in stool, diarrhea, nausea and vomiting. Genitourinary: Negative for dysuria and hematuria. Musculoskeletal: Negative for arthralgias and back pain. Skin: Negative for rash. Allergic/Immunologic: Negative for environmental allergies. Neurological: Positive for light-headedness. Negative for dizziness, syncope, weakness, numbness and headaches. Hematological: Bruises/bleeds easily. used smokeless tobacco. She reports that she does not drink alcohol or use drugs. PHYSICAL EXAM       ED Triage Vitals [11/26/18 0220]   BP Temp Temp Source Pulse Resp SpO2 Height Weight   (!) 172/62 97.8 °F (36.6 °C) Oral 86 20 92 % -- 160 lb (72.6 kg)      Physical Exam   Constitutional: She is oriented to person, place, and time. She appears well-developed and well-nourished. She is cooperative. Non-toxic appearance. She does not appear ill. Nasal cannula in place. HENT:   Head: Normocephalic. Right Ear: External ear normal. No drainage. Left Ear: External ear normal. No drainage. Nose: Nose normal. No epistaxis. Mouth/Throat: Mucous membranes are not dry and not cyanotic. Eyes: Conjunctivae and EOM are normal. Right eye exhibits no discharge. Left eye exhibits no discharge. No scleral icterus. Neck: Trachea normal, normal range of motion and phonation normal. Neck supple. No tracheal deviation present. Cardiovascular: Normal rate, regular rhythm, normal heart sounds and intact distal pulses. Exam reveals no gallop and no friction rub. No murmur heard. Pulses:       Radial pulses are 2+ on the right side. Pulmonary/Chest: Effort normal and breath sounds normal. No stridor. No respiratory distress. She has no wheezes. She has no rales. She exhibits no tenderness. Abdominal: Soft. Bowel sounds are normal. She exhibits no distension and no pulsatile midline mass. There is no tenderness. There is no rebound and no guarding. Musculoskeletal: Normal range of motion. She exhibits no edema or tenderness (No calf pain or tenderness. No evidence of DVT). Neurological: She is alert and oriented to person, place, and time. She has normal strength. She displays no tremor. She displays no seizure activity. Coordination normal. GCS eye subscore is 4. GCS verbal subscore is 5. GCS motor subscore is 6. Skin: Skin is warm and dry. No rash (on exposed surfaced) noted. She is not diaphoretic.  No

## 2018-11-26 NOTE — ED NOTES
Pt transported to 3B22 by cart in stable condition. Pt monitored on telemetry. Pt on Bipap  Called 3B and told Elayne Conner that the patient was on their way.          Thad Sosa LPN  01/21/43 [id number]

## 2018-11-26 NOTE — ED NOTES
Pt resting quietly in room with even and unlabored respirations. Side rails up x2 with call light in reach. Will continue to monitor.        Chitra Moyer RN  11/26/18 1952

## 2018-11-27 LAB
ALBUMIN SERPL-MCNC: 3.6 G/DL (ref 3.5–5.1)
ALLEN TEST: POSITIVE
ALP BLD-CCNC: 82 U/L (ref 38–126)
ALT SERPL-CCNC: 8 U/L (ref 11–66)
ANION GAP SERPL CALCULATED.3IONS-SCNC: 10 MEQ/L (ref 8–16)
AST SERPL-CCNC: 11 U/L (ref 5–40)
BASE EXCESS (CALCULATED): 12.8 MMOL/L (ref -2.5–2.5)
BILIRUB SERPL-MCNC: < 0.2 MG/DL (ref 0.3–1.2)
BUN BLDV-MCNC: 20 MG/DL (ref 7–22)
CALCIUM SERPL-MCNC: 10.1 MG/DL (ref 8.5–10.5)
CHLORIDE BLD-SCNC: 93 MEQ/L (ref 98–111)
CO2: 35 MEQ/L (ref 23–33)
COLLECTED BY:: ABNORMAL
CREAT SERPL-MCNC: 0.7 MG/DL (ref 0.4–1.2)
DEVICE: ABNORMAL
ERYTHROCYTE [DISTWIDTH] IN BLOOD BY AUTOMATED COUNT: 13.1 % (ref 11.5–14.5)
ERYTHROCYTE [DISTWIDTH] IN BLOOD BY AUTOMATED COUNT: 45.1 FL (ref 35–45)
GFR SERPL CREATININE-BSD FRML MDRD: 83 ML/MIN/1.73M2
GLUCOSE BLD-MCNC: 131 MG/DL (ref 70–108)
GLUCOSE BLD-MCNC: 168 MG/DL (ref 70–108)
GLUCOSE BLD-MCNC: 192 MG/DL (ref 70–108)
GLUCOSE BLD-MCNC: 220 MG/DL (ref 70–108)
HCO3: 40 MMOL/L (ref 23–28)
HCT VFR BLD CALC: 34.2 % (ref 37–47)
HEMOGLOBIN: 10.3 GM/DL (ref 12–16)
IFIO2: 3
MCH RBC QN AUTO: 28.5 PG (ref 26–33)
MCHC RBC AUTO-ENTMCNC: 30.1 GM/DL (ref 32.2–35.5)
MCV RBC AUTO: 94.5 FL (ref 81–99)
O2 SATURATION: 94 %
ORGANISM: ABNORMAL
PCO2: 63 MMHG (ref 35–45)
PH BLOOD GAS: 7.41 (ref 7.35–7.45)
PLATELET # BLD: 217 THOU/MM3 (ref 130–400)
PMV BLD AUTO: 10.9 FL (ref 9.4–12.4)
PO2: 75 MMHG (ref 71–104)
POTASSIUM REFLEX MAGNESIUM: 4.7 MEQ/L (ref 3.5–5.2)
PROCALCITONIN: 0.03 NG/ML (ref 0.01–0.09)
RBC # BLD: 3.62 MILL/MM3 (ref 4.2–5.4)
SODIUM BLD-SCNC: 138 MEQ/L (ref 135–145)
SOURCE, BLOOD GAS: ABNORMAL
TOTAL PROTEIN: 7.1 G/DL (ref 6.1–8)
URINE CULTURE REFLEX: ABNORMAL
WBC # BLD: 6.8 THOU/MM3 (ref 4.8–10.8)

## 2018-11-27 PROCEDURE — 2580000003 HC RX 258: Performed by: HOSPITALIST

## 2018-11-27 PROCEDURE — 84145 PROCALCITONIN (PCT): CPT

## 2018-11-27 PROCEDURE — 85027 COMPLETE CBC AUTOMATED: CPT

## 2018-11-27 PROCEDURE — 94640 AIRWAY INHALATION TREATMENT: CPT

## 2018-11-27 PROCEDURE — 82803 BLOOD GASES ANY COMBINATION: CPT

## 2018-11-27 PROCEDURE — 94760 N-INVAS EAR/PLS OXIMETRY 1: CPT

## 2018-11-27 PROCEDURE — 99223 1ST HOSP IP/OBS HIGH 75: CPT | Performed by: INTERNAL MEDICINE

## 2018-11-27 PROCEDURE — 99233 SBSQ HOSP IP/OBS HIGH 50: CPT | Performed by: INTERNAL MEDICINE

## 2018-11-27 PROCEDURE — 80053 COMPREHEN METABOLIC PANEL: CPT

## 2018-11-27 PROCEDURE — 94660 CPAP INITIATION&MGMT: CPT

## 2018-11-27 PROCEDURE — 6370000000 HC RX 637 (ALT 250 FOR IP): Performed by: HOSPITALIST

## 2018-11-27 PROCEDURE — 6370000000 HC RX 637 (ALT 250 FOR IP): Performed by: INTERNAL MEDICINE

## 2018-11-27 PROCEDURE — 82948 REAGENT STRIP/BLOOD GLUCOSE: CPT

## 2018-11-27 PROCEDURE — 36415 COLL VENOUS BLD VENIPUNCTURE: CPT

## 2018-11-27 PROCEDURE — 2709999900 HC NON-CHARGEABLE SUPPLY

## 2018-11-27 PROCEDURE — 2700000000 HC OXYGEN THERAPY PER DAY

## 2018-11-27 PROCEDURE — 6360000002 HC RX W HCPCS: Performed by: HOSPITALIST

## 2018-11-27 PROCEDURE — 36600 WITHDRAWAL OF ARTERIAL BLOOD: CPT

## 2018-11-27 PROCEDURE — 2140000000 HC CCU INTERMEDIATE R&B

## 2018-11-27 RX ORDER — DEXTROSE MONOHYDRATE 50 MG/ML
100 INJECTION, SOLUTION INTRAVENOUS PRN
Status: DISCONTINUED | OUTPATIENT
Start: 2018-11-27 | End: 2018-11-28 | Stop reason: HOSPADM

## 2018-11-27 RX ORDER — NICOTINE POLACRILEX 4 MG
15 LOZENGE BUCCAL PRN
Status: DISCONTINUED | OUTPATIENT
Start: 2018-11-27 | End: 2018-11-28 | Stop reason: HOSPADM

## 2018-11-27 RX ORDER — PREDNISONE 20 MG/1
40 TABLET ORAL DAILY
Status: DISCONTINUED | OUTPATIENT
Start: 2018-11-28 | End: 2018-11-28 | Stop reason: HOSPADM

## 2018-11-27 RX ORDER — DEXTROSE MONOHYDRATE 25 G/50ML
12.5 INJECTION, SOLUTION INTRAVENOUS PRN
Status: DISCONTINUED | OUTPATIENT
Start: 2018-11-27 | End: 2018-11-28 | Stop reason: HOSPADM

## 2018-11-27 RX ORDER — LEVOFLOXACIN 750 MG/1
750 TABLET ORAL DAILY
Status: DISCONTINUED | OUTPATIENT
Start: 2018-11-28 | End: 2018-11-28 | Stop reason: HOSPADM

## 2018-11-27 RX ORDER — ACETAMINOPHEN 325 MG/1
650 TABLET ORAL EVERY 4 HOURS PRN
Status: DISCONTINUED | OUTPATIENT
Start: 2018-11-27 | End: 2018-11-28 | Stop reason: HOSPADM

## 2018-11-27 RX ADMIN — Medication 10 ML: at 23:41

## 2018-11-27 RX ADMIN — ENOXAPARIN SODIUM 40 MG: 40 INJECTION SUBCUTANEOUS at 12:15

## 2018-11-27 RX ADMIN — DOCUSATE SODIUM 100 MG: 100 CAPSULE, LIQUID FILLED ORAL at 07:53

## 2018-11-27 RX ADMIN — CLOPIDOGREL BISULFATE 75 MG: 75 TABLET ORAL at 07:53

## 2018-11-27 RX ADMIN — INSULIN LISPRO 1 UNITS: 100 INJECTION, SOLUTION INTRAVENOUS; SUBCUTANEOUS at 23:40

## 2018-11-27 RX ADMIN — IPRATROPIUM BROMIDE AND ALBUTEROL SULFATE 1 AMPULE: .5; 3 SOLUTION RESPIRATORY (INHALATION) at 14:28

## 2018-11-27 RX ADMIN — METHYLPREDNISOLONE SODIUM SUCCINATE 40 MG: 40 INJECTION, POWDER, FOR SOLUTION INTRAMUSCULAR; INTRAVENOUS at 07:52

## 2018-11-27 RX ADMIN — AMLODIPINE BESYLATE 5 MG: 5 TABLET ORAL at 07:53

## 2018-11-27 RX ADMIN — LOSARTAN POTASSIUM 50 MG: 50 TABLET, FILM COATED ORAL at 07:53

## 2018-11-27 RX ADMIN — IPRATROPIUM BROMIDE AND ALBUTEROL SULFATE 1 AMPULE: .5; 3 SOLUTION RESPIRATORY (INHALATION) at 09:46

## 2018-11-27 RX ADMIN — LEVOFLOXACIN 500 MG: 5 INJECTION, SOLUTION INTRAVENOUS at 10:34

## 2018-11-27 RX ADMIN — METHYLPREDNISOLONE SODIUM SUCCINATE 40 MG: 40 INJECTION, POWDER, FOR SOLUTION INTRAMUSCULAR; INTRAVENOUS at 00:36

## 2018-11-27 RX ADMIN — IPRATROPIUM BROMIDE AND ALBUTEROL SULFATE 1 AMPULE: .5; 3 SOLUTION RESPIRATORY (INHALATION) at 21:28

## 2018-11-27 RX ADMIN — Medication 2 UNITS: at 12:12

## 2018-11-27 RX ADMIN — Medication 10 ML: at 07:52

## 2018-11-27 RX ADMIN — ATORVASTATIN CALCIUM 40 MG: 40 TABLET, FILM COATED ORAL at 23:40

## 2018-11-27 RX ADMIN — PANTOPRAZOLE SODIUM 40 MG: 40 TABLET, DELAYED RELEASE ORAL at 07:53

## 2018-11-27 RX ADMIN — MONTELUKAST SODIUM 10 MG: 10 TABLET, FILM COATED ORAL at 23:40

## 2018-11-27 RX ADMIN — ASPIRIN 81 MG: 81 TABLET ORAL at 07:53

## 2018-11-27 ASSESSMENT — PAIN DESCRIPTION - DESCRIPTORS: DESCRIPTORS: DULL;HEADACHE

## 2018-11-27 ASSESSMENT — PAIN DESCRIPTION - PAIN TYPE: TYPE: ACUTE PAIN

## 2018-11-27 ASSESSMENT — PAIN DESCRIPTION - PROGRESSION
CLINICAL_PROGRESSION: NOT CHANGED
CLINICAL_PROGRESSION: NOT CHANGED

## 2018-11-27 ASSESSMENT — PAIN DESCRIPTION - ONSET: ONSET: ON-GOING

## 2018-11-27 ASSESSMENT — PAIN DESCRIPTION - ORIENTATION: ORIENTATION: MID

## 2018-11-27 ASSESSMENT — PAIN DESCRIPTION - FREQUENCY: FREQUENCY: CONTINUOUS

## 2018-11-27 ASSESSMENT — PAIN DESCRIPTION - LOCATION: LOCATION: HEAD

## 2018-11-27 ASSESSMENT — PAIN SCALES - GENERAL: PAINLEVEL_OUTOF10: 4

## 2018-11-27 NOTE — PROGRESS NOTES
Hospitalist Progress Note    Patient:  Gurdeep Alegria      Unit/Bed:3B-22/022-A    YOB: 1950    MRN: [id number]       Acct: [de-identified]     PCP: Rey Stacy MD    Date of Admission: 11/26/2018    Assessment/Plan:    1. Copd exacerbation- standard rx  2. Acute resp failure with hypercarbia and hypoxia- recheck abg, consult pulmonary      Expected discharge date:  1-2 days    Disposition:    [x] Home       [] TCU       [] Rehab       [] Psych       [] SNF       [] [place]       [] Other-    Chief Complaint: feeling of general weakness; had sob and cough gray sputum several days prior    Hospital Course: Just arrived.        Subjective (past 24 hours):  Feels better       Medications:  Reviewed    Infusion Medications   Scheduled Medications    [START ON 11/28/2018] levofloxacin  750 mg Oral Daily    predniSONE  40 mg Oral Daily    amLODIPine  5 mg Oral Daily    aspirin  81 mg Oral Daily    atorvastatin  40 mg Oral Nightly    busPIRone  5 mg Oral BID    clopidogrel  75 mg Oral Daily    docusate sodium  100 mg Oral TID    losartan  50 mg Oral Daily    montelukast  10 mg Oral Nightly    pantoprazole  40 mg Oral QAM AC    senna  1 tablet Oral BID    sodium chloride flush  10 mL Intravenous 2 times per day    enoxaparin  40 mg Subcutaneous Daily    ipratropium-albuterol  1 ampule Inhalation Q4H WA     PRN Meds: acetaminophen, albuterol, fluticasone, HYDROcodone-acetaminophen, sodium chloride flush, magnesium hydroxide, ondansetron, potassium chloride **OR** potassium chloride **OR** potassium chloride, magnesium sulfate      Intake/Output Summary (Last 24 hours) at 11/27/18 1043  Last data filed at 11/27/18 0807   Gross per 24 hour   Intake              910 ml   Output              800 ml   Net              110 ml       Diet:  DIET GENERAL; Carb Control: 4 carb choices (60 gms)/meal; No Added Salt (3-4 GM)    Exam:  BP (!) 160/70   Pulse 89   Temp 98.3 are inherent in voice recognition technology. **      Final report electronically signed by Dr. Ana Cristina Espitia on 11/26/2018 3:39 AM          DVT prophylaxis: [x] Lovenox                                 [] SCDs                                 [] SQ Heparin                                 [] Encourage ambulation           [] Already on Anticoagulation     Code Status: Full Code        Tele:   [x] yes             [] no    Active Hospital Problems    Diagnosis Date Noted    Obesity (BMI 30.0-34. 9) [E66.9] 04/16/2015     Priority: Medium    Acute respiratory failure with hypercapnia (HCC) [J96.02] 11/26/2018    Acute on chronic respiratory failure with hypoxia and hypercapnia (HCC) [J96.21, J96.22]     COPD exacerbation ([facility] [address] [J44.1] 03/11/2018    Hypertension [I10] 12/06/2013    GERD (gastroesophageal reflux disease) [K21.9]     Hyperlipidemia [E78.5]     Depression [F32.9]     Diabetes type 2, controlled ([address] [E11.9]     CAD (coronary artery disease) [I25.10]        Electronically signed by Luís Zapata MD on 11/27/2018 at 10:43 AM

## 2018-11-28 VITALS
SYSTOLIC BLOOD PRESSURE: 135 MMHG | DIASTOLIC BLOOD PRESSURE: 64 MMHG | OXYGEN SATURATION: 95 % | RESPIRATION RATE: 20 BRPM | WEIGHT: 161.4 LBS | HEART RATE: 80 BPM | BODY MASS INDEX: 29.7 KG/M2 | TEMPERATURE: 97.9 F | HEIGHT: 62 IN

## 2018-11-28 LAB
ALLEN TEST: POSITIVE
BASE EXCESS (CALCULATED): 13.6 MMOL/L (ref -2.5–2.5)
COLLECTED BY:: ABNORMAL
DEVICE: ABNORMAL
GLUCOSE BLD-MCNC: 109 MG/DL (ref 70–108)
HCO3: 42 MMOL/L (ref 23–28)
IFIO2: 3
O2 SATURATION: 95 %
PCO2: 69 MMHG (ref 35–45)
PH BLOOD GAS: 7.39 (ref 7.35–7.45)
PO2: 83 MMHG (ref 71–104)
SOURCE, BLOOD GAS: ABNORMAL

## 2018-11-28 PROCEDURE — 6370000000 HC RX 637 (ALT 250 FOR IP): Performed by: HOSPITALIST

## 2018-11-28 PROCEDURE — 99232 SBSQ HOSP IP/OBS MODERATE 35: CPT | Performed by: INTERNAL MEDICINE

## 2018-11-28 PROCEDURE — 2580000003 HC RX 258: Performed by: HOSPITALIST

## 2018-11-28 PROCEDURE — 82803 BLOOD GASES ANY COMBINATION: CPT

## 2018-11-28 PROCEDURE — 36415 COLL VENOUS BLD VENIPUNCTURE: CPT

## 2018-11-28 PROCEDURE — 6370000000 HC RX 637 (ALT 250 FOR IP): Performed by: INTERNAL MEDICINE

## 2018-11-28 PROCEDURE — 82948 REAGENT STRIP/BLOOD GLUCOSE: CPT

## 2018-11-28 PROCEDURE — 99238 HOSP IP/OBS DSCHRG MGMT 30/<: CPT | Performed by: INTERNAL MEDICINE

## 2018-11-28 PROCEDURE — 94640 AIRWAY INHALATION TREATMENT: CPT

## 2018-11-28 PROCEDURE — 2700000000 HC OXYGEN THERAPY PER DAY

## 2018-11-28 PROCEDURE — 84238 ASSAY NONENDOCRINE RECEPTOR: CPT

## 2018-11-28 PROCEDURE — APPSS45 APP SPLIT SHARED TIME 31-45 MINUTES: Performed by: NURSE PRACTITIONER

## 2018-11-28 PROCEDURE — 2709999900 HC NON-CHARGEABLE SUPPLY

## 2018-11-28 RX ORDER — LEVOFLOXACIN 750 MG/1
750 TABLET ORAL DAILY
Qty: 4 TABLET | Refills: 0 | Status: SHIPPED | OUTPATIENT
Start: 2018-11-29 | End: 2018-12-03

## 2018-11-28 RX ORDER — PREDNISONE 20 MG/1
40 TABLET ORAL DAILY
Qty: 10 TABLET | Refills: 0 | Status: SHIPPED | OUTPATIENT
Start: 2018-11-29 | End: 2018-12-04

## 2018-11-28 RX ADMIN — DOCUSATE SODIUM 100 MG: 100 CAPSULE, LIQUID FILLED ORAL at 08:17

## 2018-11-28 RX ADMIN — IPRATROPIUM BROMIDE AND ALBUTEROL SULFATE 1 AMPULE: .5; 3 SOLUTION RESPIRATORY (INHALATION) at 09:22

## 2018-11-28 RX ADMIN — CLOPIDOGREL BISULFATE 75 MG: 75 TABLET ORAL at 08:17

## 2018-11-28 RX ADMIN — LOSARTAN POTASSIUM 50 MG: 50 TABLET, FILM COATED ORAL at 08:17

## 2018-11-28 RX ADMIN — SENNOSIDES 8.6 MG: 8.6 TABLET, FILM COATED ORAL at 08:17

## 2018-11-28 RX ADMIN — ASPIRIN 81 MG: 81 TABLET ORAL at 08:17

## 2018-11-28 RX ADMIN — PANTOPRAZOLE SODIUM 40 MG: 40 TABLET, DELAYED RELEASE ORAL at 06:06

## 2018-11-28 RX ADMIN — Medication 10 ML: at 08:18

## 2018-11-28 RX ADMIN — LEVOFLOXACIN 750 MG: 750 TABLET, FILM COATED ORAL at 08:17

## 2018-11-28 RX ADMIN — AMLODIPINE BESYLATE 5 MG: 5 TABLET ORAL at 08:17

## 2018-11-28 RX ADMIN — PREDNISONE 40 MG: 20 TABLET ORAL at 08:17

## 2018-11-28 ASSESSMENT — PAIN SCALES - GENERAL
PAINLEVEL_OUTOF10: 0

## 2018-11-28 NOTE — PROGRESS NOTES
The pt. Has been given discharge instructions, using teach back method. Resp. Therapist has been in to evaluate O2 level. Results stated that she is using the correct levels of O2 nasal cannula at home. Home medications were discussed. New meds, and continued meds with next dose due explained . Follow up appts. Were given to the pt. The pt. Has verbalized understanding and declined further questions  The daughter was contacted for transportation home. She had arrived and was waiting in the lobby with Patricia's coat to where home. Pt. Taken to the POV. Pt. Blanca.

## 2018-11-28 NOTE — PLAN OF CARE
Problem: Impaired respiratory status  Goal: Clear lung sounds  Outcome: Completed Date Met: 11/28/18  Continue with aerosols to aid in bronchodilation.

## 2018-11-28 NOTE — PROGRESS NOTES
adenopathy which is not significantly changed.         (See actual reports for details)  [address] Problems    Diagnosis Date Noted    Obesity (BMI 30.0-34. 9) [E66.9] 04/16/2015     Priority: Medium    Acute respiratory failure with hypercapnia (HCC) [J96.02] 11/26/2018    Acute on chronic respiratory failure with hypoxia and hypercapnia (HCC) [J96.21, J96.22]     COPD exacerbation ([facility] [address] [J44.1] 03/11/2018    Hypertension [I10] 12/06/2013    GERD (gastroesophageal reflux disease) [K21.9]     Hyperlipidemia [E78.5]     Depression [F32.9]     Diabetes type 2, controlled ([address] [E11.9]     CAD (coronary artery disease) [I25.10]      Assessment and Plan   Acute hypercapnic respiratory failure due to non compliance to her CPAP therapy with COPD exacerbation  Moderately severe COPD with acute exacerbation with bronchitis. Acute on chronic hypercapnic/hypoxic respiratory failure due to over lap syndrome: improved with BiPAP and exacerbation treatment measures (back to baseline). Severe PETE: used to be on treatment with a CPAP pressure of 11cm H20 + 3LPM oxygen bleed in. Her mean is old and missing parts. Medical non complianace: She has failed to follow through with titration studies in the past.  Mediastinal/hilar adenopathy: Improved on CT imaging in June. Pan-vascular disease with recent RCA stenting, LSC stenosis and ICA stenosis: Follows with Cardiology. 80 pack year smoking history quitting in 2010. Full Code      OK for discharge from pulmonary standpoint. We have arranged for home BiPAP machine 16/8 with formal titration study: She was educated in detail on the difference of CPAP and BiPAP and why she is need of BiPAP and to follow through with the titration study to determine proper setting. I also advised her to bring her machine to hospital for future procedures. Continue home 02 at 2-3 Liters NC.   Continue Spiriva and BRYCE HFA/Neb PRN  Continue Levaquin/Prednisone for total of 7 days  Follow up with Clarita Rosa CNP as previously scheduled 1/30/19  Follow up with VANNESSA Jose 8 weeks after titration study. Case discussed with nurse and patient/Dr. Siva Ybarra. Questions and concerns addressed. Meds and Orders reviewed. Electronically signed by     CASSANDRA Delgado - CNP on 11/28/2018 at 9:57 AM     Addendum by Dr. Siva Ybarra MD:  I have seen and examined the patient independently. Face to face evaluation and examination was performed. The above evaluation and note has been reviewed. Labs and radiographs were reviewed. I Have discussed with Ms. Iesha Schwab. KYLEE Christie about this patient in detail. The above assessment and plan has been reviewed. Please see my modifications mentioned below. My modifications:  Not in any distress. In lab BiPAP titration. Follow up as above.     Carlos Braswell MD 11/28/2018 8:01 PM

## 2018-11-28 NOTE — PROGRESS NOTES
Dr. Safia Arana placed DME order for home Bipap. Patient asking about when she will be discharged. Called [facility]'s home medical to ask about the procedure for getting the Bipap ordered. They said it needs run through the insurance and that patient also needs a nocturnal pulse ox done for the Bipap to qualify.

## 2018-11-29 LAB — SOLUBLE TRANSFERRIN RECEPT: 4.5 MG/L (ref 1.9–4.4)

## 2018-11-30 ENCOUNTER — TELEPHONE (OUTPATIENT)
Dept: INTERNAL MEDICINE CLINIC | Age: 68
End: 2018-11-30

## 2018-11-30 NOTE — TELEPHONE ENCOUNTER
----- Message from Martin Amezcua MD sent at 11/30/2018  7:42 AM EST -----  Send result of soluble transferrin receptor to pcp

## 2018-11-30 NOTE — TELEPHONE ENCOUNTER
Called dr Shelbi Das office to confirm she is an established pt there and they did confirm that. I faxed lab to Dr. Alverto May at [phone].

## 2018-12-03 DIAGNOSIS — G89.4 CHRONIC PAIN SYNDROME: ICD-10-CM

## 2018-12-03 RX ORDER — HYDROCODONE BITARTRATE AND ACETAMINOPHEN 5; 325 MG/1; MG/1
1 TABLET ORAL EVERY 8 HOURS PRN
Qty: 90 TABLET | Refills: 0 | Status: SHIPPED | OUTPATIENT
Start: 2018-12-06 | End: 2019-01-03 | Stop reason: SDUPTHER

## 2019-01-03 DIAGNOSIS — G89.4 CHRONIC PAIN SYNDROME: ICD-10-CM

## 2019-01-03 RX ORDER — HYDROCODONE BITARTRATE AND ACETAMINOPHEN 5; 325 MG/1; MG/1
1 TABLET ORAL EVERY 8 HOURS PRN
Qty: 90 TABLET | Refills: 0 | Status: SHIPPED | OUTPATIENT
Start: 2019-01-05 | End: 2019-02-05 | Stop reason: SDUPTHER

## 2019-01-08 ENCOUNTER — HOSPITAL ENCOUNTER (OUTPATIENT)
Dept: INTERVENTIONAL RADIOLOGY/VASCULAR | Age: 69
Discharge: HOME | End: 2019-01-08
Attending: INTERNAL MEDICINE | Admitting: INTERNAL MEDICINE
Payer: MEDICARE

## 2019-01-08 VITALS
OXYGEN SATURATION: 93 % | DIASTOLIC BLOOD PRESSURE: 56 MMHG | HEART RATE: 102 BPM | WEIGHT: 160 LBS | RESPIRATION RATE: 29 BRPM | SYSTOLIC BLOOD PRESSURE: 141 MMHG | BODY MASS INDEX: 29.44 KG/M2 | HEIGHT: 62 IN | TEMPERATURE: 98.4 F

## 2019-01-08 DIAGNOSIS — I25.83 CORONARY ARTERY DISEASE DUE TO LIPID RICH PLAQUE: ICD-10-CM

## 2019-01-08 DIAGNOSIS — I25.10 CORONARY ARTERY DISEASE DUE TO LIPID RICH PLAQUE: ICD-10-CM

## 2019-01-08 DIAGNOSIS — I10 ESSENTIAL HYPERTENSION: ICD-10-CM

## 2019-01-08 LAB
ABO: NORMAL
ANION GAP SERPL CALCULATED.3IONS-SCNC: 9 MEQ/L (ref 8–16)
ANTIBODY SCREEN: NORMAL
APTT: 33.6 SECONDS (ref 22–38)
BUN BLDV-MCNC: 10 MG/DL (ref 7–22)
CALCIUM SERPL-MCNC: 9.7 MG/DL (ref 8.5–10.5)
CHLORIDE BLD-SCNC: 95 MEQ/L (ref 98–111)
CO2: 37 MEQ/L (ref 23–33)
CREAT SERPL-MCNC: 0.6 MG/DL (ref 0.4–1.2)
ERYTHROCYTE [DISTWIDTH] IN BLOOD BY AUTOMATED COUNT: 12.9 % (ref 11.5–14.5)
ERYTHROCYTE [DISTWIDTH] IN BLOOD BY AUTOMATED COUNT: 43.3 FL (ref 35–45)
GFR SERPL CREATININE-BSD FRML MDRD: > 90 ML/MIN/1.73M2
GLUCOSE BLD-MCNC: 133 MG/DL (ref 70–108)
HCT VFR BLD CALC: 37.6 % (ref 37–47)
HEMOGLOBIN: 11.1 GM/DL (ref 12–16)
INR BLD: 0.91 (ref 0.85–1.13)
MCH RBC QN AUTO: 27.2 PG (ref 26–33)
MCHC RBC AUTO-ENTMCNC: 29.5 GM/DL (ref 32.2–35.5)
MCV RBC AUTO: 92.2 FL (ref 81–99)
PLATELET # BLD: 260 THOU/MM3 (ref 130–400)
PMV BLD AUTO: 10.3 FL (ref 9.4–12.4)
POTASSIUM REFLEX MAGNESIUM: 4.4 MEQ/L (ref 3.5–5.2)
RBC # BLD: 4.08 MILL/MM3 (ref 4.2–5.4)
RH FACTOR: NORMAL
SODIUM BLD-SCNC: 141 MEQ/L (ref 135–145)
WBC # BLD: 8 THOU/MM3 (ref 4.8–10.8)

## 2019-01-08 PROCEDURE — C1887 CATHETER, GUIDING: HCPCS

## 2019-01-08 PROCEDURE — 6370000000 HC RX 637 (ALT 250 FOR IP)

## 2019-01-08 PROCEDURE — 85730 THROMBOPLASTIN TIME PARTIAL: CPT

## 2019-01-08 PROCEDURE — 37236 OPEN/PERQ PLACE STENT 1ST: CPT | Performed by: INTERNAL MEDICINE

## 2019-01-08 PROCEDURE — 80048 BASIC METABOLIC PNL TOTAL CA: CPT

## 2019-01-08 PROCEDURE — 2580000003 HC RX 258: Performed by: INTERNAL MEDICINE

## 2019-01-08 PROCEDURE — C1894 INTRO/SHEATH, NON-LASER: HCPCS

## 2019-01-08 PROCEDURE — 6360000004 HC RX CONTRAST MEDICATION: Performed by: INTERNAL MEDICINE

## 2019-01-08 PROCEDURE — 2700000000 HC OXYGEN THERAPY PER DAY

## 2019-01-08 PROCEDURE — 2500000003 HC RX 250 WO HCPCS

## 2019-01-08 PROCEDURE — 6360000002 HC RX W HCPCS: Performed by: INTERNAL MEDICINE

## 2019-01-08 PROCEDURE — 6370000000 HC RX 637 (ALT 250 FOR IP): Performed by: INTERNAL MEDICINE

## 2019-01-08 PROCEDURE — 86901 BLOOD TYPING SEROLOGIC RH(D): CPT

## 2019-01-08 PROCEDURE — 36216 PLACE CATHETER IN ARTERY: CPT

## 2019-01-08 PROCEDURE — 2709999900 HC NON-CHARGEABLE SUPPLY

## 2019-01-08 PROCEDURE — C1769 GUIDE WIRE: HCPCS

## 2019-01-08 PROCEDURE — 36221 PLACE CATH THORACIC AORTA: CPT

## 2019-01-08 PROCEDURE — 86900 BLOOD TYPING SEROLOGIC ABO: CPT

## 2019-01-08 PROCEDURE — 6360000002 HC RX W HCPCS

## 2019-01-08 PROCEDURE — C1760 CLOSURE DEV, VASC: HCPCS

## 2019-01-08 PROCEDURE — 75605 CONTRAST EXAM THORACIC AORTA: CPT | Performed by: INTERNAL MEDICINE

## 2019-01-08 PROCEDURE — 86850 RBC ANTIBODY SCREEN: CPT

## 2019-01-08 PROCEDURE — C1725 CATH, TRANSLUMIN NON-LASER: HCPCS

## 2019-01-08 PROCEDURE — 85610 PROTHROMBIN TIME: CPT

## 2019-01-08 PROCEDURE — 85027 COMPLETE CBC AUTOMATED: CPT

## 2019-01-08 PROCEDURE — 36215 PLACE CATHETER IN ARTERY: CPT | Performed by: INTERNAL MEDICINE

## 2019-01-08 PROCEDURE — 37236 OPEN/PERQ PLACE STENT 1ST: CPT

## 2019-01-08 PROCEDURE — 36415 COLL VENOUS BLD VENIPUNCTURE: CPT

## 2019-01-08 RX ORDER — HEPARIN SODIUM 1000 [USP'U]/ML
6000 INJECTION, SOLUTION INTRAVENOUS; SUBCUTANEOUS ONCE
Status: COMPLETED | OUTPATIENT
Start: 2019-01-08 | End: 2019-01-08

## 2019-01-08 RX ORDER — FENTANYL CITRATE 50 UG/ML
25 INJECTION, SOLUTION INTRAMUSCULAR; INTRAVENOUS ONCE
Status: DISCONTINUED | OUTPATIENT
Start: 2019-01-08 | End: 2019-01-08

## 2019-01-08 RX ORDER — SODIUM CHLORIDE 0.9 % (FLUSH) 0.9 %
10 SYRINGE (ML) INJECTION PRN
Status: DISCONTINUED | OUTPATIENT
Start: 2019-01-08 | End: 2019-01-08 | Stop reason: SDUPTHER

## 2019-01-08 RX ORDER — ATROPINE SULFATE 0.4 MG/ML
0.5 AMPUL (ML) INJECTION
Status: DISCONTINUED | OUTPATIENT
Start: 2019-01-08 | End: 2019-01-08 | Stop reason: HOSPADM

## 2019-01-08 RX ORDER — DIAPER,BRIEF,INFANT-TODD,DISP
EACH MISCELLANEOUS ONCE
Status: COMPLETED | OUTPATIENT
Start: 2019-01-08 | End: 2019-01-08

## 2019-01-08 RX ORDER — SODIUM CHLORIDE 9 MG/ML
INJECTION, SOLUTION INTRAVENOUS CONTINUOUS
Status: DISCONTINUED | OUTPATIENT
Start: 2019-01-08 | End: 2019-01-08 | Stop reason: ALTCHOICE

## 2019-01-08 RX ORDER — MIDAZOLAM HYDROCHLORIDE 1 MG/ML
1 INJECTION INTRAMUSCULAR; INTRAVENOUS ONCE
Status: COMPLETED | OUTPATIENT
Start: 2019-01-08 | End: 2019-01-08

## 2019-01-08 RX ORDER — SODIUM CHLORIDE 0.9 % (FLUSH) 0.9 %
10 SYRINGE (ML) INJECTION EVERY 12 HOURS SCHEDULED
Status: DISCONTINUED | OUTPATIENT
Start: 2019-01-08 | End: 2019-01-08 | Stop reason: SDUPTHER

## 2019-01-08 RX ORDER — ONDANSETRON 2 MG/ML
4 INJECTION INTRAMUSCULAR; INTRAVENOUS EVERY 6 HOURS PRN
Status: DISCONTINUED | OUTPATIENT
Start: 2019-01-08 | End: 2019-01-08 | Stop reason: HOSPADM

## 2019-01-08 RX ORDER — SODIUM CHLORIDE 0.9 % (FLUSH) 0.9 %
10 SYRINGE (ML) INJECTION EVERY 12 HOURS SCHEDULED
Status: DISCONTINUED | OUTPATIENT
Start: 2019-01-08 | End: 2019-01-08 | Stop reason: HOSPADM

## 2019-01-08 RX ORDER — ACETAMINOPHEN 325 MG/1
650 TABLET ORAL EVERY 4 HOURS PRN
Status: DISCONTINUED | OUTPATIENT
Start: 2019-01-08 | End: 2019-01-08 | Stop reason: HOSPADM

## 2019-01-08 RX ORDER — FENTANYL CITRATE 50 UG/ML
50 INJECTION, SOLUTION INTRAMUSCULAR; INTRAVENOUS ONCE
Status: COMPLETED | OUTPATIENT
Start: 2019-01-08 | End: 2019-01-08

## 2019-01-08 RX ORDER — FENTANYL CITRATE 50 UG/ML
25 INJECTION, SOLUTION INTRAMUSCULAR; INTRAVENOUS ONCE
Status: COMPLETED | OUTPATIENT
Start: 2019-01-08 | End: 2019-01-08

## 2019-01-08 RX ORDER — ASPIRIN 325 MG
325 TABLET ORAL ONCE
Status: COMPLETED | OUTPATIENT
Start: 2019-01-08 | End: 2019-01-08

## 2019-01-08 RX ORDER — MORPHINE SULFATE 2 MG/ML
2 INJECTION, SOLUTION INTRAMUSCULAR; INTRAVENOUS
Status: DISCONTINUED | OUTPATIENT
Start: 2019-01-08 | End: 2019-01-08 | Stop reason: HOSPADM

## 2019-01-08 RX ORDER — SODIUM CHLORIDE 9 MG/ML
75 INJECTION, SOLUTION INTRAVENOUS CONTINUOUS
Status: DISCONTINUED | OUTPATIENT
Start: 2019-01-08 | End: 2019-01-08 | Stop reason: HOSPADM

## 2019-01-08 RX ORDER — SODIUM CHLORIDE 0.9 % (FLUSH) 0.9 %
10 SYRINGE (ML) INJECTION PRN
Status: DISCONTINUED | OUTPATIENT
Start: 2019-01-08 | End: 2019-01-08 | Stop reason: HOSPADM

## 2019-01-08 RX ORDER — NITROGLYCERIN 0.4 MG/1
0.4 TABLET SUBLINGUAL EVERY 5 MIN PRN
Status: DISCONTINUED | OUTPATIENT
Start: 2019-01-08 | End: 2019-01-08 | Stop reason: HOSPADM

## 2019-01-08 RX ADMIN — ASPIRIN 325 MG: 325 TABLET ORAL at 07:03

## 2019-01-08 RX ADMIN — SODIUM CHLORIDE: 9 INJECTION, SOLUTION INTRAVENOUS at 07:00

## 2019-01-08 RX ADMIN — HEPARIN SODIUM 6000 UNITS: 1000 INJECTION INTRAVENOUS; SUBCUTANEOUS at 08:53

## 2019-01-08 RX ADMIN — FENTANYL CITRATE 50 MCG: 50 INJECTION INTRAMUSCULAR; INTRAVENOUS at 08:08

## 2019-01-08 RX ADMIN — MIDAZOLAM HYDROCHLORIDE 1 MG: 2 INJECTION, SOLUTION INTRAMUSCULAR; INTRAVENOUS at 08:08

## 2019-01-08 RX ADMIN — BACITRACIN ZINC 1 G: 500 OINTMENT TOPICAL at 09:16

## 2019-01-08 RX ADMIN — FENTANYL CITRATE 25 MCG: 50 INJECTION INTRAMUSCULAR; INTRAVENOUS at 08:54

## 2019-01-08 RX ADMIN — IOPAMIDOL 100 ML: 612 INJECTION, SOLUTION INTRAVENOUS at 09:16

## 2019-01-08 ASSESSMENT — PAIN SCALES - GENERAL
PAINLEVEL_OUTOF10: 0
PAINLEVEL_OUTOF10: 0

## 2019-01-10 ENCOUNTER — OFFICE VISIT (OUTPATIENT)
Dept: PULMONOLOGY | Age: 69
End: 2019-01-10
Payer: MEDICARE

## 2019-01-10 VITALS
WEIGHT: 160 LBS | SYSTOLIC BLOOD PRESSURE: 120 MMHG | BODY MASS INDEX: 29.44 KG/M2 | DIASTOLIC BLOOD PRESSURE: 62 MMHG | HEIGHT: 62 IN | HEART RATE: 98 BPM | OXYGEN SATURATION: 91 % | TEMPERATURE: 98.2 F

## 2019-01-10 DIAGNOSIS — J44.9 COPD, SEVERE (HCC): Primary | ICD-10-CM

## 2019-01-10 DIAGNOSIS — G47.33 OSA AND COPD OVERLAP SYNDROME (HCC): ICD-10-CM

## 2019-01-10 DIAGNOSIS — Z95.5 HISTORY OF HEART ARTERY STENT: ICD-10-CM

## 2019-01-10 DIAGNOSIS — J96.11 CHRONIC RESPIRATORY FAILURE WITH HYPOXIA (HCC): ICD-10-CM

## 2019-01-10 DIAGNOSIS — J44.9 OSA AND COPD OVERLAP SYNDROME (HCC): ICD-10-CM

## 2019-01-10 DIAGNOSIS — Z87.891 PERSONAL HISTORY OF TOBACCO USE: ICD-10-CM

## 2019-01-10 PROCEDURE — G8419 CALC BMI OUT NRM PARAM NOF/U: HCPCS | Performed by: NURSE PRACTITIONER

## 2019-01-10 PROCEDURE — G8484 FLU IMMUNIZE NO ADMIN: HCPCS | Performed by: NURSE PRACTITIONER

## 2019-01-10 PROCEDURE — G8427 DOCREV CUR MEDS BY ELIG CLIN: HCPCS | Performed by: NURSE PRACTITIONER

## 2019-01-10 PROCEDURE — G0296 VISIT TO DETERM LDCT ELIG: HCPCS | Performed by: NURSE PRACTITIONER

## 2019-01-10 PROCEDURE — 3017F COLORECTAL CA SCREEN DOC REV: CPT | Performed by: NURSE PRACTITIONER

## 2019-01-10 PROCEDURE — 1036F TOBACCO NON-USER: CPT | Performed by: NURSE PRACTITIONER

## 2019-01-10 PROCEDURE — G8926 SPIRO NO PERF OR DOC: HCPCS | Performed by: NURSE PRACTITIONER

## 2019-01-10 PROCEDURE — 1123F ACP DISCUSS/DSCN MKR DOCD: CPT | Performed by: NURSE PRACTITIONER

## 2019-01-10 PROCEDURE — G8598 ASA/ANTIPLAT THER USED: HCPCS | Performed by: NURSE PRACTITIONER

## 2019-01-10 PROCEDURE — 4040F PNEUMOC VAC/ADMIN/RCVD: CPT | Performed by: NURSE PRACTITIONER

## 2019-01-10 PROCEDURE — G8400 PT W/DXA NO RESULTS DOC: HCPCS | Performed by: NURSE PRACTITIONER

## 2019-01-10 PROCEDURE — 3023F SPIROM DOC REV: CPT | Performed by: NURSE PRACTITIONER

## 2019-01-10 PROCEDURE — 99214 OFFICE O/P EST MOD 30 MIN: CPT | Performed by: NURSE PRACTITIONER

## 2019-01-10 PROCEDURE — 1090F PRES/ABSN URINE INCON ASSESS: CPT | Performed by: NURSE PRACTITIONER

## 2019-01-10 PROCEDURE — 1101F PT FALLS ASSESS-DOCD LE1/YR: CPT | Performed by: NURSE PRACTITIONER

## 2019-01-10 ASSESSMENT — ENCOUNTER SYMPTOMS
EYES NEGATIVE: 1
BACK PAIN: 1
CHEST TIGHTNESS: 0
WHEEZING: 0
STRIDOR: 0
SHORTNESS OF BREATH: 1
DIARRHEA: 0
NAUSEA: 0
COUGH: 0
ALLERGIC/IMMUNOLOGIC NEGATIVE: 1

## 2019-01-23 ENCOUNTER — TELEPHONE (OUTPATIENT)
Dept: CARDIOLOGY CLINIC | Age: 69
End: 2019-01-23

## 2019-02-05 DIAGNOSIS — G89.4 CHRONIC PAIN SYNDROME: ICD-10-CM

## 2019-02-05 RX ORDER — HYDROCODONE BITARTRATE AND ACETAMINOPHEN 5; 325 MG/1; MG/1
1 TABLET ORAL EVERY 8 HOURS PRN
Qty: 90 TABLET | Refills: 0 | Status: SHIPPED | OUTPATIENT
Start: 2019-02-05 | End: 2019-02-28 | Stop reason: SDUPTHER

## 2019-02-25 ENCOUNTER — OFFICE VISIT (OUTPATIENT)
Dept: CARDIOLOGY CLINIC | Age: 69
End: 2019-02-25
Payer: MEDICARE

## 2019-02-25 VITALS
HEART RATE: 121 BPM | WEIGHT: 164.3 LBS | BODY MASS INDEX: 30.24 KG/M2 | HEIGHT: 62 IN | DIASTOLIC BLOOD PRESSURE: 85 MMHG | SYSTOLIC BLOOD PRESSURE: 166 MMHG

## 2019-02-25 DIAGNOSIS — I25.83 CORONARY ARTERY DISEASE DUE TO LIPID RICH PLAQUE: Primary | ICD-10-CM

## 2019-02-25 DIAGNOSIS — I25.10 CORONARY ARTERY DISEASE DUE TO LIPID RICH PLAQUE: Primary | ICD-10-CM

## 2019-02-25 PROCEDURE — G8417 CALC BMI ABV UP PARAM F/U: HCPCS | Performed by: INTERNAL MEDICINE

## 2019-02-25 PROCEDURE — 1090F PRES/ABSN URINE INCON ASSESS: CPT | Performed by: INTERNAL MEDICINE

## 2019-02-25 PROCEDURE — 3017F COLORECTAL CA SCREEN DOC REV: CPT | Performed by: INTERNAL MEDICINE

## 2019-02-25 PROCEDURE — 99213 OFFICE O/P EST LOW 20 MIN: CPT | Performed by: INTERNAL MEDICINE

## 2019-02-25 PROCEDURE — 1036F TOBACCO NON-USER: CPT | Performed by: INTERNAL MEDICINE

## 2019-02-25 PROCEDURE — G8598 ASA/ANTIPLAT THER USED: HCPCS | Performed by: INTERNAL MEDICINE

## 2019-02-25 PROCEDURE — 1123F ACP DISCUSS/DSCN MKR DOCD: CPT | Performed by: INTERNAL MEDICINE

## 2019-02-25 PROCEDURE — G8400 PT W/DXA NO RESULTS DOC: HCPCS | Performed by: INTERNAL MEDICINE

## 2019-02-25 PROCEDURE — 1101F PT FALLS ASSESS-DOCD LE1/YR: CPT | Performed by: INTERNAL MEDICINE

## 2019-02-25 PROCEDURE — G8484 FLU IMMUNIZE NO ADMIN: HCPCS | Performed by: INTERNAL MEDICINE

## 2019-02-25 PROCEDURE — G8427 DOCREV CUR MEDS BY ELIG CLIN: HCPCS | Performed by: INTERNAL MEDICINE

## 2019-02-25 PROCEDURE — 4040F PNEUMOC VAC/ADMIN/RCVD: CPT | Performed by: INTERNAL MEDICINE

## 2019-02-28 ENCOUNTER — OFFICE VISIT (OUTPATIENT)
Dept: PHYSICAL MEDICINE AND REHAB | Age: 69
End: 2019-02-28
Payer: MEDICARE

## 2019-02-28 VITALS
WEIGHT: 164 LBS | SYSTOLIC BLOOD PRESSURE: 135 MMHG | HEART RATE: 95 BPM | HEIGHT: 62 IN | BODY MASS INDEX: 30.18 KG/M2 | DIASTOLIC BLOOD PRESSURE: 77 MMHG

## 2019-02-28 DIAGNOSIS — M46.1 SI (SACROILIAC) JOINT INFLAMMATION (HCC): ICD-10-CM

## 2019-02-28 DIAGNOSIS — M47.816 SPONDYLOSIS OF LUMBAR REGION WITHOUT MYELOPATHY OR RADICULOPATHY: Primary | ICD-10-CM

## 2019-02-28 DIAGNOSIS — K59.03 DRUG INDUCED CONSTIPATION: ICD-10-CM

## 2019-02-28 DIAGNOSIS — M48.061 SPINAL STENOSIS OF LUMBAR REGION WITHOUT NEUROGENIC CLAUDICATION: ICD-10-CM

## 2019-02-28 DIAGNOSIS — G89.4 CHRONIC PAIN SYNDROME: ICD-10-CM

## 2019-02-28 PROCEDURE — G8598 ASA/ANTIPLAT THER USED: HCPCS | Performed by: NURSE PRACTITIONER

## 2019-02-28 PROCEDURE — G8484 FLU IMMUNIZE NO ADMIN: HCPCS | Performed by: NURSE PRACTITIONER

## 2019-02-28 PROCEDURE — G8417 CALC BMI ABV UP PARAM F/U: HCPCS | Performed by: NURSE PRACTITIONER

## 2019-02-28 PROCEDURE — 99213 OFFICE O/P EST LOW 20 MIN: CPT | Performed by: NURSE PRACTITIONER

## 2019-02-28 PROCEDURE — G8427 DOCREV CUR MEDS BY ELIG CLIN: HCPCS | Performed by: NURSE PRACTITIONER

## 2019-02-28 PROCEDURE — 4040F PNEUMOC VAC/ADMIN/RCVD: CPT | Performed by: NURSE PRACTITIONER

## 2019-02-28 PROCEDURE — 1101F PT FALLS ASSESS-DOCD LE1/YR: CPT | Performed by: NURSE PRACTITIONER

## 2019-02-28 PROCEDURE — 1036F TOBACCO NON-USER: CPT | Performed by: NURSE PRACTITIONER

## 2019-02-28 PROCEDURE — G8400 PT W/DXA NO RESULTS DOC: HCPCS | Performed by: NURSE PRACTITIONER

## 2019-02-28 PROCEDURE — 1090F PRES/ABSN URINE INCON ASSESS: CPT | Performed by: NURSE PRACTITIONER

## 2019-02-28 PROCEDURE — 3017F COLORECTAL CA SCREEN DOC REV: CPT | Performed by: NURSE PRACTITIONER

## 2019-02-28 PROCEDURE — 1123F ACP DISCUSS/DSCN MKR DOCD: CPT | Performed by: NURSE PRACTITIONER

## 2019-02-28 RX ORDER — HYDROCODONE BITARTRATE AND ACETAMINOPHEN 5; 325 MG/1; MG/1
1 TABLET ORAL EVERY 8 HOURS PRN
Qty: 90 TABLET | Refills: 0 | Status: SHIPPED | OUTPATIENT
Start: 2019-03-07 | End: 2019-04-03 | Stop reason: SDUPTHER

## 2019-02-28 ASSESSMENT — ENCOUNTER SYMPTOMS
COUGH: 1
BACK PAIN: 1
SHORTNESS OF BREATH: 1

## 2019-03-08 ENCOUNTER — CLINICAL DOCUMENTATION (OUTPATIENT)
Dept: PULMONOLOGY | Age: 69
End: 2019-03-08

## 2019-04-03 DIAGNOSIS — G89.4 CHRONIC PAIN SYNDROME: ICD-10-CM

## 2019-04-04 RX ORDER — HYDROCODONE BITARTRATE AND ACETAMINOPHEN 5; 325 MG/1; MG/1
1 TABLET ORAL EVERY 8 HOURS PRN
Qty: 90 TABLET | Refills: 0 | Status: SHIPPED | OUTPATIENT
Start: 2019-04-06 | End: 2019-05-06 | Stop reason: SDUPTHER

## 2019-04-04 NOTE — TELEPHONE ENCOUNTER
OARRS reviewed. UDS: + for    Hydrocodone POSITIVE CONSISTENT  Norhydrocodone POSITIVE CONSISTENT     Narcan offered: no       Last seen: 2/28/2019.        Follow-up:   Future Appointments   Date Time Provider Jesu Hoi   5/30/2019  8:30 AM CASSANDRA Anthony CNP [facility] Pain [facility]   7/10/2019 10:00 AM STR CT IMAGING RM1  [facility] EXP STR Radiolog   7/15/2019 10:30 AM [facility], APRN - CNP Pulm Med [facility]ED II[facility]

## 2019-04-16 RX ORDER — FUROSEMIDE 20 MG/1
20 TABLET ORAL DAILY PRN
Qty: 7 TABLET | Refills: 0 | Status: SHIPPED | OUTPATIENT
Start: 2019-04-16 | End: 2019-05-21 | Stop reason: SDUPTHER

## 2019-04-23 RX ORDER — CLOPIDOGREL BISULFATE 75 MG/1
75 TABLET ORAL DAILY
Qty: 30 TABLET | Refills: 11 | Status: SHIPPED | OUTPATIENT
Start: 2019-04-23 | End: 2021-03-10 | Stop reason: ALTCHOICE

## 2019-05-06 DIAGNOSIS — G89.4 CHRONIC PAIN SYNDROME: ICD-10-CM

## 2019-05-06 RX ORDER — HYDROCODONE BITARTRATE AND ACETAMINOPHEN 5; 325 MG/1; MG/1
1 TABLET ORAL EVERY 8 HOURS PRN
Qty: 90 TABLET | Refills: 0 | Status: SHIPPED | OUTPATIENT
Start: 2019-05-06 | End: 2019-05-30 | Stop reason: SDUPTHER

## 2019-05-06 NOTE — TELEPHONE ENCOUNTER
OARRS reviewed. UDS: + for norco    Narcan offered: no       Last seen: 2/28/2019.      Follow-up:   Future Appointments   Date Time Provider Jesu Saleh   5/30/2019  8:30 AM CASSANDRA Graham CNP [facility] Pain [facility]   7/10/2019 10:00 AM STR CT IMAGING RM1  [facility] EXP STR Radiolog   7/15/2019 10:30 AM CASSANDRA Levy CNP Pulm Med [facility]

## 2019-05-08 ENCOUNTER — TELEPHONE (OUTPATIENT)
Dept: CARDIOLOGY CLINIC | Age: 69
End: 2019-05-08

## 2019-05-08 NOTE — TELEPHONE ENCOUNTER
Pt called and said she is having a lot of fluid build up and just had a heart cath not long ago so she thinks she needs to be seen.  Please call pt and advise

## 2019-05-21 ENCOUNTER — OFFICE VISIT (OUTPATIENT)
Dept: CARDIOLOGY CLINIC | Age: 69
End: 2019-05-21
Payer: MEDICARE

## 2019-05-21 VITALS
HEART RATE: 113 BPM | SYSTOLIC BLOOD PRESSURE: 129 MMHG | WEIGHT: 169 LBS | BODY MASS INDEX: 31.1 KG/M2 | DIASTOLIC BLOOD PRESSURE: 61 MMHG | HEIGHT: 62 IN

## 2019-05-21 DIAGNOSIS — I10 ESSENTIAL HYPERTENSION: ICD-10-CM

## 2019-05-21 DIAGNOSIS — I50.32 CHRONIC DIASTOLIC CONGESTIVE HEART FAILURE (HCC): Primary | ICD-10-CM

## 2019-05-21 PROCEDURE — 3017F COLORECTAL CA SCREEN DOC REV: CPT | Performed by: PHYSICIAN ASSISTANT

## 2019-05-21 PROCEDURE — G8427 DOCREV CUR MEDS BY ELIG CLIN: HCPCS | Performed by: PHYSICIAN ASSISTANT

## 2019-05-21 PROCEDURE — G8598 ASA/ANTIPLAT THER USED: HCPCS | Performed by: PHYSICIAN ASSISTANT

## 2019-05-21 PROCEDURE — 99213 OFFICE O/P EST LOW 20 MIN: CPT | Performed by: PHYSICIAN ASSISTANT

## 2019-05-21 PROCEDURE — 1123F ACP DISCUSS/DSCN MKR DOCD: CPT | Performed by: PHYSICIAN ASSISTANT

## 2019-05-21 PROCEDURE — G8400 PT W/DXA NO RESULTS DOC: HCPCS | Performed by: PHYSICIAN ASSISTANT

## 2019-05-21 PROCEDURE — 1036F TOBACCO NON-USER: CPT | Performed by: PHYSICIAN ASSISTANT

## 2019-05-21 PROCEDURE — 4040F PNEUMOC VAC/ADMIN/RCVD: CPT | Performed by: PHYSICIAN ASSISTANT

## 2019-05-21 PROCEDURE — G8417 CALC BMI ABV UP PARAM F/U: HCPCS | Performed by: PHYSICIAN ASSISTANT

## 2019-05-21 PROCEDURE — 1090F PRES/ABSN URINE INCON ASSESS: CPT | Performed by: PHYSICIAN ASSISTANT

## 2019-05-21 RX ORDER — FUROSEMIDE 20 MG/1
20 TABLET ORAL DAILY PRN
Qty: 30 TABLET | Refills: 11 | Status: SHIPPED | OUTPATIENT
Start: 2019-05-21 | End: 2020-01-10 | Stop reason: DRUGHIGH

## 2019-05-21 NOTE — PROGRESS NOTES
Pt here for swelling    EKG done on: 11-    Pt complains of:peripheral edema,  SOB,dizziness,palpitations    Pt denies:chest pain               [facility]  [facility]   [address] [address]   Dept: [phone]   Dept Fax: [phone]   Loc: [phone]      Chief Complaint   Patient presents with    Follow-up    Leg Swelling    Palpitations    Shortness of Breath    Dizziness     Patient with a history of chronic diastolic congestive heart failure presents with increased lower extremity edema. Patient states over the last week or 2. She has had a weight gain and increased lower extremity edema. She takes her Lasix as needed. She has not been taking it daily. She denies any chest pain. She has orthopnea. She also has chronic lung disease.   Cardiologist:  Dr. Mccurdy Ramses:   No fever, no chills, No fatigue or weight loss  Pulmonary:    intermittent shortness of breath  Cardiac:    Denies recent chest pain   GI:     No nausea or vomiting, no abdominal pain  Neuro:    No dizziness or light headedness  Musculoskeletal:  No recent active issues  Extremities:  ++edema, good peripheral pulses      Past Medical History:   Diagnosis Date    Bladder dysfunction     CAD (coronary artery disease)     Cerebral artery occlusion with cerebral infarction ([address]     TIA    Cerebrovascular disease     CHF (congestive heart failure) ([address]     COPD (chronic obstructive pulmonary disease) ([facility]     uses oxygen 24 hours per day    DDD (degenerative disc disease)     Depression     GERD (gastroesophageal reflux disease)     Hyperlipidemia     Hypertension     Obesity     Other disorders of kidney and ureter in diseases classified elsewhere     Other screening mammogram 2011    Pap smear, as part of routine gynecological examination 2007    Pap smear, as part of routine gynecological examination 2007    Peripheral edema     with ascites    Pneumonia     PONV (postoperative nausea and vomiting)     with back injection in [facility]    Unspecified sleep apnea     c-pap at 10 cm wtr       Allergies   Allergen Reactions    Codeine Itching     Pt cant remember    Doxycycline      Cant breath     Lipitor Itching    Simvastatin Itching     Stomach pain    Chantix [Varenicline Tartrate] Itching       Current Outpatient Medications   Medication Sig Dispense Refill    HYDROcodone-acetaminophen (NORCO) 5-325 MG per tablet Take 1 tablet by mouth every 8 hours as needed for Pain for up to 30 days. 90 tablet 0    clopidogrel (PLAVIX) 75 MG tablet Take 1 tablet by mouth daily 30 tablet 11    atorvastatin (LIPITOR) 40 MG tablet Take 1 tablet by mouth daily 30 tablet 3    tiotropium (SPIRIVA) 18 MCG inhalation capsule Inhale 1 capsule into the lungs daily 30 capsule 11    albuterol (PROVENTIL) (2.5 MG/3ML) 0.083% nebulizer solution Take 3 mLs by nebulization every 6 hours as needed for Wheezing or Shortness of Breath 120 mL 11    potassium chloride (KLOR-CON M) 20 MEQ extended release tablet Take 20 mEq by mouth daily as needed      albuterol sulfate  (90 Base) MCG/ACT inhaler Inhale 2 puffs into the lungs 4 times daily as needed for Wheezing      pseudoephedrine (SUDAFED 12 HR) 120 MG extended release tablet Take 120 mg by mouth every 12 hours as needed for Congestion      fluticasone (FLONASE) 50 MCG/ACT nasal spray 1 spray by Nasal route daily as needed for Rhinitis      amLODIPine (NORVASC) 5 MG tablet Take 5 mg by mouth daily      montelukast (SINGULAIR) 10 MG tablet Take 1 tablet by mouth nightly 30 tablet 3    losartan (COZAAR) 50 MG tablet Take 50 mg by mouth daily   0    omeprazole (PRILOSEC) 40 MG capsule Take 1 capsule by mouth daily 30 capsule 1    OXYGEN   Inhale into the lungs 3 lpm, per patient at home and when walking      aspirin 81 MG EC tablet Take 81 mg by mouth daily.         furosemide (LASIX) 20 MG tablet Take 1 tablet by mouth daily as needed (water retention/ swelling) 30 tablet 11     No current facility-administered medications for this visit.         Social History     Socioeconomic History    Marital status:      Spouse name: None    Number of children: 3    Years of education: 12    Highest education level: None   Occupational History    Occupation: disability   Social Needs    Financial resource strain: None    Food insecurity:     Worry: None     Inability: None    Transportation needs:     Medical: None     Non-medical: None   Tobacco Use    Smoking status: Former Smoker     Packs/day: 2.00     Years: 40.00     Pack years: 80.00     Types: Cigarettes     Last attempt to quit: 2010     Years since quittin.5    Smokeless tobacco: Never Used   Substance and Sexual Activity    Alcohol use: No     Comment: pt hasn't drank since     Drug use: No    Sexual activity: Not Currently   Lifestyle    Physical activity:     Days per week: None     Minutes per session: None    Stress: None   Relationships    Social connections:     Talks on phone: None     Gets together: None     Attends [employer] service: None     Active member of club or organization: None     Attends meetings of clubs or organizations: None     Relationship status: None    Intimate partner violence:     Fear of current or ex partner: None     Emotionally abused: None     Physically abused: None     Forced sexual activity: None   Other Topics Concern    None   Social History Narrative    None       Family History   Problem Relation Age of Onset    Heart Failure Mother     Heart Disease Mother     High Blood Pressure Mother     Heart Failure Father     Heart Disease Father     Alzheimer's Disease Father     Parkinsonism Father     High Blood Pressure Father     COPD Sister     Cancer Brother         skin    Stroke Brother     Cancer Daughter         colon    Diabetes Daughter        Blood pressure 129/61, pulse 113, height 5' 2\" (1.575 m), weight 169 lb (76.7 kg), not currently breastfeeding. General:   Well developed, well nourished  Lungs:   Clear to auscultation  Heart:    Normal S1 S2, No murmur, rubs, or gallops  Abdomen:   Soft, non tender, no organomegalies, positive bowel sounds  Extremities:   No edema, no cyanosis, good peripheral pulses  Neurological:   Awake, alert, oriented. No obvious focal deficits  Musculoskeletal:  No obvious deformities      Procedure 1/8/2019  CONCLUSION:  1.  70% calcified hazy stenosis of the proximal left subclavian artery. 2.  Significant gradient measured across the left subclavian stenosis  (20 mmHg). 3.  Successful stent placement with 7 x 27 Visi-Pro stent across the  proximal left subclavian stenosis. Diagnosis Orders   1. Chronic diastolic congestive heart failure ([address]     2. Essential hypertension         Continue Dr Irma Mathias current treatment plan    I recommend that when she has increased weight gain, and leg edema when her to take Lasix 20 mg daily. She needs to weigh herself daily. Follow 2 g sodium diet and 2 L fluid restriction. Continue same current medications and with constant vigilance to changes in symptoms and also any potential side effects. Return for care if become worse or seek medical attention immediately. The patient is educated on symptoms of heart disease that include chest pain and passing out, dizziness, etc and to report them if there is any change or go to emergency room.        Follow up with Dr Lisa Figueroa as scheduled or sooner if needed  (Please note that portions of this note were completed with a voice recognition program.  Efforts were made to edit the dictation but occasionally words are mis-transcribed.)      Collins Toussaint PA-C

## 2019-05-30 ENCOUNTER — OFFICE VISIT (OUTPATIENT)
Dept: PHYSICAL MEDICINE AND REHAB | Age: 69
End: 2019-05-30
Payer: MEDICARE

## 2019-05-30 VITALS
HEIGHT: 62 IN | SYSTOLIC BLOOD PRESSURE: 164 MMHG | WEIGHT: 169.09 LBS | BODY MASS INDEX: 31.12 KG/M2 | HEART RATE: 94 BPM | DIASTOLIC BLOOD PRESSURE: 73 MMHG

## 2019-05-30 DIAGNOSIS — M48.061 SPINAL STENOSIS OF LUMBAR REGION WITHOUT NEUROGENIC CLAUDICATION: ICD-10-CM

## 2019-05-30 DIAGNOSIS — G89.4 CHRONIC PAIN SYNDROME: ICD-10-CM

## 2019-05-30 DIAGNOSIS — M47.816 SPONDYLOSIS OF LUMBAR REGION WITHOUT MYELOPATHY OR RADICULOPATHY: ICD-10-CM

## 2019-05-30 DIAGNOSIS — K59.03 DRUG INDUCED CONSTIPATION: ICD-10-CM

## 2019-05-30 DIAGNOSIS — M46.1 SI (SACROILIAC) JOINT INFLAMMATION (HCC): Primary | ICD-10-CM

## 2019-05-30 PROCEDURE — G8417 CALC BMI ABV UP PARAM F/U: HCPCS | Performed by: NURSE PRACTITIONER

## 2019-05-30 PROCEDURE — 1036F TOBACCO NON-USER: CPT | Performed by: NURSE PRACTITIONER

## 2019-05-30 PROCEDURE — G8598 ASA/ANTIPLAT THER USED: HCPCS | Performed by: NURSE PRACTITIONER

## 2019-05-30 PROCEDURE — 3017F COLORECTAL CA SCREEN DOC REV: CPT | Performed by: NURSE PRACTITIONER

## 2019-05-30 PROCEDURE — 1123F ACP DISCUSS/DSCN MKR DOCD: CPT | Performed by: NURSE PRACTITIONER

## 2019-05-30 PROCEDURE — G8400 PT W/DXA NO RESULTS DOC: HCPCS | Performed by: NURSE PRACTITIONER

## 2019-05-30 PROCEDURE — G8427 DOCREV CUR MEDS BY ELIG CLIN: HCPCS | Performed by: NURSE PRACTITIONER

## 2019-05-30 PROCEDURE — 99213 OFFICE O/P EST LOW 20 MIN: CPT | Performed by: NURSE PRACTITIONER

## 2019-05-30 PROCEDURE — 1090F PRES/ABSN URINE INCON ASSESS: CPT | Performed by: NURSE PRACTITIONER

## 2019-05-30 PROCEDURE — 4040F PNEUMOC VAC/ADMIN/RCVD: CPT | Performed by: NURSE PRACTITIONER

## 2019-05-30 RX ORDER — HYDROCODONE BITARTRATE AND ACETAMINOPHEN 5; 325 MG/1; MG/1
1 TABLET ORAL EVERY 8 HOURS PRN
Qty: 90 TABLET | Refills: 0 | Status: SHIPPED | OUTPATIENT
Start: 2019-06-05 | End: 2019-07-02 | Stop reason: SDUPTHER

## 2019-05-30 ASSESSMENT — ENCOUNTER SYMPTOMS
COUGH: 1
BACK PAIN: 1
SHORTNESS OF BREATH: 1

## 2019-05-30 NOTE — PROGRESS NOTES
[facility]  [address] [facility]  Dept: [phone]  Dept Fax: [phone]  Loc: [phone]    Visit Date: 5/30/2019    Functionality Assessment/Goals Worksheet     On a scale of 0 (Does not Interfere) to 10 (Completely Interferes)     1. Which number describes how during the past week pain has interfered with       the following:  A. General Activity:  8  B. Mood: 8  C. Walking Ability:  8  D. Normal Work (Includes both work outside the home and housework):  7  E. Relations with Other People:   8  F. Sleep:   8  G. Enjoyment of Life:   8    2. Patient Prefers to Take their Pain Medications:     [x]  On a regular basis   []  Only when necessary    []  Does not take pain medications    3. What are the Patient's Goals/Expectations for Visiting Pain Management? []  Learn about my pain    [x]  Receive Medication   []  Physical Therapy     []  Treat Depression   [x]  Receive Injections    []  Treat Sleep   []  Deal with Anxiety and Stress   []  Treat Opoid Dependence/Addiction   []  Other: get better      HPI:   Michelle Zruita is a 76 y.o. female is here today for    Chief Complaint: Lower back pain, Si pain     HPI   3 month FU SI pain remains main complaint, also continues to have lower back pain. Pain remains up and down. Constant and switches between a sharp and dull ache. Norco prn remains effective. Had Angioplasty since last visit. Remains on Plavix and ASA. Remains on oxygen 1-3 liters     Medications reviewed. Patient constipation side effects with medications. Patient states she is taking medications as prescribed. Shedenies receiving pain medications from other sources. She denies any ER visits since last visit. Pain scale with out pain medications or at its worst is 10/10. Pain scale with pain medications or at its best is 5/10.   Last dose of Norco was today  Drug screen reviewed from 2/28/2019 and was appropriate  Pill count was completed today and was appropriate  Patient does not have naloxone available at home. Patient has not required use of naloxone at home since last office visit. The patientis allergic to codeine; doxycycline; lipitor; simvastatin; and chantix [varenicline tartrate]. Past Medical History  Lacy Corona  has a past medical history of Bladder dysfunction, CAD (coronary artery disease), Cerebral artery occlusion with cerebral infarction [facility]), Cerebrovascular disease, CHF (congestive heart failure) ([facility] [address] COPD (chronic obstructive pulmonary disease) ([facility] [address] DDD (degenerative disc disease), Depression, GERD (gastroesophageal reflux disease), Hyperlipidemia, Hypertension, Obesity, Other disorders of kidney and ureter in diseases classified elsewhere, Other screening mammogram, Pap smear, as part of routine gynecological examination, Pap smear, as part of routine gynecological examination, Peripheral edema, Pneumonia, PONV (postoperative nausea and vomiting), and Unspecified sleep apnea. Past Surgical History  The patient  has a past surgical history that includes Dilation and curettage of uterus (1970's); eye surgery (2013); other surgical history (2016); Colonoscopy (7-2009); other surgical history (Left, 12/12/2017); Lumbar spine surgery (Left, 12/12/2017); and Endoscopy, colon, diagnostic. Family History  This patient's family history includes Alzheimer's Disease in her father; COPD in her sister; Cancer in her brother and daughter; Diabetes in her daughter; Heart Disease in her father and mother; Heart Failure in her father and mother; High Blood Pressure in her father and mother; Parkinsonism in her father; Stroke in her brother. Social History  Lacy Corona  reports that she quit smoking about 8 years ago. Her smoking use included cigarettes. She has a 80.00 pack-year smoking history.  She has never used smokeless tobacco. She reports that she does not drink alcohol or use drugs. Medications    Current Outpatient Medications:     [START ON 6/5/2019] HYDROcodone-acetaminophen (NORCO) 5-325 MG per tablet, Take 1 tablet by mouth every 8 hours as needed for Pain for up to 30 days. , Disp: 90 tablet, Rfl: 0    furosemide (LASIX) 20 MG tablet, Take 1 tablet by mouth daily as needed (water retention/ swelling), Disp: 30 tablet, Rfl: 11    clopidogrel (PLAVIX) 75 MG tablet, Take 1 tablet by mouth daily, Disp: 30 tablet, Rfl: 11    atorvastatin (LIPITOR) 40 MG tablet, Take 1 tablet by mouth daily, Disp: 30 tablet, Rfl: 3    tiotropium (SPIRIVA) 18 MCG inhalation capsule, Inhale 1 capsule into the lungs daily, Disp: 30 capsule, Rfl: 11    albuterol (PROVENTIL) (2.5 MG/3ML) 0.083% nebulizer solution, Take 3 mLs by nebulization every 6 hours as needed for Wheezing or Shortness of Breath, Disp: 120 mL, Rfl: 11    potassium chloride (KLOR-CON M) 20 MEQ extended release tablet, Take 20 mEq by mouth daily as needed, Disp: , Rfl:     albuterol sulfate  (90 Base) MCG/ACT inhaler, Inhale 2 puffs into the lungs 4 times daily as needed for Wheezing, Disp: , Rfl:     pseudoephedrine (SUDAFED 12 HR) 120 MG extended release tablet, Take 120 mg by mouth every 12 hours as needed for Congestion, Disp: , Rfl:     fluticasone (FLONASE) 50 MCG/ACT nasal spray, 1 spray by Nasal route daily as needed for Rhinitis, Disp: , Rfl:     amLODIPine (NORVASC) 5 MG tablet, Take 5 mg by mouth daily, Disp: , Rfl:     montelukast (SINGULAIR) 10 MG tablet, Take 1 tablet by mouth nightly, Disp: 30 tablet, Rfl: 3    losartan (COZAAR) 50 MG tablet, Take 50 mg by mouth daily , Disp: , Rfl: 0    omeprazole (PRILOSEC) 40 MG capsule, Take 1 capsule by mouth daily, Disp: 30 capsule, Rfl: 1    OXYGEN,  Inhale into the lungs 3 lpm, per patient at home and when walking, Disp: , Rfl:     aspirin 81 MG EC tablet, Take 81 mg by mouth daily.   , Disp: , Rfl:     Subjective:      Review of Systems   Respiratory: Positive for cough and shortness of breath. Cardiovascular: Positive for leg swelling. Musculoskeletal: Positive for arthralgias, back pain and myalgias. Neurological: Positive for weakness. Negative for numbness. Objective:     Vitals:    05/30/19 0817   BP: (!) 183/82   Site: Left Upper Arm   Position: Sitting   Cuff Size: Medium Adult   Pulse: 94   Weight: 169 lb 1.5 oz (76.7 kg)   Height: 5' 2.01\" (1.575 m)       Physical Exam   Constitutional: She is oriented to person, place, and time. She appears well-developed and well-nourished. No distress. HENT:   Head: Normocephalic and atraumatic. Right Ear: External ear normal.   Left Ear: External ear normal.   Nose: Nose normal.   Mouth/Throat: Oropharynx is clear and moist. No oropharyngeal exudate. Eyes: Pupils are equal, round, and reactive to light. Conjunctivae and EOM are normal. Right eye exhibits no discharge. Left eye exhibits no discharge. No scleral icterus. Neck: Normal range of motion and full passive range of motion without pain. Neck supple. No muscular tenderness present. No neck rigidity. No edema, no erythema and normal range of motion present. No thyromegaly present. Cardiovascular: Normal rate, regular rhythm, normal heart sounds and intact distal pulses. Exam reveals no gallop and no friction rub. No murmur heard. Pulmonary/Chest: Effort normal. No respiratory distress. She has decreased breath sounds in the right upper field, the right middle field, the right lower field, the left upper field, the left middle field and the left lower field. She has wheezes in the right upper field and the left upper field. She has no rales. She exhibits no tenderness. 2 liters NC portable oxygen   Abdominal: Soft. Bowel sounds are normal. She exhibits no distension. There is no tenderness. There is no rebound and no guarding.    Musculoskeletal:        Right shoulder: She exhibits decreased range of motion and tenderness. Left shoulder: She exhibits decreased range of motion and tenderness. Right elbow: Normal.       Left elbow: Normal.        Right wrist: Normal.        Left wrist: Normal.        Right hip: Normal.        Left hip: She exhibits decreased range of motion, decreased strength and tenderness. Right knee: Normal.        Left knee: Normal.        Cervical back: She exhibits decreased range of motion, tenderness, pain and spasm. Thoracic back: She exhibits tenderness. Lumbar back: She exhibits tenderness, pain and spasm. Back:         Right upper leg: She exhibits tenderness. Left upper leg: She exhibits tenderness. Neurological: She is alert and oriented to person, place, and time. She has normal strength and normal reflexes. She is not disoriented. She displays no atrophy. No cranial nerve deficit or sensory deficit. She exhibits normal muscle tone. She displays a negative Romberg sign. Coordination and gait normal.   SLR-  Motor 5/5 BUE BLE   Skin: Skin is warm. No rash noted. She is not diaphoretic. No erythema. No pallor. Psychiatric: She has a normal mood and affect. Her speech is normal and behavior is normal. Judgment and thought content normal. Her mood appears not anxious. Her affect is not angry, not blunt, not labile and not inappropriate. She is not agitated, not aggressive, not hyperactive, not slowed, not withdrawn, not actively hallucinating and not combative. Thought content is not paranoid and not delusional. Cognition and memory are normal. Cognition and memory are not impaired. She does not express impulsivity or inappropriate judgment. She does not exhibit a depressed mood. She expresses no homicidal and no suicidal ideation. She expresses no suicidal plans and no homicidal plans. She exhibits normal recent memory and normal remote memory. She is attentive. Nursing note and vitals reviewed.     ANASTACIA test: positive   Yeomans test: positive   Gaenslen test: positive      Assessment:     1. SI (sacroiliac) joint inflammation (HCC)    2. Spondylosis of lumbar region without myelopathy or radiculopathy    3. Spinal stenosis of lumbar region without neurogenic claudication    4. Drug induced constipation    5. Chronic pain syndrome            Plan:      · OARRS reviewed. Current MED: 15.00  · Patient was offered naloxone for home. Refused. · Discussed long term side effects of medications, tolerance, dependency and addiction. · Previous UDS reviewed  · UDS preformed today for compliance. · Patient told can not receive any pain medications from any other source. · No evidence of abuse, diversion or aberrant behavior.  Medications and/or procedures to improve function and quality of life- patient understanding with this and that may not be pain free   Discussed with patient about safe storage of medications at home   Discussed possible weaning of medication dosing dependent on treatment/procedure results.  Discussed with patient about risks with procedure including infection, reaction to medication, increased pain, or bleeding. · Procedure notes reviewed in detail. · Effects from bilateral L-facer RFA have worn off. Received 80% relief and will plan to repeat in future. · Discussed SI MBB   · Unable to have procedures at this time d/t stent placement- on blood thinners and had angioplasty. Will need to be cleared by Cardiology prior to moving on to procedures. · Patient is compliant. · Continue medications at this time, Rosy White is very effective, Continue Norco 5/325  1/2 tab to 1 tab every 8 hours as needed for breakthrough pain- ordered refill       Meds. Prescribed:   Orders Placed This Encounter   Medications    HYDROcodone-acetaminophen (NORCO) 5-325 MG per tablet     Sig: Take 1 tablet by mouth every 8 hours as needed for Pain for up to 30 days.      Dispense:  90 tablet     Refill:  0     Reduce doses taken as pain becomes manageable       Return in about 3 months (around 8/30/2019), or if symptoms worsen or fail to improve, for follow up  for medications.          Electronically signed by CASSANDRA Avilez CNP on5/30/2019 at 8:33 AM

## 2019-06-20 ENCOUNTER — HOSPITAL ENCOUNTER (OUTPATIENT)
Age: 69
Setting detail: SPECIMEN
Discharge: HOME | End: 2019-06-20
Payer: MEDICARE

## 2019-06-20 LAB
ANION GAP SERPL CALCULATED.3IONS-SCNC: 10 MMOL/L (ref 9–17)
BNP INTERPRETATION: NORMAL
BUN BLDV-MCNC: 12 MG/DL (ref 8–23)
BUN/CREAT BLD: ABNORMAL (ref 9–20)
CALCIUM SERPL-MCNC: 9.3 MG/DL (ref 8.6–10.4)
CHLORIDE BLD-SCNC: 91 MMOL/L (ref 98–107)
CO2: 38 MMOL/L (ref 20–31)
CREAT SERPL-MCNC: 0.52 MG/DL (ref 0.5–0.9)
GFR AFRICAN AMERICAN: >60 ML/MIN
GFR NON-AFRICAN AMERICAN: >60 ML/MIN
GFR SERPL CREATININE-BSD FRML MDRD: ABNORMAL ML/MIN/{1.73_M2}
GFR SERPL CREATININE-BSD FRML MDRD: ABNORMAL ML/MIN/{1.73_M2}
GLUCOSE BLD-MCNC: 136 MG/DL (ref 70–99)
POTASSIUM SERPL-SCNC: 4.1 MMOL/L (ref 3.7–5.3)
PRO-BNP: 50 PG/ML
SODIUM BLD-SCNC: 139 MMOL/L (ref 135–144)

## 2019-07-02 DIAGNOSIS — G89.4 CHRONIC PAIN SYNDROME: ICD-10-CM

## 2019-07-02 RX ORDER — HYDROCODONE BITARTRATE AND ACETAMINOPHEN 5; 325 MG/1; MG/1
1 TABLET ORAL EVERY 8 HOURS PRN
Qty: 90 TABLET | Refills: 0 | Status: SHIPPED | OUTPATIENT
Start: 2019-07-05 | End: 2019-08-01 | Stop reason: SDUPTHER

## 2019-07-29 ENCOUNTER — HOSPITAL ENCOUNTER (OUTPATIENT)
Dept: CT IMAGING | Age: 69
Discharge: HOME | End: 2019-07-29
Payer: MEDICARE

## 2019-07-29 DIAGNOSIS — Z87.891 PERSONAL HISTORY OF TOBACCO USE: ICD-10-CM

## 2019-07-29 PROCEDURE — G0297 LDCT FOR LUNG CA SCREEN: HCPCS

## 2019-07-31 ENCOUNTER — OFFICE VISIT (OUTPATIENT)
Dept: PULMONOLOGY | Age: 69
End: 2019-07-31
Payer: MEDICARE

## 2019-07-31 VITALS
HEIGHT: 62 IN | BODY MASS INDEX: 31.17 KG/M2 | OXYGEN SATURATION: 91 % | TEMPERATURE: 98 F | DIASTOLIC BLOOD PRESSURE: 66 MMHG | WEIGHT: 169.4 LBS | SYSTOLIC BLOOD PRESSURE: 148 MMHG | HEART RATE: 90 BPM

## 2019-07-31 DIAGNOSIS — J96.11 CHRONIC RESPIRATORY FAILURE WITH HYPOXIA (HCC): ICD-10-CM

## 2019-07-31 DIAGNOSIS — M79.89 LEG SWELLING: ICD-10-CM

## 2019-07-31 DIAGNOSIS — J44.9 COPD, SEVERE (HCC): Primary | ICD-10-CM

## 2019-07-31 DIAGNOSIS — J43.9 PULMONARY EMPHYSEMA, UNSPECIFIED EMPHYSEMA TYPE (HCC): ICD-10-CM

## 2019-07-31 DIAGNOSIS — Z95.5 HISTORY OF HEART ARTERY STENT: ICD-10-CM

## 2019-07-31 PROCEDURE — 1036F TOBACCO NON-USER: CPT | Performed by: NURSE PRACTITIONER

## 2019-07-31 PROCEDURE — 1090F PRES/ABSN URINE INCON ASSESS: CPT | Performed by: NURSE PRACTITIONER

## 2019-07-31 PROCEDURE — G8417 CALC BMI ABV UP PARAM F/U: HCPCS | Performed by: NURSE PRACTITIONER

## 2019-07-31 PROCEDURE — G8926 SPIRO NO PERF OR DOC: HCPCS | Performed by: NURSE PRACTITIONER

## 2019-07-31 PROCEDURE — 99214 OFFICE O/P EST MOD 30 MIN: CPT | Performed by: NURSE PRACTITIONER

## 2019-07-31 PROCEDURE — 4040F PNEUMOC VAC/ADMIN/RCVD: CPT | Performed by: NURSE PRACTITIONER

## 2019-07-31 PROCEDURE — 3023F SPIROM DOC REV: CPT | Performed by: NURSE PRACTITIONER

## 2019-07-31 PROCEDURE — 3017F COLORECTAL CA SCREEN DOC REV: CPT | Performed by: NURSE PRACTITIONER

## 2019-07-31 PROCEDURE — G8400 PT W/DXA NO RESULTS DOC: HCPCS | Performed by: NURSE PRACTITIONER

## 2019-07-31 PROCEDURE — G8598 ASA/ANTIPLAT THER USED: HCPCS | Performed by: NURSE PRACTITIONER

## 2019-07-31 PROCEDURE — G8427 DOCREV CUR MEDS BY ELIG CLIN: HCPCS | Performed by: NURSE PRACTITIONER

## 2019-07-31 PROCEDURE — 1123F ACP DISCUSS/DSCN MKR DOCD: CPT | Performed by: NURSE PRACTITIONER

## 2019-07-31 RX ORDER — POTASSIUM CHLORIDE 20 MEQ/1
20 TABLET, EXTENDED RELEASE ORAL DAILY PRN
Qty: 60 TABLET | Refills: 0 | Status: SHIPPED | OUTPATIENT
Start: 2019-07-31 | End: 2019-08-29

## 2019-07-31 ASSESSMENT — ENCOUNTER SYMPTOMS
SORE THROAT: 0
CHEST TIGHTNESS: 0
VOMITING: 0
NAUSEA: 0
EYES NEGATIVE: 1
WHEEZING: 0
COUGH: 0
ABDOMINAL PAIN: 0
DIARRHEA: 0
SHORTNESS OF BREATH: 1
TROUBLE SWALLOWING: 0

## 2019-07-31 NOTE — PROGRESS NOTES
Unspecified sleep apnea     c-pap at 10 cm wtr     SURGICAL HISTORY:  Past Surgical History:   Procedure Laterality Date    COLONOSCOPY      DILATION AND CURETTAGE OF UTERUS      ENDOSCOPY, COLON, DIAGNOSTIC      EYE SURGERY      Laser surgery     LUMBAR SPINE SURGERY Left 2017    LUMBAR RFA L2-3, 3-4, 4-5, L5-S1 LEFT SIDE performed by Aniceto Ornelas MD at [place] [address]  2016    burning nerves in back - lumbar @ [place]    OTHER SURGICAL HISTORY Left 2017    Lumbar RFA L2-3, L3-4, L4-5, L5-S1     SOCIAL HISTORY:  Social History     Tobacco Use    Smoking status: Former Smoker     Packs/day: 2.00     Years: 40.00     Pack years: 80.00     Types: Cigarettes     Last attempt to quit: 2010     Years since quittin.7    Smokeless tobacco: Never Used   Substance Use Topics    Alcohol use: No     Comment: pt hasn't drank since     Drug use: No     ALLERGIES:  Allergies   Allergen Reactions    Codeine Itching     Pt cant remember    Doxycycline      Cant breath     Lipitor Itching    Simvastatin Itching     Stomach pain    Chantix [Varenicline Tartrate] Itching     FAMILY HISTORY:  Family History   Problem Relation Age of Onset    Heart Failure Mother     Heart Disease Mother     High Blood Pressure Mother     Heart Failure Father     Heart Disease Father     Alzheimer's Disease Father     Parkinsonism Father     High Blood Pressure Father     COPD Sister     Cancer Brother         skin    Stroke Brother     Cancer Daughter         colon    Diabetes Daughter      CURRENT MEDICATIONS:  Current Outpatient Medications   Medication Sig Dispense Refill    potassium chloride (KLOR-CON M) 20 MEQ extended release tablet Take 1 tablet by mouth daily as needed (when taking Lasix) Take with Lasix 60 tablet 0    HYDROcodone-acetaminophen (NORCO) 5-325 MG per tablet Take 1 tablet by mouth every 8 hours as needed for Pain for up to 30 days. 90 tablet 0    furosemide (LASIX) 20 MG tablet Take 1 tablet by mouth daily as needed (water retention/ swelling) (Patient taking differently: Take 40 mg by mouth daily as needed (water retention/ swelling) ) 30 tablet 11    clopidogrel (PLAVIX) 75 MG tablet Take 1 tablet by mouth daily 30 tablet 11    atorvastatin (LIPITOR) 40 MG tablet Take 1 tablet by mouth daily 30 tablet 3    albuterol sulfate  (90 Base) MCG/ACT inhaler Inhale 2 puffs into the lungs 4 times daily as needed for Wheezing      pseudoephedrine (SUDAFED 12 HR) 120 MG extended release tablet Take 120 mg by mouth every 12 hours as needed for Congestion      fluticasone (FLONASE) 50 MCG/ACT nasal spray 1 spray by Nasal route daily as needed for Rhinitis      amLODIPine (NORVASC) 5 MG tablet Take 5 mg by mouth daily      montelukast (SINGULAIR) 10 MG tablet Take 1 tablet by mouth nightly 30 tablet 3    losartan (COZAAR) 50 MG tablet Take 50 mg by mouth daily   0    omeprazole (PRILOSEC) 40 MG capsule Take 1 capsule by mouth daily 30 capsule 1    OXYGEN   Inhale into the lungs 3 lpm, per patient at home and when walking      aspirin 81 MG EC tablet Take 81 mg by mouth daily.  tiotropium (SPIRIVA) 18 MCG inhalation capsule Inhale 1 capsule into the lungs daily (Patient not taking: Reported on 7/31/2019) 30 capsule 11    albuterol (PROVENTIL) (2.5 MG/3ML) 0.083% nebulizer solution Take 3 mLs by nebulization every 6 hours as needed for Wheezing or Shortness of Breath (Patient not taking: Reported on 7/31/2019) 120 mL 11     No current facility-administered medications for this visit. Forrest ALEXANDER   Review of Systems   Constitutional: Negative for activity change, appetite change, chills, fever and unexpected weight change (up and down due to swelling ). HENT: Negative. Negative for sore throat and trouble swallowing. Eyes: Negative. Respiratory: Positive for shortness of breath.  Negative for cough, chest tightness and wheezing. Cardiovascular: Positive for leg swelling. Negative for chest pain and palpitations. Gastrointestinal: Negative for abdominal pain, diarrhea, nausea and vomiting. Genitourinary: Negative. Musculoskeletal: Negative. Skin: Negative. Neurological: Negative. Negative for dizziness and headaches. Hematological: Does not bruise/bleed easily. Psychiatric/Behavioral: Negative for suicidal ideas. Physical exam   BP (!) 148/66 (Site: Right Upper Arm, Position: Sitting, Cuff Size: Medium Adult)   Pulse 90   Temp 98 °F (36.7 °C)   Ht 5' 2\" (1.575 m)   Wt 169 lb 6.4 oz (76.8 kg)   SpO2 91% Comment: on 2 liters o2  BMI 30.98 kg/m²        Physical Exam   Constitutional: She is oriented to person, place, and time. She appears well-developed and well-nourished. No distress. Nasal cannula in place. HENT:   Mouth/Throat: No oropharyngeal exudate. Eyes: Conjunctivae are normal. Right eye exhibits no discharge. No scleral icterus. Neck: Neck supple. No JVD present. Cardiovascular: Normal rate and regular rhythm. Exam reveals no friction rub. No murmur heard. Pulmonary/Chest: Effort normal and breath sounds normal. No respiratory distress. She has no wheezes. She has no rales. She exhibits no tenderness. Abdominal: Soft. Musculoskeletal: She exhibits edema (skin is tight with non pitting edema bilateral LE - shiny apperance of skin ). She exhibits no tenderness. Lymphadenopathy:     She has no cervical adenopathy. Neurological: She is alert and oriented to person, place, and time. Skin: Skin is warm, dry and intact. Capillary refill takes less than 2 seconds. No cyanosis. There is pallor. Nails show no clubbing. Psychiatric: She has a normal mood and affect. Her behavior is normal. Judgment and thought content normal.   Nursing note and vitals reviewed.        Test results   Lung Nodule Screening     [x] Qualifies    []Does not qualify   [] Declined

## 2019-08-01 DIAGNOSIS — G89.4 CHRONIC PAIN SYNDROME: ICD-10-CM

## 2019-08-01 RX ORDER — HYDROCODONE BITARTRATE AND ACETAMINOPHEN 5; 325 MG/1; MG/1
1 TABLET ORAL EVERY 8 HOURS PRN
Qty: 90 TABLET | Refills: 0 | Status: SHIPPED | OUTPATIENT
Start: 2019-08-04 | End: 2019-08-29 | Stop reason: SDUPTHER

## 2019-08-01 NOTE — TELEPHONE ENCOUNTER
OARRS reviewed. UDS: + for   Hydrocodone POSITIVE CONSISTENT  Hydromorphone POSITIVE CONSISTENT  Norhydrocodone POSITIVE CONSISTENT     Narcan offered: refused       Last seen: 5/30/2019.      Follow-up:   Future Appointments   Date Time Provider Jesu Therese   8/29/2019  8:30 AM CASSANDRA Moya CNP [facility]   1/31/2020  9:30 AM CASSANDRA Jasmine CNP Pulm Med [facility]

## 2019-08-29 ENCOUNTER — TELEPHONE (OUTPATIENT)
Dept: CARDIOLOGY CLINIC | Age: 69
End: 2019-08-29

## 2019-08-29 ENCOUNTER — OFFICE VISIT (OUTPATIENT)
Dept: PHYSICAL MEDICINE AND REHAB | Age: 69
End: 2019-08-29
Payer: MEDICARE

## 2019-08-29 VITALS
DIASTOLIC BLOOD PRESSURE: 80 MMHG | BODY MASS INDEX: 31.16 KG/M2 | WEIGHT: 169.31 LBS | HEIGHT: 62 IN | SYSTOLIC BLOOD PRESSURE: 140 MMHG | HEART RATE: 89 BPM

## 2019-08-29 DIAGNOSIS — M46.1 SI (SACROILIAC) JOINT INFLAMMATION (HCC): ICD-10-CM

## 2019-08-29 DIAGNOSIS — G89.4 CHRONIC PAIN SYNDROME: ICD-10-CM

## 2019-08-29 DIAGNOSIS — M48.061 SPINAL STENOSIS OF LUMBAR REGION WITHOUT NEUROGENIC CLAUDICATION: ICD-10-CM

## 2019-08-29 DIAGNOSIS — M47.816 SPONDYLOSIS OF LUMBAR REGION WITHOUT MYELOPATHY OR RADICULOPATHY: Primary | ICD-10-CM

## 2019-08-29 DIAGNOSIS — K59.03 DRUG INDUCED CONSTIPATION: ICD-10-CM

## 2019-08-29 PROCEDURE — 1090F PRES/ABSN URINE INCON ASSESS: CPT | Performed by: NURSE PRACTITIONER

## 2019-08-29 PROCEDURE — G8417 CALC BMI ABV UP PARAM F/U: HCPCS | Performed by: NURSE PRACTITIONER

## 2019-08-29 PROCEDURE — 1123F ACP DISCUSS/DSCN MKR DOCD: CPT | Performed by: NURSE PRACTITIONER

## 2019-08-29 PROCEDURE — G8400 PT W/DXA NO RESULTS DOC: HCPCS | Performed by: NURSE PRACTITIONER

## 2019-08-29 PROCEDURE — G8427 DOCREV CUR MEDS BY ELIG CLIN: HCPCS | Performed by: NURSE PRACTITIONER

## 2019-08-29 PROCEDURE — 1036F TOBACCO NON-USER: CPT | Performed by: NURSE PRACTITIONER

## 2019-08-29 PROCEDURE — G8598 ASA/ANTIPLAT THER USED: HCPCS | Performed by: NURSE PRACTITIONER

## 2019-08-29 PROCEDURE — 99213 OFFICE O/P EST LOW 20 MIN: CPT | Performed by: NURSE PRACTITIONER

## 2019-08-29 PROCEDURE — 3017F COLORECTAL CA SCREEN DOC REV: CPT | Performed by: NURSE PRACTITIONER

## 2019-08-29 PROCEDURE — 4040F PNEUMOC VAC/ADMIN/RCVD: CPT | Performed by: NURSE PRACTITIONER

## 2019-08-29 RX ORDER — DOCUSATE SODIUM 100 MG/1
CAPSULE, LIQUID FILLED ORAL DAILY
Refills: 0 | COMMUNITY
Start: 2019-08-22

## 2019-08-29 RX ORDER — TRIAMTERENE AND HYDROCHLOROTHIAZIDE 37.5; 25 MG/1; MG/1
TABLET ORAL
Refills: 0 | COMMUNITY
Start: 2019-08-13

## 2019-08-29 RX ORDER — HYDROCODONE BITARTRATE AND ACETAMINOPHEN 5; 325 MG/1; MG/1
1 TABLET ORAL EVERY 8 HOURS PRN
Qty: 90 TABLET | Refills: 0 | Status: SHIPPED | OUTPATIENT
Start: 2019-09-03 | End: 2019-10-01 | Stop reason: SDUPTHER

## 2019-08-29 ASSESSMENT — ENCOUNTER SYMPTOMS
SHORTNESS OF BREATH: 1
COUGH: 1
BACK PAIN: 1

## 2019-09-03 ENCOUNTER — TELEPHONE (OUTPATIENT)
Dept: PULMONOLOGY | Age: 69
End: 2019-09-03

## 2019-09-04 ENCOUNTER — TELEPHONE (OUTPATIENT)
Dept: PHYSICAL MEDICINE AND REHAB | Age: 69
End: 2019-09-04

## 2019-09-04 NOTE — TELEPHONE ENCOUNTER
Approvedtoday   Questionnaire submitted. PA Case [id number] Status: Approved. Authorization and Notifications Completed.     Notified pharmacy, Pt notified via [place]

## 2019-09-06 ENCOUNTER — TELEPHONE (OUTPATIENT)
Dept: PHYSICAL MEDICINE AND REHAB | Age: 69
End: 2019-09-06

## 2019-09-06 NOTE — TELEPHONE ENCOUNTER
Cardiac clearance received from Dr. Ronal Fraire. Procedure scheduled 10/7/19 @ 9:50 with an arrival time of 8:50. Educated on Cognection. Verbalized understanding.

## 2019-09-16 ENCOUNTER — TELEPHONE (OUTPATIENT)
Dept: CARDIOLOGY CLINIC | Age: 69
End: 2019-09-16

## 2019-09-30 ENCOUNTER — PREP FOR PROCEDURE (OUTPATIENT)
Dept: PHYSICAL MEDICINE AND REHAB | Age: 69
End: 2019-09-30

## 2019-09-30 NOTE — H&P
triamterene-hydrochlorothiazide (MAXZIDE-25) 37.5-25 MG per tablet, take 1 tablet by mouth every morning, Disp: , Rfl: 0    RA COL-RITE 100 MG capsule, , Disp: , Rfl: 0    [START ON 9/3/2019] HYDROcodone-acetaminophen (NORCO) 5-325 MG per tablet, Take 1 tablet by mouth every 8 hours as needed for Pain for up to 30 days. , Disp: 90 tablet, Rfl: 0    furosemide (LASIX) 20 MG tablet, Take 1 tablet by mouth daily as needed (water retention/ swelling) (Patient taking differently: Take 40 mg by mouth daily as needed (water retention/ swelling) ), Disp: 30 tablet, Rfl: 11    clopidogrel (PLAVIX) 75 MG tablet, Take 1 tablet by mouth daily, Disp: 30 tablet, Rfl: 11    atorvastatin (LIPITOR) 40 MG tablet, Take 1 tablet by mouth daily, Disp: 30 tablet, Rfl: 3    tiotropium (SPIRIVA) 18 MCG inhalation capsule, Inhale 1 capsule into the lungs daily, Disp: 30 capsule, Rfl: 11    albuterol (PROVENTIL) (2.5 MG/3ML) 0.083% nebulizer solution, Take 3 mLs by nebulization every 6 hours as needed for Wheezing or Shortness of Breath, Disp: 120 mL, Rfl: 11    albuterol sulfate  (90 Base) MCG/ACT inhaler, Inhale 2 puffs into the lungs 4 times daily as needed for Wheezing, Disp: , Rfl:     pseudoephedrine (SUDAFED 12 HR) 120 MG extended release tablet, Take 120 mg by mouth every 12 hours as needed for Congestion, Disp: , Rfl:     fluticasone (FLONASE) 50 MCG/ACT nasal spray, 1 spray by Nasal route daily as needed for Rhinitis, Disp: , Rfl:     amLODIPine (NORVASC) 5 MG tablet, Take 5 mg by mouth daily, Disp: , Rfl:     montelukast (SINGULAIR) 10 MG tablet, Take 1 tablet by mouth nightly, Disp: 30 tablet, Rfl: 3    losartan (COZAAR) 50 MG tablet, Take 50 mg by mouth daily , Disp: , Rfl: 0    omeprazole (PRILOSEC) 40 MG capsule, Take 1 capsule by mouth daily, Disp: 30 capsule, Rfl: 1    OXYGEN,  Inhale into the lungs 3 lpm, per patient at home and when walking, Disp: , Rfl:     aspirin 81 MG EC tablet, Take 81 mg by mouth daily. , Disp: , Rfl:         Subjective:      Review of Systems   Respiratory: Positive for cough and shortness of breath. Musculoskeletal: Positive for arthralgias, back pain and myalgias. Neurological: Positive for weakness. Negative for numbness.         Objective:      Vitals                                  Weight: 169 lb 5 oz (76.8 kg)   Height: 5' 2.01\" (1.575 m)            Physical Exam   Constitutional: She is oriented to person, place, and time. She appears well-developed and well-nourished. No distress. HENT:   Head: Normocephalic and atraumatic. Right Ear: External ear normal.   Left Ear: External ear normal.   Nose: Nose normal.   Mouth/Throat: Oropharynx is clear and moist. No oropharyngeal exudate. Eyes: Pupils are equal, round, and reactive to light. Conjunctivae and EOM are normal. Right eye exhibits no discharge. Left eye exhibits no discharge. No scleral icterus. Neck: Normal range of motion and full passive range of motion without pain. Neck supple. No muscular tenderness present. No neck rigidity. No edema, no erythema and normal range of motion present. No thyromegaly present. Cardiovascular: Normal rate, regular rhythm, normal heart sounds and intact distal pulses. Exam reveals no gallop and no friction rub. No murmur heard. Pulmonary/Chest: Effort normal. No respiratory distress. She has decreased breath sounds in the right upper field, the right middle field, the right lower field, the left upper field, the left middle field and the left lower field. She has wheezes in the right upper field and the left upper field. She has no rales. She exhibits no tenderness. 2 liters NC portable oxygen   Abdominal: Soft. Bowel sounds are normal. She exhibits no distension. There is no tenderness. There is no rebound and no guarding. Musculoskeletal:        Right shoulder: She exhibits decreased range of motion and tenderness.         Left shoulder: She exhibits decreased range of lumbar region without myelopathy or radiculopathy    2. SI (sacroiliac) joint inflammation (HCC)    3. Spinal stenosis of lumbar region without neurogenic claudication    4. Drug induced constipation    5. Chronic pain syndrome          Plan:  bilateral L-facet RFA @ L2-3, L3-4, L4-5, and L5-S1 RIGHT SIDE FIRST.        Mateusz Emerson, CASSANDRA - CNP  9/30/2019

## 2019-10-01 DIAGNOSIS — G89.4 CHRONIC PAIN SYNDROME: ICD-10-CM

## 2019-10-01 RX ORDER — HYDROCODONE BITARTRATE AND ACETAMINOPHEN 5; 325 MG/1; MG/1
1 TABLET ORAL EVERY 8 HOURS PRN
Qty: 90 TABLET | Refills: 0 | Status: SHIPPED | OUTPATIENT
Start: 2019-10-03 | End: 2019-10-28 | Stop reason: SDUPTHER

## 2019-10-07 ENCOUNTER — ANESTHESIA EVENT (OUTPATIENT)
Dept: OPERATING ROOM | Age: 69
End: 2019-10-07
Payer: MEDICARE

## 2019-10-07 ENCOUNTER — ANESTHESIA (OUTPATIENT)
Dept: OPERATING ROOM | Age: 69
End: 2019-10-07
Payer: MEDICARE

## 2019-10-07 ENCOUNTER — APPOINTMENT (OUTPATIENT)
Dept: GENERAL RADIOLOGY | Age: 69
End: 2019-10-07
Attending: PAIN MEDICINE
Payer: MEDICARE

## 2019-10-07 ENCOUNTER — HOSPITAL ENCOUNTER (OUTPATIENT)
Age: 69
Setting detail: OUTPATIENT SURGERY
Discharge: HOME | End: 2019-10-07
Attending: PAIN MEDICINE | Admitting: PAIN MEDICINE
Payer: MEDICARE

## 2019-10-07 VITALS
BODY MASS INDEX: 31.34 KG/M2 | RESPIRATION RATE: 16 BRPM | DIASTOLIC BLOOD PRESSURE: 57 MMHG | WEIGHT: 166 LBS | OXYGEN SATURATION: 95 % | HEIGHT: 61 IN | TEMPERATURE: 98.3 F | HEART RATE: 104 BPM | SYSTOLIC BLOOD PRESSURE: 142 MMHG

## 2019-10-07 VITALS
OXYGEN SATURATION: 91 % | RESPIRATION RATE: 16 BRPM | SYSTOLIC BLOOD PRESSURE: 183 MMHG | DIASTOLIC BLOOD PRESSURE: 75 MMHG

## 2019-10-07 PROCEDURE — 3600000056 HC PAIN LEVEL 4 BASE: Performed by: PAIN MEDICINE

## 2019-10-07 PROCEDURE — 7100000010 HC PHASE II RECOVERY - FIRST 15 MIN: Performed by: PAIN MEDICINE

## 2019-10-07 PROCEDURE — 3209999900 FLUORO FOR SURGICAL PROCEDURES

## 2019-10-07 PROCEDURE — 64635 DESTROY LUMB/SAC FACET JNT: CPT | Performed by: PAIN MEDICINE

## 2019-10-07 PROCEDURE — 2500000003 HC RX 250 WO HCPCS: Performed by: PAIN MEDICINE

## 2019-10-07 PROCEDURE — 7100000011 HC PHASE II RECOVERY - ADDTL 15 MIN: Performed by: PAIN MEDICINE

## 2019-10-07 PROCEDURE — 3700000000 HC ANESTHESIA ATTENDED CARE: Performed by: PAIN MEDICINE

## 2019-10-07 PROCEDURE — 3700000001 HC ADD 15 MINUTES (ANESTHESIA): Performed by: PAIN MEDICINE

## 2019-10-07 PROCEDURE — 2580000003 HC RX 258: Performed by: NURSE ANESTHETIST, CERTIFIED REGISTERED

## 2019-10-07 PROCEDURE — 3600000057 HC PAIN LEVEL 4 ADDL 15 MIN: Performed by: PAIN MEDICINE

## 2019-10-07 PROCEDURE — 6360000002 HC RX W HCPCS: Performed by: PAIN MEDICINE

## 2019-10-07 PROCEDURE — 2709999900 HC NON-CHARGEABLE SUPPLY: Performed by: PAIN MEDICINE

## 2019-10-07 PROCEDURE — 64636 DESTROY L/S FACET JNT ADDL: CPT | Performed by: PAIN MEDICINE

## 2019-10-07 PROCEDURE — 6360000002 HC RX W HCPCS: Performed by: NURSE ANESTHETIST, CERTIFIED REGISTERED

## 2019-10-07 RX ORDER — ROPIVACAINE HYDROCHLORIDE 2 MG/ML
INJECTION, SOLUTION EPIDURAL; INFILTRATION; PERINEURAL PRN
Status: DISCONTINUED | OUTPATIENT
Start: 2019-10-07 | End: 2019-10-07 | Stop reason: ALTCHOICE

## 2019-10-07 RX ORDER — 0.9 % SODIUM CHLORIDE 0.9 %
VIAL (ML) INJECTION PRN
Status: DISCONTINUED | OUTPATIENT
Start: 2019-10-07 | End: 2019-10-07 | Stop reason: SDUPTHER

## 2019-10-07 RX ORDER — LIDOCAINE HYDROCHLORIDE 10 MG/ML
INJECTION, SOLUTION EPIDURAL; INFILTRATION; INTRACAUDAL; PERINEURAL PRN
Status: DISCONTINUED | OUTPATIENT
Start: 2019-10-07 | End: 2019-10-07 | Stop reason: ALTCHOICE

## 2019-10-07 RX ORDER — PROPOFOL 10 MG/ML
INJECTION, EMULSION INTRAVENOUS PRN
Status: DISCONTINUED | OUTPATIENT
Start: 2019-10-07 | End: 2019-10-07 | Stop reason: SDUPTHER

## 2019-10-07 RX ORDER — FENTANYL CITRATE 50 UG/ML
INJECTION, SOLUTION INTRAMUSCULAR; INTRAVENOUS PRN
Status: DISCONTINUED | OUTPATIENT
Start: 2019-10-07 | End: 2019-10-07 | Stop reason: SDUPTHER

## 2019-10-07 RX ORDER — METHYLPREDNISOLONE ACETATE 80 MG/ML
INJECTION, SUSPENSION INTRA-ARTICULAR; INTRALESIONAL; INTRAMUSCULAR; SOFT TISSUE PRN
Status: DISCONTINUED | OUTPATIENT
Start: 2019-10-07 | End: 2019-10-07 | Stop reason: ALTCHOICE

## 2019-10-07 RX ADMIN — Medication 10 ML: at 11:09

## 2019-10-07 RX ADMIN — PROPOFOL 60 MG: 10 INJECTION, EMULSION INTRAVENOUS at 11:09

## 2019-10-07 RX ADMIN — FENTANYL CITRATE 100 MCG: 50 INJECTION INTRAMUSCULAR; INTRAVENOUS at 10:48

## 2019-10-07 ASSESSMENT — PULMONARY FUNCTION TESTS
PIF_VALUE: 0

## 2019-10-07 ASSESSMENT — COPD QUESTIONNAIRES: CAT_SEVERITY: SEVERE

## 2019-10-07 ASSESSMENT — ENCOUNTER SYMPTOMS: SHORTNESS OF BREATH: 1

## 2019-10-07 ASSESSMENT — PAIN DESCRIPTION - DESCRIPTORS: DESCRIPTORS: ACHING

## 2019-10-07 ASSESSMENT — PAIN - FUNCTIONAL ASSESSMENT: PAIN_FUNCTIONAL_ASSESSMENT: 0-10

## 2019-10-28 ENCOUNTER — OFFICE VISIT (OUTPATIENT)
Dept: PHYSICAL MEDICINE AND REHAB | Age: 69
End: 2019-10-28
Payer: MEDICARE

## 2019-10-28 VITALS
HEIGHT: 61 IN | HEART RATE: 68 BPM | SYSTOLIC BLOOD PRESSURE: 126 MMHG | BODY MASS INDEX: 31.34 KG/M2 | WEIGHT: 166.01 LBS | DIASTOLIC BLOOD PRESSURE: 58 MMHG

## 2019-10-28 DIAGNOSIS — M48.061 SPINAL STENOSIS OF LUMBAR REGION WITHOUT NEUROGENIC CLAUDICATION: ICD-10-CM

## 2019-10-28 DIAGNOSIS — G89.4 CHRONIC PAIN SYNDROME: ICD-10-CM

## 2019-10-28 DIAGNOSIS — M47.816 SPONDYLOSIS OF LUMBAR REGION WITHOUT MYELOPATHY OR RADICULOPATHY: Primary | ICD-10-CM

## 2019-10-28 DIAGNOSIS — M46.1 SI (SACROILIAC) JOINT INFLAMMATION (HCC): ICD-10-CM

## 2019-10-28 PROCEDURE — 99213 OFFICE O/P EST LOW 20 MIN: CPT | Performed by: NURSE PRACTITIONER

## 2019-10-28 RX ORDER — HYDROCODONE BITARTRATE AND ACETAMINOPHEN 5; 325 MG/1; MG/1
1 TABLET ORAL EVERY 8 HOURS PRN
Qty: 90 TABLET | Refills: 0 | Status: SHIPPED | OUTPATIENT
Start: 2019-11-02 | End: 2019-11-27 | Stop reason: SDUPTHER

## 2019-10-28 ASSESSMENT — ENCOUNTER SYMPTOMS
SHORTNESS OF BREATH: 1
BACK PAIN: 1
COUGH: 1

## 2019-11-14 ENCOUNTER — PREP FOR PROCEDURE (OUTPATIENT)
Dept: PHYSICAL MEDICINE AND REHAB | Age: 69
End: 2019-11-14

## 2019-11-26 ENCOUNTER — PREP FOR PROCEDURE (OUTPATIENT)
Dept: PHYSICAL MEDICINE AND REHAB | Age: 69
End: 2019-11-26

## 2019-11-27 DIAGNOSIS — G89.4 CHRONIC PAIN SYNDROME: ICD-10-CM

## 2019-11-27 RX ORDER — HYDROCODONE BITARTRATE AND ACETAMINOPHEN 5; 325 MG/1; MG/1
1 TABLET ORAL EVERY 8 HOURS PRN
Qty: 90 TABLET | Refills: 0 | Status: SHIPPED | OUTPATIENT
Start: 2019-12-02 | End: 2019-12-29 | Stop reason: SDUPTHER

## 2019-12-02 ENCOUNTER — TELEPHONE (OUTPATIENT)
Dept: PHYSICAL MEDICINE AND REHAB | Age: 69
End: 2019-12-02

## 2019-12-29 DIAGNOSIS — G89.4 CHRONIC PAIN SYNDROME: ICD-10-CM

## 2019-12-30 DIAGNOSIS — G89.4 CHRONIC PAIN SYNDROME: ICD-10-CM

## 2019-12-30 RX ORDER — HYDROCODONE BITARTRATE AND ACETAMINOPHEN 5; 325 MG/1; MG/1
1 TABLET ORAL EVERY 8 HOURS PRN
Qty: 90 TABLET | Refills: 0 | Status: SHIPPED | OUTPATIENT
Start: 2019-12-31 | End: 2020-01-29 | Stop reason: SDUPTHER

## 2019-12-30 RX ORDER — HYDROCODONE BITARTRATE AND ACETAMINOPHEN 5; 325 MG/1; MG/1
1 TABLET ORAL EVERY 8 HOURS PRN
Qty: 90 TABLET | Refills: 0 | OUTPATIENT
Start: 2019-12-30 | End: 2020-01-29

## 2020-01-06 ENCOUNTER — PREP FOR PROCEDURE (OUTPATIENT)
Dept: PHYSICAL MEDICINE AND REHAB | Age: 70
End: 2020-01-06

## 2020-01-15 ENCOUNTER — TELEPHONE (OUTPATIENT)
Dept: PHYSICAL MEDICINE AND REHAB | Age: 70
End: 2020-01-15

## 2020-01-27 ENCOUNTER — PREP FOR PROCEDURE (OUTPATIENT)
Dept: PHYSICAL MEDICINE AND REHAB | Age: 70
End: 2020-01-27

## 2020-01-27 NOTE — H&P
  triamterene-hydrochlorothiazide (MAXZIDE-25) 37.5-25 MG per tablet, take 1 tablet by mouth every morning, Disp: , Rfl: 0    RA COL-RITE 100 MG capsule, , Disp: , Rfl: 0    [START ON 9/3/2019] HYDROcodone-acetaminophen (NORCO) 5-325 MG per tablet, Take 1 tablet by mouth every 8 hours as needed for Pain for up to 30 days. , Disp: 90 tablet, Rfl: 0    furosemide (LASIX) 20 MG tablet, Take 1 tablet by mouth daily as needed (water retention/ swelling) (Patient taking differently: Take 40 mg by mouth daily as needed (water retention/ swelling) ), Disp: 30 tablet, Rfl: 11    clopidogrel (PLAVIX) 75 MG tablet, Take 1 tablet by mouth daily, Disp: 30 tablet, Rfl: 11    atorvastatin (LIPITOR) 40 MG tablet, Take 1 tablet by mouth daily, Disp: 30 tablet, Rfl: 3    tiotropium (SPIRIVA) 18 MCG inhalation capsule, Inhale 1 capsule into the lungs daily, Disp: 30 capsule, Rfl: 11    albuterol (PROVENTIL) (2.5 MG/3ML) 0.083% nebulizer solution, Take 3 mLs by nebulization every 6 hours as needed for Wheezing or Shortness of Breath, Disp: 120 mL, Rfl: 11    albuterol sulfate  (90 Base) MCG/ACT inhaler, Inhale 2 puffs into the lungs 4 times daily as needed for Wheezing, Disp: , Rfl:     pseudoephedrine (SUDAFED 12 HR) 120 MG extended release tablet, Take 120 mg by mouth every 12 hours as needed for Congestion, Disp: , Rfl:     fluticasone (FLONASE) 50 MCG/ACT nasal spray, 1 spray by Nasal route daily as needed for Rhinitis, Disp: , Rfl:     amLODIPine (NORVASC) 5 MG tablet, Take 5 mg by mouth daily, Disp: , Rfl:     montelukast (SINGULAIR) 10 MG tablet, Take 1 tablet by mouth nightly, Disp: 30 tablet, Rfl: 3    losartan (COZAAR) 50 MG tablet, Take 50 mg by mouth daily , Disp: , Rfl: 0    omeprazole (PRILOSEC) 40 MG capsule, Take 1 capsule by mouth daily, Disp: 30 capsule, Rfl: 1    OXYGEN,  Inhale into the lungs 3 lpm, per patient at home and when walking, Disp: , Rfl:     aspirin 81 MG EC tablet, Take 81 mg by mouth daily.  , Disp: , Rfl:          Subjective:      Review of Systems   Respiratory: Positive for cough and shortness of breath.    Musculoskeletal: Positive for arthralgias, back pain and myalgias. Neurological: Positive for weakness. Negative for numbness.         Objective:      Vitals                                                 Weight: 169 lb 5 oz (76.8 kg)   Height: 5' 2.01\" (1.575 m)            Physical Exam   Constitutional: She is oriented to person, place, and time. She appears well-developed and well-nourished. No distress. HENT:   Head: Normocephalic and atraumatic. Right Ear: External ear normal.   Left Ear: External ear normal.   Nose: Nose normal.   Mouth/Throat: Oropharynx is clear and moist. No oropharyngeal exudate. Eyes: Pupils are equal, round, and reactive to light. Conjunctivae and EOM are normal. Right eye exhibits no discharge. Left eye exhibits no discharge. No scleral icterus. Neck: Normal range of motion and full passive range of motion without pain. Neck supple. No muscular tenderness present. No neck rigidity. No edema, no erythema and normal range of motion present. No thyromegaly present. Cardiovascular: Normal rate, regular rhythm, normal heart sounds and intact distal pulses. Exam reveals no gallop and no friction rub.   No murmur heard. Pulmonary/Chest: Effort normal. No respiratory distress. She has decreased breath sounds in the right upper field, the right middle field, the right lower field, the left upper field, the left middle field and the left lower field. She has wheezes in the right upper field and the left upper field. She has no rales. She exhibits no tenderness.   2 liters NC portable oxygen   Abdominal: Soft. Bowel sounds are normal. She exhibits no distension. There is no tenderness. There is no rebound and no guarding.    Musculoskeletal:        Right shoulder: She exhibits decreased range of motion and tenderness.        Left shoulder: She exhibits decreased range of motion and tenderness.        Right elbow: Normal.       Left elbow: Normal.        Right wrist: Normal.        Left wrist: Normal.        Right hip: Normal.        Left hip: She exhibits decreased range of motion, decreased strength and tenderness.        Right knee: Normal.        Left knee: Normal.        Cervical back: She exhibits decreased range of motion, tenderness, pain and spasm.        Thoracic back: She exhibits tenderness.        Lumbar back: She exhibits tenderness, pain and spasm.        Back:         Right upper leg: She exhibits tenderness.        Left upper leg: She exhibits tenderness. Neurological: She is alert and oriented to person, place, and time. She has normal strength and normal reflexes. She is not disoriented. She displays no atrophy. No cranial nerve deficit or sensory deficit. She exhibits normal muscle tone. She displays a negative Romberg sign. Coordination and gait normal.   SLR-  Motor 5/5 BUE BLE   Skin: Skin is warm. No rash noted. She is not diaphoretic. No erythema. No pallor. Psychiatric: She has a normal mood and affect. Her speech is normal and behavior is normal. Judgment and thought content normal. Her mood appears not anxious. Her affect is not angry, not blunt, not labile and not inappropriate. She is not agitated, not aggressive, not hyperactive, not slowed, not withdrawn, not actively hallucinating and not combative. Thought content is not paranoid and not delusional. Cognition and memory are normal. Cognition and memory are not impaired. She does not express impulsivity or inappropriate judgment. She does not exhibit a depressed mood. She expresses no homicidal and no suicidal ideation. She expresses no suicidal plans and no homicidal plans. She exhibits normal recent memory and normal remote memory.  She is attentive.   Nursing note and vitals reviewed.     ANASTACIA test: Positive   Yeomans test: Positive   [place] test: Positive   Assessment:    1. Spondylosis of lumbar region without myelopathy or radiculopathy    2. SI (sacroiliac) joint inflammation (HCC)    3. Spinal stenosis of lumbar region without neurogenic claudication    4. Drug induced constipation    5.  Chronic pain syndrome             Plan:  bilateral L-facet RFA @ L2-3, L3-4, L4-5, and L5-S1 left side       CASSANDRA Donato - CNP  1/27/2020

## 2020-01-27 NOTE — H&P (VIEW-ONLY)
Bubba Bautista is a 71 y.o. female is here today for     Chief Complaint: Lower back pain           The patientis allergic to codeine; doxycycline; lipitor; simvastatin; and chantix [varenicline tartrate].    Past Medical History  Patricia  has a past medical history of Bladder dysfunction, CAD (coronary artery disease), Cerebral artery occlusion with cerebral infarction [facility]), Cerebrovascular disease, CHF (congestive heart failure) ([facility] [address] COPD (chronic obstructive pulmonary disease) ([facility] [address] DDD (degenerative disc disease), Depression, GERD (gastroesophageal reflux disease), Hyperlipidemia, Hypertension, Obesity, Other disorders of kidney and ureter in diseases classified elsewhere, Other screening mammogram, Pap smear, as part of routine gynecological examination, Pap smear, as part of routine gynecological examination, Peripheral edema, Pneumonia, PONV (postoperative nausea and vomiting), and Unspecified sleep apnea.     Past Surgical History  The patient  has a past surgical history that includes Dilation and curettage of uterus (1970's); eye surgery (2013); other surgical history (2016); Colonoscopy (7-2009); other surgical history (Left, 12/12/2017); Lumbar spine surgery (Left, 12/12/2017); and Endoscopy, colon, diagnostic.     Family History  This patient's family history includes Alzheimer's Disease in her father; COPD in her sister; Cancer in her brother and daughter; Diabetes in her daughter; Heart Disease in her father and mother; Heart Failure in her father and mother; High Blood Pressure in her father and mother; Parkinsonism in her father; Stroke in her brother.     Social History  Patricia  reports that she quit smoking about 8 years ago. Her smoking use included cigarettes. She has a 80.00 pack-year smoking history.  She has never used smokeless tobacco. She reports that she does not drink alcohol or use drugs.     Medications     Current Medication      Current Outpatient Medications:   triamterene-hydrochlorothiazide (MAXZIDE-25) 37.5-25 MG per tablet, take 1 tablet by mouth every morning, Disp: , Rfl: 0    RA COL-RITE 100 MG capsule, , Disp: , Rfl: 0    [START ON 9/3/2019] HYDROcodone-acetaminophen (NORCO) 5-325 MG per tablet, Take 1 tablet by mouth every 8 hours as needed for Pain for up to 30 days. , Disp: 90 tablet, Rfl: 0    furosemide (LASIX) 20 MG tablet, Take 1 tablet by mouth daily as needed (water retention/ swelling) (Patient taking differently: Take 40 mg by mouth daily as needed (water retention/ swelling) ), Disp: 30 tablet, Rfl: 11    clopidogrel (PLAVIX) 75 MG tablet, Take 1 tablet by mouth daily, Disp: 30 tablet, Rfl: 11    atorvastatin (LIPITOR) 40 MG tablet, Take 1 tablet by mouth daily, Disp: 30 tablet, Rfl: 3    tiotropium (SPIRIVA) 18 MCG inhalation capsule, Inhale 1 capsule into the lungs daily, Disp: 30 capsule, Rfl: 11    albuterol (PROVENTIL) (2.5 MG/3ML) 0.083% nebulizer solution, Take 3 mLs by nebulization every 6 hours as needed for Wheezing or Shortness of Breath, Disp: 120 mL, Rfl: 11    albuterol sulfate  (90 Base) MCG/ACT inhaler, Inhale 2 puffs into the lungs 4 times daily as needed for Wheezing, Disp: , Rfl:     pseudoephedrine (SUDAFED 12 HR) 120 MG extended release tablet, Take 120 mg by mouth every 12 hours as needed for Congestion, Disp: , Rfl:     fluticasone (FLONASE) 50 MCG/ACT nasal spray, 1 spray by Nasal route daily as needed for Rhinitis, Disp: , Rfl:     amLODIPine (NORVASC) 5 MG tablet, Take 5 mg by mouth daily, Disp: , Rfl:     montelukast (SINGULAIR) 10 MG tablet, Take 1 tablet by mouth nightly, Disp: 30 tablet, Rfl: 3    losartan (COZAAR) 50 MG tablet, Take 50 mg by mouth daily , Disp: , Rfl: 0    omeprazole (PRILOSEC) 40 MG capsule, Take 1 capsule by mouth daily, Disp: 30 capsule, Rfl: 1    OXYGEN,  Inhale into the lungs 3 lpm, per patient at home and when walking, Disp: , Rfl:     aspirin 81 MG EC tablet, Take 81 mg by    1. Spondylosis of lumbar region without myelopathy or radiculopathy    2. SI (sacroiliac) joint inflammation (HCC)    3. Spinal stenosis of lumbar region without neurogenic claudication    4. Drug induced constipation    5.  Chronic pain syndrome             Plan:  bilateral L-facet RFA @ L2-3, L3-4, L4-5, and L5-S1 left side       Virgilio Esteban, APRN - CNP  1/27/2020

## 2020-01-29 ENCOUNTER — OFFICE VISIT (OUTPATIENT)
Dept: PHYSICAL MEDICINE AND REHAB | Age: 70
End: 2020-01-29
Payer: MEDICARE

## 2020-01-29 VITALS
BODY MASS INDEX: 29.45 KG/M2 | WEIGHT: 160.05 LBS | DIASTOLIC BLOOD PRESSURE: 64 MMHG | SYSTOLIC BLOOD PRESSURE: 118 MMHG | HEIGHT: 62 IN

## 2020-01-29 PROCEDURE — 99213 OFFICE O/P EST LOW 20 MIN: CPT | Performed by: NURSE PRACTITIONER

## 2020-01-29 RX ORDER — HYDROCODONE BITARTRATE AND ACETAMINOPHEN 5; 325 MG/1; MG/1
1 TABLET ORAL EVERY 8 HOURS PRN
Qty: 90 TABLET | Refills: 0 | Status: SHIPPED | OUTPATIENT
Start: 2020-01-30 | End: 2020-02-25 | Stop reason: SDUPTHER

## 2020-01-29 ASSESSMENT — ENCOUNTER SYMPTOMS
SHORTNESS OF BREATH: 1
BACK PAIN: 1
COUGH: 0

## 2020-01-29 NOTE — PROGRESS NOTES
[facility]  [address]  Dept: [phone]  Dept Fax: [phone]: [phone]    Visit Date: 1/29/2020    Functionality Assessment/Goals Worksheet     On a scale of 0 (Does not Interfere) to 10 (Completely Interferes)     1. Which number describes how during the past week pain has interfered with       the following:  A. General Activity:  9  B. Mood: 7  C. Walking Ability:  9  D. Normal Work (Includes both work outside the home and housework):  9  E. Relations with Other People:   6  F. Sleep:   8  G. Enjoyment of Life:   8    2. Patient Prefers to Take their Pain Medications:     [x]  On a regular basis   []  Only when necessary    []  Does not take pain medications    3. What are the Patient's Goals/Expectations for Visiting Pain Management? []  Learn about my pain    [x]  Receive Medication   []  Physical Therapy     []  Treat Depression   []  Receive Injections    []  Treat Sleep   []  Deal with Anxiety and Stress   []  Treat Opoid Dependence/Addiction   [x]  Other: follow up       HPI:   Pollo Erickson is a 71 y.o. female is here today for    Chief Complaint: Lower back and SI pain     HPI   3 month FU. Continues to have pain across lwoer back but main left side as patient is still getting 70% relief from right L-facet RFA. Sharp, aching pain. Did not have Left L-facet RFA complete that was ordered last visit because got sick treated for CHF. Now scheduled on 2/7/2020 for left L-facet RFA. Pain has been up and down     On 2-3 liters of oxygen   Norco prn remains effective      Medications reviewed. Patient denies side effects with medications. Patient states she is taking medications as prescribed. Shedenies receiving pain medications from other sources. She denies any ER visits since last visit.     Pain scale with out pain medications or at its worst is 7-9/10. Pain scale with pain medications or at its best is 4/10. Last dose of [place] was today  Drug screen reviewed from 8/29/2019 and was appropriate  Pill count was completed today and was appropriate  Patient does not have naloxone available at home. Patient has not required use of naloxone at home since last office visit. The patientis allergic to codeine; doxycycline; lipitor; simvastatin; and chantix [varenicline tartrate]. Past Medical History  Yannick Finnegan  has a past medical history of Bladder dysfunction, CAD (coronary artery disease), Cerebral artery occlusion with cerebral infarction [facility]), Cerebrovascular disease, CHF (congestive heart failure) ([facility] [address] COPD (chronic obstructive pulmonary disease) ([facility] [address] DDD (degenerative disc disease), Depression, GERD (gastroesophageal reflux disease), Hyperlipidemia, Hypertension, Obesity, Other disorders of kidney and ureter in diseases classified elsewhere, Other screening mammogram, Pap smear, as part of routine gynecological examination, Pap smear, as part of routine gynecological examination, Peripheral edema, Pneumonia, PONV (postoperative nausea and vomiting), and Unspecified sleep apnea. Past Surgical History  The patient  has a past surgical history that includes Dilation and curettage of uterus (1970's); eye surgery (2013); other surgical history (2016); Colonoscopy (7-2009); other surgical history (Left, 12/12/2017); Lumbar spine surgery (Left, 12/12/2017); Endoscopy, colon, diagnostic; and Lumbar spine surgery (Right, 10/7/2019). Family History  This patient's family history includes Alzheimer's Disease in her father; COPD in her sister; Cancer in her brother and daughter; Diabetes in her daughter; Heart Disease in her father and mother; Heart Failure in her father and mother; High Blood Pressure in her father and mother; Parkinsonism in her father; Stroke in her brother. Social History  Yannick Finnegan  reports that she quit smoking about 9 years ago. Disp: , Rfl:     Subjective:      Review of Systems   Respiratory: Positive for shortness of breath. Negative for cough. On oxygen    Cardiovascular: Positive for leg swelling. Musculoskeletal: Positive for arthralgias, back pain, joint swelling and myalgias. Objective:     Vitals:    01/29/20 0852   BP: 118/64   Site: Left Upper Arm   Position: Sitting   Cuff Size: Large Adult   Weight: 160 lb 0.9 oz (72.6 kg)   Height: 5' 2.01\" (1.575 m)       Physical Exam  Vitals signs and nursing note reviewed. Constitutional:       General: She is not in acute distress. Appearance: She is well-developed. She is not diaphoretic. HENT:      Head: Normocephalic and atraumatic. Right Ear: External ear normal.      Left Ear: External ear normal.      Nose: Nose normal.      Mouth/Throat:      Pharynx: No oropharyngeal exudate. Eyes:      General: No scleral icterus. Right eye: No discharge. Left eye: No discharge. Conjunctiva/sclera: Conjunctivae normal.      Pupils: Pupils are equal, round, and reactive to light. Neck:      Musculoskeletal: Full passive range of motion without pain, normal range of motion and neck supple. Normal range of motion. No edema, erythema, neck rigidity or muscular tenderness. Thyroid: No thyromegaly. Cardiovascular:      Rate and Rhythm: Normal rate and regular rhythm. Heart sounds: Normal heart sounds. No murmur. No friction rub. No gallop. Pulmonary:      Effort: Pulmonary effort is normal. No respiratory distress. Breath sounds: Examination of the right-upper field reveals decreased breath sounds and wheezing. Examination of the left-upper field reveals decreased breath sounds and wheezing. Examination of the right-middle field reveals decreased breath sounds. Examination of the left-middle field reveals decreased breath sounds. Examination of the right-lower field reveals decreased breath sounds.  Examination of the left-lower

## 2020-02-07 ENCOUNTER — HOSPITAL ENCOUNTER (OUTPATIENT)
Age: 70
Setting detail: OUTPATIENT SURGERY
Discharge: HOME | End: 2020-02-07
Attending: PAIN MEDICINE | Admitting: PAIN MEDICINE
Payer: MEDICARE

## 2020-02-07 ENCOUNTER — APPOINTMENT (OUTPATIENT)
Dept: GENERAL RADIOLOGY | Age: 70
End: 2020-02-07
Attending: PAIN MEDICINE
Payer: MEDICARE

## 2020-02-07 ENCOUNTER — ANESTHESIA (OUTPATIENT)
Dept: OPERATING ROOM | Age: 70
End: 2020-02-07
Payer: MEDICARE

## 2020-02-07 ENCOUNTER — ANESTHESIA EVENT (OUTPATIENT)
Dept: OPERATING ROOM | Age: 70
End: 2020-02-07
Payer: MEDICARE

## 2020-02-07 VITALS
DIASTOLIC BLOOD PRESSURE: 59 MMHG | TEMPERATURE: 98.3 F | HEART RATE: 94 BPM | OXYGEN SATURATION: 93 % | WEIGHT: 167 LBS | RESPIRATION RATE: 16 BRPM | BODY MASS INDEX: 30.73 KG/M2 | SYSTOLIC BLOOD PRESSURE: 141 MMHG | HEIGHT: 62 IN

## 2020-02-07 VITALS
OXYGEN SATURATION: 98 % | RESPIRATION RATE: 15 BRPM | SYSTOLIC BLOOD PRESSURE: 128 MMHG | DIASTOLIC BLOOD PRESSURE: 60 MMHG

## 2020-02-07 PROCEDURE — 3209999900 FLUORO FOR SURGICAL PROCEDURES

## 2020-02-07 PROCEDURE — 6360000002 HC RX W HCPCS: Performed by: PAIN MEDICINE

## 2020-02-07 PROCEDURE — 3700000001 HC ADD 15 MINUTES (ANESTHESIA): Performed by: PAIN MEDICINE

## 2020-02-07 PROCEDURE — 3700000000 HC ANESTHESIA ATTENDED CARE: Performed by: PAIN MEDICINE

## 2020-02-07 PROCEDURE — 64636 DESTROY L/S FACET JNT ADDL: CPT | Performed by: PAIN MEDICINE

## 2020-02-07 PROCEDURE — 2709999900 HC NON-CHARGEABLE SUPPLY: Performed by: PAIN MEDICINE

## 2020-02-07 PROCEDURE — 6360000002 HC RX W HCPCS: Performed by: NURSE ANESTHETIST, CERTIFIED REGISTERED

## 2020-02-07 PROCEDURE — 64635 DESTROY LUMB/SAC FACET JNT: CPT | Performed by: PAIN MEDICINE

## 2020-02-07 PROCEDURE — 2500000003 HC RX 250 WO HCPCS: Performed by: NURSE ANESTHETIST, CERTIFIED REGISTERED

## 2020-02-07 PROCEDURE — 7100000010 HC PHASE II RECOVERY - FIRST 15 MIN: Performed by: PAIN MEDICINE

## 2020-02-07 PROCEDURE — 3600000057 HC PAIN LEVEL 4 ADDL 15 MIN: Performed by: PAIN MEDICINE

## 2020-02-07 PROCEDURE — 2500000003 HC RX 250 WO HCPCS: Performed by: PAIN MEDICINE

## 2020-02-07 PROCEDURE — 7100000011 HC PHASE II RECOVERY - ADDTL 15 MIN: Performed by: PAIN MEDICINE

## 2020-02-07 PROCEDURE — 3600000056 HC PAIN LEVEL 4 BASE: Performed by: PAIN MEDICINE

## 2020-02-07 RX ORDER — PROPOFOL 10 MG/ML
INJECTION, EMULSION INTRAVENOUS PRN
Status: DISCONTINUED | OUTPATIENT
Start: 2020-02-07 | End: 2020-02-07 | Stop reason: SDUPTHER

## 2020-02-07 RX ORDER — FENTANYL CITRATE 50 UG/ML
INJECTION, SOLUTION INTRAMUSCULAR; INTRAVENOUS PRN
Status: DISCONTINUED | OUTPATIENT
Start: 2020-02-07 | End: 2020-02-07 | Stop reason: SDUPTHER

## 2020-02-07 RX ORDER — LIDOCAINE HYDROCHLORIDE 10 MG/ML
INJECTION, SOLUTION EPIDURAL; INFILTRATION; INTRACAUDAL; PERINEURAL PRN
Status: DISCONTINUED | OUTPATIENT
Start: 2020-02-07 | End: 2020-02-07 | Stop reason: ALTCHOICE

## 2020-02-07 RX ORDER — LIDOCAINE HYDROCHLORIDE 10 MG/ML
INJECTION, SOLUTION INFILTRATION; PERINEURAL PRN
Status: DISCONTINUED | OUTPATIENT
Start: 2020-02-07 | End: 2020-02-07 | Stop reason: SDUPTHER

## 2020-02-07 RX ORDER — METHYLPREDNISOLONE ACETATE 80 MG/ML
INJECTION, SUSPENSION INTRA-ARTICULAR; INTRALESIONAL; INTRAMUSCULAR; SOFT TISSUE PRN
Status: DISCONTINUED | OUTPATIENT
Start: 2020-02-07 | End: 2020-02-07 | Stop reason: ALTCHOICE

## 2020-02-07 RX ORDER — BUPIVACAINE HYDROCHLORIDE 2.5 MG/ML
INJECTION, SOLUTION EPIDURAL; INFILTRATION; INTRACAUDAL PRN
Status: DISCONTINUED | OUTPATIENT
Start: 2020-02-07 | End: 2020-02-07 | Stop reason: ALTCHOICE

## 2020-02-07 RX ADMIN — PROPOFOL 50 MG: 10 INJECTION, EMULSION INTRAVENOUS at 09:15

## 2020-02-07 RX ADMIN — LIDOCAINE HYDROCHLORIDE 20 MG: 10 INJECTION, SOLUTION INFILTRATION; PERINEURAL at 09:15

## 2020-02-07 RX ADMIN — FENTANYL CITRATE 100 MCG: 50 INJECTION, SOLUTION INTRAMUSCULAR; INTRAVENOUS at 09:09

## 2020-02-07 ASSESSMENT — PULMONARY FUNCTION TESTS
PIF_VALUE: 0

## 2020-02-07 ASSESSMENT — ENCOUNTER SYMPTOMS: SHORTNESS OF BREATH: 1

## 2020-02-07 ASSESSMENT — PAIN DESCRIPTION - DESCRIPTORS: DESCRIPTORS: CONSTANT

## 2020-02-07 ASSESSMENT — PAIN SCALES - GENERAL: PAINLEVEL_OUTOF10: 0

## 2020-02-07 ASSESSMENT — PAIN - FUNCTIONAL ASSESSMENT: PAIN_FUNCTIONAL_ASSESSMENT: 0-10

## 2020-02-07 NOTE — INTERVAL H&P NOTE
H&P Update    Patient's History and Physical from January 27, 2020 was reviewed. Patient examined. There has been no change.     Electronically signed by Belle Craig MD on 2/7/20 at 8:25 AM

## 2020-02-07 NOTE — ANESTHESIA POSTPROCEDURE EVALUATION
Department of Anesthesiology  Postprocedure Note    Patient: Joslyn Mclaughlin  MRN: [id number]  YOB: 1950  Date of evaluation: 2/7/2020  Time:  1:34 PM     Procedure Summary     Date:  02/07/20 Room / Location:  [address] [place] [address] 03 / [facility]    Anesthesia Start:  3167 Anesthesia Stop:  7026    Procedure:  Left L-facet RFA @ L2-3, L3-4, L4-5, and L5-S1 (Left ) Diagnosis:  (LUMBAR SPONDYLOSIS)    Surgeon:  Asha Urrutia MD Responsible Provider:  Shelton Hernandez MD    Anesthesia Type:  MAC ASA Status:  4          Anesthesia Type: MAC    Eryn Phase I:      Eryn Phase II: Eryn Score: 8    Last vitals: Reviewed and per EMR flowsheets.        Anesthesia Post Evaluation    Patient location during evaluation: PACU  Patient participation: complete - patient participated  Level of consciousness: awake  Airway patency: patent  Nausea & Vomiting: no vomiting and no nausea  Complications: no  Cardiovascular status: hemodynamically stable  Respiratory status: acceptable  Hydration status: stable

## 2020-02-07 NOTE — ANESTHESIA PRE PROCEDURE
daily  10/20/17  Yes Historical Provider, MD   omeprazole (PRILOSEC) 40 MG capsule Take 1 capsule by mouth daily 5/4/16  Yes Susana Sandoval MD   OXYGEN   Inhale into the lungs 3 lpm, per patient at home and when walking   Yes Historical Provider, MD   RA COL-RITE 100 MG capsule daily  8/22/19   Historical Provider, MD   clopidogrel (PLAVIX) 75 MG tablet Take 1 tablet by mouth daily 4/23/19   CASSANDRA Botello - CNP   aspirin 81 MG EC tablet Take 81 mg by mouth daily. Historical Provider, MD       Current medications:    No current facility-administered medications for this encounter. Allergies: Allergies   Allergen Reactions    Codeine Itching     Pt cant remember    Doxycycline      Cant breath     Lipitor Itching    Simvastatin Itching     Stomach pain    Chantix [Varenicline Tartrate] Itching       Problem List:    Patient Active Problem List   Diagnosis Code    GERD (gastroesophageal reflux disease) K21.9    Hyperlipidemia E78.5    Peripheral edema R60.9    CHF (congestive heart failure) ([facility]) I50.9    Depression F32.9    DDD (degenerative disc disease) [id number]    CAD (coronary artery disease) I25.10    Cerebrovascular disease I67.9    Diabetes type 2, controlled ([facility] [address] E11.9    Bladder dysfunction N31.9    Stage 3 severe COPD by GOLD classification ([facility] [address] J44.9    Nasal congestion R09.81    Acute sinusitis J01.90    Hypertrophy of both inferior nasal turbinates J34.3    Lumbar spondylosis M47.816    History of panniculitis Z87.39    Hypertension I10    Dyspnea R06.00    Sinus disease J34.9    PETE and COPD overlap syndrome (HCC) G47.33, J44.9    Snoring R06.83    Obesity (BMI 30.0-34. 9) E66.9    Witnessed apneic spells R06.81    Chronic respiratory failure with hypoxia ([facility]) J96.11    COPD exacerbation ([facility]) J44.1    Acute respiratory failure ([facility]) J96.00    Acute on chronic respiratory failure with hypoxia and hypercapnia ([facility]) J96.21, J96.22    Chest pain R07.9    Angina, class III ([facility]) I20.9    Abnormal stress test R94.39    Smoker F17.200    Acute respiratory failure with hypercapnia ([facility]) J96.02    Fatigue R53.83    Peripheral artery disease ([facility]) I73.9       Past Medical History:        Diagnosis Date    Bladder dysfunction     CAD (coronary artery disease)     Cerebral artery occlusion with cerebral infarction ([facility])     TIA    Cerebrovascular disease     CHF (congestive heart failure) ([facility])     COPD (chronic obstructive pulmonary disease) ([facility])     uses oxygen 24 hours per day    DDD (degenerative disc disease)     Depression     GERD (gastroesophageal reflux disease)     Hyperlipidemia     Hypertension     Obesity     Other disorders of kidney and ureter in diseases classified elsewhere     Other screening mammogram 2011    Pap smear, as part of routine gynecological examination 2007    Pap smear, as part of routine gynecological examination 2007    Peripheral edema     with ascites    Pneumonia     PONV (postoperative nausea and vomiting)     with back injection in [facility]    Unspecified sleep apnea     c-pap at 10 cm wtr       Past Surgical History:        Procedure Laterality Date    COLONOSCOPY  7-2009    DILATION AND CURETTAGE OF UTERUS  1970's    ENDOSCOPY, COLON, DIAGNOSTIC      EYE SURGERY  2013    Laser surgery     LUMBAR SPINE SURGERY Left 12/12/2017    LUMBAR RFA L2-3, 3-4, 4-5, L5-S1 LEFT SIDE performed by Jackelyn Duane, MD at [address] [facility] Right 10/7/2019    L-facet RFA @ L2-3, L3-4, L4-5, and L5-S1 RIGHT SIDE FIRST performed by Jackelyn Duane, MD at [place] [address]  2016    burning nerves in back - lumbar @ [facility]    OTHER SURGICAL HISTORY Left 12/12/2017    Lumbar RFA L2-3, L3-4, L4-5, L5-S1       Social History:    Social History     Tobacco Use    Smoking status: Former Smoker     Packs/day: 2.00     Years: 40.00 Pack years: 80.00     Types: Cigarettes     Last attempt to quit: 2010     Years since quittin.2    Smokeless tobacco: Never Used   Substance Use Topics    Alcohol use: No     Comment: pt hasn't drank since                                 Counseling given: Not Answered      Vital Signs (Current):   Vitals:    20 0828   BP: 137/60   Pulse: 88   Resp: 16   Temp: 97.8 °F (36.6 °C)   TempSrc: Temporal   SpO2: 96%   Weight: 167 lb (75.8 kg)   Height: 5' 2\" (1.575 m)                                              BP Readings from Last 3 Encounters:   20 137/60   20 118/64   10/28/19 (!) 126/58       NPO Status: Time of last liquid consumption:                         Time of last solid consumption:                         Date of last liquid consumption: 20                        Date of last solid food consumption: 20    BMI:   Wt Readings from Last 3 Encounters:   20 167 lb (75.8 kg)   20 160 lb 0.9 oz (72.6 kg)   10/28/19 166 lb 0.1 oz (75.3 kg)     Body mass index is 30.54 kg/m². CBC:   Lab Results   Component Value Date    WBC 8.0 2019    RBC 4.08 2019    RBC 4.30 2012    HGB 11.1 2019    HCT 37.6 2019    MCV 92.2 2019    RDW 13.3 2018     2019       CMP:   Lab Results   Component Value Date     2019    K 4.1 2019    K 4.4 2019    CL 91 2019    CO2 38 2019    BUN 12 2019    CREATININE 0.52 2019    GFRAA >60 2019    LABGLOM >60 2019    LABGLOM >90 2019    GLUCOSE 136 2019    PROT 7.1 2018    CALCIUM 9.3 2019    BILITOT <0.2 2018    BILITOT NEGATIVE 2012    ALKPHOS 82 2018    AST 11 2018    ALT 8 2018       POC Tests: No results for input(s): POCGLU, POCNA, POCK, POCCL, POCBUN, POCHEMO, POCHCT in the last 72 hours.     Coags:   Lab Results   Component Value Date    INR 0.91 2019

## 2020-02-07 NOTE — INTERVAL H&P NOTE
H&P Update    Patient's History and Physical from January 27, 2020 was reviewed. Patient examined. There has been no change.     Electronically signed by Asha Urrutia MD on 2/7/20 at 8:26 AM

## 2020-02-07 NOTE — OP NOTE
mostly axial in nature.  Pain is interfering with the activities of daily living.  Physical examination revealed facet tenderness and facet loading is positive.  Patient had undergone lumbar facet joint injections with pain relief that lasted for only a short period of time and had greater than 70% pain relief with confirmatory median branch blocks.  Hence we decided to do radiofrequency abalation of median branches for long term pain releif.   The procedure and risks  were discussed with the patient and an informed consent was obtained.     Procedure:  Left side   A meaningful communication was kept up with the patient throughout the procedure. The patient is placed in prone position and skin over the back was prepped and draped in sterile manner.  Then using fluoroscopy the junction of the transverse process of the vertebra with the superior process of the facet joint was observed and the view was optimized.  The skin and deep tissues posterior were infiltrated with 9 ml of  0.25% Marcaine. The RF canula with the 10 mm active tip was introduced through the skin wheal under fluoroscopy guidance such that the tip of the needle lies in the groove of the transverse process with the superior processes of the facet joint.  Then a lateral view of the lumbar spine was obtained to make sure the tip of needle is not in the neural foramen.  Then electric impedence was checked to make sure it is acceptable. Then a sensory stimulus was applied at 50 Hz up to 1 volt and concordant pain symptoms were reproduced. Then a motor stimulus was applied at 2 Hz up to 2 volts and no motor stimulation was seen in lower extremities.  Some multifidus stimulus was seen. Vega Osorio after negative aspiration a mixture of depomedrol 80  mg and 0.2%  ropivacaine 2 cc was injected through the needle.  Then a initial lesion was done at 80 degrees centigrade for 90 seconds     EBL-0     Patient's vital signs and neurological status remained stable throughout the procedure and post procedural period.  The patient tolerated the procedure well and was discharged home in stable condition.       Electronically signed by Maida West MD on 2/7/20 at 9:09 AM

## 2020-02-26 RX ORDER — HYDROCODONE BITARTRATE AND ACETAMINOPHEN 5; 325 MG/1; MG/1
1 TABLET ORAL EVERY 8 HOURS PRN
Qty: 90 TABLET | Refills: 0 | Status: SHIPPED | OUTPATIENT
Start: 2020-02-29 | End: 2020-03-28 | Stop reason: SDUPTHER

## 2020-03-02 ENCOUNTER — OFFICE VISIT (OUTPATIENT)
Dept: PHYSICAL MEDICINE AND REHAB | Age: 70
End: 2020-03-02
Payer: MEDICARE

## 2020-03-02 VITALS
WEIGHT: 167.11 LBS | HEIGHT: 62 IN | SYSTOLIC BLOOD PRESSURE: 118 MMHG | DIASTOLIC BLOOD PRESSURE: 64 MMHG | BODY MASS INDEX: 30.75 KG/M2

## 2020-03-02 PROCEDURE — 99213 OFFICE O/P EST LOW 20 MIN: CPT | Performed by: NURSE PRACTITIONER

## 2020-03-02 ASSESSMENT — ENCOUNTER SYMPTOMS: BACK PAIN: 1

## 2020-03-02 NOTE — PROGRESS NOTES
[facility]  [address]  Dept: [phone]  Dept Fax: [phone]: [phone]    Visit Date: 3/2/2020    Functionality Assessment/Goals Worksheet     On a scale of 0 (Does not Interfere) to 10 (Completely Interferes)     1. Which number describes how during the past week pain has interfered with       the following:  A. General Activity:  8  B. Mood: 8  C. Walking Ability:  9  D. Normal Work (Includes both work outside the home and housework):  8  E. Relations with Other People:   8  F. Sleep:   8  G. Enjoyment of Life:   8    2. Patient Prefers to Take their Pain Medications:     [x]  On a regular basis   []  Only when necessary    []  Does not take pain medications    3. What are the Patient's Goals/Expectations for Visiting Pain Management? []  Learn about my pain    []  Receive Medication   []  Physical Therapy     []  Treat Depression   []  Receive Injections    []  Treat Sleep   []  Deal with Anxiety and Stress   []  Treat Opoid Dependence/Addiction   [x]  Other:      HPI:   Kem Swartz is a 71 y.o. female is here today for    Chief Complaint: Lower back pain, SI pain     HPI   FU left L-facet RFA from 2/7/2020. Receiving over 80% relief of left lower back pain since Left L-facet RFA, still good relief from Right L-facet RFA but is feeling pain is slowly returning. Burning and aching pain still in lower back. Norco prn remains effective. On 3 liters of oxygen per nasal cannula       Medications reviewed. Patient denies side effects with medications. Patient states she is taking medications as prescribed. Shedenies receiving pain medications from other sources. She denies any ER visits since last visit. Pain scale with out pain medications or at its worst is 7/10. Pain scale with pain medications or at its best is 3/10.   Last dose of Norco was Take 81 mg by mouth daily. , Disp: , Rfl:     Subjective:      Review of Systems   Musculoskeletal: Positive for arthralgias, back pain and myalgias. Neurological: Positive for weakness. Objective:     Vitals:    03/02/20 0907   BP: 118/64   Site: Left Upper Arm   Position: Sitting   Cuff Size: Medium Adult   Weight: 167 lb 1.7 oz (75.8 kg)   Height: 5' 2.01\" (1.575 m)       Physical Exam  Vitals signs and nursing note reviewed. Constitutional:       General: She is not in acute distress. Appearance: She is well-developed. She is not diaphoretic. HENT:      Head: Normocephalic and atraumatic. Right Ear: External ear normal.      Left Ear: External ear normal.      Nose: Nose normal.      Mouth/Throat:      Pharynx: No oropharyngeal exudate. Eyes:      General: No scleral icterus. Right eye: No discharge. Left eye: No discharge. Conjunctiva/sclera: Conjunctivae normal.      Pupils: Pupils are equal, round, and reactive to light. Neck:      Musculoskeletal: Full passive range of motion without pain, normal range of motion and neck supple. Normal range of motion. No edema, erythema, neck rigidity or muscular tenderness. Thyroid: No thyromegaly. Cardiovascular:      Rate and Rhythm: Normal rate and regular rhythm. Heart sounds: Normal heart sounds. No murmur. No friction rub. No gallop. Pulmonary:      Effort: Pulmonary effort is normal. No respiratory distress. Breath sounds: Examination of the right-upper field reveals decreased breath sounds and wheezing. Examination of the left-upper field reveals decreased breath sounds and wheezing. Examination of the right-middle field reveals decreased breath sounds. Examination of the left-middle field reveals decreased breath sounds. Examination of the right-lower field reveals decreased breath sounds. Examination of the left-lower field reveals decreased breath sounds.  Decreased breath sounds and wheezing present. No rales. Chest:      Chest wall: No tenderness. Abdominal:      General: Bowel sounds are normal. There is no distension. Palpations: Abdomen is soft. Tenderness: There is no abdominal tenderness. There is no guarding or rebound. Musculoskeletal:      Right shoulder: She exhibits decreased range of motion and tenderness. Left shoulder: She exhibits decreased range of motion and tenderness. Right elbow: Normal.     Left elbow: Normal.      Right wrist: Normal.      Left wrist: Normal.      Right hip: Normal.      Left hip: She exhibits decreased range of motion, decreased strength and tenderness. Right knee: Normal.      Left knee: Normal.      Cervical back: She exhibits decreased range of motion, tenderness, pain and spasm. Thoracic back: She exhibits tenderness. Lumbar back: She exhibits tenderness, pain and spasm. Back:       Right upper leg: She exhibits tenderness. Left upper leg: She exhibits tenderness. Skin:     General: Skin is warm. Coloration: Skin is not pale. Findings: No erythema or rash. Neurological:      Mental Status: She is alert and oriented to person, place, and time. She is not disoriented. Cranial Nerves: No cranial nerve deficit. Sensory: No sensory deficit. Motor: No atrophy or abnormal muscle tone. Coordination: Coordination normal.      Gait: Gait normal.      Deep Tendon Reflexes: Reflexes are normal and symmetric. Comments: SLR-  Motor 5/5 BUE BLE   Psychiatric:         Attention and Perception: She is attentive. Mood and Affect: Mood is not anxious or depressed. Affect is not labile, blunt, angry or inappropriate. Speech: Speech normal.         Behavior: Behavior normal. Behavior is not agitated, slowed, aggressive, withdrawn, hyperactive or combative. Thought Content:  Thought content normal. Thought content is not paranoid or delusional. Thought content does not include homicidal or suicidal ideation. Thought content does not include homicidal or suicidal plan. Cognition and Memory: Memory is not impaired. She does not exhibit impaired recent memory or impaired remote memory. Judgment: Judgment normal. Judgment is not impulsive or inappropriate. ANASTACIA test: + bilaterally   Yeomans test: + bilaterally   Gaenslen test: + bilaterally      Assessment:     1. Spondylosis of lumbar region without myelopathy or radiculopathy    2. SI (sacroiliac) joint inflammation (HCC)    3. Spinal stenosis of lumbar region without neurogenic claudication    4. Chronic pain syndrome            Plan:      · OARRS reviewed. Current MED: 15.00  · Patient was offered naloxone for home. Refused. · Discussed long term side effects of medications, tolerance, dependency and addiction. · Previous UDS reviewed  · UDS preformed today for compliance. · Patient told can not receive any pain medications from any other source. · No evidence of abuse, diversion or aberrant behavior.  Medications and/or procedures to improve function and quality of life- patient understanding with this and that may not be pain free   Discussed with patient about safe storage of medications at home   Discussed possible weaning of medication dosing dependent on treatment/procedure results.  Discussed with patient about risks with procedure including infection, reaction to medication, increased pain, or bleeding.  Procedure notes reviewed in detail.  Receiving over 80% relief of left lower back pain since Left L-facet RFA. Still relief from right L-facet RFA,   · Continue Norco 5/325 1 tab every 8 hours as needed for breakthrough pain- refill sent. Patient is compliant       Meds. Prescribed:   No orders of the defined types were placed in this encounter. Return in about 6 weeks (around 4/13/2020), or if symptoms worsen or fail to improve, for follow up  for medications.

## 2020-03-03 ENCOUNTER — APPOINTMENT (OUTPATIENT)
Dept: GENERAL RADIOLOGY | Age: 70
End: 2020-03-03
Payer: MEDICARE

## 2020-03-03 ENCOUNTER — HOSPITAL ENCOUNTER (EMERGENCY)
Age: 70
Discharge: HOME | End: 2020-03-03
Attending: EMERGENCY MEDICINE
Payer: MEDICARE

## 2020-03-03 VITALS
OXYGEN SATURATION: 94 % | TEMPERATURE: 98.3 F | SYSTOLIC BLOOD PRESSURE: 137 MMHG | WEIGHT: 155 LBS | HEART RATE: 103 BPM | BODY MASS INDEX: 28.52 KG/M2 | HEIGHT: 62 IN | RESPIRATION RATE: 18 BRPM | DIASTOLIC BLOOD PRESSURE: 63 MMHG

## 2020-03-03 LAB
ANION GAP SERPL CALCULATED.3IONS-SCNC: 12 MEQ/L (ref 8–16)
BASOPHILS # BLD: 0.3 %
BASOPHILS ABSOLUTE: 0 THOU/MM3 (ref 0–0.1)
BUN BLDV-MCNC: 16 MG/DL (ref 7–22)
CALCIUM SERPL-MCNC: 10.2 MG/DL (ref 8.5–10.5)
CHLORIDE BLD-SCNC: 90 MEQ/L (ref 98–111)
CO2: 37 MEQ/L (ref 23–33)
CREAT SERPL-MCNC: 1 MG/DL (ref 0.4–1.2)
EKG ATRIAL RATE: 95 BPM
EKG P AXIS: 66 DEGREES
EKG P-R INTERVAL: 186 MS
EKG Q-T INTERVAL: 358 MS
EKG QRS DURATION: 102 MS
EKG QTC CALCULATION (BAZETT): 449 MS
EKG R AXIS: 56 DEGREES
EKG T AXIS: 92 DEGREES
EKG VENTRICULAR RATE: 95 BPM
EOSINOPHIL # BLD: 4.8 %
EOSINOPHILS ABSOLUTE: 0.4 THOU/MM3 (ref 0–0.4)
ERYTHROCYTE [DISTWIDTH] IN BLOOD BY AUTOMATED COUNT: 13.2 % (ref 11.5–14.5)
ERYTHROCYTE [DISTWIDTH] IN BLOOD BY AUTOMATED COUNT: 44.9 FL (ref 35–45)
FLU A ANTIGEN: NEGATIVE
FLU B ANTIGEN: NEGATIVE
GFR SERPL CREATININE-BSD FRML MDRD: 55 ML/MIN/1.73M2
GLUCOSE BLD-MCNC: 136 MG/DL (ref 70–108)
HCT VFR BLD CALC: 35.4 % (ref 37–47)
HEMOGLOBIN: 10.4 GM/DL (ref 12–16)
IMMATURE GRANS (ABS): 0.03 THOU/MM3 (ref 0–0.07)
IMMATURE GRANULOCYTES: 0.3 %
LYMPHOCYTES # BLD: 28.9 %
LYMPHOCYTES ABSOLUTE: 2.5 THOU/MM3 (ref 1–4.8)
MCH RBC QN AUTO: 27.4 PG (ref 26–33)
MCHC RBC AUTO-ENTMCNC: 29.4 GM/DL (ref 32.2–35.5)
MCV RBC AUTO: 93.4 FL (ref 81–99)
MONOCYTES # BLD: 10.5 %
MONOCYTES ABSOLUTE: 0.9 THOU/MM3 (ref 0.4–1.3)
NUCLEATED RED BLOOD CELLS: 0 /100 WBC
OSMOLALITY CALCULATION: 280.8 MOSMOL/KG (ref 275–300)
PLATELET # BLD: 294 THOU/MM3 (ref 130–400)
PMV BLD AUTO: 11.1 FL (ref 9.4–12.4)
POTASSIUM REFLEX MAGNESIUM: 3.8 MEQ/L (ref 3.5–5.2)
PRO-BNP: 92.5 PG/ML (ref 0–900)
RBC # BLD: 3.79 MILL/MM3 (ref 4.2–5.4)
SEG NEUTROPHILS: 55.2 %
SEGMENTED NEUTROPHILS ABSOLUTE COUNT: 4.9 THOU/MM3 (ref 1.8–7.7)
SODIUM BLD-SCNC: 139 MEQ/L (ref 135–145)
TROPONIN T: < 0.01 NG/ML
WBC # BLD: 8.8 THOU/MM3 (ref 4.8–10.8)

## 2020-03-03 PROCEDURE — 94761 N-INVAS EAR/PLS OXIMETRY MLT: CPT

## 2020-03-03 PROCEDURE — 94640 AIRWAY INHALATION TREATMENT: CPT

## 2020-03-03 PROCEDURE — 99285 EMERGENCY DEPT VISIT HI MDM: CPT

## 2020-03-03 PROCEDURE — 6360000002 HC RX W HCPCS: Performed by: EMERGENCY MEDICINE

## 2020-03-03 PROCEDURE — 93010 ELECTROCARDIOGRAM REPORT: CPT | Performed by: NUCLEAR MEDICINE

## 2020-03-03 PROCEDURE — 85025 COMPLETE CBC W/AUTO DIFF WBC: CPT

## 2020-03-03 PROCEDURE — 96374 THER/PROPH/DIAG INJ IV PUSH: CPT

## 2020-03-03 PROCEDURE — 87804 INFLUENZA ASSAY W/OPTIC: CPT

## 2020-03-03 PROCEDURE — 83880 ASSAY OF NATRIURETIC PEPTIDE: CPT

## 2020-03-03 PROCEDURE — 80048 BASIC METABOLIC PNL TOTAL CA: CPT

## 2020-03-03 PROCEDURE — 36415 COLL VENOUS BLD VENIPUNCTURE: CPT

## 2020-03-03 PROCEDURE — 71046 X-RAY EXAM CHEST 2 VIEWS: CPT

## 2020-03-03 PROCEDURE — 84484 ASSAY OF TROPONIN QUANT: CPT

## 2020-03-03 PROCEDURE — 93005 ELECTROCARDIOGRAM TRACING: CPT | Performed by: EMERGENCY MEDICINE

## 2020-03-03 PROCEDURE — 6370000000 HC RX 637 (ALT 250 FOR IP): Performed by: EMERGENCY MEDICINE

## 2020-03-03 RX ORDER — CEFDINIR 300 MG/1
300 CAPSULE ORAL 2 TIMES DAILY
Qty: 20 CAPSULE | Refills: 0 | Status: SHIPPED | OUTPATIENT
Start: 2020-03-03 | End: 2020-03-13

## 2020-03-03 RX ORDER — METHYLPREDNISOLONE SODIUM SUCCINATE 125 MG/2ML
125 INJECTION, POWDER, LYOPHILIZED, FOR SOLUTION INTRAMUSCULAR; INTRAVENOUS ONCE
Status: COMPLETED | OUTPATIENT
Start: 2020-03-03 | End: 2020-03-03

## 2020-03-03 RX ORDER — IPRATROPIUM BROMIDE AND ALBUTEROL SULFATE 2.5; .5 MG/3ML; MG/3ML
3 SOLUTION RESPIRATORY (INHALATION) ONCE
Status: COMPLETED | OUTPATIENT
Start: 2020-03-03 | End: 2020-03-03

## 2020-03-03 RX ORDER — PREDNISONE 20 MG/1
40 TABLET ORAL DAILY
Qty: 10 TABLET | Refills: 0 | Status: SHIPPED | OUTPATIENT
Start: 2020-03-03 | End: 2020-03-08

## 2020-03-03 RX ADMIN — IPRATROPIUM BROMIDE AND ALBUTEROL SULFATE 3 AMPULE: .5; 3 SOLUTION RESPIRATORY (INHALATION) at 15:05

## 2020-03-03 RX ADMIN — METHYLPREDNISOLONE SODIUM SUCCINATE 125 MG: 125 INJECTION, POWDER, FOR SOLUTION INTRAMUSCULAR; INTRAVENOUS at 16:00

## 2020-03-03 ASSESSMENT — ENCOUNTER SYMPTOMS
NAUSEA: 0
ABDOMINAL PAIN: 0
COUGH: 1
RHINORRHEA: 0
SHORTNESS OF BREATH: 1
VOMITING: 0
SORE THROAT: 0

## 2020-03-03 NOTE — ED TRIAGE NOTES
Pt to ED via private vehicle w/rprts of increased SOB. Pt rprts hx of COPD and always on 3L of O2 via NC, however in the last few days SOB has worsened and feels more winded w/minor exertion. Pt rprts concern w/possible pneumonia. Pt rprts treating breathing tx w/o improvement; last yesterday. Inhaler today. Pt placed on monitor and in gown. Placed on 3L of O2 via NC; pt states needs Oxygen decreased to 2L; too strong. Pt 90% on 2L. Denies CP.

## 2020-03-03 NOTE — ED NOTES
Dr. Herminio Eaton at bedside to update on results and POC. Flu swab obtained and sent to lab.      Ailyn Marcus RN  03/03/20 5549

## 2020-03-03 NOTE — ED NOTES
Pt resting on cot; breathing easy and unlabored on 2L of O2 via NC. Denies needs. Vitals updated.       Ailyn Marcus, RN  03/03/20 1604

## 2020-03-03 NOTE — ED PROVIDER NOTES
[facility] [address] COMPLAINT       Chief Complaint   Patient presents with    Shortness of Breath       Nurses Notes reviewed and I agree except as noted in the HPI. HISTORY OF PRESENT ILLNESS    Merrick Melgar is a 71 y.o. female who presents to the Emergency Department for the evaluation of shortness of breath onset 2 weeks ago. Her shortness of breath is described as wheezing. Patient states she has been taking old amoxicillin for 3 days twice a day, breathing treatments, and using her albuterol inhaler at home using her 1-2 times per day. patient has COPD and is on 3L of O2 at home. She reports increased mucus at night, clear productive cough, back pain, chills, body aches, and fatigue. She denies leg swelling. She denies being on steroids. She states her granddaughter who lives with her has bronchitis. The patient has a past medical history of CAD, CHF, DDD, GERD, HLD, and HTN. No further complaints at the time of the initial encounter. The HPI was provided by the patient. REVIEW OF SYSTEMS     Review of Systems   Constitutional: Positive for chills and fatigue. Negative for fever. HENT: Negative for congestion, rhinorrhea and sore throat. Respiratory: Positive for cough, shortness of breath and wheezing. Negative for chest tightness. Cardiovascular: Negative for chest pain and leg swelling. Gastrointestinal: Negative for abdominal pain, nausea and vomiting. Genitourinary: Negative for difficulty urinating. Musculoskeletal: Positive for myalgias. Negative for arthralgias and back pain. Skin: Negative for rash.        PAST MEDICAL HISTORY    has a past medical history of Bladder dysfunction, CAD (coronary artery disease), Cerebral artery occlusion with cerebral infarction [facility]), Cerebrovascular disease, CHF (congestive heart failure) ([facility] [address] COPD (chronic obstructive pulmonary disease) ([facility] [address] DDD (degenerative disc disease), Conjunctivae normal.   Neck:      Musculoskeletal: Normal range of motion and neck supple. Vascular: No JVD. Cardiovascular:      Rate and Rhythm: Normal rate and regular rhythm. Pulses: Normal pulses. Heart sounds: Normal heart sounds. No murmur. No friction rub. No gallop. Pulmonary:      Effort: Pulmonary effort is normal. No respiratory distress. Breath sounds: Examination of the left-upper field reveals decreased breath sounds. Examination of the left-middle field reveals decreased breath sounds. Examination of the left-lower field reveals decreased breath sounds. Decreased breath sounds and wheezing (scattered) present. No rhonchi or rales. Abdominal:      General: Bowel sounds are normal. There is no distension. Palpations: Abdomen is soft. Tenderness: There is no abdominal tenderness. There is no guarding or rebound. Musculoskeletal: Normal range of motion. Skin:     General: Skin is warm and dry. Findings: No rash. Neurological:      Mental Status: She is alert and oriented to person, place, and time. Motor: No abnormal muscle tone. Coordination: Coordination normal.         DIFFERENTIAL DIAGNOSIS:   Bronchitis, pneumonia, influenza, COPD exacerbation, CHF    DIAGNOSTIC RESULTS     EKG: All EKG's are interpreted by the Emergency Department Physician who either signs or Co-signs this chart in the absence of a cardiologist.  EKG interpreted by Esvin Aguirre, DO:    Vent. Rate: 95 bpm  WA interval: 186 ms  QRS duration: 102 ms  QTc: 449 ms  P-R-T axes: 66, 56, 92  NSR  No STEMI. RADIOLOGY: non-plain film images(s) such as CT, Ultrasound and MRI are read by the radiologist.    XR CHEST STANDARD (2 VW)   Final Result    IMPRESSION:      No definite lobar consolidation. However there are reticular markings in the lung bases appearance is similar to prior. Correlation with symptoms advised.           **This report has been created using voice recognition software. It may contain minor errors which are inherent in voice recognition technology. **      Final report electronically signed by Dr. Daphne Felipe on 3/3/2020 3:04 PM           LABS:   Labs Reviewed   CBC WITH AUTO DIFFERENTIAL - Abnormal; Notable for the following components:       Result Value    RBC 3.79 (*)     Hemoglobin 10.4 (*)     Hematocrit 35.4 (*)     MCHC 29.4 (*)     All other components within normal limits   BASIC METABOLIC PANEL W/ REFLEX TO MG FOR LOW K   TROPONIN   BRAIN NATRIURETIC PEPTIDE       EMERGENCY DEPARTMENT COURSE:   Vitals:    Vitals:    03/03/20 1414 03/03/20 1505 03/03/20 1526   BP: (!) 152/58     Pulse: 79     Resp: 22     Temp: 98.3 °F (36.8 °C)     TempSrc: Oral     SpO2: 90% 92% 100%   Weight: 155 lb (70.3 kg)     Height: 5' 2\" (1.575 m)          3:05 PM: The patient was seen and evaluated. MDM:  I estimate there is LOW risk for PULMONARY EMBOLISM, PULMONARY EDEMA, PNEUMONIA, PNEUMOTHORAX, STATUS ASTHMATICUS, ACUTE RESPIRATORY FAILURE, OR ACUTE CORONARY SYNDROME, thus I consider the discharge disposition reasonable. Patient with a history of COPD she has increased cough with productive sputum and wheezing. She was treated with duo nebs and steroids she reports improvement in her symptoms after these treatments. Her flu swab was negative her chest x-ray showed reticular markings. Negative pneumonia. This could be related to an atypical pneumonia. Given patient's allergies I started her on cefdinir. She is instructed to use her albuterol nebulizer every 4-6 hours scheduled for a total of 5 times a day for the next 2 days. She was started on steroids. Patient is agreeable to the plan of care. the patient and/or family and I have discussed the diagnosis and risks, and we agree with discharging home to follow-up with their primary doctor. We also discussed returning to the Emergency Department immediately if new or worsening symptoms occur.  We have discussed the symptoms which are most concerning (e.g., bloody sputum, fever, worsening pain or shortness of breath, vomiting, confusion or altered mental status) that necessitate immediate return. CRITICAL CARE:   None     CONSULTS:  none    PROCEDURES:  None     FINAL IMPRESSION      1. COPD exacerbation ([address]    2. Bronchitis          DISPOSITION/PLAN   home    PATIENT REFERRED TO:  Jenifer Espitia MD  [address] [facility] [address]  [phone]    Schedule an appointment as soon as possible for a visit in 3 days        DISCHARGE MEDICATIONS:  Discharge Medication List as of 3/3/2020  6:42 PM      START taking these medications    Details   predniSONE (DELTASONE) 20 MG tablet Take 2 tablets by mouth daily for 5 days, Disp-10 tablet, R-0Print      cefdinir (OMNICEF) 300 MG capsule Take 1 capsule by mouth 2 times daily for 10 days, Disp-20 capsule, R-0Print             (Please note that portions of this note were completed with a voice recognition program.  Efforts were made to edit the dictations but occasionally words are mis-transcribed.)    Scribe:  Paz Fox 3/3/20 3:05 PM Scribing for and in the presence of Ed Clemens DO. Signed by: Antonino Sanders, 03/03/20 3:33 PM    Provider:  I personally performed the services described in the documentation, reviewed and edited the documentation which was dictated to the scribe in my presence, and it accurately records my words and actions.     Ed Clemens DO 3/3/20 3:33 PM        Ed Clemens DO  03/04/20 1116

## 2020-03-04 ASSESSMENT — ENCOUNTER SYMPTOMS
WHEEZING: 1
BACK PAIN: 0
CHEST TIGHTNESS: 0

## 2020-03-16 RX ORDER — ALBUTEROL SULFATE 90 UG/1
2 AEROSOL, METERED RESPIRATORY (INHALATION) 4 TIMES DAILY PRN
Qty: 1 INHALER | Refills: 5 | Status: SHIPPED | OUTPATIENT
Start: 2020-03-16 | End: 2021-04-12

## 2020-03-16 RX ORDER — ALBUTEROL SULFATE 2.5 MG/3ML
2.5 SOLUTION RESPIRATORY (INHALATION) EVERY 6 HOURS PRN
Qty: 120 ML | Refills: 11 | Status: SHIPPED | OUTPATIENT
Start: 2020-03-16 | End: 2021-09-15 | Stop reason: SDUPTHER

## 2020-03-16 RX ORDER — MONTELUKAST SODIUM 10 MG/1
10 TABLET ORAL NIGHTLY
Qty: 30 TABLET | Refills: 3 | Status: SHIPPED | OUTPATIENT
Start: 2020-03-16

## 2020-03-25 ENCOUNTER — TELEPHONE (OUTPATIENT)
Dept: PULMONOLOGY | Age: 70
End: 2020-03-25

## 2020-03-26 ENCOUNTER — TELEPHONE (OUTPATIENT)
Dept: PULMONOLOGY | Age: 70
End: 2020-03-26

## 2020-03-26 NOTE — TELEPHONE ENCOUNTER
Called patient to schedule ABG's and overnight pulse ox. Patient refused to have these test scheduled stating her breathing is too bad and she is not going out. She said if and when she feels she can go out and if you still want the testing done then she might do them.

## 2020-03-31 RX ORDER — HYDROCODONE BITARTRATE AND ACETAMINOPHEN 5; 325 MG/1; MG/1
1 TABLET ORAL EVERY 8 HOURS PRN
Qty: 90 TABLET | Refills: 0 | Status: SHIPPED | OUTPATIENT
Start: 2020-04-01 | End: 2020-04-27 | Stop reason: SDUPTHER

## 2020-04-13 ENCOUNTER — VIRTUAL VISIT (OUTPATIENT)
Dept: PHYSICAL MEDICINE AND REHAB | Age: 70
End: 2020-04-13
Payer: MEDICARE

## 2020-04-13 PROCEDURE — 99442 PR PHYS/QHP TELEPHONE EVALUATION 11-20 MIN: CPT | Performed by: NURSE PRACTITIONER

## 2020-04-28 RX ORDER — HYDROCODONE BITARTRATE AND ACETAMINOPHEN 5; 325 MG/1; MG/1
1 TABLET ORAL EVERY 8 HOURS PRN
Qty: 90 TABLET | Refills: 0 | Status: SHIPPED | OUTPATIENT
Start: 2020-05-01 | End: 2020-05-28 | Stop reason: SDUPTHER

## 2020-04-28 NOTE — TELEPHONE ENCOUNTER
OARRS reviewed. UDS: + for  norco - consistent. Last seen: 3/2/2020.      Follow-up:   Future Appointments   Date Time Provider Jesu Therese   6/8/2020  8:15 AM CASSANDRA Rojas CNP [facility]   7/2/2020  9:30 AM CASSANDRA Clement CNP Pulm Med [facility]

## 2020-05-22 ENCOUNTER — APPOINTMENT (OUTPATIENT)
Dept: GENERAL RADIOLOGY | Age: 70
DRG: 191 | End: 2020-05-22
Payer: MEDICARE

## 2020-05-22 ENCOUNTER — HOSPITAL ENCOUNTER (INPATIENT)
Age: 70
LOS: 1 days | Discharge: HOME | DRG: 191 | End: 2020-05-23
Attending: EMERGENCY MEDICINE | Admitting: FAMILY MEDICINE
Payer: MEDICARE

## 2020-05-22 PROBLEM — J44.0 COPD (CHRONIC OBSTRUCTIVE PULMONARY DISEASE) WITH ACUTE BRONCHITIS (HCC): Status: ACTIVE | Noted: 2020-05-22

## 2020-05-22 PROBLEM — J20.9 COPD (CHRONIC OBSTRUCTIVE PULMONARY DISEASE) WITH ACUTE BRONCHITIS (HCC): Status: ACTIVE | Noted: 2020-05-22

## 2020-05-22 LAB
ALBUMIN SERPL-MCNC: 3.5 G/DL (ref 3.5–5.1)
ALP BLD-CCNC: 75 U/L (ref 38–126)
ALT SERPL-CCNC: 8 U/L (ref 11–66)
ANION GAP SERPL CALCULATED.3IONS-SCNC: 3 MEQ/L (ref 8–16)
AST SERPL-CCNC: 13 U/L (ref 5–40)
BACTERIA: ABNORMAL /HPF
BASE EXCESS MIXED: 15.8 MMOL/L (ref -2–3)
BASOPHILS # BLD: 0.3 %
BASOPHILS ABSOLUTE: 0 THOU/MM3 (ref 0–0.1)
BILIRUB SERPL-MCNC: < 0.2 MG/DL (ref 0.3–1.2)
BILIRUBIN DIRECT: < 0.2 MG/DL (ref 0–0.3)
BILIRUBIN URINE: NEGATIVE
BLOOD, URINE: NEGATIVE
BUN BLDV-MCNC: 16 MG/DL (ref 7–22)
CALCIUM SERPL-MCNC: 8.7 MG/DL (ref 8.5–10.5)
CASTS 2: ABNORMAL /LPF
CASTS UA: ABNORMAL /LPF
CHARACTER, URINE: ABNORMAL
CHLORIDE BLD-SCNC: 91 MEQ/L (ref 98–111)
CO2: 41 MEQ/L (ref 23–33)
COLLECTED BY:: ABNORMAL
COLOR: YELLOW
CREAT SERPL-MCNC: 0.8 MG/DL (ref 0.4–1.2)
CRYSTALS, UA: ABNORMAL
EKG ATRIAL RATE: 93 BPM
EKG P AXIS: 71 DEGREES
EKG P-R INTERVAL: 188 MS
EKG Q-T INTERVAL: 368 MS
EKG QRS DURATION: 104 MS
EKG QTC CALCULATION (BAZETT): 457 MS
EKG R AXIS: 55 DEGREES
EKG T AXIS: 92 DEGREES
EKG VENTRICULAR RATE: 93 BPM
EOSINOPHIL # BLD: 3.7 %
EOSINOPHILS ABSOLUTE: 0.4 THOU/MM3 (ref 0–0.4)
EPITHELIAL CELLS, UA: ABNORMAL /HPF
ERYTHROCYTE [DISTWIDTH] IN BLOOD BY AUTOMATED COUNT: 13.2 % (ref 11.5–14.5)
ERYTHROCYTE [DISTWIDTH] IN BLOOD BY AUTOMATED COUNT: 44.2 FL (ref 35–45)
GFR SERPL CREATININE-BSD FRML MDRD: 71 ML/MIN/1.73M2
GLUCOSE BLD-MCNC: 158 MG/DL (ref 70–108)
GLUCOSE BLD-MCNC: 209 MG/DL (ref 70–108)
GLUCOSE URINE: NEGATIVE MG/DL
HCO3, MIXED: 48 MMOL/L (ref 23–28)
HCT VFR BLD CALC: 35.5 % (ref 37–47)
HEMOGLOBIN: 10.7 GM/DL (ref 12–16)
IMMATURE GRANS (ABS): 0.03 THOU/MM3 (ref 0–0.07)
IMMATURE GRANULOCYTES: 0.3 %
KETONES, URINE: NEGATIVE
LEUKOCYTE ESTERASE, URINE: ABNORMAL
LYMPHOCYTES # BLD: 22.6 %
LYMPHOCYTES ABSOLUTE: 2.4 THOU/MM3 (ref 1–4.8)
MAGNESIUM: 2.1 MG/DL (ref 1.6–2.4)
MCH RBC QN AUTO: 27.7 PG (ref 26–33)
MCHC RBC AUTO-ENTMCNC: 30.1 GM/DL (ref 32.2–35.5)
MCV RBC AUTO: 92 FL (ref 81–99)
MISCELLANEOUS 2: ABNORMAL
MONOCYTES # BLD: 8.5 %
MONOCYTES ABSOLUTE: 0.9 THOU/MM3 (ref 0.4–1.3)
NITRITE, URINE: NEGATIVE
NUCLEATED RED BLOOD CELLS: 0 /100 WBC
O2 SAT, MIXED: 67 %
OSMOLALITY CALCULATION: 274.6 MOSMOL/KG (ref 275–300)
PCO2, MIXED VENOUS: 95 MMHG (ref 41–51)
PH UA: 7 (ref 5–9)
PH, MIXED: 7.31 (ref 7.31–7.41)
PLATELET # BLD: 278 THOU/MM3 (ref 130–400)
PMV BLD AUTO: 10.4 FL (ref 9.4–12.4)
PO2 MIXED: 42 MMHG (ref 25–40)
POTASSIUM REFLEX MAGNESIUM: 3.5 MEQ/L (ref 3.5–5.2)
PRO-BNP: 71.9 PG/ML (ref 0–900)
PROTEIN UA: NEGATIVE
RBC # BLD: 3.86 MILL/MM3 (ref 4.2–5.4)
RBC URINE: ABNORMAL /HPF
RENAL EPITHELIAL, UA: ABNORMAL
SARS-COV-2, NAAT: NOT DETECTED
SEG NEUTROPHILS: 64.6 %
SEGMENTED NEUTROPHILS ABSOLUTE COUNT: 6.7 THOU/MM3 (ref 1.8–7.7)
SODIUM BLD-SCNC: 135 MEQ/L (ref 135–145)
SPECIFIC GRAVITY, URINE: 1.01 (ref 1–1.03)
TOTAL PROTEIN: 8 G/DL (ref 6.1–8)
TROPONIN T: < 0.01 NG/ML
UROBILINOGEN, URINE: 0.2 EU/DL (ref 0–1)
WBC # BLD: 10.4 THOU/MM3 (ref 4.8–10.8)
WBC UA: ABNORMAL /HPF
YEAST: ABNORMAL

## 2020-05-22 PROCEDURE — 82948 REAGENT STRIP/BLOOD GLUCOSE: CPT

## 2020-05-22 PROCEDURE — 6360000002 HC RX W HCPCS: Performed by: FAMILY MEDICINE

## 2020-05-22 PROCEDURE — 94660 CPAP INITIATION&MGMT: CPT

## 2020-05-22 PROCEDURE — 96374 THER/PROPH/DIAG INJ IV PUSH: CPT

## 2020-05-22 PROCEDURE — U0003 INFECTIOUS AGENT DETECTION BY NUCLEIC ACID (DNA OR RNA); SEVERE ACUTE RESPIRATORY SYNDROME CORONAVIRUS 2 (SARS-COV-2) (CORONAVIRUS DISEASE [COVID-19]), AMPLIFIED PROBE TECHNIQUE, MAKING USE OF HIGH THROUGHPUT TECHNOLOGIES AS DESCRIBED BY CMS-2020-01-R: HCPCS

## 2020-05-22 PROCEDURE — 2060000000 HC ICU INTERMEDIATE R&B

## 2020-05-22 PROCEDURE — 36415 COLL VENOUS BLD VENIPUNCTURE: CPT

## 2020-05-22 PROCEDURE — 94640 AIRWAY INHALATION TREATMENT: CPT

## 2020-05-22 PROCEDURE — 83880 ASSAY OF NATRIURETIC PEPTIDE: CPT

## 2020-05-22 PROCEDURE — 2580000003 HC RX 258: Performed by: FAMILY MEDICINE

## 2020-05-22 PROCEDURE — 94760 N-INVAS EAR/PLS OXIMETRY 1: CPT

## 2020-05-22 PROCEDURE — 85025 COMPLETE CBC W/AUTO DIFF WBC: CPT

## 2020-05-22 PROCEDURE — 87641 MR-STAPH DNA AMP PROBE: CPT

## 2020-05-22 PROCEDURE — 99223 1ST HOSP IP/OBS HIGH 75: CPT | Performed by: FAMILY MEDICINE

## 2020-05-22 PROCEDURE — 71045 X-RAY EXAM CHEST 1 VIEW: CPT

## 2020-05-22 PROCEDURE — 83735 ASSAY OF MAGNESIUM: CPT

## 2020-05-22 PROCEDURE — 6370000000 HC RX 637 (ALT 250 FOR IP): Performed by: EMERGENCY MEDICINE

## 2020-05-22 PROCEDURE — G0378 HOSPITAL OBSERVATION PER HR: HCPCS

## 2020-05-22 PROCEDURE — 80048 BASIC METABOLIC PNL TOTAL CA: CPT

## 2020-05-22 PROCEDURE — 96376 TX/PRO/DX INJ SAME DRUG ADON: CPT

## 2020-05-22 PROCEDURE — 87086 URINE CULTURE/COLONY COUNT: CPT

## 2020-05-22 PROCEDURE — 80076 HEPATIC FUNCTION PANEL: CPT

## 2020-05-22 PROCEDURE — 87081 CULTURE SCREEN ONLY: CPT

## 2020-05-22 PROCEDURE — 93005 ELECTROCARDIOGRAM TRACING: CPT | Performed by: EMERGENCY MEDICINE

## 2020-05-22 PROCEDURE — 87500 VANOMYCIN DNA AMP PROBE: CPT

## 2020-05-22 PROCEDURE — U0002 COVID-19 LAB TEST NON-CDC: HCPCS

## 2020-05-22 PROCEDURE — 2709999900 HC NON-CHARGEABLE SUPPLY

## 2020-05-22 PROCEDURE — 6370000000 HC RX 637 (ALT 250 FOR IP): Performed by: FAMILY MEDICINE

## 2020-05-22 PROCEDURE — 84484 ASSAY OF TROPONIN QUANT: CPT

## 2020-05-22 PROCEDURE — 81001 URINALYSIS AUTO W/SCOPE: CPT

## 2020-05-22 PROCEDURE — 99291 CRITICAL CARE FIRST HOUR: CPT

## 2020-05-22 PROCEDURE — 2700000000 HC OXYGEN THERAPY PER DAY

## 2020-05-22 PROCEDURE — 6360000002 HC RX W HCPCS: Performed by: EMERGENCY MEDICINE

## 2020-05-22 RX ORDER — POLYETHYLENE GLYCOL 3350 17 G/17G
17 POWDER, FOR SOLUTION ORAL DAILY PRN
Status: DISCONTINUED | OUTPATIENT
Start: 2020-05-22 | End: 2020-05-23 | Stop reason: HOSPADM

## 2020-05-22 RX ORDER — LOSARTAN POTASSIUM 50 MG/1
50 TABLET ORAL DAILY
Status: DISCONTINUED | OUTPATIENT
Start: 2020-05-23 | End: 2020-05-23 | Stop reason: HOSPADM

## 2020-05-22 RX ORDER — AZITHROMYCIN 250 MG/1
500 TABLET, FILM COATED ORAL ONCE
Status: COMPLETED | OUTPATIENT
Start: 2020-05-22 | End: 2020-05-22

## 2020-05-22 RX ORDER — PANTOPRAZOLE SODIUM 40 MG/1
40 TABLET, DELAYED RELEASE ORAL
Status: DISCONTINUED | OUTPATIENT
Start: 2020-05-23 | End: 2020-05-23 | Stop reason: HOSPADM

## 2020-05-22 RX ORDER — PREDNISONE 20 MG/1
40 TABLET ORAL DAILY
Status: DISCONTINUED | OUTPATIENT
Start: 2020-05-25 | End: 2020-05-23 | Stop reason: HOSPADM

## 2020-05-22 RX ORDER — ALBUTEROL SULFATE 2.5 MG/3ML
2.5 SOLUTION RESPIRATORY (INHALATION) EVERY 6 HOURS PRN
Status: DISCONTINUED | OUTPATIENT
Start: 2020-05-22 | End: 2020-05-23 | Stop reason: HOSPADM

## 2020-05-22 RX ORDER — IPRATROPIUM BROMIDE AND ALBUTEROL SULFATE 2.5; .5 MG/3ML; MG/3ML
1 SOLUTION RESPIRATORY (INHALATION) ONCE
Status: COMPLETED | OUTPATIENT
Start: 2020-05-22 | End: 2020-05-22

## 2020-05-22 RX ORDER — ATORVASTATIN CALCIUM 40 MG/1
40 TABLET, FILM COATED ORAL DAILY
Status: DISCONTINUED | OUTPATIENT
Start: 2020-05-23 | End: 2020-05-23 | Stop reason: HOSPADM

## 2020-05-22 RX ORDER — METHYLPREDNISOLONE SODIUM SUCCINATE 40 MG/ML
40 INJECTION, POWDER, LYOPHILIZED, FOR SOLUTION INTRAMUSCULAR; INTRAVENOUS EVERY 6 HOURS
Status: DISCONTINUED | OUTPATIENT
Start: 2020-05-22 | End: 2020-05-23 | Stop reason: HOSPADM

## 2020-05-22 RX ORDER — ONDANSETRON 2 MG/ML
4 INJECTION INTRAMUSCULAR; INTRAVENOUS EVERY 6 HOURS PRN
Status: DISCONTINUED | OUTPATIENT
Start: 2020-05-22 | End: 2020-05-23 | Stop reason: HOSPADM

## 2020-05-22 RX ORDER — AZITHROMYCIN 250 MG/1
250 TABLET, FILM COATED ORAL DAILY
Status: DISCONTINUED | OUTPATIENT
Start: 2020-05-23 | End: 2020-05-23

## 2020-05-22 RX ORDER — HYDROCODONE BITARTRATE AND ACETAMINOPHEN 5; 325 MG/1; MG/1
1 TABLET ORAL EVERY 8 HOURS PRN
Status: DISCONTINUED | OUTPATIENT
Start: 2020-05-22 | End: 2020-05-23 | Stop reason: HOSPADM

## 2020-05-22 RX ORDER — PROMETHAZINE HYDROCHLORIDE 25 MG/1
12.5 TABLET ORAL EVERY 6 HOURS PRN
Status: DISCONTINUED | OUTPATIENT
Start: 2020-05-22 | End: 2020-05-23 | Stop reason: HOSPADM

## 2020-05-22 RX ORDER — IPRATROPIUM BROMIDE AND ALBUTEROL SULFATE 2.5; .5 MG/3ML; MG/3ML
1 SOLUTION RESPIRATORY (INHALATION)
Status: DISCONTINUED | OUTPATIENT
Start: 2020-05-23 | End: 2020-05-22 | Stop reason: CLARIF

## 2020-05-22 RX ORDER — SODIUM CHLORIDE 0.9 % (FLUSH) 0.9 %
10 SYRINGE (ML) INJECTION PRN
Status: DISCONTINUED | OUTPATIENT
Start: 2020-05-22 | End: 2020-05-23 | Stop reason: HOSPADM

## 2020-05-22 RX ORDER — METHYLPREDNISOLONE SODIUM SUCCINATE 125 MG/2ML
60 INJECTION, POWDER, LYOPHILIZED, FOR SOLUTION INTRAMUSCULAR; INTRAVENOUS ONCE
Status: COMPLETED | OUTPATIENT
Start: 2020-05-22 | End: 2020-05-22

## 2020-05-22 RX ORDER — SODIUM CHLORIDE 0.9 % (FLUSH) 0.9 %
10 SYRINGE (ML) INJECTION EVERY 12 HOURS SCHEDULED
Status: DISCONTINUED | OUTPATIENT
Start: 2020-05-22 | End: 2020-05-23 | Stop reason: HOSPADM

## 2020-05-22 RX ORDER — ALBUTEROL SULFATE 2.5 MG/3ML
2.5 SOLUTION RESPIRATORY (INHALATION)
Status: DISCONTINUED | OUTPATIENT
Start: 2020-05-23 | End: 2020-05-23 | Stop reason: HOSPADM

## 2020-05-22 RX ORDER — ASPIRIN 81 MG/1
81 TABLET ORAL DAILY
Status: DISCONTINUED | OUTPATIENT
Start: 2020-05-23 | End: 2020-05-23 | Stop reason: HOSPADM

## 2020-05-22 RX ORDER — MONTELUKAST SODIUM 10 MG/1
10 TABLET ORAL NIGHTLY
Status: DISCONTINUED | OUTPATIENT
Start: 2020-05-22 | End: 2020-05-23 | Stop reason: HOSPADM

## 2020-05-22 RX ORDER — TRIAMTERENE AND HYDROCHLOROTHIAZIDE 37.5; 25 MG/1; MG/1
1 TABLET ORAL DAILY
Status: DISCONTINUED | OUTPATIENT
Start: 2020-05-23 | End: 2020-05-23 | Stop reason: HOSPADM

## 2020-05-22 RX ORDER — CLOPIDOGREL BISULFATE 75 MG/1
75 TABLET ORAL DAILY
Status: DISCONTINUED | OUTPATIENT
Start: 2020-05-23 | End: 2020-05-23 | Stop reason: HOSPADM

## 2020-05-22 RX ORDER — ACETAMINOPHEN 325 MG/1
650 TABLET ORAL EVERY 6 HOURS PRN
Status: DISCONTINUED | OUTPATIENT
Start: 2020-05-22 | End: 2020-05-23 | Stop reason: HOSPADM

## 2020-05-22 RX ORDER — ACETAMINOPHEN 650 MG/1
650 SUPPOSITORY RECTAL EVERY 6 HOURS PRN
Status: DISCONTINUED | OUTPATIENT
Start: 2020-05-22 | End: 2020-05-23 | Stop reason: HOSPADM

## 2020-05-22 RX ORDER — AMLODIPINE BESYLATE 5 MG/1
5 TABLET ORAL DAILY
Status: DISCONTINUED | OUTPATIENT
Start: 2020-05-23 | End: 2020-05-23 | Stop reason: HOSPADM

## 2020-05-22 RX ADMIN — IPRATROPIUM BROMIDE AND ALBUTEROL SULFATE 1 AMPULE: .5; 3 SOLUTION RESPIRATORY (INHALATION) at 17:12

## 2020-05-22 RX ADMIN — METHYLPREDNISOLONE SODIUM SUCCINATE 60 MG: 125 INJECTION, POWDER, FOR SOLUTION INTRAMUSCULAR; INTRAVENOUS at 17:10

## 2020-05-22 RX ADMIN — MONTELUKAST SODIUM 10 MG: 10 TABLET, FILM COATED ORAL at 23:20

## 2020-05-22 RX ADMIN — Medication 10 ML: at 23:21

## 2020-05-22 RX ADMIN — METHYLPREDNISOLONE SODIUM SUCCINATE 40 MG: 40 INJECTION, POWDER, FOR SOLUTION INTRAMUSCULAR; INTRAVENOUS at 23:21

## 2020-05-22 RX ADMIN — INSULIN LISPRO 2 UNITS: 100 INJECTION, SOLUTION INTRAVENOUS; SUBCUTANEOUS at 23:24

## 2020-05-22 RX ADMIN — AZITHROMYCIN DIHYDRATE 500 MG: 250 TABLET, FILM COATED ORAL at 23:21

## 2020-05-22 ASSESSMENT — PAIN DESCRIPTION - DESCRIPTORS: DESCRIPTORS: HEAVINESS

## 2020-05-22 ASSESSMENT — ENCOUNTER SYMPTOMS
SINUS PRESSURE: 1
SORE THROAT: 0
ABDOMINAL DISTENTION: 0
EYE DISCHARGE: 0
EYE REDNESS: 0
VOMITING: 0
BACK PAIN: 0
ABDOMINAL PAIN: 0
RHINORRHEA: 1
CHEST TIGHTNESS: 1
COUGH: 1
SHORTNESS OF BREATH: 1
DIARRHEA: 0
NAUSEA: 0
SINUS PAIN: 1

## 2020-05-22 ASSESSMENT — PAIN DESCRIPTION - LOCATION: LOCATION: CHEST

## 2020-05-22 ASSESSMENT — PAIN DESCRIPTION - FREQUENCY: FREQUENCY: CONTINUOUS

## 2020-05-22 ASSESSMENT — PAIN DESCRIPTION - PAIN TYPE: TYPE: ACUTE PAIN

## 2020-05-22 ASSESSMENT — PAIN SCALES - GENERAL
PAINLEVEL_OUTOF10: 5
PAINLEVEL_OUTOF10: 0

## 2020-05-22 ASSESSMENT — PAIN DESCRIPTION - ORIENTATION: ORIENTATION: MID

## 2020-05-22 NOTE — ED PROVIDER NOTES
[facility]  eMERGENCY dEPARTMENT eNCOUnter          [address] [facility]       Chief Complaint   Patient presents with    Chest Pain    Shortness of Breath       Nurses Notes reviewed and I agree except as noted in the HPI. HISTORY OF PRESENT ILLNESS    Og Tang is a 71 y.o. female who presents to the Emergency Department for the evaluation of chest tightness, shortness of breath, productive cough, and chills for the past week. The patient also complained of nausea, runny nose, nasal congestion, sinus pressure, and sinus pain. The patient denied episodes of emesis or pain radiation. She denied leg swelling or palpitations. Per nurse's note, \"Pt states her symptoms started about an hour ago, pt states she was just sitting when the symptoms started. Pt states \"I feel like there is an elephant sitting on my chest\". Pt states she currently has the chills but denies fever or sweats\". The patient reported she has been undergoing breath treatments daily 1x. The patient has medical history consisting of Obesity, CHF, CAD, Cerebrovascular disease, and COPD. The patient has surgical history consisting of Pain management procedure. The patient is a former smoker. The patient has no further acute complaints at this time. The HPI was provided by the patient. REVIEW OF SYSTEMS     Review of Systems   Constitutional: Positive for chills. Negative for diaphoresis, fatigue and fever. HENT: Positive for congestion, rhinorrhea, sinus pressure and sinus pain. Negative for sore throat. Eyes: Negative for discharge, redness and visual disturbance. Respiratory: Positive for cough (productive), chest tightness and shortness of breath. Cardiovascular: Negative for chest pain and leg swelling. Gastrointestinal: Negative for abdominal distention, abdominal pain, diarrhea, nausea and vomiting. Genitourinary: Negative for decreased urine volume, difficulty urinating, dysuria, frequency and hematuria. TABLET    Take 20 mg by mouth daily as needed    HYDROCODONE-ACETAMINOPHEN (NORCO) 5-325 MG PER TABLET    Take 1 tablet by mouth every 8 hours as needed for Pain for up to 30 days. LOSARTAN (COZAAR) 50 MG TABLET    Take 50 mg by mouth daily     METFORMIN (GLUCOPHAGE) 500 MG TABLET    Take 500 mg by mouth daily (with breakfast)    MONTELUKAST (SINGULAIR) 10 MG TABLET    Take 1 tablet by mouth nightly    OMEPRAZOLE (PRILOSEC) 40 MG CAPSULE    Take 1 capsule by mouth daily    OXYGEN      Inhale into the lungs 3 lpm, per patient at home and when walking    PSEUDOEPHEDRINE (SUDAFED 12 HR) 120 MG EXTENDED RELEASE TABLET    Take 120 mg by mouth every 12 hours as needed for Congestion    RA COL-RITE 100 MG CAPSULE    daily     TIOTROPIUM (SPIRIVA) 18 MCG INHALATION CAPSULE    Inhale 1 capsule into the lungs daily    TRIAMTERENE-HYDROCHLOROTHIAZIDE (MAXZIDE-25) 37.5-25 MG PER TABLET    take 1 tablet by mouth every morning       ALLERGIES     is allergic to codeine; doxycycline; lipitor; simvastatin; and chantix [varenicline tartrate]. FAMILY HISTORY     She indicated that her mother is . She indicated that her father is . She indicated that the status of her sister is unknown. She indicated that the status of her brother is unknown. She indicated that the status of her daughter is unknown.   family history includes Alzheimer's Disease in her father; COPD in her sister; Cancer in her brother and daughter; Diabetes in her daughter; Heart Disease in her father and mother; Heart Failure in her father and mother; High Blood Pressure in her father and mother; Parkinsonism in her father; Stroke in her brother. SOCIAL HISTORY      reports that she quit smoking about 9 years ago. Her smoking use included cigarettes. She has a 80.00 pack-year smoking history. She has never used smokeless tobacco. She reports that she does not drink alcohol or use drugs.     PHYSICAL EXAM     INITIAL VITALS:  oral temperature is

## 2020-05-23 VITALS
SYSTOLIC BLOOD PRESSURE: 111 MMHG | OXYGEN SATURATION: 92 % | BODY MASS INDEX: 29.83 KG/M2 | HEART RATE: 109 BPM | HEIGHT: 62 IN | DIASTOLIC BLOOD PRESSURE: 58 MMHG | RESPIRATION RATE: 16 BRPM | TEMPERATURE: 98.1 F | WEIGHT: 162.13 LBS

## 2020-05-23 LAB
ALBUMIN SERPL-MCNC: 3.5 G/DL (ref 3.5–5.1)
ALP BLD-CCNC: 77 U/L (ref 38–126)
ALT SERPL-CCNC: 9 U/L (ref 11–66)
ANION GAP SERPL CALCULATED.3IONS-SCNC: 10 MEQ/L (ref 8–16)
AST SERPL-CCNC: 11 U/L (ref 5–40)
BASE EXCESS MIXED: 12.8 MMOL/L (ref -2–3)
BILIRUB SERPL-MCNC: < 0.2 MG/DL (ref 0.3–1.2)
BUN BLDV-MCNC: 15 MG/DL (ref 7–22)
CALCIUM SERPL-MCNC: 9.7 MG/DL (ref 8.5–10.5)
CHLORIDE BLD-SCNC: 91 MEQ/L (ref 98–111)
CO2: 36 MEQ/L (ref 23–33)
COLLECTED BY:: ABNORMAL
CREAT SERPL-MCNC: 0.7 MG/DL (ref 0.4–1.2)
DEVICE: ABNORMAL
ERYTHROCYTE [DISTWIDTH] IN BLOOD BY AUTOMATED COUNT: 13.2 % (ref 11.5–14.5)
ERYTHROCYTE [DISTWIDTH] IN BLOOD BY AUTOMATED COUNT: 44.4 FL (ref 35–45)
FIO2, MIXED VENOUS: 30
GFR SERPL CREATININE-BSD FRML MDRD: 83 ML/MIN/1.73M2
GLUCOSE BLD-MCNC: 174 MG/DL (ref 70–108)
GLUCOSE BLD-MCNC: 180 MG/DL (ref 70–108)
GLUCOSE BLD-MCNC: 349 MG/DL (ref 70–108)
HCO3, MIXED: 41 MMOL/L (ref 23–28)
HCT VFR BLD CALC: 35 % (ref 37–47)
HEMOGLOBIN: 10.5 GM/DL (ref 12–16)
MCH RBC QN AUTO: 27.6 PG (ref 26–33)
MCHC RBC AUTO-ENTMCNC: 30 GM/DL (ref 32.2–35.5)
MCV RBC AUTO: 91.9 FL (ref 81–99)
MRSA SCREEN RT-PCR: NEGATIVE
O2 SAT, MIXED: 76 %
PCO2, MIXED VENOUS: 71 MMHG (ref 41–51)
PH, MIXED: 7.37 (ref 7.31–7.41)
PLATELET # BLD: 300 THOU/MM3 (ref 130–400)
PMV BLD AUTO: 10.9 FL (ref 9.4–12.4)
PO2 MIXED: 44 MMHG (ref 25–40)
POTASSIUM REFLEX MAGNESIUM: 4 MEQ/L (ref 3.5–5.2)
RBC # BLD: 3.81 MILL/MM3 (ref 4.2–5.4)
SET PEEP: 6 MMHG
SET PRESS SUPP: 6 CMH2O
SODIUM BLD-SCNC: 137 MEQ/L (ref 135–145)
TOTAL PROTEIN: 7.2 G/DL (ref 6.1–8)
VANCOMYCIN RESISTANT ENTEROCOCCUS: NEGATIVE
WBC # BLD: 8.2 THOU/MM3 (ref 4.8–10.8)

## 2020-05-23 PROCEDURE — 2580000003 HC RX 258: Performed by: FAMILY MEDICINE

## 2020-05-23 PROCEDURE — 93010 ELECTROCARDIOGRAM REPORT: CPT | Performed by: INTERNAL MEDICINE

## 2020-05-23 PROCEDURE — 6360000002 HC RX W HCPCS: Performed by: FAMILY MEDICINE

## 2020-05-23 PROCEDURE — 97116 GAIT TRAINING THERAPY: CPT

## 2020-05-23 PROCEDURE — 94660 CPAP INITIATION&MGMT: CPT

## 2020-05-23 PROCEDURE — 6370000000 HC RX 637 (ALT 250 FOR IP): Performed by: FAMILY MEDICINE

## 2020-05-23 PROCEDURE — 96376 TX/PRO/DX INJ SAME DRUG ADON: CPT

## 2020-05-23 PROCEDURE — 94761 N-INVAS EAR/PLS OXIMETRY MLT: CPT

## 2020-05-23 PROCEDURE — 97161 PT EVAL LOW COMPLEX 20 MIN: CPT

## 2020-05-23 PROCEDURE — 99239 HOSP IP/OBS DSCHRG MGMT >30: CPT | Performed by: PHYSICIAN ASSISTANT

## 2020-05-23 PROCEDURE — 82948 REAGENT STRIP/BLOOD GLUCOSE: CPT

## 2020-05-23 PROCEDURE — G0378 HOSPITAL OBSERVATION PER HR: HCPCS

## 2020-05-23 PROCEDURE — 36415 COLL VENOUS BLD VENIPUNCTURE: CPT

## 2020-05-23 PROCEDURE — 80053 COMPREHEN METABOLIC PANEL: CPT

## 2020-05-23 PROCEDURE — 2700000000 HC OXYGEN THERAPY PER DAY

## 2020-05-23 PROCEDURE — 94640 AIRWAY INHALATION TREATMENT: CPT

## 2020-05-23 PROCEDURE — 85027 COMPLETE CBC AUTOMATED: CPT

## 2020-05-23 PROCEDURE — 2709999900 HC NON-CHARGEABLE SUPPLY

## 2020-05-23 PROCEDURE — 96372 THER/PROPH/DIAG INJ SC/IM: CPT

## 2020-05-23 RX ORDER — METOPROLOL SUCCINATE 25 MG/1
25 TABLET, EXTENDED RELEASE ORAL DAILY
COMMUNITY
End: 2020-06-29

## 2020-05-23 RX ORDER — PREDNISONE 20 MG/1
40 TABLET ORAL DAILY
Qty: 10 TABLET | Refills: 0 | Status: SHIPPED | OUTPATIENT
Start: 2020-05-25 | End: 2020-05-30

## 2020-05-23 RX ADMIN — PANTOPRAZOLE SODIUM 40 MG: 40 TABLET, DELAYED RELEASE ORAL at 05:14

## 2020-05-23 RX ADMIN — AMLODIPINE BESYLATE 5 MG: 5 TABLET ORAL at 08:22

## 2020-05-23 RX ADMIN — Medication 10 ML: at 08:21

## 2020-05-23 RX ADMIN — TRIAMTERENE AND HYDROCHLOROTHIAZIDE 1 TABLET: 37.5; 25 TABLET ORAL at 08:22

## 2020-05-23 RX ADMIN — LOSARTAN POTASSIUM 50 MG: 50 TABLET, FILM COATED ORAL at 08:22

## 2020-05-23 RX ADMIN — ALBUTEROL SULFATE 2.5 MG: 2.5 SOLUTION RESPIRATORY (INHALATION) at 08:10

## 2020-05-23 RX ADMIN — ACETAMINOPHEN 650 MG: 325 TABLET ORAL at 06:28

## 2020-05-23 RX ADMIN — ALBUTEROL SULFATE 2.5 MG: 2.5 SOLUTION RESPIRATORY (INHALATION) at 12:07

## 2020-05-23 RX ADMIN — METHYLPREDNISOLONE SODIUM SUCCINATE 40 MG: 40 INJECTION, POWDER, FOR SOLUTION INTRAMUSCULAR; INTRAVENOUS at 12:30

## 2020-05-23 RX ADMIN — CLOPIDOGREL BISULFATE 75 MG: 75 TABLET ORAL at 08:22

## 2020-05-23 RX ADMIN — ENOXAPARIN SODIUM 40 MG: 40 INJECTION SUBCUTANEOUS at 08:22

## 2020-05-23 RX ADMIN — METHYLPREDNISOLONE SODIUM SUCCINATE 40 MG: 40 INJECTION, POWDER, FOR SOLUTION INTRAMUSCULAR; INTRAVENOUS at 04:30

## 2020-05-23 RX ADMIN — TIOTROPIUM BROMIDE 18 MCG: 18 CAPSULE ORAL; RESPIRATORY (INHALATION) at 08:20

## 2020-05-23 RX ADMIN — ASPIRIN 81 MG: 81 TABLET ORAL at 08:22

## 2020-05-23 ASSESSMENT — PAIN SCALES - GENERAL
PAINLEVEL_OUTOF10: 4
PAINLEVEL_OUTOF10: 2

## 2020-05-23 NOTE — PROGRESS NOTES
[address] [address]  INPATIENT PHYSICAL THERAPY  EVALUATION  STR ICU STEPDOWN TELEMETRY 4K - [id number]    Time In: 1005  Time Out: 1030  Timed Code Treatment Minutes: 10 Minutes  Minutes: 25          Date: 2020  Patient Name: Ainsley Larson,  Gender:  female        MRN: [id number]  : 1950  (71 y.o.)      Referring Practitioner: Dr. Leda Daly   Diagnosis: COPD   Additional Pertinent Hx: 71 y.o. female who presents to the Emergency Department for the evaluation of chest tightness, shortness of breath, productive cough, and chills for the past week. The patient also complained of nausea, runny nose, nasal congestion, sinus pressure, and sinus pain. The patient denied episodes of emesis or pain radiation. She denied leg swelling or palpitations. Per nurse's note, \"Pt states her symptoms started about an hour ago, pt states she was just sitting when the symptoms started. Pt states \"I feel like there is an elephant sitting on my chest\". Pt states she currently has the chills but denies fever or sweats\". The patient reported she has been undergoing breath treatments daily 1x. Restrictions/Precautions:  Restrictions/Precautions: General Precautions, Fall Risk  Position Activity Restriction  Other position/activity restrictions: 3L 02    Subjective:  Chart Reviewed: Yes  Subjective: Pt resting in bed and reports feeling \"not too bad\". Pt reports wearing 3L 02 at home, 2L 02 when up and about. General:  Overall Orientation Status: Within Normal Limits    Vision: Impaired  Vision Exceptions: Wears glasses for reading    Hearing: Within functional limits         Pain:  Yes.   Pain Assessment  Pain Level: 2(HA from wearing the mask )       Social/Functional History:    Lives With: Daughter  Type of Home: House  Home Layout: Two level  Home Access: Stairs to enter with rails  Entrance Stairs - Number of Steps: 2 ESTEE   Entrance Stairs - Rails: Left  Home Equipment: Rolling walker             ADL

## 2020-05-23 NOTE — H&P
ambulation. Her chest x-ray was unremarkable. Her COVID-19 test was negative. Her VBG demonstrated elevated CO2 levels, hence a trial of BiPAP was planned in the ER. Patient's mentation was at baseline. No confusion or altered mentation. Patient will be admitted to the medical floor for further management.      Past Medical History:          Diagnosis Date    Bladder dysfunction     CAD (coronary artery disease)     Cerebral artery occlusion with cerebral infarction (HCC)     TIA    Cerebrovascular disease     CHF (congestive heart failure) (HCC)     COPD (chronic obstructive pulmonary disease) (HCC)     uses oxygen 24 hours per day    DDD (degenerative disc disease)     Depression     GERD (gastroesophageal reflux disease)     Hyperlipidemia     Hypertension     Obesity     Other disorders of kidney and ureter in diseases classified elsewhere     Other screening mammogram 2011    Pap smear, as part of routine gynecological examination 2007    Pap smear, as part of routine gynecological examination 2007    Peripheral edema     with ascites    Pneumonia     PONV (postoperative nausea and vomiting)     with back [facility] in [facility]    Unspecified sleep apnea     c-pap at 10 cm wtr       Past Surgical History:          Procedure Laterality Date    COLONOSCOPY  7-2009    DILATION AND CURETTAGE OF UTERUS  1970's    ENDOSCOPY, COLON, DIAGNOSTIC      EYE SURGERY  2013    Laser surgery     LUMBAR SPINE SURGERY Left 12/12/2017    LUMBAR RFA L2-3, 3-4, 4-5, L5-S1 LEFT SIDE performed by Vern Ashby MD at [address] [facility] Right 10/7/2019    L-facet RFA @ L2-3, L3-4, L4-5, and L5-S1 RIGHT SIDE FIRST performed by Vern [facility]by MD at [facility]  2016    burning nerves in back - lumbar @ [facility]    OTHER SURGICAL HISTORY Left 12/12/2017    Lumbar RFA L2-3, L3-4, L4-5, L5-S1    PAIN MANAGEMENT PROCEDURE Left

## 2020-05-23 NOTE — PROGRESS NOTES
Patient discharged to home, patient's daughter, Torsten Quintana, to bring patient home via private vehicle. Transport takes patient down to discharge lobby via wheelchair. Discharge packet went over with patient including discharge instructions. Discharge instructions went over with patient including appointments to call about (PCP within 5 days) and to call on Tuesday to see if pulmonology appointment could be made sooner than already set. Patient left with belongings and discharge packet. Patient's questions and concerns were addressed, patient leaves stable and ambulatory.

## 2020-05-24 ENCOUNTER — CARE COORDINATION (OUTPATIENT)
Dept: CASE MANAGEMENT | Age: 70
End: 2020-05-24

## 2020-05-24 LAB
MRSA SCREEN: NORMAL
ORGANISM: ABNORMAL
SARS-COV-2: NOT DETECTED
URINE CULTURE REFLEX: ABNORMAL

## 2020-05-24 NOTE — CARE COORDINATION
[facility] Transitions Initial Follow Up Call    Call within 2 business days of discharge: Yes  Patient: Albertina Watkins Patient : 1950   MRN: <[id number]>  Reason for Admission: Copd Exac, A/C Resp Failure  Discharge Date: 20 RARS: Readmission Risk Score: 16  Facility: [address] [place] [address]       Complaint Diagnosis Description Type Department Provider    20 Chest Pain; Shortness of Breath COPD exacerbation ([facility] [address] . .. ED to Hosp-Admission (Discharged) (ADMITTED) KAILA CardonaK Laban Angelucci, PA-C; Franca Catsle. .. Care Transitions 24 Hour Call    Schedule Follow Up Appointment with PCP:  Declined  Do you have any ongoing symptoms?:  Yes  Patient-reported symptoms:  Cough, Shortness of Breath  Interventions for patient-reported symptoms:  Other (Comment: Patient denies need as not acute)  Do you have a copy of your discharge instructions?:  Yes  Do you have all of your prescriptions and are they filled?:  Yes  Have you been contacted by a [address] [place] [address] Avenue?:  No  Have you scheduled your follow up appointment?:  No (Comment: Patient to contact PCP on Tues to  (holiday wknd))  Were you discharged with any Home Care or Post Acute Services:  No  Do you feel like you have everything you need to keep you well at home?:  Yes  Care Transitions Interventions         [address] [place] [address] Drive:  2020 Not detected  PATIENT RISK FACTORS: COPD, CHF, Ch Resp Failure on O2    Patient contacted regarding recent discharge and COVID-19 risk. Discussed 2020 COVID-19 test results:     SARS-CoV-2, NAAT NOT DETECTED  NOT DETECT Final       Care Transition Nurse spoke w/ Patient by telephone to perform post discharge assessment. Verified name and  as identifiers. Patient reports chronic, ongoing n/p cough and mild sob on exertion. Denies ac resp distress, fever, malaise, flu sx or other Covid19 related sx. Denies cp, orthopnea, edema.   Denies need for PCP notification. Using O2 24/7 as directed. Encouraged inhalers as directed, tripod position, pursed lip breathing, cough&deep breathing exercises, elevating head when at rest.     CTN reviewed discharge instructions, medical action plan and red flags related to discharge diagnosis. Reviewed and educated them on any new and changed medications related to discharge diagnosis. Confirmed Patient obtained and taking Prednisone as directed. Advised obtaining a 90-day supply of all daily and as-needed medications. Instructed to contact PCP on Tues (holiday wknd) to schedule 7 day hosp f/u appt. Denies resource needs including hhc, dme; denies o2 equipment/supply needs. Education provided regarding infection prevention, and signs and symptoms of COVID-19 and when to seek medical attention with Patient who verbalized understanding. Discussed exposure protocols and quarantine from [address] you at higher risk for severe illness 2019 and given an opportunity for questions and concerns. Patient agrees to [facility] [phone] or PCP office for questions related to their healthcare. CTN/ACM provided contact information for future reference. From CDC: Are you at higher risk for severe illness?  Wash your hands often.  Avoid close contact (6 feet, which is about two arm lengths) with people who are sick.  Put distance between yourself and other people if COVID-19 is spreading in your community.  Clean and disinfect frequently touched surfaces.  Avoid all cruise travel and non-essential air travel.  Call your healthcare professional if you have concerns about COVID-19 and your underlying condition or if you are sick. For more information on steps you can take to protect yourself, see CDC's How to Protect Yourself    Patient given information for [facility] and agrees to enroll. CTN completed enrollment  Patient's preferred e-mail: [email or web address]  Patient's preferred phone number: [phone]   Based on Loop alert triggers, patient will be contacted by nurse care manager for worsening symptoms.     {Follow Up  Future Appointments   Date Time Provider Jesu Saleh   6/8/2020  8:15 AM CASSANDRA Garcia CNP SRPX Pain [facility]AN AM [place] [facility]   7/2/2020  9:30 AM CASSANDRA North CNP Pulm Med CARLO - Leticia Donnelly RN

## 2020-05-28 RX ORDER — HYDROCODONE BITARTRATE AND ACETAMINOPHEN 5; 325 MG/1; MG/1
1 TABLET ORAL EVERY 8 HOURS PRN
Qty: 90 TABLET | Refills: 0 | Status: SHIPPED | OUTPATIENT
Start: 2020-05-31 | End: 2020-06-26 | Stop reason: SDUPTHER

## 2020-06-04 ENCOUNTER — OFFICE VISIT (OUTPATIENT)
Dept: PULMONOLOGY | Age: 70
End: 2020-06-04
Payer: MEDICARE

## 2020-06-04 VITALS
HEART RATE: 94 BPM | HEIGHT: 62 IN | OXYGEN SATURATION: 100 % | WEIGHT: 160 LBS | BODY MASS INDEX: 29.44 KG/M2 | DIASTOLIC BLOOD PRESSURE: 62 MMHG | SYSTOLIC BLOOD PRESSURE: 136 MMHG | TEMPERATURE: 98.4 F

## 2020-06-04 PROCEDURE — G0296 VISIT TO DETERM LDCT ELIG: HCPCS | Performed by: NURSE PRACTITIONER

## 2020-06-04 PROCEDURE — 99214 OFFICE O/P EST MOD 30 MIN: CPT | Performed by: NURSE PRACTITIONER

## 2020-06-04 PROCEDURE — 94664 DEMO&/EVAL PT USE INHALER: CPT | Performed by: NURSE PRACTITIONER

## 2020-06-04 RX ORDER — AZITHROMYCIN 500 MG/1
500 TABLET, FILM COATED ORAL DAILY
Qty: 1 PACKET | Refills: 0 | Status: SHIPPED | OUTPATIENT
Start: 2020-06-04 | End: 2021-07-07 | Stop reason: SDUPTHER

## 2020-06-04 RX ORDER — PREDNISONE 20 MG/1
20 TABLET ORAL DAILY
Qty: 5 TABLET | Refills: 0 | Status: SHIPPED | OUTPATIENT
Start: 2020-06-04 | End: 2021-07-07 | Stop reason: SDUPTHER

## 2020-06-04 ASSESSMENT — ENCOUNTER SYMPTOMS
ABDOMINAL PAIN: 0
VOMITING: 0
NAUSEA: 0
SHORTNESS OF BREATH: 1
EYES NEGATIVE: 1
WHEEZING: 0
COUGH: 1
DIARRHEA: 0

## 2020-06-04 NOTE — PROGRESS NOTES
normal. No accessory muscle usage or respiratory distress. Breath sounds: Normal breath sounds. Decreased air movement (in all lung fields ) present. No wheezing, rhonchi or rales. Comments: Congested cough a few times during the encounter   Chest:      Chest wall: No tenderness. Musculoskeletal:      Right lower leg: No edema. Left lower leg: No edema. Skin:     General: Skin is warm and dry. Capillary Refill: Capillary refill takes less than 2 seconds. Coloration: Skin is pale. Nails: There is no clubbing. Neurological:      Mental Status: She is alert. Psychiatric:         Mood and Affect: Mood normal.         Behavior: Behavior normal.         Thought Content: Thought content normal.         Judgment: Judgment normal.          Test results   Lung Nodule Screening     [x] Qualifies    []Does not qualify   [] Declined    [x] Completed 7/29/19 - RADS 1      Ref. Range 5/22/2020 18:19   PCO2, MIXED VENOUS Latest Ref Range: 41 - 51 mmhg 95 (H)   PO2, Mixed Latest Ref Range: 25 - 40 mmhg 42 (H)   HCO3, Mixed Latest Ref Range: 23 - 28 mmol/l 48 (H)   Base Exc, Mixed Latest Ref Range: -2.0 - 3.0 mmol/l 15.8 (H)   O2 Sat, Mixed Latest Units: % 67   PH MIXED Latest Ref Range: 7.31 - 7.41  7.31   COLLECTED BY: Unknown [id number]       Assessment      Diagnosis Orders   1. COPD, severe ([facility] [address]  azithromycin (ZITHROMAX) 500 MG tablet    CT LUNG SCREENING   2. Personal history of tobacco use  CT LUNG SCREENING    LA VISIT TO DISCUSS LUNG CA SCREEN W LDCT    CT Lung Screen (Annual)   3. PETE on CPAP     4. Chronic respiratory failure with hypoxia (HCC)         uncontrolled severe COPD with recent hospitalization due to exacerbation. Plan   -Stop spiriva   -Start [employer] ( previously tried [employer], [facility], Symbicort). 2 samples provided in office today, educated on proper assembly and use of respimat with demo inhaler.  Pt demonstrated proper use with first dose done today -continue PRN Albuterol   -continue supplemental Oxygen  -needs appt with sleep clinic for management of PETE and see about getting a new machine. -COPD zone's discussed with patient, was given handout with office phone number    -prednisone 40 mg PO x 5 day script printed and given with Zone paper,   -to call office if in yellow zone and start prednisone and zithromax at that time. Patient  was educated that if  fails to follow up or call office with update after initiating steroid no further script will be given until seen . *Will see Marya Cueto in: 2 months with annual LDCT to review     Electronically signed by CASSANDRA Leach - CNP on 6/4/2020 at 1:57 PM   Low Dose CT (LDCT) Lung Screening criteria met   Age 50-69   Pack year smoking >30   Still smoking or less than 15 year since quit   No sign or symptoms of lung cancer   > 11 months since last LDCT     Risks and benefits of lung cancer screening with LDCT scans discussed:    Significance of positive screen - False-positive LDCT results often occur. 95% of all positive results do not lead to a diagnosis of cancer. Usually further imaging can resolve most false-positive results; however, some patients may require invasive procedures. Over diagnosis risk - 10% to 12% of screen-detected lung cancer cases are over diagnosed--that is, the cancer would not have been detected in the patient's lifetime without the screening. Need for follow up screens annually to continue lung cancer screening effectiveness     Risks associated with radiation from annual LDCT- Radiation exposure is about the same as for a mammogram, which is about 1/3 of the annual background radiation exposure from everyday life. Starting screening at age 54 is not likely to increase cancer risk from radiation exposure.     Patients with comorbidities resulting in life expectancy of < 10 years, or that would preclude treatment of an abnormality identified on CT, should not

## 2020-06-04 NOTE — PATIENT INSTRUCTIONS
undergo LDCT testing, you will be required to sign a waiver indicating your willingness to pay for the scan.

## 2020-06-08 ENCOUNTER — OFFICE VISIT (OUTPATIENT)
Dept: PHYSICAL MEDICINE AND REHAB | Age: 70
End: 2020-06-08
Payer: MEDICARE

## 2020-06-08 VITALS
BODY MASS INDEX: 29.44 KG/M2 | TEMPERATURE: 97 F | DIASTOLIC BLOOD PRESSURE: 62 MMHG | SYSTOLIC BLOOD PRESSURE: 134 MMHG | HEART RATE: 70 BPM | WEIGHT: 160 LBS | HEIGHT: 62 IN

## 2020-06-08 PROCEDURE — 99213 OFFICE O/P EST LOW 20 MIN: CPT | Performed by: NURSE PRACTITIONER

## 2020-06-08 ASSESSMENT — ENCOUNTER SYMPTOMS
COUGH: 1
BACK PAIN: 1

## 2020-06-08 NOTE — PROGRESS NOTES
[facility]  [address]  Dept: [phone]  Dept Fax: [phone]: [phone]    Visit Date: 6/8/2020    Functionality Assessment/Goals Worksheet     On a scale of 0 (Does not Interfere) to 10 (Completely Interferes)     1. Which number describes how during the past week pain has interfered with       the following:  A. General Activity:  5  B. Mood: 0  C. Walking Ability:  5  D. Normal Work (Includes both work outside the home and housework):  6  E. Relations with Other People:   5  F. Sleep:   5  G. Enjoyment of Life:   6    2. Patient Prefers to Take their Pain Medications:     [x]  On a regular basis   []  Only when necessary    []  Does not take pain medications    3. What are the Patient's Goals/Expectations for Visiting Pain Management? []  Learn about my pain    [x]  Receive Medication   []  Physical Therapy     []  Treat Depression   []  Receive Injections    []  Treat Sleep   [x]  Deal with Anxiety and Stress   []  Treat Opoid Dependence/Addiction   []  Other:      HPI:   Parker Sullivan is a 71 y.o. female is here today for    Chief Complaint: Lower back pain, SI pain     HPI   2 month FU. Main complaint remains right lower back pain- aching, stabbing pain. Still receiving relief from Left L-facet RFA as left lower back pain is tolerable. Has been staying home and in d/t virus     Norco prn remains effective. Decreases pain to a tolerable level. States that breathing has been better is on 2 liters of oxygen today. Medications reviewed. Patient denies side effects with medications. Patient states she is taking medications as prescribed. Shedenies receiving pain medications from other sources. She had 1 ER visit since last visit any ER visits since last visit. Pain scale with out pain medications or at its worst is 6/10.   Pain scale with pain medications or at its best is 3/10. Last dose of [place] was yesterday  Drug screen reviewed from 1/29/2020 and was appropriate  Pill count was completed today and was appropriate  Patient does not have naloxone available at home. Patient has not required use of naloxone at home since last office visit. The patientis allergic to codeine; doxycycline; lipitor; simvastatin; and chantix [varenicline tartrate]. Past Medical History  Remy Burns  has a past medical history of Bladder dysfunction, CAD (coronary artery disease), Cerebral artery occlusion with cerebral infarction [facility]), Cerebrovascular disease, CHF (congestive heart failure) ([facility] [address] COPD (chronic obstructive pulmonary disease) ([facility] [address] DDD (degenerative disc disease), Depression, GERD (gastroesophageal reflux disease), Hyperlipidemia, Hypertension, Obesity, Other disorders of kidney and ureter in diseases classified elsewhere, Other screening mammogram, Pap smear, as part of routine gynecological examination, Pap smear, as part of routine gynecological examination, Peripheral edema, Pneumonia, PONV (postoperative nausea and vomiting), and Unspecified sleep apnea. Past Surgical History  The patient  has a past surgical history that includes Dilation and curettage of uterus (1970's); eye surgery (2013); other surgical history (2016); Colonoscopy (7-2009); other surgical history (Left, 12/12/2017); Lumbar spine surgery (Left, 12/12/2017); Endoscopy, colon, diagnostic; Lumbar spine surgery (Right, 10/7/2019); and Pain management procedure (Left, 2/7/2020). Family History  This patient's family history includes Alzheimer's Disease in her father; COPD in her sister; Cancer in her brother and daughter; Diabetes in her daughter; Heart Disease in her father and mother; Heart Failure in her father and mother; High Blood Pressure in her father and mother; Parkinsonism in her father; Stroke in her brother.     Social History  Remy Burns  reports that she quit smoking losartan (COZAAR) 50 MG tablet, Take 50 mg by mouth daily , Disp: , Rfl: 0    OXYGEN,  Inhale into the lungs 3 lpm, per patient at home and when walking, Disp: , Rfl:     aspirin 81 MG EC tablet, Take 81 mg by mouth daily. , Disp: , Rfl:     Subjective:      Review of Systems   Respiratory: Positive for cough. On 2 liters of oxygen per nasal cannula    Musculoskeletal: Positive for arthralgias, back pain and myalgias. Neurological: Positive for weakness. Negative for numbness. Objective:     Vitals:    06/08/20 0801   BP: 134/62   Site: Right Upper Arm   Position: Sitting   Cuff Size: Medium Adult   Pulse: 70   Temp: 97 °F (36.1 °C)   TempSrc: Temporal   Weight: 160 lb (72.6 kg)   Height: 5' 2\" (1.575 m)       Physical Exam  Vitals signs and nursing note reviewed. Constitutional:       General: She is not in acute distress. Appearance: She is well-developed. She is not diaphoretic. HENT:      Head: Normocephalic and atraumatic. Right Ear: External ear normal.      Left Ear: External ear normal.      Nose: Nose normal.      Mouth/Throat:      Pharynx: No oropharyngeal exudate. Eyes:      General: No scleral icterus. Right eye: No discharge. Left eye: No discharge. Conjunctiva/sclera: Conjunctivae normal.      Pupils: Pupils are equal, round, and reactive to light. Neck:      Musculoskeletal: Full passive range of motion without pain, normal range of motion and neck supple. Normal range of motion. No edema, erythema, neck rigidity or muscular tenderness. Thyroid: No thyromegaly. Cardiovascular:      Rate and Rhythm: Normal rate and regular rhythm. Heart sounds: Normal heart sounds. No murmur. No friction rub. No gallop. Pulmonary:      Effort: Pulmonary effort is normal. No respiratory distress. Breath sounds: Examination of the right-upper field reveals decreased breath sounds and wheezing.  Examination of the left-upper field reveals

## 2020-06-26 RX ORDER — HYDROCODONE BITARTRATE AND ACETAMINOPHEN 5; 325 MG/1; MG/1
1 TABLET ORAL EVERY 8 HOURS PRN
Qty: 90 TABLET | Refills: 0 | Status: SHIPPED | OUTPATIENT
Start: 2020-06-30 | End: 2020-07-27 | Stop reason: SDUPTHER

## 2020-06-29 ENCOUNTER — OFFICE VISIT (OUTPATIENT)
Dept: CARDIOLOGY CLINIC | Age: 70
End: 2020-06-29
Payer: MEDICARE

## 2020-06-29 VITALS
WEIGHT: 160 LBS | SYSTOLIC BLOOD PRESSURE: 129 MMHG | BODY MASS INDEX: 29.44 KG/M2 | DIASTOLIC BLOOD PRESSURE: 56 MMHG | HEIGHT: 62 IN | HEART RATE: 107 BPM

## 2020-06-29 PROCEDURE — 99213 OFFICE O/P EST LOW 20 MIN: CPT | Performed by: INTERNAL MEDICINE

## 2020-06-29 RX ORDER — POLYETHYLENE GLYCOL 3350 17 G/17G
17 POWDER, FOR SOLUTION ORAL DAILY PRN
COMMUNITY

## 2020-06-29 RX ORDER — FLUTICASONE PROPIONATE 50 MCG
1 SPRAY, SUSPENSION (ML) NASAL DAILY
COMMUNITY

## 2020-06-29 RX ORDER — OMEPRAZOLE 40 MG/1
40 CAPSULE, DELAYED RELEASE ORAL DAILY
COMMUNITY

## 2020-06-29 NOTE — PROGRESS NOTES
[facility]  [address]  [address] [place] [address] Road [facility]  Dept: [phone]  Dept Fax: [phone]  Loc: [phone]    Visit Date: 6/29/2020    Ms. Wei Barahona is a 71 y.o. female  who presented for:  Chief Complaint   Patient presents with    Check-Up    Coronary Artery Disease    Cardiac Clearance       HPI:   69yo F hx of CAD s/p PCI of RCA, COPD on O2, HTN, HLD, PETE on CPAP and left subclavian stenosis s/p PTA/stenting is here for a followup. Patient is going for nerve ablation on the back. And is here for preop risk stratification. Denies any chest pain, sob, palpitations, lightheadedness, dizziness, orthopnea, PND or pedal edema. Current Outpatient Medications:     fluticasone (FLONASE) 50 MCG/ACT nasal spray, 1 spray by Each Nostril route daily, Disp: , Rfl:     omeprazole (PRILOSEC) 40 MG delayed release capsule, Take 40 mg by mouth daily, Disp: , Rfl:     polyethylene glycol (GLYCOLAX) 17 g packet, Take 17 g by mouth daily as needed for Constipation, Disp: , Rfl:     [START ON 6/30/2020] HYDROcodone-acetaminophen (NORCO) 5-325 MG per tablet, Take 1 tablet by mouth every 8 hours as needed for Pain for up to 30 days. , Disp: 90 tablet, Rfl: 0    Tiotropium Bromide-Olodaterol 2.5-2.5 MCG/ACT AERS, Inhale 2 puffs into the lungs daily, Disp: 1 Inhaler, Rfl: 3    albuterol (PROVENTIL) (2.5 MG/3ML) 0.083% nebulizer solution, Take 3 mLs by nebulization every 6 hours as needed for Wheezing or Shortness of Breath, Disp: 120 mL, Rfl: 11    montelukast (SINGULAIR) 10 MG tablet, Take 1 tablet by mouth nightly, Disp: 30 tablet, Rfl: 3    albuterol sulfate  (90 Base) MCG/ACT inhaler, Inhale 2 puffs into the lungs 4 times daily as needed for Wheezing, Disp: 1 Inhaler, Rfl: 5    metFORMIN (GLUCOPHAGE) 500 MG tablet, Take 500 mg by mouth daily (with breakfast), Disp: , Rfl:     furosemide (LASIX) 20 MG tablet, Take 20 mg by mouth

## 2020-06-29 NOTE — PROGRESS NOTES
Pt here for check up needs clearance for RFA [facility] TBD     Pt denies chest pain, dizziness , heart palpitations     Pt continues with sob on 2 liters O2, swelling in legs and feet,     Pt concerned has not taken plavix for months has questions

## 2020-07-10 ENCOUNTER — HOSPITAL ENCOUNTER (OUTPATIENT)
Age: 70
Discharge: HOME | End: 2020-07-10
Payer: MEDICARE

## 2020-07-10 PROCEDURE — U0003 INFECTIOUS AGENT DETECTION BY NUCLEIC ACID (DNA OR RNA); SEVERE ACUTE RESPIRATORY SYNDROME CORONAVIRUS 2 (SARS-COV-2) (CORONAVIRUS DISEASE [COVID-19]), AMPLIFIED PROBE TECHNIQUE, MAKING USE OF HIGH THROUGHPUT TECHNOLOGIES AS DESCRIBED BY CMS-2020-01-R: HCPCS

## 2020-07-16 ENCOUNTER — ANESTHESIA (OUTPATIENT)
Dept: OPERATING ROOM | Age: 70
End: 2020-07-16
Payer: MEDICARE

## 2020-07-16 ENCOUNTER — HOSPITAL ENCOUNTER (OUTPATIENT)
Age: 70
Setting detail: OUTPATIENT SURGERY
Discharge: HOME | End: 2020-07-16
Attending: PAIN MEDICINE | Admitting: PAIN MEDICINE
Payer: MEDICARE

## 2020-07-16 ENCOUNTER — APPOINTMENT (OUTPATIENT)
Dept: GENERAL RADIOLOGY | Age: 70
End: 2020-07-16
Attending: PAIN MEDICINE
Payer: MEDICARE

## 2020-07-16 ENCOUNTER — ANESTHESIA EVENT (OUTPATIENT)
Dept: OPERATING ROOM | Age: 70
End: 2020-07-16
Payer: MEDICARE

## 2020-07-16 VITALS
OXYGEN SATURATION: 96 % | SYSTOLIC BLOOD PRESSURE: 125 MMHG | WEIGHT: 157 LBS | RESPIRATION RATE: 16 BRPM | HEART RATE: 80 BPM | HEIGHT: 62 IN | TEMPERATURE: 97.3 F | DIASTOLIC BLOOD PRESSURE: 60 MMHG | BODY MASS INDEX: 28.89 KG/M2

## 2020-07-16 VITALS
RESPIRATION RATE: 14 BRPM | SYSTOLIC BLOOD PRESSURE: 135 MMHG | DIASTOLIC BLOOD PRESSURE: 50 MMHG | OXYGEN SATURATION: 98 %

## 2020-07-16 LAB
GLUCOSE BLD-MCNC: 104 MG/DL (ref 70–108)
SARS-COV-2, NAAT: NOT DETECTED

## 2020-07-16 PROCEDURE — 6360000002 HC RX W HCPCS: Performed by: PAIN MEDICINE

## 2020-07-16 PROCEDURE — 6360000002 HC RX W HCPCS: Performed by: NURSE ANESTHETIST, CERTIFIED REGISTERED

## 2020-07-16 PROCEDURE — 2720000010 HC SURG SUPPLY STERILE: Performed by: PAIN MEDICINE

## 2020-07-16 PROCEDURE — 3209999900 FLUORO FOR SURGICAL PROCEDURES

## 2020-07-16 PROCEDURE — 3700000000 HC ANESTHESIA ATTENDED CARE: Performed by: PAIN MEDICINE

## 2020-07-16 PROCEDURE — 82948 REAGENT STRIP/BLOOD GLUCOSE: CPT

## 2020-07-16 PROCEDURE — 2709999900 HC NON-CHARGEABLE SUPPLY: Performed by: PAIN MEDICINE

## 2020-07-16 PROCEDURE — 3700000001 HC ADD 15 MINUTES (ANESTHESIA): Performed by: PAIN MEDICINE

## 2020-07-16 PROCEDURE — 7100000010 HC PHASE II RECOVERY - FIRST 15 MIN: Performed by: PAIN MEDICINE

## 2020-07-16 PROCEDURE — 3600000057 HC PAIN LEVEL 4 ADDL 15 MIN: Performed by: PAIN MEDICINE

## 2020-07-16 PROCEDURE — 2500000003 HC RX 250 WO HCPCS: Performed by: PAIN MEDICINE

## 2020-07-16 PROCEDURE — 64635 DESTROY LUMB/SAC FACET JNT: CPT | Performed by: PAIN MEDICINE

## 2020-07-16 PROCEDURE — 64636 DESTROY L/S FACET JNT ADDL: CPT | Performed by: PAIN MEDICINE

## 2020-07-16 PROCEDURE — U0002 COVID-19 LAB TEST NON-CDC: HCPCS

## 2020-07-16 PROCEDURE — 3600000056 HC PAIN LEVEL 4 BASE: Performed by: PAIN MEDICINE

## 2020-07-16 PROCEDURE — 7100000011 HC PHASE II RECOVERY - ADDTL 15 MIN: Performed by: PAIN MEDICINE

## 2020-07-16 RX ORDER — PROPOFOL 10 MG/ML
INJECTION, EMULSION INTRAVENOUS PRN
Status: DISCONTINUED | OUTPATIENT
Start: 2020-07-16 | End: 2020-07-16 | Stop reason: SDUPTHER

## 2020-07-16 RX ORDER — METHYLPREDNISOLONE ACETATE 80 MG/ML
INJECTION, SUSPENSION INTRA-ARTICULAR; INTRALESIONAL; INTRAMUSCULAR; SOFT TISSUE PRN
Status: DISCONTINUED | OUTPATIENT
Start: 2020-07-16 | End: 2020-07-16 | Stop reason: ALTCHOICE

## 2020-07-16 RX ORDER — LIDOCAINE HYDROCHLORIDE 10 MG/ML
INJECTION, SOLUTION EPIDURAL; INFILTRATION; INTRACAUDAL; PERINEURAL PRN
Status: DISCONTINUED | OUTPATIENT
Start: 2020-07-16 | End: 2020-07-16 | Stop reason: ALTCHOICE

## 2020-07-16 RX ORDER — BUPIVACAINE HYDROCHLORIDE 2.5 MG/ML
INJECTION, SOLUTION EPIDURAL; INFILTRATION; INTRACAUDAL PRN
Status: DISCONTINUED | OUTPATIENT
Start: 2020-07-16 | End: 2020-07-16 | Stop reason: ALTCHOICE

## 2020-07-16 RX ADMIN — PROPOFOL 40 MG: 10 INJECTION, EMULSION INTRAVENOUS at 10:32

## 2020-07-16 ASSESSMENT — PULMONARY FUNCTION TESTS
PIF_VALUE: 0

## 2020-07-16 ASSESSMENT — ENCOUNTER SYMPTOMS: SHORTNESS OF BREATH: 1

## 2020-07-16 ASSESSMENT — PAIN - FUNCTIONAL ASSESSMENT: PAIN_FUNCTIONAL_ASSESSMENT: 0-10

## 2020-07-16 ASSESSMENT — PAIN DESCRIPTION - PAIN TYPE: TYPE: SURGICAL PAIN

## 2020-07-16 ASSESSMENT — PAIN DESCRIPTION - DESCRIPTORS: DESCRIPTORS: ACHING;CONSTANT;DULL

## 2020-07-16 ASSESSMENT — PAIN SCALES - GENERAL: PAINLEVEL_OUTOF10: 3

## 2020-07-16 ASSESSMENT — PAIN DESCRIPTION - LOCATION: LOCATION: HEAD

## 2020-07-16 NOTE — H&P
[facility]  History and Physical Update    Pt Name: Bridgett Jenkins  MRN: [id number]  YOB: 1950  Date of evaluation: 7/16/2020      I have examined the patient and reviewed the H&P/Consult and there are no changes to the patient or plans.         Electronically signed by Malvin Yang MD on 7/16/2020 at 9:09 AM

## 2020-07-16 NOTE — PROGRESS NOTES
2598 Lab called. COVID test results from 7/10 not completed. Needs Rapid COVID testing.  0920COVID rapid tests obtained per Rocío Sargent RN  7666 Discharge instructions given, understanding voiced, questions answered. Family to car.

## 2020-07-16 NOTE — H&P
H & P    Main complaint remains right lower back pain- aching, stabbing pain. Still receiving relief from Left L-facet RFA as left lower back pain is tolerable.      Has been staying home and in d/t virus      Norco prn remains effective. Decreases pain to a tolerable level.     States that breathing has been better is on 2 liters of oxygen today.         Medications reviewed. Patient denies side effects with medications. Patient states she is taking medications as prescribed. Shedenies receiving pain medications from other sources. She had 1 ER visit since last visit any ER visits since last visit.     Pain scale with out pain medications or at its worst is 6/10. Pain scale with pain medications or at its best is 3/10. Last dose of Pierce was yesterday  Drug screen reviewed from 1/29/2020 and was appropriate  Pill count was completed today and was appropriate  Patient does not have naloxone available at home. Patient has not required use of naloxone at home since last office visit.         The patientis allergic to codeine; doxycycline; lipitor; simvastatin; and chantix [varenicline tartrate].      Past Medical History  Viraj Nassar  has a past medical history of Bladder dysfunction, CAD (coronary artery disease), Cerebral artery occlusion with cerebral infarction [facility]), Cerebrovascular disease, CHF (congestive heart failure) ([facility] [address] COPD (chronic obstructive pulmonary disease) ([facility] [address] DDD (degenerative disc disease), Depression, GERD (gastroesophageal reflux disease), Hyperlipidemia, Hypertension, Obesity, Other disorders of kidney and ureter in diseases classified elsewhere, Other screening mammogram, Pap smear, as part of routine gynecological examination, Pap smear, as part of routine gynecological examination, Peripheral edema, Pneumonia, PONV (postoperative nausea and vomiting), and Unspecified sleep apnea.     Past Surgical History  The patient  has a past surgical history that includes Dilation and curettage of uterus (1970's); eye surgery (2013); other surgical history (2016); Colonoscopy (7-2009); other surgical history (Left, 12/12/2017); Lumbar spine surgery (Left, 12/12/2017); Endoscopy, colon, diagnostic; Lumbar spine surgery (Right, 10/7/2019); and Pain management procedure (Left, 2/7/2020).    Family History  This patient's family history includes Alzheimer's Disease in her father; COPD in her sister; Cancer in her brother and daughter; Diabetes in her daughter; Heart Disease in her father and mother; Heart Failure in her father and mother; High Blood Pressure in her father and mother; Parkinsonism in her father; Stroke in her brother.  Mendoza Latham  reports that she quit smoking about 9 years ago. Her smoking use included cigarettes. She has a 80.00 pack-year smoking history. She has never used smokeless tobacco. She reports that she does not drink alcohol or use drugs.     Medications  Current Medication     Current Outpatient Medications:     predniSONE (DELTASONE) 20 MG tablet, Take 1 tablet by mouth daily for 5 days, Disp: 5 tablet, Rfl: 0    Tiotropium Bromide-Olodaterol 2.5-2.5 MCG/ACT AERS, Inhale 2 puffs into the lungs daily, Disp: 1 Inhaler, Rfl: 3    HYDROcodone-acetaminophen (NORCO) 5-325 MG per tablet, Take 1 tablet by mouth every 8 hours as needed for Pain for up to 30 days. , Disp: 90 tablet, Rfl: 0    metoprolol succinate (TOPROL XL) 25 MG extended release tablet, Take 25 mg by mouth daily, Disp: , Rfl:     albuterol (PROVENTIL) (2.5 MG/3ML) 0.083% nebulizer solution, Take 3 mLs by nebulization every 6 hours as needed for Wheezing or Shortness of Breath, Disp: 120 mL, Rfl: 11    montelukast (SINGULAIR) 10 MG tablet, Take 1 tablet by mouth nightly, Disp: 30 tablet, Rfl: 3    albuterol sulfate  (90 Base) MCG/ACT inhaler, Inhale 2 puffs into the lungs 4 times daily as needed for Wheezing, Disp: 1 Inhaler, Rfl: 5    metFORMIN (GLUCOPHAGE) 500 MG tablet, Take 500 mg by mouth daily (with breakfast), Disp: , Rfl:     furosemide (LASIX) 20 MG tablet, Take 20 mg by mouth daily as needed, Disp: , Rfl:     triamterene-hydrochlorothiazide (MAXZIDE-25) 37.5-25 MG per tablet, take 1 tablet by mouth every morning, Disp: , Rfl: 0    RA COL-RITE 100 MG capsule, daily , Disp: , Rfl: 0    clopidogrel (PLAVIX) 75 MG tablet, Take 1 tablet by mouth daily, Disp: 30 tablet, Rfl: 11    atorvastatin (LIPITOR) 40 MG tablet, Take 1 tablet by mouth daily, Disp: 30 tablet, Rfl: 3    pseudoephedrine (SUDAFED 12 HR) 120 MG extended release tablet, Take 120 mg by mouth every 12 hours as needed for Congestion, Disp: , Rfl:     amLODIPine (NORVASC) 5 MG tablet, Take 5 mg by mouth daily, Disp: , Rfl:     losartan (COZAAR) 50 MG tablet, Take 50 mg by mouth daily , Disp: , Rfl: 0    OXYGEN,  Inhale into the lungs 3 lpm, per patient at home and when walking, Disp: , Rfl:     aspirin 81 MG EC tablet, Take 81 mg by mouth daily. , Disp: , Rfl:         Subjective:      Review of Systems   Respiratory: Positive for cough. On 2 liters of oxygen per nasal cannula    Musculoskeletal: Positive for arthralgias, back pain and myalgias. Neurological: Positive for weakness. Negative for numbness.         Objective:      Vitals       Vitals:     06/08/20 0801   BP: 134/62   Site: Right Upper Arm   Position: Sitting   Cuff Size: Medium Adult   Pulse: 70   Temp: 97 °F (36.1 °C)   TempSrc: Temporal   Weight: 160 lb (72.6 kg)   Height: 5' 2\" (1.575 m)           Physical Exam  Vitals signs and nursing note reviewed. Constitutional:       General: She is not in acute distress. Appearance: She is well-developed. She is not diaphoretic. HENT:      Head: Normocephalic and atraumatic. Right Ear: External ear normal.      Left Ear: External ear normal.      Nose: Nose normal.      Mouth/Throat:      Pharynx: No oropharyngeal exudate. Eyes:      General: No scleral icterus. Right eye: No discharge. Left eye: No discharge. Conjunctiva/sclera: Conjunctivae normal.      Pupils: Pupils are equal, round, and reactive to light. Neck:      Musculoskeletal: Full passive range of motion without pain, normal range of motion and neck supple. Normal range of motion. No edema, erythema, neck rigidity or muscular tenderness. Thyroid: No thyromegaly. Cardiovascular:      Rate and Rhythm: Normal rate and regular rhythm. Heart sounds: Normal heart sounds. No murmur. No friction rub. No gallop. Pulmonary:      Effort: Pulmonary effort is normal. No respiratory distress. Breath sounds: Examination of the right-upper field reveals decreased breath sounds and wheezing. Examination of the left-upper field reveals decreased breath sounds and wheezing. Examination of the right-middle field reveals decreased breath sounds. Examination of the left-middle field reveals decreased breath sounds. Examination of the right-lower field reveals decreased breath sounds. Examination of the left-lower field reveals decreased breath sounds. Decreased breath sounds and wheezing present. No rales. Chest:      Chest wall: No tenderness. Abdominal:      General: Bowel sounds are normal. There is no distension. Palpations: Abdomen is soft. Tenderness: There is no abdominal tenderness. There is no guarding or rebound. Musculoskeletal:      Right shoulder: She exhibits decreased range of motion and tenderness. Left shoulder: She exhibits decreased range of motion and tenderness. Right elbow: Normal.     Left elbow: Normal.      Right wrist: Normal.      Left wrist: Normal.      Right hip: Normal.      Left hip: She exhibits decreased range of motion, decreased strength and tenderness. Right knee: Normal.      Left knee: Normal.      Cervical back: She exhibits decreased range of motion, tenderness, pain and spasm. Thoracic back: She exhibits tenderness.       Lumbar back: She exhibits tenderness, pain and spasm. Back:       Right upper leg: She exhibits tenderness. Left upper leg: She exhibits tenderness. Skin:     General: Skin is warm. Coloration: Skin is not pale. Findings: No erythema or rash. Neurological:      Mental Status: She is alert and oriented to person, place, and time. She is not disoriented. Cranial Nerves: No cranial nerve deficit. Sensory: No sensory deficit. Motor: No atrophy or abnormal muscle tone. Coordination: Coordination normal.      Gait: Gait normal.      Deep Tendon Reflexes: Reflexes are normal and symmetric. Comments: SLR-  Motor 5/5 BUE BLE   Psychiatric:         Attention and Perception: She is attentive. Mood and Affect: Mood is not anxious or depressed. Affect is not labile, blunt, angry or inappropriate. Speech: Speech normal.         Behavior: Behavior normal. Behavior is not agitated, slowed, aggressive, withdrawn, hyperactive or combative. Thought Content: Thought content normal. Thought content is not paranoid or delusional. Thought content does not include homicidal or suicidal ideation. Thought content does not include homicidal or suicidal plan. Cognition and Memory: Memory is not impaired. She does not exhibit impaired recent memory or impaired remote memory. Judgment: Judgment normal. Judgment is not impulsive or inappropriate.         ANASTACIA test: + bilaterally   Yeomans test: + bilaterally   Gaenslen test: + bilaterally      Assessment:      1. Spondylosis of lumbar region without myelopathy or radiculopathy    2. SI (sacroiliac) joint inflammation (HCC)    3. Spinal stenosis of lumbar region without neurogenic claudication    4. Chronic pain syndrome            Plan:      · OARRS reviewed. Current MED: 15.00  · Patient was offered naloxone for home. Refused. · Discussed long term side effects of medications, tolerance, dependency and addiction.   · Previous UDS

## 2020-07-16 NOTE — ANESTHESIA PRE PROCEDURE
Department of Anesthesiology  Preprocedure Note       Name:  Garrison Szymanski   Age:  71 y.o.  :  1950                                          MRN:  [id number]         Date:  2020      Surgeon: Santos Villa):  Nelli Esparza MD    Procedure: Procedure(s):  Right L-facet RFA @ L2-3, L3-4, L4-5, and L5-S1    Medications prior to admission:   Prior to Admission medications    Medication Sig Start Date End Date Taking? Authorizing Provider   fluticasone (FLONASE) 50 MCG/ACT nasal spray 1 spray by Each Nostril route daily    Historical Provider, MD   omeprazole (PRILOSEC) 40 MG delayed release capsule Take 40 mg by mouth daily    Historical Provider, MD   polyethylene glycol (GLYCOLAX) 17 g packet Take 17 g by mouth daily as needed for Constipation    Historical Provider, MD   HYDROcodone-acetaminophen (NORCO) 5-325 MG per tablet Take 1 tablet by mouth every 8 hours as needed for Pain for up to 30 days.  20  CASSANDRA Blanc CNP   Tiotropium Bromide-Olodaterol 2.5-2.5 MCG/ACT AERS Inhale 2 puffs into the lungs daily 20  CASSANDRA Mccoy CNP   albuterol (PROVENTIL) (2.5 MG/3ML) 0.083% nebulizer solution Take 3 mLs by nebulization every 6 hours as needed for Wheezing or Shortness of Breath 3/16/20   CASSANDRA Gallardo CNP   montelukast (SINGULAIR) 10 MG tablet Take 1 tablet by mouth nightly 3/16/20   CASSANDRA Mccoy CNP   albuterol sulfate  (90 Base) MCG/ACT inhaler Inhale 2 puffs into the lungs 4 times daily as needed for Wheezing 3/16/20   CASSANDRA Mccoy CNP   metFORMIN (GLUCOPHAGE) 500 MG tablet Take 500 mg by mouth daily (with breakfast)    Historical Provider, MD   furosemide (LASIX) 20 MG tablet Take 20 mg by mouth daily as needed    Historical Provider, MD   triamterene-hydrochlorothiazide (MAXZIDE-25) 37.5-25 MG per tablet take 1 tablet by mouth every morning 19   Historical Provider, MD   RA COL-RITE 100 MG capsule daily  19 FIRST performed by Alhaji Rae MD at [facility]  2016    burning nerves in back - lumbar @ [facility]    OTHER SURGICAL HISTORY Left 2017    Lumbar RFA L2-3, L3-4, L4-5, L5-S1    PAIN MANAGEMENT PROCEDURE Left 2020    Left L-facet RFA @ L2-3, L3-4, L4-5, and L5-S1 performed by Alhaji Rae MD at [address] [address] [address] History:    Social History     Tobacco Use    Smoking status: Former Smoker     Packs/day: 2.00     Years: 40.00     Pack years: 80.00     Types: Cigarettes     Last attempt to quit: 2010     Years since quittin.6    Smokeless tobacco: Never Used   Substance Use Topics    Alcohol use: No     Comment: pt hasn't drank since                                 Counseling given: Not Answered      Vital Signs (Current): There were no vitals filed for this visit.                                            BP Readings from Last 3 Encounters:   20 (!) 129/56   20 134/62   20 136/62       NPO Status:                                                                                 BMI:   Wt Readings from Last 3 Encounters:   20 160 lb (72.6 kg)   20 160 lb (72.6 kg)   20 160 lb (72.6 kg)     There is no height or weight on file to calculate BMI.    CBC:   Lab Results   Component Value Date    WBC 8.2 2020    RBC 3.81 2020    RBC 4.30 2012    HGB 10.5 2020    HCT 35.0 2020    MCV 91.9 2020    RDW 13.3 2018     2020       CMP:   Lab Results   Component Value Date     2020    K 4.0 2020    CL 91 2020    CO2 36 2020    BUN 15 2020    CREATININE 0.7 2020    GFRAA >60 2019    LABGLOM 83 2020    GLUCOSE 180 2020    PROT 7.2 2020    CALCIUM 9.7 2020    BILITOT <0.2 2020    BILITOT NEGATIVE 2012    ALKPHOS 77 2020    AST 11 2020    ALT 9 05/23/2020       POC Tests: No results for input(s): POCGLU, POCNA, POCK, POCCL, POCBUN, POCHEMO, POCHCT in the last 72 hours. Coags:   Lab Results   Component Value Date    INR 0.91 01/08/2019    APTT 33.6 01/08/2019       HCG (If Applicable): No results found for: PREGTESTUR, PREGSERUM, HCG, HCGQUANT     ABGs: No results found for: PHART, PO2ART, VMW4NHO, NIQ9PQS, BEART, V8MLYUHS     Type & Screen (If Applicable):  Lab Results   Component Value Date    LABRH NEG 01/08/2019       Drug/Infectious Status (If Applicable):  No results found for: HIV, HEPCAB    COVID-19 Screening (If Applicable):   Lab Results   Component Value Date    COVID19 NOT DETECTED 05/22/2020    COVID19 Not Detected 05/22/2020         Anesthesia Evaluation     history of anesthetic complications: PONV. Airway: Mallampati: II       Mouth opening: > = 3 FB Dental:          Pulmonary:   (+) COPD:  shortness of breath:  sleep apnea:                             Cardiovascular:    (+) hypertension:, angina:, CAD:, CHF:,                   Neuro/Psych:               GI/Hepatic/Renal:   (+) GERD:,           Endo/Other:    (+) Diabetes, . Abdominal:   (+) obese,         Vascular:                                        Anesthesia Plan      MAC     ASA 4             Anesthetic plan and risks discussed with patient. Plan discussed with CRNA.                   Garett Yi MD   7/16/2020

## 2020-07-16 NOTE — ANESTHESIA POSTPROCEDURE EVALUATION
Department of Anesthesiology  Postprocedure Note    Patient: Angela Sims  MRN: [id number]  YOB: 1950  Date of evaluation: 7/16/2020  Time:  1:17 PM     Procedure Summary     Date:  07/16/20 Room / Location:  [address] [place] [address] 03 / 138 [facility]    Anesthesia Start:  1024 Anesthesia Stop:  3874    Procedure:  Right L-facet RFA @ L2-3, L3-4, L4-5, and L5-S1 (Right ) Diagnosis:  (LUMBAR SPONDYLOSIS)    Surgeon:  Javy Jenkins MD Responsible Provider:  Alton Mccoy MD    Anesthesia Type:  MAC ASA Status:  4          Anesthesia Type: MAC    Eryn Phase I: Eryn Score: 9    Eryn Phase II: Eryn Score: 9    Last vitals: Reviewed and per EMR flowsheets.        Anesthesia Post Evaluation

## 2020-07-16 NOTE — OP NOTE
Operative Note    Pre-Procedure Note    Patient Name: Mikayla Ricketts   YOB: 1950  Medical Record Number: [id number]  Date: 7/16/20    Indication:  Lower back pain  Consent: On file. Vital Signs:   Vitals:    07/16/20 0933   BP: 130/60   Pulse: 75   Resp: 17   Temp: 96.7 °F (35.9 °C)   SpO2: 98%       Past Medical History:   has a past medical history of Bladder dysfunction, CAD (coronary artery disease), Cerebral artery occlusion with cerebral infarction [facility]), Cerebrovascular disease, CHF (congestive heart failure) ([facility] [address] COPD (chronic obstructive pulmonary disease) ([facility] [address] DDD (degenerative disc disease), Depression, GERD (gastroesophageal reflux disease), Hyperlipidemia, Hypertension, Obesity, Other disorders of kidney and ureter in diseases classified elsewhere, Other screening mammogram, Pap smear, as part of routine gynecological examination, Pap smear, as part of routine gynecological examination, Peripheral edema, Pneumonia, PONV (postoperative nausea and vomiting), and Unspecified sleep apnea. Past Surgical History:   has a past surgical history that includes Dilation and curettage of uterus (1970's); eye surgery (2013); other surgical history (2016); Colonoscopy (7-2009); other surgical history (Left, 12/12/2017); Lumbar spine surgery (Left, 12/12/2017); Endoscopy, colon, diagnostic; Lumbar spine surgery (Right, 10/7/2019); and Pain management procedure (Left, 2/7/2020). Pre-Sedation Documentation and Exam:   Vital signs have been reviewed (see flow sheet for vitals).      Sedation/ Anesthesia Plan:   MAC    Patient is an appropriate candidate for plan of sedation: yes            Preoperative Diagnosis:  L-spondylosis     Post-Op Dx: as above     Procedure Performed:  :Radiofrequency ablation of median branches at the levels of L2-3,L3-4,L4-5 and L5-S1 right under fluoroscopic guidance      Indication for the Procedure:  The patient has ahistory of chronic low back pain that is not responding well to the conservative treatment.  Patient's pain is mostly axial in nature.  Pain is interfering with the activities of daily living.  Physical examination revealed facet tenderness and facet loading is positive.  Patient had undergone lumbar facet joint injections with pain relief that lasted for only a short period of time and had greater than 70% pain relief with confirmatory median branch blocks.  Hence we decided to do radiofrequency abalation of median branches for long term pain releif.   The procedure and risks  were discussed with the patient and an informed consent was obtained.     Procedure:  Right side   A meaningful communication was kept up with the patient throughout the procedure. The patient is placed in prone position and skin over the back was prepped and draped in sterile manner.  Then using fluoroscopy the junction of the transverse process of the vertebra with the superior process of the facet joint was observed and the view was optimized.  The skin and deep tissues posterior were infiltrated with 15 ml of  1% xylocaine . The RF canula with the 10 mm active tip was introduced through the skin wheal under fluoroscopy guidance such that the tip of the needle lies in the groove of the transverse process with the superior processes of the facet joint.  Then a lateral view of the lumbar spine was obtained to make sure the tip of needle is not in the neural foramen.  Then electric impedence was checked to make sure it is acceptable. Then a sensory stimulus was applied at 50 Hz up to 1 volt and concordant pain symptoms were reproduced. Then a motor stimulus was applied at 2 Hz up to 2 volts and no motor stimulation was seen in lower extremities.  Some multifidus stimulus was seen. Denilson Phillips after negative aspiration a mixture of depomedrol 80  mg and 0.25%  marcaine 2 cc was injected through the needle.  Then a initial lesion was done at 80 degrees centigrade for 90

## 2020-07-16 NOTE — PROGRESS NOTES
1038-Patient to Phase II via cart. Report received from Mirian Mccall. Patient drowsy but responsive. Vitals obtained and stable. Respirations even and unlabored on home O2 2LNC. Patient denies nausea, numbness and tingling. Patient rates headache 5/10. Patient able to move all extremities. No drainage noted at injection sites. Patient instructed to stay in bed. Instructed on call light use. 1042-Patient provided with snack and drink. Denies needs. Call light in reach. 1057-IV removed with no complications. Patient continues to deny pain, nausea, numbness, and tingling. Patient getting dressed in bed.  1105-Patient discharged in stable condition. All belongings given to patient. Patient ambulated to car with assistance from RN. Patient tolerated well.

## 2020-07-17 LAB — SARS-COV-2: NORMAL

## 2020-07-27 RX ORDER — HYDROCODONE BITARTRATE AND ACETAMINOPHEN 5; 325 MG/1; MG/1
1 TABLET ORAL EVERY 8 HOURS PRN
Qty: 90 TABLET | Refills: 0 | Status: SHIPPED | OUTPATIENT
Start: 2020-07-30 | End: 2020-08-27 | Stop reason: SDUPTHER

## 2020-07-27 NOTE — TELEPHONE ENCOUNTER
OARRS reviewed.  UDS: + for  norco - consistent    Narcan offered: yes, refused     Last seen: 06/08/2020     Follow-up:   Future Appointments   Date Time Provider Jesu Hoi   7/30/2020  9:15 AM CASSANDRA Berg CNP [facility] Pain [address]   7/31/2020  9:40 AM STR CT IMAGING RM1 STRZ CT SCAN STR Radiolog   8/6/2020 10:00 AM CASSANDRA Babin CNP Pulm Med [address]   6/21/2021 11:30 AM Jb Mccurdy MD [facility] Heart [address]

## 2020-07-30 ENCOUNTER — OFFICE VISIT (OUTPATIENT)
Dept: PHYSICAL MEDICINE AND REHAB | Age: 70
End: 2020-07-30
Payer: MEDICARE

## 2020-07-30 VITALS
HEIGHT: 62 IN | WEIGHT: 157 LBS | DIASTOLIC BLOOD PRESSURE: 70 MMHG | TEMPERATURE: 97.6 F | BODY MASS INDEX: 28.89 KG/M2 | SYSTOLIC BLOOD PRESSURE: 132 MMHG

## 2020-07-30 PROCEDURE — 99213 OFFICE O/P EST LOW 20 MIN: CPT | Performed by: NURSE PRACTITIONER

## 2020-07-30 ASSESSMENT — ENCOUNTER SYMPTOMS
COUGH: 1
BACK PAIN: 1
SHORTNESS OF BREATH: 1
CHEST TIGHTNESS: 1

## 2020-07-30 NOTE — PROGRESS NOTES
[facility]  [address]  Dept: [phone]  Dept Fax: [phone]: [phone]    Visit Date: 7/30/2020    Functionality Assessment/Goals Worksheet     On a scale of 0 (Does not Interfere) to 10 (Completely Interferes)     1. Which number describes how during the past week pain has interfered with       the following:  A. General Activity:  6  B. Mood: 6  C. Walking Ability:  5  D. Normal Work (Includes both work outside the home and housework):  6  E. Relations with Other People:   0  F. Sleep:   8  G. Enjoyment of Life:   5    2. Patient Prefers to Take their Pain Medications:     [x]  On a regular basis   []  Only when necessary    []  Does not take pain medications    3. What are the Patient's Goals/Expectations for Visiting Pain Management? []  Learn about my pain    [x]  Receive Medication   []  Physical Therapy     []  Treat Depression   [x]  Receive Injections    []  Treat Sleep   []  Deal with Anxiety and Stress   []  Treat Opoid Dependence/Addiction   []  Other:      HPI:   Cassie Haskins is a 71 y.o. female is here today for    Chief Complaint: Lower back pain    HPI   FU from Right L-facet RFA from 7/16/2020. Receiving 80% relief of right lower back at this time form right L-facet RFA, pain is much better and patient feels that activity is easier with less pain. Now noticing increased left lower back pain as effects from Left L-facet RFA are waning. This is the pain complaint at this time- sharp, aching and burning pain. Norco prn remains effective. On 3 liters of oxygen per nasal cannula. Medications reviewed. Patient denies side effects with medications. Patient states she is taking medications as prescribed. Shedenies receiving pain medications from other sources. She denies any ER visits since last visit.     Pain scale with out pain medications or father; Stroke in her brother. Social History  Muna Or  reports that she quit smoking about 9 years ago. Her smoking use included cigarettes. She has a 80.00 pack-year smoking history. She has never used smokeless tobacco. She reports that she does not drink alcohol or use drugs. Medications    Current Outpatient Medications:     HYDROcodone-acetaminophen (NORCO) 5-325 MG per tablet, Take 1 tablet by mouth every 8 hours as needed for Pain for up to 30 days. , Disp: 90 tablet, Rfl: 0    fluticasone (FLONASE) 50 MCG/ACT nasal spray, 1 spray by Each Nostril route daily, Disp: , Rfl:     omeprazole (PRILOSEC) 40 MG delayed release capsule, Take 40 mg by mouth daily, Disp: , Rfl:     polyethylene glycol (GLYCOLAX) 17 g packet, Take 17 g by mouth daily as needed for Constipation, Disp: , Rfl:     Tiotropium Bromide-Olodaterol 2.5-2.5 MCG/ACT AERS, Inhale 2 puffs into the lungs daily, Disp: 1 Inhaler, Rfl: 3    albuterol (PROVENTIL) (2.5 MG/3ML) 0.083% nebulizer solution, Take 3 mLs by nebulization every 6 hours as needed for Wheezing or Shortness of Breath, Disp: 120 mL, Rfl: 11    montelukast (SINGULAIR) 10 MG tablet, Take 1 tablet by mouth nightly, Disp: 30 tablet, Rfl: 3    albuterol sulfate  (90 Base) MCG/ACT inhaler, Inhale 2 puffs into the lungs 4 times daily as needed for Wheezing, Disp: 1 Inhaler, Rfl: 5    metFORMIN (GLUCOPHAGE) 500 MG tablet, Take 500 mg by mouth daily (with breakfast), Disp: , Rfl:     furosemide (LASIX) 20 MG tablet, Take 20 mg by mouth daily as needed, Disp: , Rfl:     triamterene-hydrochlorothiazide (MAXZIDE-25) 37.5-25 MG per tablet, take 1 tablet by mouth every morning, Disp: , Rfl: 0    RA COL-RITE 100 MG capsule, daily , Disp: , Rfl: 0    clopidogrel (PLAVIX) 75 MG tablet, Take 1 tablet by mouth daily, Disp: 30 tablet, Rfl: 11    atorvastatin (LIPITOR) 40 MG tablet, Take 1 tablet by mouth daily, Disp: 30 tablet, Rfl: 3    pseudoephedrine (SUDAFED 12 HR) 120 MG extended release tablet, Take 120 mg by mouth every 12 hours as needed for Congestion, Disp: , Rfl:     amLODIPine (NORVASC) 5 MG tablet, Take 5 mg by mouth daily, Disp: , Rfl:     losartan (COZAAR) 50 MG tablet, Take 50 mg by mouth daily , Disp: , Rfl: 0    OXYGEN,  Inhale into the lungs 3 lpm, per patient at home and when walking, Disp: , Rfl:     aspirin 81 MG EC tablet, Take 81 mg by mouth daily. , Disp: , Rfl:     Subjective:      Review of Systems   Constitutional: Negative. Respiratory: Positive for cough, chest tightness and shortness of breath. On 3 liters of oxygen per nasal cannula    Cardiovascular: Negative. Musculoskeletal: Positive for arthralgias, back pain, gait problem and myalgias. Neurological: Positive for weakness and numbness. Psychiatric/Behavioral: Negative. Objective:     Vitals:    07/30/20 0910   BP: 132/70   Temp: 97.6 °F (36.4 °C)   Weight: 157 lb (71.2 kg)   Height: 5' 2\" (1.575 m)       Physical Exam  Vitals signs and nursing note reviewed. Constitutional:       General: She is not in acute distress. Appearance: She is well-developed. She is not diaphoretic. HENT:      Head: Normocephalic and atraumatic. Right Ear: External ear normal.      Left Ear: External ear normal.      Nose: Nose normal.      Mouth/Throat:      Pharynx: No oropharyngeal exudate. Eyes:      General: No scleral icterus. Right eye: No discharge. Left eye: No discharge. Conjunctiva/sclera: Conjunctivae normal.      Pupils: Pupils are equal, round, and reactive to light. Neck:      Musculoskeletal: Full passive range of motion without pain, normal range of motion and neck supple. Normal range of motion. No edema, erythema, neck rigidity or muscular tenderness. Thyroid: No thyromegaly. Cardiovascular:      Rate and Rhythm: Normal rate and regular rhythm. Heart sounds: Normal heart sounds. No murmur. No friction rub. No gallop. Pulmonary:      Effort: Pulmonary effort is normal. No respiratory distress. Breath sounds: Examination of the right-upper field reveals decreased breath sounds and wheezing. Examination of the left-upper field reveals decreased breath sounds and wheezing. Examination of the right-middle field reveals decreased breath sounds. Examination of the left-middle field reveals decreased breath sounds. Examination of the right-lower field reveals decreased breath sounds. Examination of the left-lower field reveals decreased breath sounds. Decreased breath sounds and wheezing present. No rales. Chest:      Chest wall: No tenderness. Abdominal:      General: Bowel sounds are normal. There is no distension. Palpations: Abdomen is soft. Tenderness: There is no abdominal tenderness. There is no guarding or rebound. Musculoskeletal:      Right shoulder: She exhibits decreased range of motion and tenderness. Left shoulder: She exhibits decreased range of motion and tenderness. Right elbow: Normal.     Left elbow: Normal.      Right wrist: Normal.      Left wrist: Normal.      Right hip: Normal.      Left hip: She exhibits decreased range of motion, decreased strength and tenderness. Right knee: Normal.      Left knee: Normal.      Cervical back: She exhibits decreased range of motion, tenderness, pain and spasm. Thoracic back: She exhibits tenderness. Lumbar back: She exhibits tenderness, pain and spasm. Back:       Right upper leg: She exhibits tenderness. Left upper leg: She exhibits tenderness. Skin:     General: Skin is warm. Coloration: Skin is not pale. Findings: No erythema or rash. Neurological:      Mental Status: She is alert and oriented to person, place, and time. She is not disoriented. Cranial Nerves: No cranial nerve deficit. Sensory: No sensory deficit. Motor: No atrophy or abnormal muscle tone.       Coordination: Coordination

## 2020-08-19 ENCOUNTER — HOSPITAL ENCOUNTER (OUTPATIENT)
Age: 70
Discharge: HOME | End: 2020-08-19
Payer: MEDICARE

## 2020-08-19 PROCEDURE — U0003 INFECTIOUS AGENT DETECTION BY NUCLEIC ACID (DNA OR RNA); SEVERE ACUTE RESPIRATORY SYNDROME CORONAVIRUS 2 (SARS-COV-2) (CORONAVIRUS DISEASE [COVID-19]), AMPLIFIED PROBE TECHNIQUE, MAKING USE OF HIGH THROUGHPUT TECHNOLOGIES AS DESCRIBED BY CMS-2020-01-R: HCPCS

## 2020-08-20 LAB — SARS-COV-2: NOT DETECTED

## 2020-08-25 ENCOUNTER — HOSPITAL ENCOUNTER (OUTPATIENT)
Age: 70
Setting detail: OUTPATIENT SURGERY
Discharge: HOME | End: 2020-08-25
Attending: PAIN MEDICINE | Admitting: PAIN MEDICINE
Payer: MEDICARE

## 2020-08-25 ENCOUNTER — ANESTHESIA (OUTPATIENT)
Dept: OPERATING ROOM | Age: 70
End: 2020-08-25
Payer: MEDICARE

## 2020-08-25 ENCOUNTER — APPOINTMENT (OUTPATIENT)
Dept: GENERAL RADIOLOGY | Age: 70
End: 2020-08-25
Attending: PAIN MEDICINE
Payer: MEDICARE

## 2020-08-25 ENCOUNTER — ANESTHESIA EVENT (OUTPATIENT)
Dept: OPERATING ROOM | Age: 70
End: 2020-08-25
Payer: MEDICARE

## 2020-08-25 VITALS
WEIGHT: 160.8 LBS | BODY MASS INDEX: 29.59 KG/M2 | HEART RATE: 87 BPM | HEIGHT: 62 IN | SYSTOLIC BLOOD PRESSURE: 138 MMHG | DIASTOLIC BLOOD PRESSURE: 63 MMHG | OXYGEN SATURATION: 96 % | TEMPERATURE: 97.3 F | RESPIRATION RATE: 16 BRPM

## 2020-08-25 VITALS
TEMPERATURE: 96.8 F | DIASTOLIC BLOOD PRESSURE: 58 MMHG | RESPIRATION RATE: 16 BRPM | OXYGEN SATURATION: 92 % | SYSTOLIC BLOOD PRESSURE: 126 MMHG

## 2020-08-25 LAB — GLUCOSE BLD-MCNC: 119 MG/DL (ref 70–108)

## 2020-08-25 PROCEDURE — 3700000000 HC ANESTHESIA ATTENDED CARE: Performed by: PAIN MEDICINE

## 2020-08-25 PROCEDURE — 64635 DESTROY LUMB/SAC FACET JNT: CPT | Performed by: PAIN MEDICINE

## 2020-08-25 PROCEDURE — 7100000011 HC PHASE II RECOVERY - ADDTL 15 MIN: Performed by: PAIN MEDICINE

## 2020-08-25 PROCEDURE — 82948 REAGENT STRIP/BLOOD GLUCOSE: CPT

## 2020-08-25 PROCEDURE — 6360000002 HC RX W HCPCS: Performed by: PAIN MEDICINE

## 2020-08-25 PROCEDURE — 2500000003 HC RX 250 WO HCPCS: Performed by: NURSE ANESTHETIST, CERTIFIED REGISTERED

## 2020-08-25 PROCEDURE — 3600000056 HC PAIN LEVEL 4 BASE: Performed by: PAIN MEDICINE

## 2020-08-25 PROCEDURE — 3700000001 HC ADD 15 MINUTES (ANESTHESIA): Performed by: PAIN MEDICINE

## 2020-08-25 PROCEDURE — 3600000057 HC PAIN LEVEL 4 ADDL 15 MIN: Performed by: PAIN MEDICINE

## 2020-08-25 PROCEDURE — 2500000003 HC RX 250 WO HCPCS: Performed by: PAIN MEDICINE

## 2020-08-25 PROCEDURE — 2580000003 HC RX 258: Performed by: NURSE ANESTHETIST, CERTIFIED REGISTERED

## 2020-08-25 PROCEDURE — 64636 DESTROY L/S FACET JNT ADDL: CPT | Performed by: PAIN MEDICINE

## 2020-08-25 PROCEDURE — 3209999900 FLUORO FOR SURGICAL PROCEDURES

## 2020-08-25 PROCEDURE — 7100000010 HC PHASE II RECOVERY - FIRST 15 MIN: Performed by: PAIN MEDICINE

## 2020-08-25 PROCEDURE — 2709999900 HC NON-CHARGEABLE SUPPLY: Performed by: PAIN MEDICINE

## 2020-08-25 PROCEDURE — 6360000002 HC RX W HCPCS: Performed by: NURSE ANESTHETIST, CERTIFIED REGISTERED

## 2020-08-25 RX ORDER — BUPIVACAINE HYDROCHLORIDE 2.5 MG/ML
INJECTION, SOLUTION EPIDURAL; INFILTRATION; INTRACAUDAL PRN
Status: DISCONTINUED | OUTPATIENT
Start: 2020-08-25 | End: 2020-08-25 | Stop reason: ALTCHOICE

## 2020-08-25 RX ORDER — PROPOFOL 10 MG/ML
INJECTION, EMULSION INTRAVENOUS PRN
Status: DISCONTINUED | OUTPATIENT
Start: 2020-08-25 | End: 2020-08-25 | Stop reason: SDUPTHER

## 2020-08-25 RX ORDER — LIDOCAINE HYDROCHLORIDE 10 MG/ML
INJECTION, SOLUTION EPIDURAL; INFILTRATION; INTRACAUDAL; PERINEURAL PRN
Status: DISCONTINUED | OUTPATIENT
Start: 2020-08-25 | End: 2020-08-25 | Stop reason: ALTCHOICE

## 2020-08-25 RX ORDER — SODIUM CHLORIDE 9 MG/ML
INJECTION, SOLUTION INTRAVENOUS CONTINUOUS PRN
Status: DISCONTINUED | OUTPATIENT
Start: 2020-08-25 | End: 2020-08-25 | Stop reason: SDUPTHER

## 2020-08-25 RX ORDER — LIDOCAINE HYDROCHLORIDE 10 MG/ML
INJECTION, SOLUTION EPIDURAL; INFILTRATION; INTRACAUDAL; PERINEURAL PRN
Status: DISCONTINUED | OUTPATIENT
Start: 2020-08-25 | End: 2020-08-25 | Stop reason: SDUPTHER

## 2020-08-25 RX ORDER — FENTANYL CITRATE 50 UG/ML
INJECTION, SOLUTION INTRAMUSCULAR; INTRAVENOUS PRN
Status: DISCONTINUED | OUTPATIENT
Start: 2020-08-25 | End: 2020-08-25 | Stop reason: SDUPTHER

## 2020-08-25 RX ORDER — METHYLPREDNISOLONE ACETATE 80 MG/ML
INJECTION, SUSPENSION INTRA-ARTICULAR; INTRALESIONAL; INTRAMUSCULAR; SOFT TISSUE PRN
Status: DISCONTINUED | OUTPATIENT
Start: 2020-08-25 | End: 2020-08-25 | Stop reason: ALTCHOICE

## 2020-08-25 RX ADMIN — PROPOFOL 20 MG: 10 INJECTION, EMULSION INTRAVENOUS at 10:02

## 2020-08-25 RX ADMIN — SODIUM CHLORIDE: 9 INJECTION, SOLUTION INTRAVENOUS at 08:23

## 2020-08-25 RX ADMIN — FENTANYL CITRATE 25 MCG: 50 INJECTION, SOLUTION INTRAMUSCULAR; INTRAVENOUS at 09:54

## 2020-08-25 RX ADMIN — PROPOFOL 20 MG: 10 INJECTION, EMULSION INTRAVENOUS at 10:01

## 2020-08-25 RX ADMIN — PROPOFOL 20 MG: 10 INJECTION, EMULSION INTRAVENOUS at 10:00

## 2020-08-25 RX ADMIN — LIDOCAINE HYDROCHLORIDE 30 MG: 10 INJECTION, SOLUTION EPIDURAL; INFILTRATION; INTRACAUDAL; PERINEURAL at 10:00

## 2020-08-25 ASSESSMENT — PULMONARY FUNCTION TESTS
PIF_VALUE: 0

## 2020-08-25 ASSESSMENT — PAIN - FUNCTIONAL ASSESSMENT: PAIN_FUNCTIONAL_ASSESSMENT: 0-10

## 2020-08-25 ASSESSMENT — PAIN DESCRIPTION - DESCRIPTORS: DESCRIPTORS: ACHING

## 2020-08-25 ASSESSMENT — ENCOUNTER SYMPTOMS: SHORTNESS OF BREATH: 1

## 2020-08-25 NOTE — ANESTHESIA POSTPROCEDURE EVALUATION
Department of Anesthesiology  Postprocedure Note    Patient: Bridgett Jenkins  MRN: [id number]  YOB: 1950  Date of evaluation: 8/25/2020  Time:  10:31 AM     Procedure Summary     Date:  08/25/20 Room / Location:  [address] [place] [address] [address] [facility]    Anesthesia Start:  3628 Anesthesia Stop:  1008    Procedure:  LEFT L-facet RFA @ L2-3, L3-4, L4-5, and L5-S1 (Left ) Diagnosis:  (LUMBAR SPONDYLOSIS)    Surgeon:  Malvin Yang MD Responsible Provider:  Peter Romero MD    Anesthesia Type:  MAC ASA Status:  4          Anesthesia Type: MAC    Eryn Phase I:      Eryn Phase II: Eryn Score: 9    Last vitals: Reviewed and per EMR flowsheets.        Anesthesia Post Evaluation

## 2020-08-25 NOTE — OP NOTE
Operative Note    Pre-Procedure Note    Patient Name: Patito Kenny   YOB: 1950  Medical Record Number: [id number]  Date: 8/25/20    Indication:  Lower back pain  Consent: On file. Vital Signs:   Vitals:    08/25/20 0828   BP: (!) 143/63   Pulse: 89   Resp: 22   Temp: 97 °F (36.1 °C)   SpO2: 95%       Past Medical History:   has a past medical history of Bladder dysfunction, CAD (coronary artery disease), Cerebral artery occlusion with cerebral infarction ([facility] [address] Cerebrovascular disease, CHF (congestive heart failure) ([facility] [address] COPD (chronic obstructive pulmonary disease) ([facility] [address] DDD (degenerative disc disease), Depression, GERD (gastroesophageal reflux disease), Hyperlipidemia, Hypertension, Obesity, Other disorders of kidney and ureter in diseases classified elsewhere, Other screening mammogram, Pap smear, as part of routine gynecological examination, Pap smear, as part of routine gynecological examination, Peripheral edema, Pneumonia, PONV (postoperative nausea and vomiting), and Unspecified sleep apnea. Past Surgical History:   has a past surgical history that includes Dilation and curettage of uterus (1970's); eye surgery (2013); other surgical history (2016); Colonoscopy (7-2009); other surgical history (Left, 12/12/2017); Lumbar spine surgery (Left, 12/12/2017); Endoscopy, colon, diagnostic; Lumbar spine surgery (Right, 10/7/2019); Pain management procedure (Left, 2/7/2020); and Pain management procedure (Right, 7/16/2020). Pre-Sedation Documentation and Exam:   Vital signs have been reviewed (see flow sheet for vitals).      Sedation/ Anesthesia Plan:   MAC    Patient is an appropriate candidate for plan of sedation: yes          Preoperative Diagnosis:  L-spondylosis     Post-Op Dx: as above     Procedure Performed:  :Radiofrequency ablation of median branches at the levels of L2-3,L3-4,L4-5 De Bars fluoroscopic guidance      Indication for the Procedure:  The patient has ahistory of chronic low back pain that is not responding well to the conservative treatment.  Patient's pain is mostly axial in nature.  Pain is interfering with the activities of daily living.  Physical examination revealed facet tenderness and facet loading is positive.  Patient had undergone lumbar facet joint injections with pain relief that lasted for only a short period of time and had greater than 70% pain relief with confirmatory median branch blocks.  Hence we decided to do radiofrequency abalation of median branches for long term pain releif.   The procedure and risks  were discussed with the patient and an informed consent was obtained.     Procedure:  Left side   A meaningful communication was kept up with the patient throughout the procedure. The patient is placed in prone position and skin over the back was prepped and draped in sterile manner.  Then using fluoroscopy the junction of the transverse process of the vertebra with the superior process of the facet joint was observed and the view was optimized.  The skin and deep tissues posterior were infiltrated with 15 ml of  1% xylocaine . The RF canula with the 10 mm active tip was introduced through the skin wheal under fluoroscopy guidance such that the tip of the needle lies in the groove of the transverse process with the superior processes of the facet joint.  Then a lateral view of the lumbar spine was obtained to make sure the tip of needle is not in the neural foramen.  Then electric impedence was checked to make sure it is acceptable. Then a sensory stimulus was applied at 50 Hz up to 1 volt and concordant pain symptoms were reproduced. Then a motor stimulus was applied at 2 Hz up to 2 volts and no motor stimulation was seen in lower extremities.  Some multifidus stimulus was seen. Erick Lam after negative aspiration a mixture of depomedrol 80  mg and 0.25%  marcaine 2 cc was injected through the needle.  Then a initial lesion was done at [de-identified] degrees centigrade for 90 seconds     EBL-0     Patient's vital signs and neurological status remained stable throughout the procedure and post procedural period.  The patient tolerated the procedure well and was discharged home in stable condition.              Electronically signed by Nelli Esparza MD on 8/25/20 at 9:51 AM EDT

## 2020-08-25 NOTE — H&P
H&P    Right L-facet RFA from 7/16/2020. Receiving 80% relief of right lower back at this time form right L-facet RFA, pain is much better and patient feels that activity is easier with less pain. Now noticing increased left lower back pain as effects from Left L-facet RFA are waning. This is the pain complaint at this time- sharp, aching and burning pain.         Norco prn remains effective.      On 3 liters of oxygen per nasal cannula. Medications reviewed. Patient denies side effects with medications. Patient states she is taking medications as prescribed. Shedenies receiving pain medications from other sources. She denies any ER visits since last visit.     Pain scale with out pain medications or at its worst is 7/10. Pain scale with pain medications or at its best is 4/10. Last dose of Tynan was today  Drug screen reviewed from 6/8/2020 and was appropriate  Pill count was completed today and was appropriate  Patient does not have naloxone available at home. Patient has not required use of naloxone at home since last office visit.         The patientis allergic to codeine; doxycycline; lipitor; simvastatin; and chantix [varenicline tartrate].      Past Medical History  Maximo Brunner  has a past medical history of Bladder dysfunction, CAD (coronary artery disease), Cerebral artery occlusion with cerebral infarction [facility]), Cerebrovascular disease, CHF (congestive heart failure) ([facility] COPD (chronic obstructive pulmonary disease) ([facility] DDD (degenerative disc disease), Depression, GERD (gastroesophageal reflux disease), Hyperlipidemia, Hypertension, Obesity, Other disorders of kidney and ureter in diseases classified elsewhere, Other screening mammogram, Pap smear, as part of routine gynecological examination, Pap smear, as part of routine gynecological examination, Peripheral edema, Pneumonia, PONV (postoperative nausea and vomiting), and Unspecified sleep apnea.     Past Surgical History  The patient  has a past surgical history that includes Dilation and curettage of uterus (1970's); eye surgery (2013); other surgical history (2016); Colonoscopy (7-2009); other surgical history (Left, 12/12/2017); Lumbar spine surgery (Left, 12/12/2017); Endoscopy, colon, diagnostic; Lumbar spine surgery (Right, 10/7/2019); Pain management procedure (Left, 2/7/2020); and Pain management procedure (Right, 7/16/2020).    Family History  This patient's family history includes Alzheimer's Disease in her father; COPD in her sister; Cancer in her brother and daughter; Diabetes in her daughter; Heart Disease in her father and mother; Heart Failure in her father and mother; High Blood Pressure in her father and mother; Parkinsonism in her father; Stroke in her brother.  Simin Green  reports that she quit smoking about 9 years ago. Her smoking use included cigarettes. She has a 80.00 pack-year smoking history. She has never used smokeless tobacco. She reports that she does not drink alcohol or use drugs.     Medications  Current Medication     Current Outpatient Medications:     HYDROcodone-acetaminophen (NORCO) 5-325 MG per tablet, Take 1 tablet by mouth every 8 hours as needed for Pain for up to 30 days. , Disp: 90 tablet, Rfl: 0    fluticasone (FLONASE) 50 MCG/ACT nasal spray, 1 spray by Each Nostril route daily, Disp: , Rfl:     omeprazole (PRILOSEC) 40 MG delayed release capsule, Take 40 mg by mouth daily, Disp: , Rfl:     polyethylene glycol (GLYCOLAX) 17 g packet, Take 17 g by mouth daily as needed for Constipation, Disp: , Rfl:     Tiotropium Bromide-Olodaterol 2.5-2.5 MCG/ACT AERS, Inhale 2 puffs into the lungs daily, Disp: 1 Inhaler, Rfl: 3    albuterol (PROVENTIL) (2.5 MG/3ML) 0.083% nebulizer solution, Take 3 mLs by nebulization every 6 hours as needed for Wheezing or Shortness of Breath, Disp: 120 mL, Rfl: 11    montelukast (SINGULAIR) 10 MG tablet, Take 1 tablet by mouth nightly, Disp: 30 tablet, Rfl: 3   albuterol sulfate  (90 Base) MCG/ACT inhaler, Inhale 2 puffs into the lungs 4 times daily as needed for Wheezing, Disp: 1 Inhaler, Rfl: 5    metFORMIN (GLUCOPHAGE) 500 MG tablet, Take 500 mg by mouth daily (with breakfast), Disp: , Rfl:     furosemide (LASIX) 20 MG tablet, Take 20 mg by mouth daily as needed, Disp: , Rfl:     triamterene-hydrochlorothiazide (MAXZIDE-25) 37.5-25 MG per tablet, take 1 tablet by mouth every morning, Disp: , Rfl: 0    RA COL-RITE 100 MG capsule, daily , Disp: , Rfl: 0    clopidogrel (PLAVIX) 75 MG tablet, Take 1 tablet by mouth daily, Disp: 30 tablet, Rfl: 11    atorvastatin (LIPITOR) 40 MG tablet, Take 1 tablet by mouth daily, Disp: 30 tablet, Rfl: 3    pseudoephedrine (SUDAFED 12 HR) 120 MG extended release tablet, Take 120 mg by mouth every 12 hours as needed for Congestion, Disp: , Rfl:     amLODIPine (NORVASC) 5 MG tablet, Take 5 mg by mouth daily, Disp: , Rfl:     losartan (COZAAR) 50 MG tablet, Take 50 mg by mouth daily , Disp: , Rfl: 0    OXYGEN,  Inhale into the lungs 3 lpm, per patient at home and when walking, Disp: , Rfl:     aspirin 81 MG EC tablet, Take 81 mg by mouth daily. , Disp: , Rfl:         Subjective:      Review of Systems   Constitutional: Negative. Respiratory: Positive for cough, chest tightness and shortness of breath. On 3 liters of oxygen per nasal cannula    Cardiovascular: Negative. Musculoskeletal: Positive for arthralgias, back pain, gait problem and myalgias. Neurological: Positive for weakness and numbness. Psychiatric/Behavioral: Negative.          Objective:      Vitals       Vitals:     07/30/20 0910   BP: 132/70   Temp: 97.6 °F (36.4 °C)   Weight: 157 lb (71.2 kg)   Height: 5' 2\" (1.575 m)           Physical Exam  Vitals signs and nursing note reviewed. Constitutional:       General: She is not in acute distress. Appearance: She is well-developed. She is not diaphoretic.    HENT:      Head: Normocephalic and atraumatic. Right Ear: External ear normal.      Left Ear: External ear normal.      Nose: Nose normal.      Mouth/Throat:      Pharynx: No oropharyngeal exudate. Eyes:      General: No scleral icterus. Right eye: No discharge. Left eye: No discharge. Conjunctiva/sclera: Conjunctivae normal.      Pupils: Pupils are equal, round, and reactive to light. Neck:      Musculoskeletal: Full passive range of motion without pain, normal range of motion and neck supple. Normal range of motion. No edema, erythema, neck rigidity or muscular tenderness. Thyroid: No thyromegaly. Cardiovascular:      Rate and Rhythm: Normal rate and regular rhythm. Heart sounds: Normal heart sounds. No murmur. No friction rub. No gallop. Pulmonary:      Effort: Pulmonary effort is normal. No respiratory distress. Breath sounds: Examination of the right-upper field reveals decreased breath sounds and wheezing. Examination of the left-upper field reveals decreased breath sounds and wheezing. Examination of the right-middle field reveals decreased breath sounds. Examination of the left-middle field reveals decreased breath sounds. Examination of the right-lower field reveals decreased breath sounds. Examination of the left-lower field reveals decreased breath sounds. Decreased breath sounds and wheezing present. No rales. Chest:      Chest wall: No tenderness. Abdominal:      General: Bowel sounds are normal. There is no distension. Palpations: Abdomen is soft. Tenderness: There is no abdominal tenderness. There is no guarding or rebound. Musculoskeletal:      Right shoulder: She exhibits decreased range of motion and tenderness. Left shoulder: She exhibits decreased range of motion and tenderness.       Right elbow: Normal.     Left elbow: Normal.      Right wrist: Normal.      Left wrist: Normal.      Right hip: Normal.      Left hip: She exhibits decreased range of motion, decreased strength and tenderness. Right knee: Normal.      Left knee: Normal.      Cervical back: She exhibits decreased range of motion, tenderness, pain and spasm. Thoracic back: She exhibits tenderness. Lumbar back: She exhibits tenderness, pain and spasm. Back:       Right upper leg: She exhibits tenderness. Left upper leg: She exhibits tenderness. Skin:     General: Skin is warm. Coloration: Skin is not pale. Findings: No erythema or rash. Neurological:      Mental Status: She is alert and oriented to person, place, and time. She is not disoriented. Cranial Nerves: No cranial nerve deficit. Sensory: No sensory deficit. Motor: No atrophy or abnormal muscle tone. Coordination: Coordination normal.      Gait: Gait normal.      Deep Tendon Reflexes: Reflexes are normal and symmetric. Comments: SLR-  Motor 5/5 BUE BLE   Psychiatric:         Attention and Perception: She is attentive. Mood and Affect: Mood is not anxious or depressed. Affect is not labile, blunt, angry or inappropriate. Speech: Speech normal.         Behavior: Behavior normal. Behavior is not agitated, slowed, aggressive, withdrawn, hyperactive or combative. Thought Content: Thought content normal. Thought content is not paranoid or delusional. Thought content does not include homicidal or suicidal ideation. Thought content does not include homicidal or suicidal plan. Cognition and Memory: Memory is not impaired. She does not exhibit impaired recent memory or impaired remote memory. Judgment: Judgment normal. Judgment is not impulsive or inappropriate.         ANASTACIA test: +   Yeomans test: +   Gaenslen test: +      Assessment:      1. Spondylosis of lumbar region without myelopathy or radiculopathy    2. SI (sacroiliac) joint inflammation (HCC)    3. Spinal stenosis of lumbar region without neurogenic claudication    4.  Chronic pain syndrome Plan:      · OARRS reviewed. Current MED: 15.00  · Patient was offered naloxone for home. · Discussed long term side effects of medications, tolerance, dependency and addiction. · Previous UDS reviewed  · UDS preformed today for compliance. · Patient told can not receive any pain medications from any other source. · No evidence of abuse, diversion or aberrant behavior. · Medications and/or procedures to improve function and quality of life- patient understanding with this and that may not be pain free  · Discussed with patient about safe storage of medications at home  · Discussed possible weaning of medication dosing dependent on treatment/procedure results. · Discussed with patient about risks with procedure including infection, reaction to medication, increased pain, or bleeding. · Procedure notes reviewedin detail. · Receiving 80% relief of right lower back pain at this time from right L-facet RFA. Main complaint now is pain in left lower back as effects from Left L-facet RFA is waning- received over 80% relief for 6 months with improvement in mobility   · Will order next UDS at procedure FU. · Plan LEFT L-facet RFA @ L2-3, L3-4, L4-5, and L5-S1 for longer term pain relief. Procedure and risks discussed in detail with patient. · Needs to be off blood thinners from Cardiology prior to procedure.   · Continue Norco 5/325 1 tab every 8 hours as needed for breakthrough pain. Refill ordered        Meds. Prescribed:     Encounter Medications    No orders of the defined types were placed in this encounter.        Return for LEFT L-facet RFA @ L2-3, L3-4, L4-5, and L5-S1. , follow up after procedure.

## 2020-08-25 NOTE — PROGRESS NOTES
1006- PT arrived to PACU phase 2, hooked up to monitor, pt placed back on her home 02 of 3 L  1008- Sats up to 96%  1009- Snack given  1016- IV removed  1017- Daughter called  1026- Pt discharged walked to car with this RN

## 2020-08-25 NOTE — H&P
[facility]  History and Physical Update    Pt Name: Gerhardt Conners  MRN: [id number]  YOB: 1950  Date of evaluation: 8/25/2020      I have examined the patient and reviewed the H&P/Consult and there are no changes to the patient or plans.         Electronically signed by Vitaly Casarez MD on 8/25/2020 at 8:40 AM

## 2020-08-25 NOTE — ANESTHESIA PRE PROCEDURE
5 MG tablet Take 5 mg by mouth daily   Yes Historical Provider, MD   losartan (COZAAR) 50 MG tablet Take 50 mg by mouth daily  10/20/17  Yes Historical Provider, MD   aspirin 81 MG EC tablet Take 81 mg by mouth daily. Yes Historical Provider, MD   polyethylene glycol (GLYCOLAX) 17 g packet Take 17 g by mouth daily as needed for Constipation    Historical Provider, MD   Tiotropium Bromide-Olodaterol 2.5-2.5 MCG/ACT AERS Inhale 2 puffs into the lungs daily 6/4/20 6/4/21  CASSANDRA Napier - CNP   RA COL-RITE 100 MG capsule daily  8/22/19   Historical Provider, MD   clopidogrel (PLAVIX) 75 MG tablet Take 1 tablet by mouth daily 4/23/19   CASSANDRA Strickland - CNP   OXYGEN   Inhale into the lungs 3 lpm, per patient at home and when walking    Historical Provider, MD       Current medications:    No current facility-administered medications for this encounter. Facility-Administered Medications Ordered in Other Encounters   Medication Dose Route Frequency Provider Last Rate Last Dose    0.9 % sodium chloride infusion    Continuous PRN CASSANDRA Dawkins - CRNA           Allergies:     Allergies   Allergen Reactions    Codeine Itching     Pt cant remember    Doxycycline      Cant breath     Lipitor Itching    Simvastatin Itching     Stomach pain    Chantix [Varenicline Tartrate] Itching       Problem List:    Patient Active Problem List   Diagnosis Code    GERD (gastroesophageal reflux disease) K21.9    Hyperlipidemia E78.5    Peripheral edema R60.9    CHF [facility]congestive heart failure) ([facility]) I50.9    Depression F32.9    DDD (degenerative disc disease) [id number]    CAD (coronary artery disease) I25.10    Cerebrovascular disease I67.9    Diabetes type 2, controlled ([facility] E11.9    Bladder dysfunction N31.9    Stage 3 severe COPD by GOLD classification ([facility] [address] J44.9    Nasal congestion R09.81    Acute sinusitis J01.90    Hypertrophy of both inferior nasal turbinates J34.3    Lumbar spondylosis LUMBAR RFA L2-3, 3-4, 4-5, L5-S1 LEFT SIDE performed by Javy Jenkins MD at [address] Right 10/7/2019    L-facet RFA @ L2-3, L3-4, L4-5, and L5-S1 RIGHT SIDE FIRST performed by Javy Jenkins MD at [facility]  2016    burning nerves in back - lumbar @ [place]    OTHER SURGICAL HISTORY Left 2017    Lumbar RFA L2-3, L3-4, L4-5, L5-S1    PAIN MANAGEMENT PROCEDURE Left 2020    Left L-facet RFA @ L2-3, L3-4, L4-5, and L5-S1 performed by Javy Jenkins MD at [facility] Right 2020    Right L-facet RFA @ L2-3, L3-4, L4-5, and L5-S1 performed by Javy Jenkins MD at [address] [address] [address] History:    Social History     Tobacco Use    Smoking status: Former Smoker     Packs/day: 2.00     Years: 40.00     Pack years: 80.00     Types: Cigarettes     Last attempt to quit: 2010     Years since quittin.7    Smokeless tobacco: Never Used   Substance Use Topics    Alcohol use: No     Comment: pt hasn't drank since                                 Counseling given: Not Answered      Vital Signs (Current):   Vitals:    20 0828   BP: (!) 143/63   Pulse: 89   Resp: 22   Temp: 97 °F (36.1 °C)   TempSrc: Infrared   SpO2: 95%   Weight: 160 lb 12.8 oz (72.9 kg)   Height: 5' 2\" (1.575 m)                                              BP Readings from Last 3 Encounters:   20 (!) 143/63   20 132/70   20 125/60       NPO Status: Time of last liquid consumption: 0645(sip w/meds)                        Time of last solid consumption: 183                        Date of last liquid consumption: 20                        Date of last solid food consumption: 20    BMI:   Wt Readings from Last 3 Encounters:   20 160 lb 12.8 oz (72.9 kg)   20 157 lb (71.2 kg)   20 157 lb (71.2 kg)     Body mass index is 29.41 kg/m². CBC:   Lab Results   Component Value Date    WBC 8.2 05/23/2020    RBC 3.81 05/23/2020    RBC 4.30 03/31/2012    HGB 10.5 05/23/2020    HCT 35.0 05/23/2020    MCV 91.9 05/23/2020    RDW 13.3 03/30/2018     05/23/2020       CMP:   Lab Results   Component Value Date     05/23/2020    K 4.0 05/23/2020    CL 91 05/23/2020    CO2 36 05/23/2020    BUN 15 05/23/2020    CREATININE 0.7 05/23/2020    GFRAA >60 06/20/2019    LABGLOM 83 05/23/2020    GLUCOSE 180 05/23/2020    PROT 7.2 05/23/2020    CALCIUM 9.7 05/23/2020    BILITOT <0.2 05/23/2020    BILITOT NEGATIVE 03/31/2012    ALKPHOS 77 05/23/2020    AST 11 05/23/2020    ALT 9 05/23/2020       POC Tests:   Recent Labs     08/25/20  0844   POCGLU 119*       Coags:   Lab Results   Component Value Date    INR 0.91 01/08/2019    APTT 33.6 01/08/2019       HCG (If Applicable): No results found for: PREGTESTUR, PREGSERUM, HCG, HCGQUANT     ABGs: No results found for: PHART, PO2ART, TUI0HLU, TXL0USZ, BEART, G4WSLANY     Type & Screen (If Applicable):  Lab Results   Component Value Date    LABRH NEG 01/08/2019       Drug/Infectious Status (If Applicable):  No results found for: HIV, HEPCAB    COVID-19 Screening (If Applicable):   Lab Results   Component Value Date    COVID19 Not Detected 08/19/2020         Anesthesia Evaluation     history of anesthetic complications: PONV. Airway: Mallampati: II       Mouth opening: > = 3 FB Dental:          Pulmonary:   (+) COPD:  shortness of breath:  sleep apnea:                             Cardiovascular:    (+) hypertension:, angina:, CAD:, CHF:,                   Neuro/Psych:   (+) CVA:, psychiatric history:            GI/Hepatic/Renal:   (+) GERD:,           Endo/Other:    (+) Diabetes, . Abdominal:           Vascular:                                        Anesthesia Plan      MAC     ASA 4             Anesthetic plan and risks discussed with patient.       Plan discussed with Rafael Hill MD   8/25/2020

## 2020-08-27 RX ORDER — HYDROCODONE BITARTRATE AND ACETAMINOPHEN 5; 325 MG/1; MG/1
1 TABLET ORAL EVERY 8 HOURS PRN
Qty: 90 TABLET | Refills: 0 | Status: SHIPPED | OUTPATIENT
Start: 2020-08-29 | End: 2020-09-24 | Stop reason: SDUPTHER

## 2020-08-27 NOTE — TELEPHONE ENCOUNTER
OARRS reviewed. UDS: + for  norco - consistent      Last seen: 7/30/2020.      Follow-up:   Future Appointments   Date Time Provider Jesu Therese   9/8/2020  9:15 AM CASSANDRA Fernández - CNP [facility]   6/21/2021 11:30 AM Lynn Mcneil MD [address] [place] [facility]

## 2020-09-08 ENCOUNTER — OFFICE VISIT (OUTPATIENT)
Dept: PHYSICAL MEDICINE AND REHAB | Age: 70
End: 2020-09-08
Payer: MEDICARE

## 2020-09-08 VITALS
HEIGHT: 62 IN | BODY MASS INDEX: 29.44 KG/M2 | TEMPERATURE: 97.1 F | HEART RATE: 80 BPM | WEIGHT: 160 LBS | SYSTOLIC BLOOD PRESSURE: 138 MMHG | DIASTOLIC BLOOD PRESSURE: 72 MMHG

## 2020-09-08 PROCEDURE — 99213 OFFICE O/P EST LOW 20 MIN: CPT | Performed by: NURSE PRACTITIONER

## 2020-09-08 ASSESSMENT — ENCOUNTER SYMPTOMS
COUGH: 1
BACK PAIN: 1
SHORTNESS OF BREATH: 1
CHEST TIGHTNESS: 1

## 2020-09-08 NOTE — PROGRESS NOTES
screen reviewed from 6/8/2020 and was appropriate  Pill count was completed today and was appropriate  Patient does have naloxone available at home. Patient has not required use of naloxone at home since last office visit. The patientis allergic to codeine; doxycycline; lipitor; simvastatin; and chantix [varenicline tartrate]. Past Medical History  Maximo Brunner  has a past medical history of Bladder dysfunction, CAD (coronary artery disease), Cerebral artery occlusion with cerebral infarction [facility]), Cerebrovascular disease, CHF (congestive heart failure) ([facility] [address] COPD (chronic obstructive pulmonary disease) ([facility] [address] DDD (degenerative disc disease), Depression, GERD (gastroesophageal reflux disease), Hyperlipidemia, Hypertension, Obesity, Other disorders of kidney and ureter in diseases classified elsewhere, Other screening mammogram, Pap smear, as part of routine gynecological examination, Pap smear, as part of routine gynecological examination, Peripheral edema, Pneumonia, PONV (postoperative nausea and vomiting), and Unspecified sleep apnea. Past Surgical History  The patient  has a past surgical history that includes Dilation and curettage of uterus (1970's); eye surgery (2013); other surgical history (2016); Colonoscopy (7-2009); other surgical history (Left, 12/12/2017); Lumbar spine surgery (Left, 12/12/2017); Endoscopy, colon, diagnostic; Lumbar spine surgery (Right, 10/7/2019); Pain management procedure (Left, 2/7/2020); Pain management procedure (Right, 7/16/2020); and Pain management procedure (Left, 8/25/2020). Family History  This patient's family history includes Alzheimer's Disease in her father; COPD in her sister; Cancer in her brother and daughter; Diabetes in her daughter; Heart Disease in her father and mother; Heart Failure in her father and mother; High Blood Pressure in her father and mother; Parkinsonism in her father; Stroke in her brother.     Social History  Maximo Brunner  reports that she quit smoking about 9 years ago. Her smoking use included cigarettes. She has a 80.00 pack-year smoking history. She has never used smokeless tobacco. She reports that she does not drink alcohol or use drugs. Medications    Current Outpatient Medications:     HYDROcodone-acetaminophen (NORCO) 5-325 MG per tablet, Take 1 tablet by mouth every 8 hours as needed for Pain for up to 30 days. , Disp: 90 tablet, Rfl: 0    fluticasone (FLONASE) 50 MCG/ACT nasal spray, 1 spray by Each Nostril route daily, Disp: , Rfl:     omeprazole (PRILOSEC) 40 MG delayed release capsule, Take 40 mg by mouth daily, Disp: , Rfl:     polyethylene glycol (GLYCOLAX) 17 g packet, Take 17 g by mouth daily as needed for Constipation, Disp: , Rfl:     Tiotropium Bromide-Olodaterol 2.5-2.5 MCG/ACT AERS, Inhale 2 puffs into the lungs daily, Disp: 1 Inhaler, Rfl: 3    albuterol (PROVENTIL) (2.5 MG/3ML) 0.083% nebulizer solution, Take 3 mLs by nebulization every 6 hours as needed for Wheezing or Shortness of Breath, Disp: 120 mL, Rfl: 11    montelukast (SINGULAIR) 10 MG tablet, Take 1 tablet by mouth nightly, Disp: 30 tablet, Rfl: 3    albuterol sulfate  (90 Base) MCG/ACT inhaler, Inhale 2 puffs into the lungs 4 times daily as needed for Wheezing, Disp: 1 Inhaler, Rfl: 5    metFORMIN (GLUCOPHAGE) 500 MG tablet, Take 500 mg by mouth daily (with breakfast), Disp: , Rfl:     furosemide (LASIX) 20 MG tablet, Take 20 mg by mouth daily as needed, Disp: , Rfl:     triamterene-hydrochlorothiazide (MAXZIDE-25) 37.5-25 MG per tablet, take 1 tablet by mouth every morning, Disp: , Rfl: 0    RA COL-RITE 100 MG capsule, daily , Disp: , Rfl: 0    clopidogrel (PLAVIX) 75 MG tablet, Take 1 tablet by mouth daily, Disp: 30 tablet, Rfl: 11    atorvastatin (LIPITOR) 40 MG tablet, Take 1 tablet by mouth daily, Disp: 30 tablet, Rfl: 3    pseudoephedrine (SUDAFED 12 HR) 120 MG extended release tablet, Take 120 mg by mouth every 12 hours as needed for Congestion, Disp: , Rfl:     amLODIPine (NORVASC) 5 MG tablet, Take 5 mg by mouth daily, Disp: , Rfl:     losartan (COZAAR) 50 MG tablet, Take 50 mg by mouth daily , Disp: , Rfl: 0    OXYGEN,  Inhale into the lungs 3 lpm, per patient at home and when walking, Disp: , Rfl:     aspirin 81 MG EC tablet, Take 81 mg by mouth daily. , Disp: , Rfl:     Subjective:      Review of Systems   Constitutional: Negative. Respiratory: Positive for cough, chest tightness and shortness of breath. On 3 liters of oxygen per nasal cannula    Cardiovascular: Negative. Musculoskeletal: Positive for arthralgias, back pain, gait problem and myalgias. Neurological: Positive for weakness, numbness and headaches. Psychiatric/Behavioral: Negative. Objective:     Vitals:    09/08/20 0914   BP: 138/72   Site: Right Upper Arm   Position: Sitting   Cuff Size: Large Adult   Pulse: 80   Temp: 97.1 °F (36.2 °C)   TempSrc: Temporal   Weight: 160 lb (72.6 kg)   Height: 5' 2\" (1.575 m)       Physical Exam  Vitals signs and nursing note reviewed. Constitutional:       General: She is not in acute distress. Appearance: She is well-developed. She is not diaphoretic. HENT:      Head: Normocephalic and atraumatic. Right Ear: External ear normal.      Left Ear: External ear normal.      Nose: Nose normal.      Mouth/Throat:      Mouth: Mucous membranes are dry. Pharynx: No oropharyngeal exudate. Eyes:      General: No scleral icterus. Right eye: No discharge. Left eye: No discharge. Conjunctiva/sclera: Conjunctivae normal.      Pupils: Pupils are equal, round, and reactive to light. Neck:      Musculoskeletal: Full passive range of motion without pain, normal range of motion and neck supple. Normal range of motion. No edema, erythema, neck rigidity or muscular tenderness. Thyroid: No thyromegaly. Cardiovascular:      Rate and Rhythm: Normal rate and regular rhythm.       Heart Sensory: No sensory deficit. Motor: No atrophy or abnormal muscle tone. Coordination: Coordination normal.      Gait: Gait normal.      Deep Tendon Reflexes: Reflexes are normal and symmetric. Comments: SLR-  Motor 5/5 BUE BLE   Psychiatric:         Attention and Perception: She is attentive. Mood and Affect: Mood is not anxious or depressed. Affect is not labile, blunt, angry or inappropriate. Speech: Speech normal.         Behavior: Behavior normal. Behavior is not agitated, slowed, aggressive, withdrawn, hyperactive or combative. Thought Content: Thought content normal. Thought content is not paranoid or delusional. Thought content does not include homicidal or suicidal ideation. Thought content does not include homicidal or suicidal plan. Cognition and Memory: Memory is not impaired. She does not exhibit impaired recent memory or impaired remote memory. Judgment: Judgment normal. Judgment is not impulsive or inappropriate. ANASTACIA test: + bilaterally   Yeomans test: + bilaterally   Gaenslen test: + bilaterally      Assessment:     1. Spondylosis of lumbar region without myelopathy or radiculopathy    2. Chronic bilateral low back pain without sciatica    3. SI (sacroiliac) joint inflammation (HCC)    4. Spinal stenosis of lumbar region without neurogenic claudication    5. Chronic pain syndrome            Plan:      · OARRS reviewed. Current MED: 15.00  · Patient was offered naloxone for home. Has   · Discussed long term side effects of medications, tolerance, dependency and addiction. · Previous UDS reviewed  · UDS preformed today for compliance. · Patient told can not receive any pain medications from any other source. · No evidence of abuse, diversion or aberrant behavior.    Medications and/or procedures to improve function and quality of life- patient understanding with this and that may not be pain free   Discussed with patient about safe storage of medications at home   Discussed possible weaning of medication dosing dependent on treatment/procedure results.  Discussed with patient about risks with procedure including infection, reaction to medication, increased pain, or bleeding.  Procedure notes reveiwed in detail.  Receiving 80% relief of lower back pain from bilateral L-facet RFA, is mirna to tolerate more activity   · Continue Norco 5/325 1 tab every 8 hours as needed for breakthrough pain. Filled 8/29/2020  · Ordered updated UDS today       Meds. Prescribed:   No orders of the defined types were placed in this encounter. Return in about 2 months (around 11/8/2020), or if symptoms worsen or fail to improve, for follow up  for medications.          Electronically signed by CASSANDRA Humphrey CNP on9/8/2020 at 9:27 AM

## 2020-09-25 RX ORDER — HYDROCODONE BITARTRATE AND ACETAMINOPHEN 5; 325 MG/1; MG/1
1 TABLET ORAL EVERY 8 HOURS PRN
Qty: 90 TABLET | Refills: 0 | Status: SHIPPED | OUTPATIENT
Start: 2020-09-28 | End: 2020-10-23 | Stop reason: SDUPTHER

## 2020-09-25 NOTE — TELEPHONE ENCOUNTER
OARRS reviewed. UDS: + for  Hydrocodone consistent. Narcan offered: yes  Last seen: 9/8/2020.  Follow-up:   Future Appointments   Date Time Provider Jesu Saleh   11/10/2020  9:00 AM CASSANDRA Lott - CNP [facility]   6/21/2021 11:30 AM Grace Paulson MD [address] [place] [facility]

## 2020-10-26 RX ORDER — HYDROCODONE BITARTRATE AND ACETAMINOPHEN 5; 325 MG/1; MG/1
1 TABLET ORAL EVERY 8 HOURS PRN
Qty: 90 TABLET | Refills: 0 | Status: SHIPPED | OUTPATIENT
Start: 2020-10-28 | End: 2020-11-24 | Stop reason: SDUPTHER

## 2020-11-10 ENCOUNTER — OFFICE VISIT (OUTPATIENT)
Dept: PHYSICAL MEDICINE AND REHAB | Age: 70
End: 2020-11-10
Payer: MEDICARE

## 2020-11-10 VITALS
WEIGHT: 160 LBS | HEART RATE: 88 BPM | BODY MASS INDEX: 29.44 KG/M2 | HEIGHT: 62 IN | SYSTOLIC BLOOD PRESSURE: 136 MMHG | TEMPERATURE: 96.9 F | DIASTOLIC BLOOD PRESSURE: 76 MMHG

## 2020-11-10 PROCEDURE — 99213 OFFICE O/P EST LOW 20 MIN: CPT | Performed by: NURSE PRACTITIONER

## 2020-11-10 RX ORDER — LIDOCAINE 40 MG/G
CREAM TOPICAL
Qty: 1 TUBE | Refills: 2 | Status: SHIPPED | OUTPATIENT
Start: 2020-11-10

## 2020-11-10 ASSESSMENT — ENCOUNTER SYMPTOMS
COUGH: 1
CHEST TIGHTNESS: 1
GASTROINTESTINAL NEGATIVE: 1
SHORTNESS OF BREATH: 1
BACK PAIN: 1

## 2020-11-10 NOTE — PROGRESS NOTES
[facility]  [address]  Dept: [phone]  Dept Fax: [phone]: [phone]    Visit Date: 11/10/2020    Functionality Assessment/Goals Worksheet     On a scale of 0 (Does not Interfere) to 10 (Completely Interferes)     1. Which number describes how during the past week pain has interfered with       the following:  A. General Activity:  7  B. Mood: 6  C. Walking Ability:  8  D. Normal Work (Includes both work outside the home and housework):  7  E. Relations with Other People:   4  F. Sleep:   7  G. Enjoyment of Life:   9    2. Patient Prefers to Take their Pain Medications:     [x]  On a regular basis   []  Only when necessary    []  Does not take pain medications    3. What are the Patient's Goals/Expectations for Visiting Pain Management? []  Learn about my pain    [x]  Receive Medication   []  Physical Therapy     []  Treat Depression   [x]  Receive Injections    []  Treat Sleep   []  Deal with Anxiety and Stress   []  Treat Opoid Dependence/Addiction   []  Other:      HPI:   Kath Sheikh is a 79 y.o. female is here today for    Chief Complaint: Lower back pain     HPI   2 month FU. Continues to have pain across lower back. Aching pain that has been up and down. Worse with activity and activity and increased with cold weather changes. Continues to receive good relief of about 80% from bilateral L-facet RFA. Norco prn remains effective. Has some pain and swelling in left hand- stabbing and achingh with stiff pain   On 3.5 liters of oxygen per nasal cannula today     Medications reviewed. Patient denies side effects with medications. Patient states she is taking medications as prescribed. Shedenies receiving pain medications from other sources. She denies any ER visits since last visit.     Pain scale with out pain medications or at its worst is 6-8/10. Pain scale with pain medications or at its best is 3/10. Last dose of [place] was yesterday  Drug screen reviewed from 9/8/2020 and was appropriate  Pill count was completed today and was appropriate  Patient does have naloxone available at home. Patient has not required use of naloxone at home since last office visit. The patientis allergic to codeine; doxycycline; lipitor; simvastatin; and chantix [varenicline tartrate]. Past Medical History  Nithya  has a past medical history of Bladder dysfunction, CAD (coronary artery disease), Cerebral artery occlusion with cerebral infarction [facility]), Cerebrovascular disease, CHF (congestive heart failure) ([facility] [address] COPD (chronic obstructive pulmonary disease) ([facility] [address] DDD (degenerative disc disease), Depression, GERD (gastroesophageal reflux disease), Hyperlipidemia, Hypertension, Obesity, Other disorders of kidney and ureter in diseases classified elsewhere, Other screening mammogram, Pap smear, as part of routine gynecological examination, Pap smear, as part of routine gynecological examination, Peripheral edema, Pneumonia, PONV (postoperative nausea and vomiting), and Unspecified sleep apnea. Past Surgical History  The patient  has a past surgical history that includes Dilation and curettage of uterus (1970's); eye surgery (2013); other surgical history (2016); Colonoscopy (7-2009); other surgical history (Left, 12/12/2017); Lumbar spine surgery (Left, 12/12/2017); Endoscopy, colon, diagnostic; Lumbar spine surgery (Right, 10/7/2019); Pain management procedure (Left, 2/7/2020); Pain management procedure (Right, 7/16/2020); and Pain management procedure (Left, 8/25/2020).     Family History  This patient's family history includes Alzheimer's Disease in her father; COPD in her sister; Cancer in her brother and daughter; Diabetes in her daughter; Heart Disease in her father and mother; Heart Failure in her father and mother; High Blood Pressure in her father and mother; Parkinsonism in her father; Stroke in her brother. Social History  Johnson Rothman  reports that she quit smoking about 9 years ago. Her smoking use included cigarettes. She has a 80.00 pack-year smoking history. She has never used smokeless tobacco. She reports that she does not drink alcohol or use drugs. Medications    Current Outpatient Medications:     HYDROcodone-acetaminophen (NORCO) 5-325 MG per tablet, Take 1 tablet by mouth every 8 hours as needed for Pain for up to 30 days. , Disp: 90 tablet, Rfl: 0    fluticasone (FLONASE) 50 MCG/ACT nasal spray, 1 spray by Each Nostril route daily, Disp: , Rfl:     omeprazole (PRILOSEC) 40 MG delayed release capsule, Take 40 mg by mouth daily, Disp: , Rfl:     polyethylene glycol (GLYCOLAX) 17 g packet, Take 17 g by mouth daily as needed for Constipation, Disp: , Rfl:     Tiotropium Bromide-Olodaterol 2.5-2.5 MCG/ACT AERS, Inhale 2 puffs into the lungs daily, Disp: 1 Inhaler, Rfl: 3    albuterol (PROVENTIL) (2.5 MG/3ML) 0.083% nebulizer solution, Take 3 mLs by nebulization every 6 hours as needed for Wheezing or Shortness of Breath, Disp: 120 mL, Rfl: 11    montelukast (SINGULAIR) 10 MG tablet, Take 1 tablet by mouth nightly, Disp: 30 tablet, Rfl: 3    albuterol sulfate  (90 Base) MCG/ACT inhaler, Inhale 2 puffs into the lungs 4 times daily as needed for Wheezing, Disp: 1 Inhaler, Rfl: 5    metFORMIN (GLUCOPHAGE) 500 MG tablet, Take 500 mg by mouth daily (with breakfast), Disp: , Rfl:     furosemide (LASIX) 20 MG tablet, Take 20 mg by mouth daily as needed, Disp: , Rfl:     triamterene-hydrochlorothiazide (MAXZIDE-25) 37.5-25 MG per tablet, take 1 tablet by mouth every morning, Disp: , Rfl: 0    RA COL-RITE 100 MG capsule, daily , Disp: , Rfl: 0    clopidogrel (PLAVIX) 75 MG tablet, Take 1 tablet by mouth daily, Disp: 30 tablet, Rfl: 11    atorvastatin (LIPITOR) 40 MG tablet, Take 1 tablet by mouth daily, Disp: 30 tablet, Rfl: 3   Musculoskeletal: Full passive range of motion without pain, normal range of motion and neck supple. Normal range of motion. No edema, erythema, neck rigidity or muscular tenderness. Thyroid: No thyromegaly. Cardiovascular:      Rate and Rhythm: Normal rate and regular rhythm. Heart sounds: Normal heart sounds. No murmur. No friction rub. No gallop. Pulmonary:      Effort: Pulmonary effort is normal. No respiratory distress. Breath sounds: Examination of the right-upper field reveals decreased breath sounds and wheezing. Examination of the left-upper field reveals decreased breath sounds and wheezing. Examination of the right-middle field reveals decreased breath sounds. Examination of the left-middle field reveals decreased breath sounds. Examination of the right-lower field reveals decreased breath sounds. Examination of the left-lower field reveals decreased breath sounds. Decreased breath sounds and wheezing present. No rales. Chest:      Chest wall: No tenderness. Abdominal:      General: Bowel sounds are normal. There is no distension. Palpations: Abdomen is soft. Tenderness: There is no abdominal tenderness. There is no guarding or rebound. Musculoskeletal:         General: Swelling and tenderness present. Right shoulder: She exhibits decreased range of motion and tenderness. Left shoulder: She exhibits decreased range of motion and tenderness. Right elbow: Normal.     Left elbow: Normal.      Right wrist: She exhibits no tenderness, no bony tenderness and no swelling. Left wrist: She exhibits decreased range of motion, tenderness and bony tenderness. Right hip: Normal.      Left hip: She exhibits decreased range of motion, decreased strength and tenderness. Right knee: Normal.      Left knee: Normal.      Cervical back: She exhibits decreased range of motion, tenderness, pain and spasm. Thoracic back: She exhibits tenderness.       Lumbar

## 2020-11-24 RX ORDER — HYDROCODONE BITARTRATE AND ACETAMINOPHEN 5; 325 MG/1; MG/1
1 TABLET ORAL EVERY 8 HOURS PRN
Qty: 90 TABLET | Refills: 0 | Status: SHIPPED | OUTPATIENT
Start: 2020-11-28 | End: 2020-12-23 | Stop reason: SDUPTHER

## 2020-11-24 NOTE — TELEPHONE ENCOUNTER
OARRS reviewed. UDS: + for  Hydrocodone consistent. Last seen: 11/10/2020.  Follow-up:   Future Appointments   Date Time Provider Jesu Saleh   1/12/2021  9:00 AM CASSANDRA Azar - CNP [facility]   6/21/2021 11:30 AM Jerry Montoya MD [address] [place] [facility]

## 2020-12-24 RX ORDER — HYDROCODONE BITARTRATE AND ACETAMINOPHEN 5; 325 MG/1; MG/1
1 TABLET ORAL EVERY 8 HOURS PRN
Qty: 90 TABLET | Refills: 0 | Status: SHIPPED | OUTPATIENT
Start: 2020-12-28 | End: 2021-01-25 | Stop reason: SDUPTHER

## 2020-12-24 NOTE — TELEPHONE ENCOUNTER
OARRS reviewed. UDS: + for  norco - consistent     Last seen: 11/10/2020.      Follow-up:   Future Appointments   Date Time Provider Jesu Saleh   1/12/2021  9:00 AM CASSANDRA Lopez CNP [facility]   6/21/2021 11:30 AM Cody Ambrosio MD [facility]

## 2021-01-12 ENCOUNTER — TELEPHONE (OUTPATIENT)
Dept: PHYSICAL MEDICINE AND REHAB | Age: 71
End: 2021-01-12

## 2021-01-12 ENCOUNTER — OFFICE VISIT (OUTPATIENT)
Dept: PHYSICAL MEDICINE AND REHAB | Age: 71
End: 2021-01-12
Payer: MEDICARE

## 2021-01-12 ENCOUNTER — TELEPHONE (OUTPATIENT)
Dept: CARDIOLOGY CLINIC | Age: 71
End: 2021-01-12

## 2021-01-12 VITALS
WEIGHT: 160 LBS | DIASTOLIC BLOOD PRESSURE: 70 MMHG | HEIGHT: 62 IN | SYSTOLIC BLOOD PRESSURE: 124 MMHG | TEMPERATURE: 97.2 F | BODY MASS INDEX: 29.44 KG/M2

## 2021-01-12 DIAGNOSIS — M46.1 SI (SACROILIAC) JOINT INFLAMMATION (HCC): ICD-10-CM

## 2021-01-12 DIAGNOSIS — M48.061 SPINAL STENOSIS OF LUMBAR REGION WITHOUT NEUROGENIC CLAUDICATION: ICD-10-CM

## 2021-01-12 DIAGNOSIS — G89.29 CHRONIC BILATERAL LOW BACK PAIN WITHOUT SCIATICA: ICD-10-CM

## 2021-01-12 DIAGNOSIS — M54.50 CHRONIC BILATERAL LOW BACK PAIN WITHOUT SCIATICA: ICD-10-CM

## 2021-01-12 DIAGNOSIS — M47.816 SPONDYLOSIS OF LUMBAR REGION WITHOUT MYELOPATHY OR RADICULOPATHY: Primary | ICD-10-CM

## 2021-01-12 DIAGNOSIS — G89.4 CHRONIC PAIN SYNDROME: ICD-10-CM

## 2021-01-12 PROCEDURE — 99213 OFFICE O/P EST LOW 20 MIN: CPT | Performed by: NURSE PRACTITIONER

## 2021-01-12 ASSESSMENT — ENCOUNTER SYMPTOMS
COUGH: 1
BACK PAIN: 1
SHORTNESS OF BREATH: 1

## 2021-01-12 NOTE — TELEPHONE ENCOUNTER
Pre op Risk Assessment    Procedure Right L-Facet RFA @ L2-3, 3-4, 4-5  Physician Nahed Clemons  Date of surgery/procedure TBD    Last OV 6/29/20  Last Stress 9/17/18  Last Echo 8/14/18  Last Cath 1/8/19  Last Stent 1/8/19  Is patient on blood thinners Plavix & ASA  Hold Meds/how many days ASA 5 days

## 2021-01-12 NOTE — PROGRESS NOTES
[address] [facility] [address]  Dept: [phone]  Dept Fax: [phone]: [phone]    Visit Date: 1/12/2021    Functionality Assessment/Goals Worksheet     On a scale of 0 (Does not Interfere) to 10 (Completely Interferes)     1. Which number describes how during the past week pain has interfered with       the following:  A. General Activity:  0  B. Mood: 6  C. Walking Ability:  6  D. Normal Work (Includes both work outside the home and housework):  7  E. Relations with Other People:   7  F. Sleep:   6  G. Enjoyment of Life:   6    2. Patient Prefers to Take their Pain Medications:     [x]  On a regular basis   []  Only when necessary    []  Does not take pain medications    3. What are the Patient's Goals/Expectations for Visiting Pain Management? []  Learn about my pain    []  Receive Medication   []  Physical Therapy     []  Treat Depression   []  Receive Injections    []  Treat Sleep   []  Deal with Anxiety and Stress   []  Treat Opoid Dependence/Addiction   []  Other:      HPI:   Alfredo Mccain is a 79 y.o. female is here today for    Chief Complaint: Low back pain     HPI   2 month FU. Patient her with a main complaint of pain that has been increasing in her right lower back as effects from right L-facet RFA is wearing off- dull aching burn. Has some flare-ups at times. Pain is worse with inactivity and improved when active. Left lower back pain remains tolerable from RFA. Norco prn remains very effective in decreasing pain. Patient is able to tolerate activity. On 3 liters of oxygen per nasal cannula today. Medications reviewed. Patient denies side effects with medications. Patient states she is taking medications as prescribed. Shedenies receiving pain medications from other sources. She denies any ER visits since last visit.     Pain scale with out pain medications or at its worst is 8/10. Pain scale with pain medications or at its best is 3-4/10. Last dose of Norco was yesterday  Drug screen reviewed from 11/10/2020 and was appropriate  Pill count was completed today and was appropriate  Patient does have naloxone available at home. Patient has not required use of naloxone at home since last office visit. The patientis allergic to codeine; doxycycline; lipitor; simvastatin; and chantix [varenicline tartrate]. Past Medical History  Bel Calzada  has a past medical history of Bladder dysfunction, CAD (coronary artery disease), Cerebral artery occlusion with cerebral infarction [facility]), Cerebrovascular disease, CHF (congestive heart failure) ([facility] [address] COPD (chronic obstructive pulmonary disease) ([facility] [address] DDD (degenerative disc disease), Depression, GERD (gastroesophageal reflux disease), Hyperlipidemia, Hypertension, Obesity, Other disorders of kidney and ureter in diseases classified elsewhere, Other screening mammogram, Pap smear, as part of routine gynecological examination, Pap smear, as part of routine gynecological examination, Peripheral edema, Pneumonia, PONV (postoperative nausea and vomiting), and Unspecified sleep apnea. Past Surgical History  The patient  has a past surgical history that includes Dilation and curettage of uterus (1970's); eye surgery (2013); other surgical history (2016); Colonoscopy (7-2009); other surgical history (Left, 12/12/2017); Lumbar spine surgery (Left, 12/12/2017); Endoscopy, colon, diagnostic; Lumbar spine surgery (Right, 10/7/2019); Pain management procedure (Left, 2/7/2020); Pain management procedure (Right, 7/16/2020); and Pain management procedure (Left, 8/25/2020).     Family History  This patient's family history includes Alzheimer's Disease in her father; COPD in her sister; Cancer in her brother and daughter; Diabetes in her daughter; Heart Disease in her father and mother; Heart Failure in her father and mother; High Blood Pressure in her father and mother; Parkinsonism in her father; Stroke in her brother. Social History  Nikky Gamez  reports that she quit smoking about 10 years ago. Her smoking use included cigarettes. She has a 80.00 pack-year smoking history. She has never used smokeless tobacco. She reports that she does not drink alcohol or use drugs. Medications    Current Outpatient Medications:     HYDROcodone-acetaminophen (NORCO) 5-325 MG per tablet, Take 1 tablet by mouth every 8 hours as needed for Pain for up to 30 days. , Disp: 90 tablet, Rfl: 0    lidocaine (LMX) 4 % cream, Apply topically as needed every 6 hours to left hand for pain., Disp: 1 Tube, Rfl: 2    fluticasone (FLONASE) 50 MCG/ACT nasal spray, 1 spray by Each Nostril route daily, Disp: , Rfl:     omeprazole (PRILOSEC) 40 MG delayed release capsule, Take 40 mg by mouth daily, Disp: , Rfl:     polyethylene glycol (GLYCOLAX) 17 g packet, Take 17 g by mouth daily as needed for Constipation, Disp: , Rfl:     Tiotropium Bromide-Olodaterol 2.5-2.5 MCG/ACT AERS, Inhale 2 puffs into the lungs daily, Disp: 1 Inhaler, Rfl: 3    albuterol (PROVENTIL) (2.5 MG/3ML) 0.083% nebulizer solution, Take 3 mLs by nebulization every 6 hours as needed for Wheezing or Shortness of Breath, Disp: 120 mL, Rfl: 11    montelukast (SINGULAIR) 10 MG tablet, Take 1 tablet by mouth nightly, Disp: 30 tablet, Rfl: 3    albuterol sulfate  (90 Base) MCG/ACT inhaler, Inhale 2 puffs into the lungs 4 times daily as needed for Wheezing, Disp: 1 Inhaler, Rfl: 5    metFORMIN (GLUCOPHAGE) 500 MG tablet, Take 500 mg by mouth daily (with breakfast), Disp: , Rfl:     furosemide (LASIX) 20 MG tablet, Take 20 mg by mouth daily as needed, Disp: , Rfl:     triamterene-hydrochlorothiazide (MAXZIDE-25) 37.5-25 MG per tablet, take 1 tablet by mouth every morning, Disp: , Rfl: 0    RA COL-RITE 100 MG capsule, daily , Disp: , Rfl: 0    clopidogrel (PLAVIX) 75 MG tablet, Take 1 tablet by mouth daily, Disp: 30 tablet, Rfl: 11    atorvastatin (LIPITOR) 40 MG tablet, Take 1 tablet by mouth daily, Disp: 30 tablet, Rfl: 3    pseudoephedrine (SUDAFED 12 HR) 120 MG extended release tablet, Take 120 mg by mouth every 12 hours as needed for Congestion, Disp: , Rfl:     amLODIPine (NORVASC) 5 MG tablet, Take 5 mg by mouth daily, Disp: , Rfl:     losartan (COZAAR) 50 MG tablet, Take 50 mg by mouth daily , Disp: , Rfl: 0    OXYGEN,  Inhale into the lungs 3 lpm, per patient at home and when walking, Disp: , Rfl:     aspirin 81 MG EC tablet, Take 81 mg by mouth daily. , Disp: , Rfl:     Subjective:      Review of Systems   Respiratory: Positive for cough and shortness of breath. On 3 liters of oxygen per nasal cannula    Musculoskeletal: Positive for arthralgias, back pain and myalgias. Negative for gait problem. Neurological: Negative for weakness and numbness. Objective:     Vitals:    01/12/21 0855   BP: 124/70   Temp: 97.2 °F (36.2 °C)   Weight: 160 lb (72.6 kg)   Height: 5' 2\" (1.575 m)       Physical Exam  Vitals signs and nursing note reviewed. Constitutional:       General: She is not in acute distress. Appearance: Normal appearance. She is well-developed. She is not diaphoretic. HENT:      Head: Normocephalic and atraumatic. Right Ear: External ear normal.      Left Ear: External ear normal.      Nose: Nose normal.      Mouth/Throat:      Mouth: Mucous membranes are dry. Pharynx: No oropharyngeal exudate. Eyes:      General: No scleral icterus. Right eye: No discharge. Left eye: No discharge. Conjunctiva/sclera: Conjunctivae normal.      Pupils: Pupils are equal, round, and reactive to light. Neck:      Musculoskeletal: Full passive range of motion without pain, normal range of motion and neck supple. Normal range of motion. No edema, erythema, neck rigidity or muscular tenderness. Thyroid: No thyromegaly.    Cardiovascular: lower leg: No edema. Skin:     General: Skin is warm. Coloration: Skin is not pale. Findings: No erythema or rash. Neurological:      General: No focal deficit present. Mental Status: She is alert and oriented to person, place, and time. She is not disoriented. Cranial Nerves: No cranial nerve deficit. Sensory: No sensory deficit. Motor: No atrophy or abnormal muscle tone. Coordination: Coordination normal.      Gait: Gait normal.      Deep Tendon Reflexes: Reflexes are normal and symmetric. Comments: SLR-  Motor 5/5 BUE BLE   Psychiatric:         Attention and Perception: She is attentive. Mood and Affect: Mood normal. Mood is not anxious or depressed. Affect is not labile, blunt, angry or inappropriate. Speech: Speech normal.         Behavior: Behavior normal. Behavior is not agitated, slowed, aggressive, withdrawn, hyperactive or combative. Thought Content: Thought content normal. Thought content is not paranoid or delusional. Thought content does not include homicidal or suicidal ideation. Thought content does not include homicidal or suicidal plan. Cognition and Memory: Memory is not impaired. She does not exhibit impaired recent memory or impaired remote memory. Judgment: Judgment normal. Judgment is not impulsive or inappropriate. ANASTACIA test: +   Yeomans test: +   Gaenslen test: +     Assessment:     1. Spondylosis of lumbar region without myelopathy or radiculopathy    2. SI (sacroiliac) joint inflammation (HCC)    3. Spinal stenosis of lumbar region without neurogenic claudication    4. Chronic bilateral low back pain without sciatica    5. Chronic pain syndrome            Plan:      · OARRS reviewed. Current MED: 15.00  · Patient was offered naloxone for home. Has  · Discussed long term side effects of medications, tolerance, dependency and addiction. · Previous UDS reviewed  · UDS preformed today for compliance.   ·

## 2021-01-19 NOTE — TELEPHONE ENCOUNTER
Medication clearance received from Dr. Dyan Wilks. OK to hold ASA and Plavix for 5 days prior to procedure dated 1/13/2021. Pt. Contacted. Procedure and follow up scheduled. Pre-Procedure instructions mailed.

## 2021-01-25 DIAGNOSIS — G89.4 CHRONIC PAIN SYNDROME: ICD-10-CM

## 2021-01-26 RX ORDER — HYDROCODONE BITARTRATE AND ACETAMINOPHEN 5; 325 MG/1; MG/1
1 TABLET ORAL EVERY 8 HOURS PRN
Qty: 90 TABLET | Refills: 0 | Status: SHIPPED | OUTPATIENT
Start: 2021-01-28 | End: 2021-02-24 | Stop reason: SDUPTHER

## 2021-01-26 NOTE — TELEPHONE ENCOUNTER
OARRS reviewed. UDS: + for  Dihydrocodeine, Hydrocodone, Norhydrocodone. Last seen: 1/12/2021.  Follow-up: 3/1/21

## 2021-02-05 ENCOUNTER — TELEPHONE (OUTPATIENT)
Dept: PHYSICAL MEDICINE AND REHAB | Age: 71
End: 2021-02-05

## 2021-02-08 ENCOUNTER — TELEPHONE (OUTPATIENT)
Dept: PHYSICAL MEDICINE AND REHAB | Age: 71
End: 2021-02-08

## 2021-02-08 NOTE — TELEPHONE ENCOUNTER
Pt. LVM to cancel her procedure today due to being ill. Procedure and follow up cancelled. Will call her back to reschedule.

## 2021-02-08 NOTE — TELEPHONE ENCOUNTER
Pt. Contacted. Procedure and follow up rescheduled. Educated on pre-procedure. Verbalized understanding.

## 2021-02-16 ENCOUNTER — TELEPHONE (OUTPATIENT)
Dept: PHYSICAL MEDICINE AND REHAB | Age: 71
End: 2021-02-16

## 2021-02-24 ENCOUNTER — TELEPHONE (OUTPATIENT)
Dept: PHYSICAL MEDICINE AND REHAB | Age: 71
End: 2021-02-24

## 2021-02-24 DIAGNOSIS — G89.4 CHRONIC PAIN SYNDROME: ICD-10-CM

## 2021-02-25 NOTE — TELEPHONE ENCOUNTER
OARRS reviewed. UDS: Inconsistent Hydromorphone. Positive Dihydrocodeine, Hydrocodone, Norhydrocodone. Last seen: 1/12/2021.  Follow-up: 3/10/21

## 2021-02-26 ENCOUNTER — HOSPITAL ENCOUNTER (OUTPATIENT)
Age: 71
Setting detail: OUTPATIENT SURGERY
Discharge: HOME | End: 2021-02-26
Attending: PAIN MEDICINE | Admitting: PAIN MEDICINE
Payer: MEDICARE

## 2021-02-26 ENCOUNTER — ANESTHESIA EVENT (OUTPATIENT)
Dept: OPERATING ROOM | Age: 71
End: 2021-02-26
Payer: MEDICARE

## 2021-02-26 ENCOUNTER — APPOINTMENT (OUTPATIENT)
Dept: GENERAL RADIOLOGY | Age: 71
End: 2021-02-26
Attending: PAIN MEDICINE
Payer: MEDICARE

## 2021-02-26 ENCOUNTER — ANESTHESIA (OUTPATIENT)
Dept: OPERATING ROOM | Age: 71
End: 2021-02-26
Payer: MEDICARE

## 2021-02-26 VITALS
DIASTOLIC BLOOD PRESSURE: 80 MMHG | OXYGEN SATURATION: 95 % | SYSTOLIC BLOOD PRESSURE: 144 MMHG | TEMPERATURE: 98.2 F | HEART RATE: 80 BPM | HEIGHT: 62 IN | RESPIRATION RATE: 16 BRPM | BODY MASS INDEX: 30.73 KG/M2 | WEIGHT: 167 LBS

## 2021-02-26 VITALS
RESPIRATION RATE: 19 BRPM | SYSTOLIC BLOOD PRESSURE: 158 MMHG | DIASTOLIC BLOOD PRESSURE: 70 MMHG | OXYGEN SATURATION: 100 %

## 2021-02-26 LAB — GLUCOSE BLD-MCNC: 135 MG/DL (ref 70–108)

## 2021-02-26 PROCEDURE — 3209999900 FLUORO FOR SURGICAL PROCEDURES

## 2021-02-26 PROCEDURE — 64636 DESTROY L/S FACET JNT ADDL: CPT | Performed by: PAIN MEDICINE

## 2021-02-26 PROCEDURE — 2709999900 HC NON-CHARGEABLE SUPPLY: Performed by: PAIN MEDICINE

## 2021-02-26 PROCEDURE — 6360000002 HC RX W HCPCS: Performed by: PAIN MEDICINE

## 2021-02-26 PROCEDURE — 7100000010 HC PHASE II RECOVERY - FIRST 15 MIN: Performed by: PAIN MEDICINE

## 2021-02-26 PROCEDURE — 2500000003 HC RX 250 WO HCPCS: Performed by: PAIN MEDICINE

## 2021-02-26 PROCEDURE — 3600000056 HC PAIN LEVEL 4 BASE: Performed by: PAIN MEDICINE

## 2021-02-26 PROCEDURE — 3600000057 HC PAIN LEVEL 4 ADDL 15 MIN: Performed by: PAIN MEDICINE

## 2021-02-26 PROCEDURE — 64635 DESTROY LUMB/SAC FACET JNT: CPT | Performed by: PAIN MEDICINE

## 2021-02-26 PROCEDURE — 2720000010 HC SURG SUPPLY STERILE: Performed by: PAIN MEDICINE

## 2021-02-26 PROCEDURE — 3700000000 HC ANESTHESIA ATTENDED CARE: Performed by: PAIN MEDICINE

## 2021-02-26 PROCEDURE — 2500000003 HC RX 250 WO HCPCS: Performed by: NURSE ANESTHETIST, CERTIFIED REGISTERED

## 2021-02-26 PROCEDURE — 3700000001 HC ADD 15 MINUTES (ANESTHESIA): Performed by: PAIN MEDICINE

## 2021-02-26 PROCEDURE — 6360000002 HC RX W HCPCS: Performed by: NURSE ANESTHETIST, CERTIFIED REGISTERED

## 2021-02-26 PROCEDURE — 7100000011 HC PHASE II RECOVERY - ADDTL 15 MIN: Performed by: PAIN MEDICINE

## 2021-02-26 PROCEDURE — 82948 REAGENT STRIP/BLOOD GLUCOSE: CPT

## 2021-02-26 RX ORDER — LIDOCAINE HYDROCHLORIDE 10 MG/ML
INJECTION, SOLUTION EPIDURAL; INFILTRATION; INTRACAUDAL; PERINEURAL PRN
Status: DISCONTINUED | OUTPATIENT
Start: 2021-02-26 | End: 2021-02-26 | Stop reason: ALTCHOICE

## 2021-02-26 RX ORDER — BUPIVACAINE HYDROCHLORIDE 2.5 MG/ML
INJECTION, SOLUTION EPIDURAL; INFILTRATION; INTRACAUDAL PRN
Status: DISCONTINUED | OUTPATIENT
Start: 2021-02-26 | End: 2021-02-26 | Stop reason: ALTCHOICE

## 2021-02-26 RX ORDER — LIDOCAINE HYDROCHLORIDE 20 MG/ML
INJECTION, SOLUTION EPIDURAL; INFILTRATION; INTRACAUDAL; PERINEURAL PRN
Status: DISCONTINUED | OUTPATIENT
Start: 2021-02-26 | End: 2021-02-26 | Stop reason: SDUPTHER

## 2021-02-26 RX ORDER — METHYLPREDNISOLONE ACETATE 80 MG/ML
INJECTION, SUSPENSION INTRA-ARTICULAR; INTRALESIONAL; INTRAMUSCULAR; SOFT TISSUE PRN
Status: DISCONTINUED | OUTPATIENT
Start: 2021-02-26 | End: 2021-02-26 | Stop reason: ALTCHOICE

## 2021-02-26 RX ORDER — PROPOFOL 10 MG/ML
INJECTION, EMULSION INTRAVENOUS PRN
Status: DISCONTINUED | OUTPATIENT
Start: 2021-02-26 | End: 2021-02-26 | Stop reason: SDUPTHER

## 2021-02-26 RX ORDER — HYDROCODONE BITARTRATE AND ACETAMINOPHEN 5; 325 MG/1; MG/1
1 TABLET ORAL EVERY 8 HOURS PRN
Qty: 90 TABLET | Refills: 0 | Status: SHIPPED | OUTPATIENT
Start: 2021-02-27 | End: 2021-03-25 | Stop reason: SDUPTHER

## 2021-02-26 RX ADMIN — LIDOCAINE HYDROCHLORIDE 60 MG: 20 INJECTION, SOLUTION EPIDURAL; INFILTRATION; INTRACAUDAL; PERINEURAL at 08:45

## 2021-02-26 RX ADMIN — PROPOFOL 25 MG: 10 INJECTION, EMULSION INTRAVENOUS at 08:46

## 2021-02-26 ASSESSMENT — PULMONARY FUNCTION TESTS
PIF_VALUE: 0

## 2021-02-26 ASSESSMENT — PAIN - FUNCTIONAL ASSESSMENT: PAIN_FUNCTIONAL_ASSESSMENT: 0-10

## 2021-02-26 ASSESSMENT — PAIN DESCRIPTION - DESCRIPTORS: DESCRIPTORS: BURNING;ACHING

## 2021-02-26 ASSESSMENT — ENCOUNTER SYMPTOMS: SHORTNESS OF BREATH: 1

## 2021-02-26 NOTE — OP NOTE
Operative Note    Pre-Procedure Note    Patient Name: Jillian Shepherd   YOB: 1950  Medical Record Number: [id number]  Date: 2/26/21    Indication:  Lower back pain  Consent: On file. Vital Signs:   Vitals:    02/26/21 0824   BP: 135/64   Pulse: 82   Resp: 16   Temp: 97.2 °F (36.2 °C)   SpO2: 94%       Past Medical History:   has a past medical history of Bladder dysfunction, CAD (coronary artery disease), Cerebral artery occlusion with cerebral infarction ([address] Cerebrovascular disease, CHF (congestive heart failure) ([facility] [address] COPD (chronic obstructive pulmonary disease) ([facility] [address] DDD (degenerative disc disease), Depression, GERD (gastroesophageal reflux disease), Hyperlipidemia, Hypertension, Obesity, Other disorders of kidney and ureter in diseases classified elsewhere, Other screening mammogram, Pap smear, as part of routine gynecological examination, Pap smear, as part of routine gynecological examination, Peripheral edema, Pneumonia, PONV (postoperative nausea and vomiting), and Unspecified sleep apnea. Past Surgical History:   has a past surgical history that includes Dilation and curettage of uterus (1970's); eye surgery (2013); other surgical history (2016); Colonoscopy (7-2009); other surgical history (Left, 12/12/2017); Lumbar spine surgery (Left, 12/12/2017); Endoscopy, colon, diagnostic; Lumbar spine surgery (Right, 10/7/2019); Pain management procedure (Left, 2/7/2020); Pain management procedure (Right, 7/16/2020); and Pain management procedure (Left, 8/25/2020). Pre-Sedation Documentation and Exam:   Vital signs have been reviewed (see flow sheet for vitals).      Sedation/ Anesthesia Plan:   MAC    Patient is an appropriate candidate for plan of sedation: yes         Preoperative Diagnosis:  L-spondylosis     Post-Op Dx: as above     Procedure Performed:  :Radiofrequency ablation of median branches at the levels of L2-3,L3-4,L4-5 and L5-S1 right under fluoroscopic guidance    Indication for the Procedure:  The patient has ahistory of chronic low back pain that is not responding well to the conservative treatment.  Patient's pain is mostly axial in nature.  Pain is interfering with the activities of daily living.  Physical examination revealed facet tenderness and facet loading is positive.  Patient had undergone lumbar facet joint injections with pain relief that lasted for only a short period of time and had greater than 70% pain relief with confirmatory median branch blocks.  Hence we decided to do radiofrequency abalation of median branches for long term pain releif.   The procedure and risks  were discussed with the patient and an informed consent was obtained.     Procedure:  Right side   A meaningful communication was kept up with the patient throughout the procedure. The patient is placed in prone position and skin over the back was prepped and draped in sterile manner.  Then using fluoroscopy the junction of the transverse process of the vertebra with the superior process of the facet joint was observed and the view was optimized.  The skin and deep tissues posterior were infiltrated with 10 ml of  1% xylocaine . The RF canula with the 10 mm active tip was introduced through the skin wheal under fluoroscopy guidance such that the tip of the needle lies in the groove of the transverse process with the superior processes of the facet joint.  Then a lateral view of the lumbar spine was obtained to make sure the tip of needle is not in the neural foramen.  Then electric impedence was checked to make sure it is acceptable. Then a sensory stimulus was applied at 50 Hz up to 1 volt and concordant pain symptoms were reproduced.  Then a motor stimulus was applied at 2 Hz up to 2 volts and no motor stimulation was seen in lower extremities.  Some multifidus stimulus was seen. Sue Goltz after negative aspiration a mixture of depomedrol 80  mg and 0.25%  marcaine 2 cc was injected through the needle.  Then a initial lesion was done at 80 degrees centigrade for 90 seconds     EBL-0     Patient's vital signs and neurological status remained stable throughout the procedure and post procedural period.  The patient tolerated the procedure well and was discharged home in stable condition.            Electronically signed by Miles Kirkland MD on 2/26/21 at 8:44 AM EST

## 2021-02-26 NOTE — H&P
[facility]  History and Physical Update    Pt Name: Gladys Villeda  MRN: [id number]  YOB: 1950  Date of evaluation: 2/26/2021      I have examined the patient and reviewed the H&P/Consult and there are no changes to the patient or plans.         Electronically signed by Aquiles Ga MD on 2/26/2021 at 8:12 AM

## 2021-02-26 NOTE — ANESTHESIA POSTPROCEDURE EVALUATION
Department of Anesthesiology  Postprocedure Note    Patient: Sonal Molina  MRN: [id number]  YOB: 1950  Date of evaluation: 2/26/2021  Time:  10:46 AM     Procedure Summary     Date: 02/26/21 Room / Location: [address] [place] [address] 03 / [facility]    Anesthesia Start: Catina Muckle Anesthesia Stop: 1424    Procedure: Right L-facet RFA @ L2-3, L3-4, L4-5, and L5-S1 (Right ) Diagnosis: (LUMBAR SPONDYLOSIS)    Surgeons: Sujatha Lopez MD Responsible Provider: Tanner Bowden MD    Anesthesia Type: MAC ASA Status: 4          Anesthesia Type: MAC    Eryn Phase I:      Eryn Phase II: Eryn Score: 9    Last vitals: Reviewed and per EMR flowsheets.        Anesthesia Post Evaluation

## 2021-02-26 NOTE — PROGRESS NOTES
0695- PT arrived to PACU phase 2. Pt hooked up to monitor, VSS, pt wears home 02 2 L at home hooked back up.  0859- Snack given  0912- ride called, she will be here in 10 min  0914- IV removed, pt already dressed  0915- Site where IV removed bleeding dressing changed.   1606- Pt discharged walked pt to car with this RN pt on her own home 02

## 2021-02-26 NOTE — ANESTHESIA PRE PROCEDURE
Department of Anesthesiology  Preprocedure Note       Name:  Sonal Molina   Age:  79 y.o.  :  1950                                          MRN:  [id number]         Date:  2021      Surgeon: Hanane Valenzuelar):  Sujatha Lopez MD    Procedure: Procedure(s):  Right L-facet RFA @ L2-3, L3-4, L4-5, and L5-S1    Medications prior to admission:   Prior to Admission medications    Medication Sig Start Date End Date Taking? Authorizing Provider   HYDROcodone-acetaminophen (NORCO) 5-325 MG per tablet Take 1 tablet by mouth every 8 hours as needed for Pain for up to 30 days.  21 Yes CASSANDRA Goel CNP   fluticasone (FLONASE) 50 MCG/ACT nasal spray 1 spray by Each Nostril route daily   Yes Historical Provider, MD   omeprazole (PRILOSEC) 40 MG delayed release capsule Take 40 mg by mouth daily   Yes Historical Provider, MD   Tiotropium Bromide-Olodaterol 2.5-2.5 MCG/ACT AERS Inhale 2 puffs into the lungs daily 20 Yes CASSANDRA Mccoy CNP   albuterol (PROVENTIL) (2.5 MG/3ML) 0.083% nebulizer solution Take 3 mLs by nebulization every 6 hours as needed for Wheezing or Shortness of Breath 3/16/20  Yes CASSANDRA Mccoy CNP   albuterol sulfate  (90 Base) MCG/ACT inhaler Inhale 2 puffs into the lungs 4 times daily as needed for Wheezing 3/16/20  Yes CASSANDRA Mccoy CNP   triamterene-hydrochlorothiazide (MAXZIDE-25) 37.5-25 MG per tablet take 1 tablet by mouth every morning 19  Yes Historical Provider, MD   atorvastatin (LIPITOR) 40 MG tablet Take 1 tablet by mouth daily 10/4/18  Yes Maye Bright MD   pseudoephedrine (SUDAFED 12 HR) 120 MG extended release tablet Take 120 mg by mouth every 12 hours as needed for Congestion   Yes Historical Provider, MD   amLODIPine (NORVASC) 5 MG tablet Take 5 mg by mouth daily   Yes Historical Provider, MD   losartan (COZAAR) 50 MG tablet Take 50 mg by mouth daily  10/20/17  Yes Historical Provider, MD   OXYGEN Inhale into the lungs 3 lpm, per patient at home and when walking   Yes Historical Provider, MD   lidocaine (LMX) 4 % cream Apply topically as needed every 6 hours to left hand for pain. 11/10/20   CASSANDRA Frias CNP   polyethylene glycol (GLYCOLAX) 17 g packet Take 17 g by mouth daily as needed for Constipation    Historical Provider, MD   montelukast (SINGULAIR) 10 MG tablet Take 1 tablet by mouth nightly 3/16/20   CASSANDRA Schaefer CNP   metFORMIN (GLUCOPHAGE) 500 MG tablet Take 500 mg by mouth daily (with breakfast)    Historical Provider, MD   furosemide (LASIX) 20 MG tablet Take 20 mg by mouth daily as needed    Historical Provider, MD   RA COL-RITE 100 MG capsule daily  8/22/19   Historical Provider, MD   clopidogrel (PLAVIX) 75 MG tablet Take 1 tablet by mouth daily 4/23/19   CASSANDRA Peraza CNP   aspirin 81 MG EC tablet Take 81 mg by mouth daily. Historical Provider, MD       Current medications:    No current facility-administered medications for this encounter. Allergies:     Allergies   Allergen Reactions    Codeine Itching     Pt cant remember    Doxycycline      Cant breath     Lipitor Itching    Simvastatin Itching     Stomach pain    Chantix [Varenicline Tartrate] Itching       Problem List:    Patient Active Problem List   Diagnosis Code    GERD (gastroesophageal reflux disease) K21.9    Hyperlipidemia E78.5    Peripheral edema R60.9    CHF (congestive heart failure) ([facility]) I50.9    Depression F32.9    DDD (degenerative disc disease) [id number]    CAD (coronary artery disease) I25.10    Cerebrovascular disease I67.9    Diabetes type 2, controlled ([facility] E11.9    Bladder dysfunction N31.9    Stage 3 severe COPD by GOLD classification ([facility] [facility] J44.9    Nasal congestion R09.81    Acute sinusitis J01.90    Hypertrophy of both inferior nasal turbinates J34.3    Lumbar spondylosis M47.816    History of panniculitis Z87.39    Hypertension I10  Dyspnea R06.00    Sinus disease J34.9    PETE and COPD overlap syndrome ([facility]) G47.33, J44.9    Snoring R06.83    Obesity (BMI 30.0-34. 9) E66.9    Witnessed apneic spells R06.81    Chronic respiratory failure with hypoxia ([facility]) J96.11    COPD exacerbation ([facility]) J44.1    Acute respiratory failure ([facility]) J96.00    Acute on chronic respiratory failure with hypoxia and hypercapnia ([facility]) J96.21, J96.22    Chest pain R07.9    Angina, class III ([facility]) I20.9    Abnormal stress test R94.39    Smoker F17.200    Acute respiratory failure with hypercapnia ([facility]) J96.02    Fatigue R53.83    Peripheral artery disease ([facility]) I73.9    COPD (chronic obstructive pulmonary disease) with acute bronchitis ([facility]) J44.0, J20.9       Past Medical History:        Diagnosis Date    Bladder dysfunction     CAD (coronary artery disease)     Cerebral artery occlusion with cerebral infarction ([facility] [address] [facility]     TIA    Cerebrovascular disease     CHF (congestive heart failure) ([facility])     COPD (chronic obstructive pulmonary disease) ([facility])     uses oxygen 24 hours per day    DDD (degenerative disc disease)     Depression     GERD (gastroesophageal reflux disease)     Hyperlipidemia     Hypertension     Obesity     Other disorders of kidney and ureter in diseases classified elsewhere     Other screening mammogram 2011    Pap smear, as part of routine gynecological examination 2007    Pap smear, as part of routine gynecological examination 2007    Peripheral edema     with ascites    Pneumonia     PONV (postoperative nausea and vomiting)     with back injection in [facility]    Unspecified sleep apnea     c-pap at 10 cm wtr       Past Surgical History:        Procedure Laterality Date    COLONOSCOPY  7-2009    DILATION AND CURETTAGE OF UTERUS  1970's    ENDOSCOPY, COLON, DIAGNOSTIC      EYE SURGERY  2013    Laser surgery     LUMBAR SPINE SURGERY Left 12/12/2017 LUMBAR RFA L2-3, 3-4, 4-5, L5-S1 LEFT SIDE performed by Nader Brown MD at [address] Right 10/7/2019    L-facet RFA @ L2-3, L3-4, L4-5, and L5-S1 RIGHT SIDE FIRST performed by Nader Brown MD at [place] [address]  2016    burning nerves in back - lumbar @ [place]    OTHER SURGICAL HISTORY Left 12/12/2017    Lumbar RFA L2-3, L3-4, L4-5, L5-S1    PAIN MANAGEMENT PROCEDURE Left 2/7/2020    Left L-facet RFA @ L2-3, L3-4, L4-5, and L5-S1 performed by Nader Brown MD at [place] [address] Right 7/16/2020    Right L-facet RFA @ L2-3, L3-4, L4-5, and L5-S1 performed by Nader Brown MD at [place] [address] Left 8/25/2020    LEFT L-facet RFA @ L2-3, L3-4, L4-5, and L5-S1 performed by Nader Brown MD at [id number] [place] [address] Road History:    Social History     Tobacco Use    Smoking status: Former Smoker     Packs/day: 2.00     Years: 40.00     Pack years: 80.00     Types: Cigarettes     Quit date: 11/14/2010     Years since quitting: 10.2    Smokeless tobacco: Never Used   Substance Use Topics    Alcohol use: No     Comment: pt hasn't drank since 2007                                Counseling given: Not Answered      Vital Signs (Current):   Vitals:    02/26/21 0824   BP: 135/64   Pulse: 82   Resp: 16   Temp: 97.2 °F (36.2 °C)   TempSrc: Infrared   SpO2: 94%   Weight: 167 lb (75.8 kg)   Height: 5' 2\" (1.575 m)                                              BP Readings from Last 3 Encounters:   02/26/21 135/64   01/12/21 124/70   11/10/20 136/76       NPO Status: Time of last liquid consumption: 1830                        Time of last solid consumption: 1900                        Date of last liquid consumption: 02/25/21                        Date of last solid food consumption: 02/25/21    BMI: Wt Readings from Last 3 Encounters:   02/26/21 167 lb (75.8 kg)   01/12/21 160 lb (72.6 kg)   11/10/20 160 lb (72.6 kg)     Body mass index is 30.54 kg/m². CBC:   Lab Results   Component Value Date    WBC 8.2 05/23/2020    RBC 3.81 05/23/2020    RBC 4.30 03/31/2012    HGB 10.5 05/23/2020    HCT 35.0 05/23/2020    MCV 91.9 05/23/2020    RDW 13.3 03/30/2018     05/23/2020       CMP:   Lab Results   Component Value Date     05/23/2020    K 4.0 05/23/2020    CL 91 05/23/2020    CO2 36 05/23/2020    BUN 15 05/23/2020    CREATININE 0.7 05/23/2020    GFRAA >60 06/20/2019    LABGLOM 83 05/23/2020    GLUCOSE 180 05/23/2020    PROT 7.2 05/23/2020    CALCIUM 9.7 05/23/2020    BILITOT <0.2 05/23/2020    BILITOT NEGATIVE 03/31/2012    ALKPHOS 77 05/23/2020    AST 11 05/23/2020    ALT 9 05/23/2020       POC Tests:   Recent Labs     02/26/21  2832   POCGLU 135*       Coags:   Lab Results   Component Value Date    INR 0.91 01/08/2019    APTT 33.6 01/08/2019       HCG (If Applicable): No results found for: PREGTESTUR, PREGSERUM, HCG, HCGQUANT     ABGs: No results found for: PHART, PO2ART, FGN0JNB, UQU6BKC, BEART, M1KVADTS     Type & Screen (If Applicable):  Lab Results   Component Value Date    LABRH NEG 01/08/2019       Drug/Infectious Status (If Applicable):  No results found for: HIV, HEPCAB    COVID-19 Screening (If Applicable):   Lab Results   Component Value Date    COVID19 Not Detected 08/19/2020         Anesthesia Evaluation     history of anesthetic complications: PONV. Airway: Mallampati: II       Mouth opening: > = 3 FB Dental:          Pulmonary:   (+) COPD:  shortness of breath:  sleep apnea:                             Cardiovascular:    (+) hypertension:, angina:, CAD:, CHF:,                   Neuro/Psych:   (+) CVA:, psychiatric history:            GI/Hepatic/Renal:   (+) GERD:,           Endo/Other:    (+) Diabetes, .                  Abdominal:           Vascular: Anesthesia Plan      MAC     ASA 4             Anesthetic plan and risks discussed with patient. Plan discussed with CRNA.                   Luis Thacker MD   2/26/2021

## 2021-02-26 NOTE — H&P
has a past surgical history that includes Dilation and curettage of uterus (1970's); eye surgery (2013); other surgical history (2016); Colonoscopy (7-2009); other surgical history (Left, 12/12/2017); Lumbar spine surgery (Left, 12/12/2017); Endoscopy, colon, diagnostic; Lumbar spine surgery (Right, 10/7/2019); Pain management procedure (Left, 2/7/2020); Pain management procedure (Right, 7/16/2020); and Pain management procedure (Left, 8/25/2020).    Family History  This patient's family history includes Alzheimer's Disease in her father; COPD in her sister; Cancer in her brother and daughter; Diabetes in her daughter; Heart Disease in her father and mother; Heart Failure in her father and mother; High Blood Pressure in her father and mother; Parkinsonism in her father; Stroke in her brother.  Alethea Freitas  reports that she quit smoking about 10 years ago. Her smoking use included cigarettes. She has a 80.00 pack-year smoking history. She has never used smokeless tobacco. She reports that she does not drink alcohol or use drugs.     Medications    Current Medication      Current Outpatient Medications:     HYDROcodone-acetaminophen (NORCO) 5-325 MG per tablet, Take 1 tablet by mouth every 8 hours as needed for Pain for up to 30 days. , Disp: 90 tablet, Rfl: 0    lidocaine (LMX) 4 % cream, Apply topically as needed every 6 hours to left hand for pain., Disp: 1 Tube, Rfl: 2    fluticasone (FLONASE) 50 MCG/ACT nasal spray, 1 spray by Each Nostril route daily, Disp: , Rfl:     omeprazole (PRILOSEC) 40 MG delayed release capsule, Take 40 mg by mouth daily, Disp: , Rfl:     polyethylene glycol (GLYCOLAX) 17 g packet, Take 17 g by mouth daily as needed for Constipation, Disp: , Rfl:     Tiotropium Bromide-Olodaterol 2.5-2.5 MCG/ACT AERS, Inhale 2 puffs into the lungs daily, Disp: 1 Inhaler, Rfl: 3    albuterol (PROVENTIL) (2.5 MG/3ML) 0.083% nebulizer solution, Take 3 mLs by nebulization every 6 hours as needed for Wheezing or Shortness of Breath, Disp: 120 mL, Rfl: 11    montelukast (SINGULAIR) 10 MG tablet, Take 1 tablet by mouth nightly, Disp: 30 tablet, Rfl: 3    albuterol sulfate  (90 Base) MCG/ACT inhaler, Inhale 2 puffs into the lungs 4 times daily as needed for Wheezing, Disp: 1 Inhaler, Rfl: 5    metFORMIN (GLUCOPHAGE) 500 MG tablet, Take 500 mg by mouth daily (with breakfast), Disp: , Rfl:     furosemide (LASIX) 20 MG tablet, Take 20 mg by mouth daily as needed, Disp: , Rfl:     triamterene-hydrochlorothiazide (MAXZIDE-25) 37.5-25 MG per tablet, take 1 tablet by mouth every morning, Disp: , Rfl: 0    RA COL-RITE 100 MG capsule, daily , Disp: , Rfl: 0    clopidogrel (PLAVIX) 75 MG tablet, Take 1 tablet by mouth daily, Disp: 30 tablet, Rfl: 11    atorvastatin (LIPITOR) 40 MG tablet, Take 1 tablet by mouth daily, Disp: 30 tablet, Rfl: 3    pseudoephedrine (SUDAFED 12 HR) 120 MG extended release tablet, Take 120 mg by mouth every 12 hours as needed for Congestion, Disp: , Rfl:     amLODIPine (NORVASC) 5 MG tablet, Take 5 mg by mouth daily, Disp: , Rfl:     losartan (COZAAR) 50 MG tablet, Take 50 mg by mouth daily , Disp: , Rfl: 0    OXYGEN,  Inhale into the lungs 3 lpm, per patient at home and when walking, Disp: , Rfl:     aspirin 81 MG EC tablet, Take 81 mg by mouth daily. , Disp: , Rfl:         Subjective:      Review of Systems   Respiratory: Positive for cough and shortness of breath. On 3 liters of oxygen per nasal cannula    Musculoskeletal: Positive for arthralgias, back pain and myalgias. Negative for gait problem. Neurological: Negative for weakness and numbness.         Objective:      Vitals       Vitals:     01/12/21 0855   BP: 124/70   Temp: 97.2 °F (36.2 °C)   Weight: 160 lb (72.6 kg)   Height: 5' 2\" (1.575 m)            Physical Exam  Vitals signs and nursing note reviewed. Constitutional:       General: She is not in acute distress.      Appearance: Normal appearance. She is well-developed. She is not diaphoretic. HENT:      Head: Normocephalic and atraumatic. Right Ear: External ear normal.      Left Ear: External ear normal.      Nose: Nose normal.      Mouth/Throat:      Mouth: Mucous membranes are dry. Pharynx: No oropharyngeal exudate. Eyes:      General: No scleral icterus. Right eye: No discharge. Left eye: No discharge. Conjunctiva/sclera: Conjunctivae normal.      Pupils: Pupils are equal, round, and reactive to light. Neck:      Musculoskeletal: Full passive range of motion without pain, normal range of motion and neck supple. Normal range of motion. No edema, erythema, neck rigidity or muscular tenderness. Thyroid: No thyromegaly. Cardiovascular:      Rate and Rhythm: Normal rate and regular rhythm. Heart sounds: Normal heart sounds. No murmur. No friction rub. No gallop. Pulmonary:      Effort: Pulmonary effort is normal. No respiratory distress. Breath sounds: Examination of the right-upper field reveals decreased breath sounds and wheezing. Examination of the left-upper field reveals decreased breath sounds and wheezing. Examination of the right-middle field reveals decreased breath sounds. Examination of the left-middle field reveals decreased breath sounds. Examination of the right-lower field reveals decreased breath sounds. Examination of the left-lower field reveals decreased breath sounds. Decreased breath sounds and wheezing present. No rales. Comments: On 3 liters of oxygen   Chest:      Chest wall: No tenderness. Abdominal:      General: Bowel sounds are normal. There is no distension. Palpations: Abdomen is soft. Tenderness: There is no abdominal tenderness. There is no guarding or rebound. Musculoskeletal:         General: Swelling and tenderness present. Right shoulder: She exhibits decreased range of motion and tenderness.       Left shoulder: She exhibits decreased range of motion and tenderness. Right elbow: Normal.     Left elbow: Normal.      Right wrist: She exhibits no tenderness, no bony tenderness and no swelling. Left wrist: She exhibits decreased range of motion, tenderness and bony tenderness. Right hip: Normal.      Left hip: She exhibits decreased range of motion, decreased strength and tenderness. Right knee: Normal.      Left knee: Normal.      Cervical back: She exhibits decreased range of motion, tenderness, pain and spasm. Thoracic back: She exhibits tenderness. Lumbar back: She exhibits tenderness, pain and spasm. Back:       Right upper leg: She exhibits tenderness. Left upper leg: She exhibits tenderness. Right lower leg: No edema. Left lower leg: No edema. Skin:     General: Skin is warm. Coloration: Skin is not pale. Findings: No erythema or rash. Neurological:      General: No focal deficit present. Mental Status: She is alert and oriented to person, place, and time. She is not disoriented. Cranial Nerves: No cranial nerve deficit. Sensory: No sensory deficit. Motor: No atrophy or abnormal muscle tone. Coordination: Coordination normal.      Gait: Gait normal.      Deep Tendon Reflexes: Reflexes are normal and symmetric. Comments: SLR-  Motor 5/5 BUE BLE   Psychiatric:         Attention and Perception: She is attentive. Mood and Affect: Mood normal. Mood is not anxious or depressed. Affect is not labile, blunt, angry or inappropriate. Speech: Speech normal.         Behavior: Behavior normal. Behavior is not agitated, slowed, aggressive, withdrawn, hyperactive or combative. Thought Content: Thought content normal. Thought content is not paranoid or delusional. Thought content does not include homicidal or suicidal ideation. Thought content does not include homicidal or suicidal plan.          Cognition and Memory: Memory is not impaired. She does not exhibit impaired recent memory or impaired remote memory. Judgment: Judgment normal. Judgment is not impulsive or inappropriate.         ANASTACIA test: +   Yeomans test: +   Gaenslen test: +  Assessment:      1. Spondylosis of lumbar region without myelopathy or radiculopathy    2. SI (sacroiliac) joint inflammation (HCC)    3. Spinal stenosis of lumbar region without neurogenic claudication    4. Chronic bilateral low back pain without sciatica    5. Chronic pain syndrome       Plan:      · OARRS reviewed. Current MED: 15.00  · Patient was offered naloxone for home. Has  · Discussed long term side effects of medications, tolerance, dependency and addiction. · Previous UDS reviewed  · UDS preformed today for compliance. · Patient told can not receive any pain medications from any other source. · No evidence of abuse, diversion or aberrant behavior. · Medications and/or procedures to improve function and quality of life- patient understanding with this and that may not be pain free  · Discussed with patient about safe storage of medications at home  · Discussed possible weaning of medication dosing dependent on treatment/procedure results. · Discussed with patient about risks with procedure including infection, reaction to medication, increased pain, or bleeding. · Procedure notes reveiwed in detail.   · Continues to receive about 80% relief of left lower back pain from Left L-facet RFA, right side waning as pain is increasing received over 80% relief for 6 months. · Plan to repeat Right L-facet RFA @ L2-3, L3-4, L4-5, and L5-S1 for longer term pain relief. Procedure and risks discussed in detail with patient. · Needs to be cleared by Cardiology prior to procedure if not in the past 3 months. · Continue Norco 5/325 1 tab every 8 hours as needed for breakthrough pain. Filled 12/29/2020 and has plenty         Meds.  Prescribed:     Encounter Medications    No orders of the defined types were placed in this encounter.         Return for  Right L-facet RFA @ L2-3, L3-4, L4-5, and L5-S1. , follow up after procedure.

## 2021-03-10 ENCOUNTER — OFFICE VISIT (OUTPATIENT)
Dept: PHYSICAL MEDICINE AND REHAB | Age: 71
End: 2021-03-10
Payer: MEDICARE

## 2021-03-10 ENCOUNTER — TELEPHONE (OUTPATIENT)
Dept: PHYSICAL MEDICINE AND REHAB | Age: 71
End: 2021-03-10

## 2021-03-10 VITALS
DIASTOLIC BLOOD PRESSURE: 70 MMHG | SYSTOLIC BLOOD PRESSURE: 122 MMHG | WEIGHT: 167 LBS | BODY MASS INDEX: 30.73 KG/M2 | HEIGHT: 62 IN

## 2021-03-10 DIAGNOSIS — G89.4 CHRONIC PAIN SYNDROME: ICD-10-CM

## 2021-03-10 DIAGNOSIS — M48.061 SPINAL STENOSIS OF LUMBAR REGION WITHOUT NEUROGENIC CLAUDICATION: ICD-10-CM

## 2021-03-10 DIAGNOSIS — M47.816 SPONDYLOSIS OF LUMBAR REGION WITHOUT MYELOPATHY OR RADICULOPATHY: Primary | ICD-10-CM

## 2021-03-10 DIAGNOSIS — G89.29 CHRONIC BILATERAL LOW BACK PAIN WITHOUT SCIATICA: ICD-10-CM

## 2021-03-10 DIAGNOSIS — M54.50 CHRONIC BILATERAL LOW BACK PAIN WITHOUT SCIATICA: ICD-10-CM

## 2021-03-10 DIAGNOSIS — M46.1 SI (SACROILIAC) JOINT INFLAMMATION (HCC): ICD-10-CM

## 2021-03-10 PROCEDURE — 99213 OFFICE O/P EST LOW 20 MIN: CPT | Performed by: NURSE PRACTITIONER

## 2021-03-10 ASSESSMENT — ENCOUNTER SYMPTOMS
COUGH: 0
SHORTNESS OF BREATH: 0
BACK PAIN: 1

## 2021-03-10 NOTE — TELEPHONE ENCOUNTER
Plavix taken out due to patient stating she does not take Plavix. She temporarily took it after stents placed a couple years ago per Dr. Daniel Sheehan. Sara Lopez he had her take it short term and now she is only on ASA 81. Denies cardiac events in the past 6 months.

## 2021-03-10 NOTE — PROGRESS NOTES
[address] [facility] [address]  Dept: [phone]  Dept Fax: [phone]: [phone]    Visit Date: 3/10/2021    Functionality Assessment/Goals Worksheet     On a scale of 0 (Does not Interfere) to 10 (Completely Interferes)     1. Which number describes how during the past week pain has interfered with       the following:  A. General Activity:  4  B. Mood: 4  C. Walking Ability:  4  D. Normal Work (Includes both work outside the home and housework):  4  E. Relations with Other People:   4  F. Sleep:   4  G. Enjoyment of Life:   4    2. Patient Prefers to Take their Pain Medications:     [x]  On a regular basis   []  Only when necessary    []  Does not take pain medications    3. What are the Patient's Goals/Expectations for Visiting Pain Management? []  Learn about my pain    [x]  Receive Medication   []  Physical Therapy     []  Treat Depression   [x]  Receive Injections    []  Treat Sleep   []  Deal with Anxiety and Stress   []  Treat Opoid Dependence/Addiction   []  Other:      HPI:   Domenica Lowery is a 79 y.o. female is here today for    Chief Complaint: Garrett Aas back pain     HPI   FU from right L-facet RFA from 2/26/2021. Receiving 85% relief of pain in right lower back, \"pain is much better and I can tolerate a lot more activity\". Now noticing pain in left lower back as this is the main complaint as effects from left L-facet RFA is wearing off. Constant dull aching pain. Norco prn continues to help    Remains on oxygen 3 liters per nasal cannula     Medications reviewed. Patient denies side effects with medications. Patient states she is taking medications as prescribed. Shedenies receiving pain medications from other sources. She denies any ER visits since last visit. Pain scale with out pain medications or at its worst is 7/10.   Pain scale with pain distress. Breath sounds: Examination of the right-upper field reveals decreased breath sounds and wheezing. Examination of the left-upper field reveals decreased breath sounds and wheezing. Examination of the right-middle field reveals decreased breath sounds. Examination of the left-middle field reveals decreased breath sounds. Examination of the right-lower field reveals decreased breath sounds. Examination of the left-lower field reveals decreased breath sounds. Decreased breath sounds and wheezing present. No rales. Comments: On 3 liters of oxygen   Chest:      Chest wall: No tenderness. Abdominal:      General: Bowel sounds are normal. There is no distension. Palpations: Abdomen is soft. Tenderness: There is no abdominal tenderness. There is no guarding or rebound. Musculoskeletal:         General: Swelling and tenderness present. Right shoulder: She exhibits decreased range of motion and tenderness. Left shoulder: She exhibits decreased range of motion and tenderness. Right elbow: Normal.     Left elbow: Normal.      Right wrist: She exhibits no tenderness, no bony tenderness and no swelling. Left wrist: She exhibits decreased range of motion, tenderness and bony tenderness. Right hip: Normal.      Left hip: She exhibits decreased range of motion, decreased strength and tenderness. Right knee: Normal.      Left knee: Normal.      Cervical back: She exhibits decreased range of motion, tenderness, pain and spasm. Thoracic back: She exhibits tenderness. Lumbar back: She exhibits tenderness, pain and spasm. Back:       Right upper leg: She exhibits tenderness. Left upper leg: She exhibits tenderness. Right lower leg: No edema. Left lower leg: No edema. Skin:     General: Skin is warm. Coloration: Skin is not pale. Findings: No erythema or rash. Neurological:      General: No focal deficit present.       Mental Status: She is alert and oriented to person, place, and time. She is not disoriented. Cranial Nerves: No cranial nerve deficit. Sensory: No sensory deficit. Motor: No atrophy or abnormal muscle tone. Coordination: Coordination normal.      Gait: Gait normal.      Deep Tendon Reflexes: Reflexes are normal and symmetric. Comments: SLR-  Motor 5/5 BUE BLE   Psychiatric:         Attention and Perception: She is attentive. Mood and Affect: Mood normal. Mood is not anxious or depressed. Affect is not labile, blunt, angry or inappropriate. Speech: Speech normal.         Behavior: Behavior normal. Behavior is not agitated, slowed, aggressive, withdrawn, hyperactive or combative. Thought Content: Thought content normal. Thought content is not paranoid or delusional. Thought content does not include homicidal or suicidal ideation. Thought content does not include homicidal or suicidal plan. Cognition and Memory: Memory is not impaired. She does not exhibit impaired recent memory or impaired remote memory. Judgment: Judgment normal. Judgment is not impulsive or inappropriate. ANASTACIA test: + bilaterally   Yeomans test: + bilaterally   Gaenslen test: + bilaterally      Assessment:     1. Spondylosis of lumbar region without myelopathy or radiculopathy    2. Chronic bilateral low back pain without sciatica    3. Spinal stenosis of lumbar region without neurogenic claudication    4. SI (sacroiliac) joint inflammation (HCC)    5. Chronic pain syndrome            Plan:      · OARRS reviewed. Current MED: 15.00  · Patient was offered naloxone for home. · Discussed long term side effects of medications, tolerance, dependency and addiction. · Previous UDS reviewed  · UDS preformed today for compliance. · Patient told can not receive any pain medications from any other source. · No evidence of abuse, diversion or aberrant behavior.    Medications and/or procedures to improve function and quality of life- patient understanding with this and that may not be pain free   Discussed with patient about safe storage of medications at home   Discussed possible weaning of medication dosing dependent on treatment/procedure results.  Discussed with patient about risks with procedure including infection, reaction to medication, increased pain, or bleeding. · Procedure notes reveiwed in detail.   · Recieveing over 85% relief of right lower back pain from right L-facet RFA. Now main complaint is left lower back pain as effects from left L-facet RFA has worn off. Received over 80% for over 6 months from last Left L-facet RFA. · Plan Left L-facet RFA @ L2-3, L3-4, L4-5, and L5-S1 for longer term pain relief. Procedure and risks discussed in detail with patient. · Needs to be cleared by Cardiology prior to procedure if not in the past 3 months. On baby aspirin  · Continue Norco 5/325 1 tab every 8 hours as needed for breakthrough pain. Filled recently 3/1/2021  · Patient is compliant. Will order next UDS at procedural FU.          Meds. Prescribed:   No orders of the defined types were placed in this encounter. Return for Left L-facet RFA @ L2-3, L3-4, L4-5, and L5-S1. , follow up after procedure.                Electronically signed by CASSANDRA Cho CNP on3/10/2021 at 8:53 AM

## 2021-03-25 DIAGNOSIS — G89.4 CHRONIC PAIN SYNDROME: ICD-10-CM

## 2021-03-26 RX ORDER — HYDROCODONE BITARTRATE AND ACETAMINOPHEN 5; 325 MG/1; MG/1
1 TABLET ORAL EVERY 8 HOURS PRN
Qty: 90 TABLET | Refills: 0 | Status: SHIPPED | OUTPATIENT
Start: 2021-03-31 | End: 2021-04-22 | Stop reason: SDUPTHER

## 2021-04-07 ENCOUNTER — TELEPHONE (OUTPATIENT)
Dept: PHYSICAL MEDICINE AND REHAB | Age: 71
End: 2021-04-07

## 2021-04-08 ENCOUNTER — ANESTHESIA EVENT (OUTPATIENT)
Dept: OPERATING ROOM | Age: 71
End: 2021-04-08
Payer: MEDICARE

## 2021-04-08 ENCOUNTER — HOSPITAL ENCOUNTER (OUTPATIENT)
Age: 71
Setting detail: OUTPATIENT SURGERY
Discharge: HOME | End: 2021-04-08
Attending: PAIN MEDICINE | Admitting: PAIN MEDICINE
Payer: MEDICARE

## 2021-04-08 ENCOUNTER — ANESTHESIA (OUTPATIENT)
Dept: OPERATING ROOM | Age: 71
End: 2021-04-08
Payer: MEDICARE

## 2021-04-08 ENCOUNTER — APPOINTMENT (OUTPATIENT)
Dept: GENERAL RADIOLOGY | Age: 71
End: 2021-04-08
Attending: PAIN MEDICINE
Payer: MEDICARE

## 2021-04-08 VITALS
HEIGHT: 62 IN | DIASTOLIC BLOOD PRESSURE: 54 MMHG | SYSTOLIC BLOOD PRESSURE: 130 MMHG | TEMPERATURE: 97.5 F | WEIGHT: 159.8 LBS | BODY MASS INDEX: 29.4 KG/M2 | HEART RATE: 82 BPM | OXYGEN SATURATION: 97 % | RESPIRATION RATE: 16 BRPM

## 2021-04-08 VITALS
RESPIRATION RATE: 6 BRPM | DIASTOLIC BLOOD PRESSURE: 67 MMHG | TEMPERATURE: 97.3 F | OXYGEN SATURATION: 99 % | SYSTOLIC BLOOD PRESSURE: 152 MMHG

## 2021-04-08 LAB — GLUCOSE BLD-MCNC: 132 MG/DL (ref 70–108)

## 2021-04-08 PROCEDURE — 2500000003 HC RX 250 WO HCPCS: Performed by: NURSE ANESTHETIST, CERTIFIED REGISTERED

## 2021-04-08 PROCEDURE — 82948 REAGENT STRIP/BLOOD GLUCOSE: CPT

## 2021-04-08 PROCEDURE — 6360000002 HC RX W HCPCS: Performed by: PAIN MEDICINE

## 2021-04-08 PROCEDURE — 3600000057 HC PAIN LEVEL 4 ADDL 15 MIN: Performed by: PAIN MEDICINE

## 2021-04-08 PROCEDURE — 3600000056 HC PAIN LEVEL 4 BASE: Performed by: PAIN MEDICINE

## 2021-04-08 PROCEDURE — 3700000001 HC ADD 15 MINUTES (ANESTHESIA): Performed by: PAIN MEDICINE

## 2021-04-08 PROCEDURE — 6360000002 HC RX W HCPCS: Performed by: NURSE ANESTHETIST, CERTIFIED REGISTERED

## 2021-04-08 PROCEDURE — 2500000003 HC RX 250 WO HCPCS: Performed by: PAIN MEDICINE

## 2021-04-08 PROCEDURE — 7100000010 HC PHASE II RECOVERY - FIRST 15 MIN: Performed by: PAIN MEDICINE

## 2021-04-08 PROCEDURE — 64636 DESTROY L/S FACET JNT ADDL: CPT | Performed by: PAIN MEDICINE

## 2021-04-08 PROCEDURE — 7100000011 HC PHASE II RECOVERY - ADDTL 15 MIN: Performed by: PAIN MEDICINE

## 2021-04-08 PROCEDURE — 2709999900 HC NON-CHARGEABLE SUPPLY: Performed by: PAIN MEDICINE

## 2021-04-08 PROCEDURE — 3209999900 FLUORO FOR SURGICAL PROCEDURES

## 2021-04-08 PROCEDURE — 3700000000 HC ANESTHESIA ATTENDED CARE: Performed by: PAIN MEDICINE

## 2021-04-08 PROCEDURE — 64635 DESTROY LUMB/SAC FACET JNT: CPT | Performed by: PAIN MEDICINE

## 2021-04-08 RX ORDER — METHYLPREDNISOLONE ACETATE 80 MG/ML
INJECTION, SUSPENSION INTRA-ARTICULAR; INTRALESIONAL; INTRAMUSCULAR; SOFT TISSUE PRN
Status: DISCONTINUED | OUTPATIENT
Start: 2021-04-08 | End: 2021-04-08 | Stop reason: ALTCHOICE

## 2021-04-08 RX ORDER — PROPOFOL 10 MG/ML
INJECTION, EMULSION INTRAVENOUS PRN
Status: DISCONTINUED | OUTPATIENT
Start: 2021-04-08 | End: 2021-04-08 | Stop reason: SDUPTHER

## 2021-04-08 RX ORDER — LIDOCAINE HYDROCHLORIDE 10 MG/ML
INJECTION, SOLUTION EPIDURAL; INFILTRATION; INTRACAUDAL; PERINEURAL PRN
Status: DISCONTINUED | OUTPATIENT
Start: 2021-04-08 | End: 2021-04-08 | Stop reason: ALTCHOICE

## 2021-04-08 RX ORDER — LIDOCAINE HYDROCHLORIDE 20 MG/ML
INJECTION, SOLUTION EPIDURAL; INFILTRATION; INTRACAUDAL; PERINEURAL PRN
Status: DISCONTINUED | OUTPATIENT
Start: 2021-04-08 | End: 2021-04-08 | Stop reason: SDUPTHER

## 2021-04-08 RX ORDER — BUPIVACAINE HYDROCHLORIDE 2.5 MG/ML
INJECTION, SOLUTION EPIDURAL; INFILTRATION; INTRACAUDAL PRN
Status: DISCONTINUED | OUTPATIENT
Start: 2021-04-08 | End: 2021-04-08 | Stop reason: ALTCHOICE

## 2021-04-08 RX ADMIN — PROPOFOL 20 MG: 10 INJECTION, EMULSION INTRAVENOUS at 09:43

## 2021-04-08 RX ADMIN — LIDOCAINE HYDROCHLORIDE 50 MG: 20 INJECTION, SOLUTION EPIDURAL; INFILTRATION; INTRACAUDAL; PERINEURAL at 09:43

## 2021-04-08 RX ADMIN — PROPOFOL 20 MG: 10 INJECTION, EMULSION INTRAVENOUS at 09:46

## 2021-04-08 ASSESSMENT — PAIN - FUNCTIONAL ASSESSMENT: PAIN_FUNCTIONAL_ASSESSMENT: 0-10

## 2021-04-08 ASSESSMENT — PULMONARY FUNCTION TESTS
PIF_VALUE: 0

## 2021-04-08 ASSESSMENT — ENCOUNTER SYMPTOMS: SHORTNESS OF BREATH: 1

## 2021-04-08 NOTE — PROGRESS NOTES
7783 To recovery via cart. Spont resp. VSS. Report received from surgical rn. Pt denies pain nausea numbness or tingling. Snack and drink given  1000 IV discontinued.  Up to dress self  1010 Discharge to home in stable ambulatory condition with family member

## 2021-04-08 NOTE — OP NOTE
Operative Note  Pre-Procedure Note    Patient Name: Belle Gibbs   YOB: 1950  Medical Record Number: [id number]  Date: 4/8/21    Indication: Lower back pain  Consent: On file. Vital Signs:   Vitals:    04/08/21 0843   BP: 133/62   Pulse: 91   Resp: 17   Temp: 97.3 °F (36.3 °C)   SpO2: 97%       Past Medical History:   has a past medical history of Bladder dysfunction, CAD (coronary artery disease), Cerebral artery occlusion with cerebral infarction [facility]), Cerebrovascular disease, CHF (congestive heart failure) ([facility] [address] COPD [facility]chronic obstructive pulmonary disease) [facility] DDD (degenerative disc disease), Depression, GERD (gastroesophageal reflux disease), Hyperlipidemia, Hypertension, Obesity, Other disorders of kidney and ureter in diseases classified elsewhere, Other screening mammogram, Pap smear, as part of routine gynecological examination, Pap smear, as part of routine gynecological examination, Peripheral edema, Pneumonia, PONV (postoperative nausea and vomiting), and Unspecified sleep apnea. Past Surgical History:   has a past surgical history that includes Dilation and curettage of uterus (1970's); eye surgery (2013); other surgical history (2016); Colonoscopy (7-2009); other surgical history (Left, 12/12/2017); Lumbar spine surgery (Left, 12/12/2017); Endoscopy, colon, diagnostic; Lumbar spine surgery (Right, 10/7/2019); Pain management procedure (Left, 2/7/2020); Pain management procedure (Right, 7/16/2020); Pain management procedure (Left, 8/25/2020); and Pain management procedure (Right, 2/26/2021). Pre-Sedation Documentation and Exam:   Vital signs have been reviewed (see flow sheet for vitals).      Sedation/ Anesthesia Plan:   MAC    Patient is an appropriate candidate for plan of sedation: yes       Preoperative Diagnosis:  L-spondylosis     Post-Op Dx: as above     Procedure Performed:  :Radiofrequency ablation of median branches at the levels of L2-3,L3-4,L4-5 and L5-S1 left under fluoroscopic guidance      Indication for the Procedure:  The patient has ahistory of chronic low back pain that is not responding well to the conservative treatment.  Patient's pain is mostly axial in nature.  Pain is interfering with the activities of daily living.  Physical examination revealed facet tenderness and facet loading is positive.  Patient had undergone lumbar facet joint injections with pain relief that lasted for only a short period of time and had greater than 70% pain relief with confirmatory median branch blocks.  Hence we decided to do radiofrequency abalation of median branches for long term pain releif.   The procedure and risks  were discussed with the patient and an informed consent was obtained.     Procedure:  Left side   A meaningful communication was kept up with the patient throughout the procedure. The patient is placed in prone position and skin over the back was prepped and draped in sterile manner.  Then using fluoroscopy the junction of the transverse process of the vertebra with the superior process of the facet joint was observed and the view was optimized.  The skin and deep tissues posterior were infiltrated with 15 ml of  1% xylocaine . The RF canula with the 10 mm active tip was introduced through the skin wheal under fluoroscopy guidance such that the tip of the needle lies in the groove of the transverse process with the superior processes of the facet joint.  Then a lateral view of the lumbar spine was obtained to make sure the tip of needle is not in the neural foramen.  Then electric impedence was checked to make sure it is acceptable. Then a sensory stimulus was applied at 50 Hz up to 1 volt and concordant pain symptoms were reproduced.  Then a motor stimulus was applied at 2 Hz up to 2 volts and no motor stimulation was seen in lower extremities.  Some multifidus stimulus was seen. Mariana Roman after negative aspiration a mixture of depomedrol 80  mg and 0.25%  marcaine 2 cc was injected through the needle.  Then a lesion was done at 80 degrees centigrade for 90 seconds     EBL-0     Patient's vital signs and neurological status remained stable throughout the procedure and post procedural period.  The patient tolerated the procedure well and was discharged home in stable condition.       Electronically signed by Evan Espino MD on 4/8/21 at 9:32 AM EDT

## 2021-04-08 NOTE — H&P
[address] [address]  History and Physical Update    Pt Name: James Saenz  MRN: [id number]  YOB: 1950  Date of evaluation: 4/8/2021      I have examined the patient and reviewed the H&P/Consult and there are no changes to the patient or plans.         Electronically signed by Corey Rojas MD on 4/8/2021 at 8:34 AM

## 2021-04-08 NOTE — ANESTHESIA PRE PROCEDURE
Department of Anesthesiology  Preprocedure Note       Name:  Angélica Ruiz   Age:  79 y.o.  :  1950                                          MRN:  [id number]         Date:  2021      Surgeon: Kristine Nugent):  Piper Garzon MD    Procedure: Procedure(s):  Left L-facet RFA @ L2-3, L3-4, L4-5, and L5-S1    Medications prior to admission:   Prior to Admission medications    Medication Sig Start Date End Date Taking? Authorizing Provider   HYDROcodone-acetaminophen (NORCO) 5-325 MG per tablet Take 1 tablet by mouth every 8 hours as needed for Pain for up to 30 days. 3/31/21 4/30/21  CASSANDRA Abad CNP   lidocaine (LMX) 4 % cream Apply topically as needed every 6 hours to left hand for pain.  11/10/20   CASSANDRA Abad CNP   fluticasone (FLONASE) 50 MCG/ACT nasal spray 1 spray by Each Nostril route daily    Historical Provider, MD   omeprazole (PRILOSEC) 40 MG delayed release capsule Take 40 mg by mouth daily    Historical Provider, MD   polyethylene glycol (GLYCOLAX) 17 g packet Take 17 g by mouth daily as needed for Constipation    Historical Provider, MD   Tiotropium Bromide-Olodaterol 2.5-2.5 MCG/ACT AERS Inhale 2 puffs into the lungs daily 20  CASSANDRA Mccoy CNP   albuterol (PROVENTIL) (2.5 MG/3ML) 0.083% nebulizer solution Take 3 mLs by nebulization every 6 hours as needed for Wheezing or Shortness of Breath 3/16/20   CASSANDRA Wagner CNP   montelukast (SINGULAIR) 10 MG tablet Take 1 tablet by mouth nightly 3/16/20   CASSANDRA Mccoy CNP   albuterol sulfate  (90 Base) MCG/ACT inhaler Inhale 2 puffs into the lungs 4 times daily as needed for Wheezing 3/16/20   CASSANDRA Mccoy CNP   metFORMIN (GLUCOPHAGE) 500 MG tablet Take 500 mg by mouth daily (with breakfast)    Historical Provider, MD   furosemide (LASIX) 20 MG tablet Take 20 mg by mouth daily as needed    Historical Provider, MD   triamterene-hydrochlorothiazide (MAXZIDE-25) 37.5-25 MG per tablet take 1 tablet by mouth every morning 8/13/19   Historical Provider, MD   RA COL-RITE 100 MG capsule daily  8/22/19   Historical Provider, MD   atorvastatin (LIPITOR) 40 MG tablet Take 1 tablet by mouth daily 10/4/18   Rajesh Downey MD   pseudoephedrine (SUDAFED 12 HR) 120 MG extended release tablet Take 120 mg by mouth every 12 hours as needed for Congestion    Historical Provider, MD   amLODIPine (NORVASC) 5 MG tablet Take 5 mg by mouth daily    Historical Provider, MD   losartan (COZAAR) 50 MG tablet Take 50 mg by mouth daily  10/20/17   Historical Provider, MD   OXYGEN   Inhale into the lungs 3 lpm, per patient at home and when walking    Historical Provider, MD   aspirin 81 MG EC tablet Take 81 mg by mouth daily. Historical Provider, MD       Current medications:    No current facility-administered medications for this encounter. Allergies: Allergies   Allergen Reactions    Codeine Itching     Pt cant remember    Doxycycline      Cant breath     Lipitor Itching    Simvastatin Itching     Stomach pain    Chantix [Varenicline Tartrate] Itching       Problem List:    Patient Active Problem List   Diagnosis Code    GERD (gastroesophageal reflux disease) K21.9    Hyperlipidemia E78.5    Peripheral edema R60.9    CHF (congestive heart failure) ([facility]) I50.9    Depression F32.9    DDD (degenerative disc disease) [id number]    CAD (coronary artery disease) I25.10    Cerebrovascular disease I67.9    Diabetes type 2, controlled ([facility] [address] E11.9    Bladder dysfunction N31.9    Stage 3 severe COPD by GOLD classification ([facility] [address] J44.9    Nasal congestion R09.81    Acute sinusitis J01.90    Hypertrophy of both inferior nasal turbinates J34.3    Lumbar spondylosis M47.816    History of panniculitis Z87.39    Hypertension I10    Dyspnea R06.00    Sinus disease J34.9    PETE and COPD overlap syndrome (HCC) G47.33, J44.9    Snoring R06.83    Obesity (BMI 30.0-34. 9) E66.9    Witnessed apneic spells R06.81    Chronic respiratory failure with hypoxia ([facility]) J96.11    COPD exacerbation ([facility]) J44.1    Acute respiratory failure ([facility]) J96.00    Acute on chronic respiratory failure with hypoxia and hypercapnia ([facility]) J96.21, J96.22    Chest pain R07.9    Angina, class III ([facility]) I20.9    Abnormal stress test R94.39    Smoker F17.200    Acute respiratory failure with hypercapnia ([facility]) J96.02    Fatigue R53.83    Peripheral artery disease ([facility]) I73.9    COPD (chronic obstructive pulmonary disease) with acute bronchitis ([facility]) J44.0, J20.9       Past Medical History:        Diagnosis Date    Bladder dysfunction     CAD (coronary artery disease)     Cerebral artery occlusion with cerebral infarction ([facility] [address]     TIA    Cerebrovascular disease     CHF (congestive heart failure) ([facility])     COPD (chronic obstructive pulmonary disease) ([facility])     uses oxygen 24 hours per day    DDD (degenerative disc disease)     Depression     GERD (gastroesophageal reflux disease)     Hyperlipidemia     Hypertension     Obesity     Other disorders of kidney and ureter in diseases classified elsewhere     Other screening mammogram 2011    Pap smear, as part of routine gynecological examination 2007    Pap smear, as part of routine gynecological examination 2007    Peripheral edema     with ascites    Pneumonia     PONV (postoperative nausea and vomiting)     with back injection in [facility]    Unspecified sleep apnea     c-pap at 10 cm wtr       Past Surgical History:        Procedure Laterality Date    COLONOSCOPY  7-2009    DILATION AND CURETTAGE OF UTERUS  1970's    ENDOSCOPY, COLON, DIAGNOSTIC      EYE SURGERY  2013    Laser surgery     LUMBAR SPINE SURGERY Left 12/12/2017    LUMBAR RFA L2-3, 3-4, 4-5, L5-S1 LEFT SIDE performed by Anita Peña MD at [facility] Right 10/7/2019    L-facet RFA @ L2-3, L3-4, L4-5, and L5-S1 RIGHT SIDE FIRST performed by Carlotta Yeboah MD at [facility]  2016    burning nerves in back - lumbar @ [facility]    OTHER SURGICAL HISTORY Left 12/12/2017    Lumbar RFA L2-3, L3-4, L4-5, L5-S1    PAIN MANAGEMENT PROCEDURE Left 2/7/2020    Left L-facet RFA @ L2-3, L3-4, L4-5, and L5-S1 performed by Carlotta Yeboah MD at [address] [place] [address] Right 7/16/2020    Right L-facet RFA @ L2-3, L3-4, L4-5, and L5-S1 performed by Carlotta Yeboah MD at [address] [place] [address] Left 8/25/2020    LEFT L-facet RFA @ L2-3, L3-4, L4-5, and L5-S1 performed by Carlotta Yeboah MD at [address] [place] [address] Right 2/26/2021    Right L-facet RFA @ L2-3, L3-4, L4-5, and L5-S1 performed by Carlotta Yeboah MD at [id number] [place] [address] Road History:    Social History     Tobacco Use    Smoking status: Former Smoker     Packs/day: 2.00     Years: 40.00     Pack years: 80.00     Types: Cigarettes     Quit date: 11/14/2010     Years since quitting: 10.4    Smokeless tobacco: Never Used   Substance Use Topics    Alcohol use: No     Comment: pt hasn't drank since 2007                                Counseling given: Not Answered      Vital Signs (Current): There were no vitals filed for this visit.                                            BP Readings from Last 3 Encounters:   03/10/21 122/70   02/26/21 (!) 144/80   02/26/21 (!) 158/70       NPO Status:                                                                                 BMI:   Wt Readings from Last 3 Encounters:   03/10/21 167 lb (75.8 kg)   02/26/21 167 lb (75.8 kg)   01/12/21 160 lb (72.6 kg)     There is no height or weight on file to calculate BMI.    CBC:   Lab Results   Component Value Date    WBC 8.2 05/23/2020    RBC 3.81 05/23/2020    RBC 4.30 03/31/2012    HGB 10.5 05/23/2020    HCT 35.0 05/23/2020    MCV 91.9 05/23/2020    RDW 13.3 03/30/2018     05/23/2020       CMP:   Lab Results   Component Value Date     05/23/2020    K 4.0 05/23/2020    CL 91 05/23/2020    CO2 36 05/23/2020    BUN 15 05/23/2020    CREATININE 0.7 05/23/2020    GFRAA >60 06/20/2019    LABGLOM 83 05/23/2020    GLUCOSE 180 05/23/2020    PROT 7.2 05/23/2020    CALCIUM 9.7 05/23/2020    BILITOT <0.2 05/23/2020    BILITOT NEGATIVE 03/31/2012    ALKPHOS 77 05/23/2020    AST 11 05/23/2020    ALT 9 05/23/2020       POC Tests: No results for input(s): POCGLU, POCNA, POCK, POCCL, POCBUN, POCHEMO, POCHCT in the last 72 hours. Coags:   Lab Results   Component Value Date    INR 0.91 01/08/2019    APTT 33.6 01/08/2019       HCG (If Applicable): No results found for: PREGTESTUR, PREGSERUM, HCG, HCGQUANT     ABGs: No results found for: PHART, PO2ART, UZL1VEL, NND6QJD, BEART, Y0DEBVSE     Type & Screen (If Applicable):  Lab Results   Component Value Date    LABRH NEG 01/08/2019       Drug/Infectious Status (If Applicable):  No results found for: HIV, HEPCAB    COVID-19 Screening (If Applicable):   Lab Results   Component Value Date    COVID19 Not Detected 08/19/2020           Anesthesia Evaluation     history of anesthetic complications: PONV. Airway: Mallampati: II       Mouth opening: > = 3 FB Dental:          Pulmonary:   (+) COPD:  shortness of breath:  sleep apnea:                             Cardiovascular:    (+) hypertension:, angina:, CAD:, CHF:,                   Neuro/Psych:   (+) CVA:, psychiatric history:            GI/Hepatic/Renal:   (+) GERD:,           Endo/Other:    (+) Diabetes, . Abdominal:           Vascular:                                        Anesthesia Plan      MAC     ASA 4             Anesthetic plan and risks discussed with patient. Plan discussed with CRNA.                   Prudence Rubio MD   4/8/2021

## 2021-04-08 NOTE — H&P
Mouth: Mucous membranes are dry. Pharynx: No oropharyngeal exudate. Eyes:      General: No scleral icterus. Right eye: No discharge. Left eye: No discharge. Conjunctiva/sclera: Conjunctivae normal.      Pupils: Pupils are equal, round, and reactive to light. Neck:      Musculoskeletal: Full passive range of motion without pain, normal range of motion and neck supple. Normal range of motion. No edema, erythema, neck rigidity or muscular tenderness. Thyroid: No thyromegaly. Cardiovascular:      Rate and Rhythm: Normal rate and regular rhythm. Heart sounds: Normal heart sounds. No murmur. No friction rub. No gallop. Pulmonary:      Effort: Pulmonary effort is normal. No respiratory distress. Breath sounds: Examination of the right-upper field reveals decreased breath sounds and wheezing. Examination of the left-upper field reveals decreased breath sounds and wheezing. Examination of the right-middle field reveals decreased breath sounds. Examination of the left-middle field reveals decreased breath sounds. Examination of the right-lower field reveals decreased breath sounds. Examination of the left-lower field reveals decreased breath sounds. Decreased breath sounds and wheezing present. No rales. Comments: On 3 liters of oxygen   Chest:      Chest wall: No tenderness. Abdominal:      General: Bowel sounds are normal. There is no distension. Palpations: Abdomen is soft. Tenderness: There is no abdominal tenderness. There is no guarding or rebound. Musculoskeletal:         General: Swelling and tenderness present. Right shoulder: She exhibits decreased range of motion and tenderness. Left shoulder: She exhibits decreased range of motion and tenderness. Right elbow: Normal.     Left elbow: Normal.      Right wrist: She exhibits no tenderness, no bony tenderness and no swelling.       Left wrist: She exhibits decreased range of motion, Gaenslen test: + bilaterally   Assessment:      1. Spondylosis of lumbar region without myelopathy or radiculopathy    2. Chronic bilateral low back pain without sciatica    3. Spinal stenosis of lumbar region without neurogenic claudication    4. SI (sacroiliac) joint inflammation (HCC)    5. Chronic pain syndrome       Plan:      · OARRS reviewed. Current MED: 15.00  · Patient was offered naloxone for home. · Discussed long term side effects of medications, tolerance, dependency and addiction. · Previous UDS reviewed  · UDS preformed today for compliance. · Patient told can not receive any pain medications from any other source. · No evidence of abuse, diversion or aberrant behavior. · Medications and/or procedures to improve function and quality of life- patient understanding with this and that may not be pain free  · Discussed with patient about safe storage of medications at home  · Discussed possible weaning of medication dosing dependent on treatment/procedure results. · Discussed with patient about risks with procedure including infection, reaction to medication, increased pain, or bleeding. · Procedure notes reveiwed in detail.   · Recieveing over 85% relief of right lower back pain from right L-facet RFA. Now main complaint is left lower back pain as effects from left L-facet RFA has worn off. Received over 80% for over 6 months from last Left L-facet RFA. · Plan Left L-facet RFA @ L2-3, L3-4, L4-5, and L5-S1 for longer term pain relief. Procedure and risks discussed in detail with patient. · Needs to be cleared by Cardiology prior to procedure if not in the past 3 months. On baby aspirin  · Continue Norco 5/325 1 tab every 8 hours as needed for breakthrough pain. Filled recently 3/1/2021  · Patient is compliant. Will order next UDS at procedural FU.            Meds.  Prescribed:     Encounter Medications    No orders of the defined types were placed in this encounter.         Return for Left L-facet RFA @ L2-3, L3-4, L4-5, and L5-S1. , follow up after procedure.

## 2021-04-12 RX ORDER — ALBUTEROL SULFATE 90 UG/1
AEROSOL, METERED RESPIRATORY (INHALATION)
Qty: 18 G | Refills: 5 | Status: SHIPPED | OUTPATIENT
Start: 2021-04-12 | End: 2021-09-15 | Stop reason: SDUPTHER

## 2021-04-22 ENCOUNTER — OFFICE VISIT (OUTPATIENT)
Dept: PHYSICAL MEDICINE AND REHAB | Age: 71
End: 2021-04-22
Payer: MEDICARE

## 2021-04-22 VITALS
DIASTOLIC BLOOD PRESSURE: 80 MMHG | SYSTOLIC BLOOD PRESSURE: 124 MMHG | HEIGHT: 62 IN | BODY MASS INDEX: 29.26 KG/M2 | WEIGHT: 159 LBS

## 2021-04-22 DIAGNOSIS — G89.29 CHRONIC BILATERAL LOW BACK PAIN WITHOUT SCIATICA: ICD-10-CM

## 2021-04-22 DIAGNOSIS — M48.061 SPINAL STENOSIS OF LUMBAR REGION WITHOUT NEUROGENIC CLAUDICATION: ICD-10-CM

## 2021-04-22 DIAGNOSIS — M46.1 SI (SACROILIAC) JOINT INFLAMMATION (HCC): ICD-10-CM

## 2021-04-22 DIAGNOSIS — G89.4 CHRONIC PAIN SYNDROME: ICD-10-CM

## 2021-04-22 DIAGNOSIS — M54.50 CHRONIC BILATERAL LOW BACK PAIN WITHOUT SCIATICA: ICD-10-CM

## 2021-04-22 DIAGNOSIS — M47.816 SPONDYLOSIS OF LUMBAR REGION WITHOUT MYELOPATHY OR RADICULOPATHY: Primary | ICD-10-CM

## 2021-04-22 PROCEDURE — 99214 OFFICE O/P EST MOD 30 MIN: CPT | Performed by: NURSE PRACTITIONER

## 2021-04-22 RX ORDER — HYDROCODONE BITARTRATE AND ACETAMINOPHEN 5; 325 MG/1; MG/1
1 TABLET ORAL EVERY 8 HOURS PRN
Qty: 90 TABLET | Refills: 0 | Status: SHIPPED | OUTPATIENT
Start: 2021-04-30 | End: 2021-05-25 | Stop reason: SDUPTHER

## 2021-04-22 ASSESSMENT — ENCOUNTER SYMPTOMS
COUGH: 0
BACK PAIN: 1
SHORTNESS OF BREATH: 1
GASTROINTESTINAL NEGATIVE: 1

## 2021-04-22 NOTE — PROGRESS NOTES
Lumbar back: She exhibits tenderness, pain and spasm. Back:       Right upper leg: She exhibits tenderness. Left upper leg: She exhibits tenderness. Right lower leg: No edema. Left lower leg: No edema. Skin:     General: Skin is warm. Coloration: Skin is not pale. Findings: No erythema or rash. Neurological:      General: No focal deficit present. Mental Status: She is alert and oriented to person, place, and time. She is not disoriented. Cranial Nerves: No cranial nerve deficit. Sensory: No sensory deficit. Motor: Weakness present. No atrophy or abnormal muscle tone. Coordination: Coordination normal.      Gait: Gait normal.      Deep Tendon Reflexes: Reflexes are normal and symmetric. Comments: SLR-  Motor 5/5 BUE BLE   Psychiatric:         Attention and Perception: She is attentive. Mood and Affect: Mood normal. Mood is not anxious or depressed. Affect is not labile, blunt, angry or inappropriate. Speech: Speech normal.         Behavior: Behavior normal. Behavior is not agitated, slowed, aggressive, withdrawn, hyperactive or combative. Thought Content: Thought content normal. Thought content is not paranoid or delusional. Thought content does not include homicidal or suicidal ideation. Thought content does not include homicidal or suicidal plan. Cognition and Memory: Memory is not impaired. She does not exhibit impaired recent memory or impaired remote memory. Judgment: Judgment normal. Judgment is not impulsive or inappropriate. ANASTACIA test: + bilaterally   Yeomans test: + bilaterally   Gaenslen test: + bilaterally      Assessment:     1. Spondylosis of lumbar region without myelopathy or radiculopathy    2. Chronic bilateral low back pain without sciatica    3. Spinal stenosis of lumbar region without neurogenic claudication    4. SI (sacroiliac) joint inflammation (HCC)    5.  Chronic pain syndrome Plan:      · OARRS reviewed. Current MED: 15.00  · Patient was offered naloxone for home. · Discussed long term side effects of medications, tolerance, dependency and addiction. · Previous UDS reviewed  · UDS preformed today for compliance. · Patient told can not receive any pain medications from any other source. · No evidence of abuse, diversion or aberrant behavior.  Medications and/or procedures to improve function and quality of life- patient understanding with this and that may not be pain free   Discussed with patient about safe storage of medications at home   Discussed possible weaning of medication dosing dependent on treatment/procedure results.  Discussed with patient about risks with procedure including infection, reaction to medication, increased pain, or bleeding.  Procedure notes reviewed in detail    Receiving 80% relief of pain across lower back from bilateral L-facet RFA. Able to tolerate more activity   · Continue Norco 5/325 1 tab every 8 hours as needed for breakthrough pain. Ordered refill  · Patient is compliant. Updated UDS ordered today   · Discussed caution with opioids and breathing- education provided      Meds. Prescribed:   Orders Placed This Encounter   Medications    HYDROcodone-acetaminophen (NORCO) 5-325 MG per tablet     Sig: Take 1 tablet by mouth every 8 hours as needed for Pain for up to 30 days. Dispense:  90 tablet     Refill:  0     Reduce doses taken as pain becomes manageable       Return in about 10 weeks (around 7/1/2021), or if symptoms worsen or fail to improve, for follow up  for medications.                Electronically signed by CASSANDRA Zamudio CNP on4/22/2021 at 8:57 AM

## 2021-05-19 ENCOUNTER — INITIAL CONSULT (OUTPATIENT)
Dept: PULMONOLOGY | Age: 71
End: 2021-05-19
Payer: MEDICARE

## 2021-05-19 VITALS
BODY MASS INDEX: 30.18 KG/M2 | SYSTOLIC BLOOD PRESSURE: 120 MMHG | HEART RATE: 89 BPM | TEMPERATURE: 97.8 F | OXYGEN SATURATION: 98 % | WEIGHT: 164 LBS | DIASTOLIC BLOOD PRESSURE: 60 MMHG | HEIGHT: 62 IN

## 2021-05-19 DIAGNOSIS — J96.11 CHRONIC RESPIRATORY FAILURE WITH HYPOXIA (HCC): ICD-10-CM

## 2021-05-19 DIAGNOSIS — J44.9 COPD, SEVERE (HCC): ICD-10-CM

## 2021-05-19 DIAGNOSIS — G47.33 OSA ON CPAP: Primary | ICD-10-CM

## 2021-05-19 DIAGNOSIS — Z99.89 OSA ON CPAP: Primary | ICD-10-CM

## 2021-05-19 DIAGNOSIS — E66.9 OBESITY (BMI 30-39.9): ICD-10-CM

## 2021-05-19 PROCEDURE — 99214 OFFICE O/P EST MOD 30 MIN: CPT | Performed by: PHYSICIAN ASSISTANT

## 2021-05-19 ASSESSMENT — ENCOUNTER SYMPTOMS
ALLERGIC/IMMUNOLOGIC NEGATIVE: 1
DIARRHEA: 0
CHEST TIGHTNESS: 0
SHORTNESS OF BREATH: 1
BACK PAIN: 1
NAUSEA: 0
COUGH: 0
EYES NEGATIVE: 1
WHEEZING: 0
STRIDOR: 0

## 2021-05-19 NOTE — PROGRESS NOTES
[facility]      Tony Beam         [id number]  5/19/2021   Chief Complaint   Patient presents with    New Patient     Sleep consult, pt requested appointment, split night 10/8/2008, with download         Pt of Dr. Rigo ARANGO Download:   Original or initial AHI: 8402     Date of initial study: 10/8/2008    Compliant  100%     Noncompliant 0 %     PAP Type Aircurve 10 VautoLevel  16/8   Avg Hrs/Day 7 hr 21 min  AHI: 0.6   Recorded compliance dates , 4/17/2021  to 5/16/2021   Machine/Mfg:   [x] ResMed    [] Respironics/Dreamstation   Interface:   [] Nasal    [] Nasal pillows   [x] FFM      Provider:      [x] DAVID     []Andrew     [] Margaret    [] Lorenzo Harris    [] Loree Burnett               [] [facility]      [] [employer]    [] [address]      [] Other    ESS:  5  SAQLI: 89  Here is a scan of the most recent download:            Presentation:   Zuly Pineda presents for sleep medicine follow up for obstructive sleep apnea  Since the last visit, Zuly Pineda is doing well with PAP but having dry mouth. She has been having issues for about a year. She has adjusted humidifier. She has O2 at 3.5 liters bled into PAP and during they day. She was last seen in the sleep clinic 4 years ago   She sleeps well. She feels rested. Equipment issues: The pressure is  acceptable, the mask is acceptable     Sleep issues:  Do you feel better? Yes  More rested? Yes   Better concentration? yes    Progress History:   Since last visit any new medical issues? Yes CAD, stents placed  New ER or hospital visits? No  Any new or changes in medicines? No  Any new sleep medicines? No    Review of Systems -   Review of Systems   Constitutional: Negative for activity change, appetite change, chills and fever. HENT: Negative for congestion and postnasal drip. Eyes: Negative. Respiratory: Positive for shortness of breath. Negative for cough, chest tightness, wheezing and stridor.     Cardiovascular: Negative for chest pain and leg swelling. Gastrointestinal: Negative for diarrhea and nausea. Endocrine: Negative. Genitourinary: Negative. Musculoskeletal: Positive for back pain. Negative for arthralgias. Skin: Negative. Allergic/Immunologic: Negative. Neurological: Negative. Negative for dizziness and light-headedness. Psychiatric/Behavioral: Negative. All other systems reviewed and are negative. Physical Exam:    BMI:  Body mass index is 30 kg/m². Wt Readings from Last 3 Encounters:   05/19/21 164 lb (74.4 kg)   04/22/21 159 lb (72.1 kg)   04/08/21 159 lb 12.8 oz (72.5 kg)     Weight stable / unchanged  Vitals: /60 (Site: Right Upper Arm, Position: Sitting, Cuff Size: Large Adult)   Pulse 89   Temp 97.8 °F (36.6 °C) (Temporal)   Ht 5' 2\" (1.575 m)   Wt 164 lb (74.4 kg)   SpO2 98% Comment: Room air at rest  BMI 30.00 kg/m²       Physical Exam  Constitutional:       Appearance: She is well-developed. HENT:      Head: Normocephalic and atraumatic. Right Ear: External ear normal.      Left Ear: External ear normal.   Eyes:      Conjunctiva/sclera: Conjunctivae normal.      Pupils: Pupils are equal, round, and reactive to light. Cardiovascular:      Rate and Rhythm: Normal rate and regular rhythm. Heart sounds: Normal heart sounds. Pulmonary:      Effort: Pulmonary effort is normal.      Breath sounds: Normal breath sounds. Musculoskeletal:         General: Normal range of motion. Cervical back: Normal range of motion and neck supple. Skin:     General: Skin is warm and dry. Neurological:      Mental Status: She is alert and oriented to person, place, and time. Psychiatric:         Behavior: Behavior normal.         Thought Content: Thought content normal.         Judgment: Judgment normal.           ASSESSMENT/DIAGNOSIS     Diagnosis Orders   1. PETE on CPAP     2. Obesity (BMI 30-39.9)     3. COPD, severe ([address]     4.  Chronic respiratory failure with hypoxia ([facility] [address]              Plan   Do you need any equipment today? No  - Turn humidifier up  - Will have DME inspect humidifier  - Instructed to call if continues to have dry mouth and will likely need referral to ENT  - Download reviewed and discussed with patient  - She  was advised to continue current positive airway pressure therapy with above described pressure. - She  advised to keep good compliance with current recommended pressure to get optimal results and clinical improvement  - Recommend 7-9 hours of sleep with PAP  - She was advised to call DME company regarding supplies if needed.   -She call my office for earlier appointment if needed for worsening of sleep symptoms.   - She was instructed on weight loss  - Arturo Aden was educated about my impression and plan. Patient verbalizesunderstanding. We will see Talha Gilliam back in: 1 year with download    Information added by my medical assistant/LPN was reviewed today       Anthony Becker PA-C, MPAS  5/19/2021       Spent 40 with patient and greater than 50% of the time was counseling and coordinating care.

## 2021-05-25 DIAGNOSIS — G89.4 CHRONIC PAIN SYNDROME: ICD-10-CM

## 2021-05-25 RX ORDER — HYDROCODONE BITARTRATE AND ACETAMINOPHEN 5; 325 MG/1; MG/1
1 TABLET ORAL EVERY 8 HOURS PRN
Qty: 90 TABLET | Refills: 0 | Status: SHIPPED | OUTPATIENT
Start: 2021-05-30 | End: 2021-07-01 | Stop reason: SDUPTHER

## 2021-05-25 NOTE — TELEPHONE ENCOUNTER
OARRS reviewed. UDS: + for  Hydrocodone -consistent.      Last seen: 4/22/2021    Follow-up: 7/1/2021

## 2021-05-25 NOTE — TELEPHONE ENCOUNTER
Brandy Mckeon called requesting a refill on the following medications:  Requested Prescriptions     Pending Prescriptions Disp Refills    HYDROcodone-acetaminophen (NORCO) 5-325 MG per tablet 90 tablet 0     Sig: Take 1 tablet by mouth every 8 hours as needed for Pain for up to 30 days.      Pharmacy verified:  .pv  [facility] [address]    Date of last visit: 04/22/2021  Date of next visit (if applicable): 2/7/9057

## 2021-05-26 LAB
ALBUMIN SERPL-MCNC: 4 G/DL (ref 3.2–5.3)
ALK PHOSPHATASE: 70 U/L (ref 39–130)
ALT SERPL-CCNC: 13 U/L (ref 0–31)
ANION GAP SERPL CALCULATED.3IONS-SCNC: 9 MMOL/L (ref 5–15)
AST SERPL-CCNC: 16 U/L (ref 0–41)
BILIRUB SERPL-MCNC: 0.3 MG/DL (ref 0.3–1.2)
BUN BLDV-MCNC: 16 MG/DL (ref 5–27)
CALCIUM SERPL-MCNC: 9.8 MG/DL (ref 8.5–10.5)
CHLORIDE BLD-SCNC: 92 MMOL/L (ref 98–109)
CHOLESTEROL/HDL RATIO: 7 (ref 1–5)
CHOLESTEROL: 313 MG/DL (ref 150–200)
CO2: 38 MMOL/L (ref 22–32)
CREAT SERPL-MCNC: 1.1 MG/DL (ref 0.4–1)
EGFR AFRICAN AMERICAN: 59 ML/MIN/1.73SQ.M
EGFR IF NONAFRICAN AMERICAN: 49 ML/MIN/1.73SQ.M
GLUCOSE: 104 MG/DL (ref 65–99)
HCT VFR BLD CALC: 31.7 % (ref 35–47)
HDLC SERPL-MCNC: 45 MG/DL
HEMOGLOBIN: 10.4 G/DL (ref 11.7–15.5)
LDL CHOLESTEROL CALCULATED: 195 MG/DL
LDL/HDL RATIO: 4.3
MCH RBC QN AUTO: 28.3 PG (ref 27–34)
MCHC RBC AUTO-ENTMCNC: 32.8 G/DL (ref 32–36)
MCV RBC AUTO: 86 FL (ref 80–100)
PDW BLD-RTO: 14.5 % (ref 11.5–15)
PLATELETS: 307 X10E9/L (ref 150–450)
PMV BLD AUTO: 9.7 FL (ref 7–12)
POTASSIUM SERPL-SCNC: 4 MMOL/L (ref 3.5–5)
RBC: 3.68 X10E12/L (ref 3.8–5.2)
SODIUM BLD-SCNC: 139 MMOL/L (ref 134–146)
TOTAL PROTEIN: 7.6 G/DL (ref 6–8)
TRIGL SERPL-MCNC: 365 MG/DL (ref 27–150)
VLDLC SERPL CALC-MCNC: 73 MG/DL (ref 0–30)
WBC: 10.4 X10E9/L (ref 4–11)

## 2021-06-08 ENCOUNTER — OFFICE VISIT (OUTPATIENT)
Dept: PULMONOLOGY | Age: 71
End: 2021-06-08
Payer: MEDICARE

## 2021-06-08 VITALS
DIASTOLIC BLOOD PRESSURE: 68 MMHG | TEMPERATURE: 97.3 F | BODY MASS INDEX: 30.4 KG/M2 | OXYGEN SATURATION: 97 % | HEART RATE: 102 BPM | WEIGHT: 165.2 LBS | HEIGHT: 62 IN | SYSTOLIC BLOOD PRESSURE: 136 MMHG

## 2021-06-08 DIAGNOSIS — J44.9 COPD, SEVERE (HCC): ICD-10-CM

## 2021-06-08 DIAGNOSIS — E66.9 OBESITY (BMI 30-39.9): ICD-10-CM

## 2021-06-08 DIAGNOSIS — J44.1 COPD EXACERBATION (HCC): Primary | ICD-10-CM

## 2021-06-08 DIAGNOSIS — J96.11 CHRONIC RESPIRATORY FAILURE WITH HYPOXIA (HCC): ICD-10-CM

## 2021-06-08 DIAGNOSIS — Z87.891 PERSONAL HISTORY OF TOBACCO USE: ICD-10-CM

## 2021-06-08 PROCEDURE — 99214 OFFICE O/P EST MOD 30 MIN: CPT | Performed by: NURSE PRACTITIONER

## 2021-06-08 PROCEDURE — G0296 VISIT TO DETERM LDCT ELIG: HCPCS | Performed by: NURSE PRACTITIONER

## 2021-06-08 RX ORDER — FLUTICASONE FUROATE, UMECLIDINIUM BROMIDE AND VILANTEROL TRIFENATATE 100; 62.5; 25 UG/1; UG/1; UG/1
1 POWDER RESPIRATORY (INHALATION) DAILY
Qty: 30 EACH | Refills: 11 | Status: SHIPPED | OUTPATIENT
Start: 2021-06-08 | End: 2021-07-07 | Stop reason: SINTOL

## 2021-06-08 RX ORDER — PREDNISONE 50 MG/1
50 TABLET ORAL DAILY
Qty: 5 TABLET | Refills: 0 | Status: SHIPPED | OUTPATIENT
Start: 2021-06-08 | End: 2021-06-13

## 2021-06-08 RX ORDER — FUROSEMIDE 20 MG/1
20 TABLET ORAL DAILY PRN
Qty: 60 TABLET | Refills: 0 | Status: SHIPPED | OUTPATIENT
Start: 2021-06-08

## 2021-06-08 RX ORDER — AZITHROMYCIN 500 MG/1
500 TABLET, FILM COATED ORAL DAILY
Qty: 1 PACKET | Refills: 0 | Status: SHIPPED | OUTPATIENT
Start: 2021-06-08 | End: 2021-06-11

## 2021-06-08 NOTE — PROGRESS NOTES
[facility]     Patient: Kalyan Howard, 79 y.o.   : 1950    Pt of [facility], CNP      Subjective     Chief Complaint   Patient presents with    Follow-up     COPD 1 year pulmonary follow up with no testing        HPI  Cliff Lu is here for 1 year follow up for COPD  Stopped Spiriva last visit, started on Stiolto for better COPD control - has seen no benefit with use compared to Spiriva, is still using compliantly  ( previously on Anoro/ Dulera/ Symbicort)     Prefers Ventolin over Albuterol sulfate - asking for new script CRISTIANA  Using albuterol 1-4 times per day   Using albuterol nebs when \"really SOB\" did use yesterday   More SOB in past 4-5 days, coughing more with clear phlegm   \" I think I'm retaining water\" took some diuretics she had at home and did help, now out of them   Last spirometry 2018: suggestive of combined restrictive and obstruction pattern- FEV 1 44% FEV 1 /FVC ratio 81   Unable to do normal daily activity such as sweeping, or even walking in her house. Are the above symptoms at your baseline No  Any recent exacerbations that have required oral atb or steroids No  Any new medical issues since last visit : No  Is using cpap  nightly and has used PRN during the day for SOB with benefit     From last note:   Cliff Lu is here for 1 yr follow up for her COPD  Recent trouble with elevated Blood sugar, now taking Metformin   On 1-3LPM O2 continuous during the day and O2 bleed into PAP at night. Since last visit was in admitted x 1 night for COPD exac. .  As soon as she received solumedrol felt immediate relief, sent home on 5 days prednisone which she completed. Previously on Dulera- stopped due cost, tried Symbicort felt was making breathing worse, now only using Spiriva Handihaler. Prescribed Anoro- made symptoms worse went back to Spiriva .   Is using Spiriva daily, albuterol nebs 2x daily, PRN use of Ventolin ( on average once daily in am)     Currently Feels her COPD symptoms are stable. Chronic stable cough and SOB on exertion, C/o central substernal pain that comes and goes. Not related to eating or laying down. No h/o GERD. SOCIAL HISTORY:  Social History     Tobacco Use    Smoking status: Former Smoker     Packs/day: 2.00     Years: 40.00     Pack years: 80.00     Types: Cigarettes     Quit date: 11/14/2010     Years since quitting: 10.5    Smokeless tobacco: Never Used   Vaping Use    Vaping Use: Never used   Substance Use Topics    Alcohol use: No     Comment: pt hasn't drank since 2007    Drug use: No         CURRENT MEDICATIONS:  Current Outpatient Medications   Medication Sig Dispense Refill    VENTOLIN  (90 Base) MCG/ACT inhaler Inhale 2 puffs into the lungs every 6 hours as needed for Wheezing or Shortness of Breath 1 Inhaler 11    furosemide (LASIX) 20 MG tablet Take 1 tablet by mouth daily as needed (swelling) 60 tablet 0    fluticasone-umeclidin-vilant (TRELEGY ELLIPTA) 100-62.5-25 MCG/INH AEPB Inhale 1 puff into the lungs daily 30 each 11    predniSONE (DELTASONE) 50 MG tablet Take 1 tablet by mouth daily for 5 days 5 tablet 0    azithromycin (ZITHROMAX) 500 MG tablet Take 1 tablet by mouth daily for 3 days 1 packet 0    HYDROcodone-acetaminophen (NORCO) 5-325 MG per tablet Take 1 tablet by mouth every 8 hours as needed for Pain for up to 30 days. 90 tablet 0    CPAP Machine MISC by Does not apply route Please check patient's CPAP machine for proper functioning by [employer]. Turn off ramp 1 each 0    albuterol sulfate  (90 Base) MCG/ACT inhaler inhale 2 puffs INTO THE LUNGS four times a day if needed for wheezing 18 g 5    lidocaine (LMX) 4 % cream Apply topically as needed every 6 hours to left hand for pain.  1 Tube 2    fluticasone (FLONASE) 50 MCG/ACT nasal spray 1 spray by Each Nostril route daily      omeprazole (PRILOSEC) 40 MG delayed release capsule Take 40 mg by mouth daily  polyethylene glycol (GLYCOLAX) 17 g packet Take 17 g by mouth daily as needed for Constipation      albuterol (PROVENTIL) (2.5 MG/3ML) 0.083% nebulizer solution Take 3 mLs by nebulization every 6 hours as needed for Wheezing or Shortness of Breath 120 mL 11    montelukast (SINGULAIR) 10 MG tablet Take 1 tablet by mouth nightly 30 tablet 3    metFORMIN (GLUCOPHAGE) 500 MG tablet Take 500 mg by mouth daily (with breakfast)      triamterene-hydrochlorothiazide (MAXZIDE-25) 37.5-25 MG per tablet take 1 tablet by mouth every morning  0    RA COL-RITE 100 MG capsule daily   0    atorvastatin (LIPITOR) 40 MG tablet Take 1 tablet by mouth daily 30 tablet 3    pseudoephedrine (SUDAFED 12 HR) 120 MG extended release tablet Take 120 mg by mouth every 12 hours as needed for Congestion      amLODIPine (NORVASC) 5 MG tablet Take 5 mg by mouth daily      losartan (COZAAR) 50 MG tablet Take 50 mg by mouth daily   0    OXYGEN   Inhale into the lungs 3 lpm, per patient at home and when walking      aspirin 81 MG EC tablet Take 81 mg by mouth daily. No current facility-administered medications for this visit. Diana ALEXANDER   Review of Systems   Constitutional: Positive for activity change (declined ). Negative for chills, fatigue, fever and unexpected weight change. HENT: Negative. Eyes: Negative. Respiratory: Positive for cough, chest tightness and shortness of breath. Negative for wheezing. Cardiovascular: Positive for leg swelling. Negative for chest pain and palpitations. Gastrointestinal: Negative for abdominal pain, diarrhea, nausea and vomiting. Genitourinary: Negative. Musculoskeletal: Negative. Skin: Negative. Neurological: Negative. Hematological: Does not bruise/bleed easily. Psychiatric/Behavioral: Negative for sleep disturbance and suicidal ideas. The patient is nervous/anxious (anxiety related to her medical condition ).          Physical exam   /68 (Site: Right Upper Arm, Position: Sitting, Cuff Size: Large Adult)   Pulse 102   Temp 97.3 °F (36.3 °C)   Ht 5' 2\" (1.575 m)   Wt 165 lb 3.2 oz (74.9 kg)   SpO2 97% Comment: 97 on 2 L  BMI 30.22 kg/m²      Wt Readings from Last 3 Encounters:   06/08/21 165 lb 3.2 oz (74.9 kg)   05/19/21 164 lb (74.4 kg)   04/22/21 159 lb (72.1 kg)     Physical Exam  Vitals and nursing note reviewed. Constitutional:       General: She is not in acute distress. Appearance: She is overweight. Interventions: Nasal cannula in place. Comments: Fatigued appearance    HENT:      Mouth/Throat:      Lips: Pink. Mouth: Mucous membranes are moist.      Pharynx: Oropharynx is clear. No oropharyngeal exudate or posterior oropharyngeal erythema. Eyes:      Conjunctiva/sclera: Conjunctivae normal.   Neck:      Vascular: No JVD. Cardiovascular:      Rate and Rhythm: Normal rate and regular rhythm. Heart sounds: No murmur heard. No friction rub. Pulmonary:      Effort: Pulmonary effort is normal. No accessory muscle usage or respiratory distress. Breath sounds: Normal breath sounds. Decreased air movement (poor aeration throughout all lung fields ) present. No wheezing, rhonchi or rales. Chest:      Chest wall: No tenderness. Musculoskeletal:      Right lower leg: No edema. Left lower leg: No edema. Skin:     General: Skin is warm and dry. Capillary Refill: Capillary refill takes less than 2 seconds. Nails: There is no clubbing. Neurological:      Mental Status: She is alert. Psychiatric:         Mood and Affect: Mood normal.         Behavior: Behavior normal.         Thought Content: Thought content normal.         Judgment: Judgment normal.          Test results   Lung Nodule Screening     [x] Qualifies    []Does not qualify   [] Declined    [x] Completed 2019, declined one in 2020 due to COVID    Assessment      Diagnosis Orders   1.  COPD exacerbation (HCC)  VENTOLIN  (90 Base) MCG/ACT inhaler    furosemide (LASIX) 20 MG tablet    azithromycin (ZITHROMAX) 500 MG tablet   2. COPD, severe ([address]  fluticasone-umeclidin-vilant (TRELEGY ELLIPTA) 100-62.5-25 MCG/INH AEPB    predniSONE (DELTASONE) 50 MG tablet    CT Lung Screen (Annual)    Full PFT Study With Bronchodilator   3. Personal history of tobacco use  SC VISIT TO DISCUSS LUNG CA SCREEN W LDCT    CT Lung Screen (Annual)    CT LUNG SCREENING    Full PFT Study With Bronchodilator   4. Obesity (BMI 30-39.9)     5. Chronic respiratory failure with hypoxia (HCC)            Plan    -Zithromax and prednisone burst for acute exacerbation  -continue supplemental O2 as prescribed  -continue CPAP nightly and PRN during the day for SOB  -schedule low dose CT lung screen   -refill 20 mg lasix PRN daily for swelling.   -schedule in hospital Full PFT  -discussed [place] valve procedure/ qualifications and was given pamphlet of information regarding the procedure.   -pt requests to try Trelegy ellipta. Provided with 2 samples of 100 mcg , instructed on use , printed script   -stop Stiolto   -PRN Ventolin- escribed with CRISTIANA for brand name     Will see Gracieladarcy Moreno after testing   Electronically signed by CASSANDRA Villarreal CNP on 6/9/2021 at 9:22 AM   Low Dose CT (LDCT) Lung Screening criteria met   Age 50-69   Pack year smoking >30   Still smoking or less than 15 year since quit   No sign or symptoms of lung cancer   > 11 months since last LDCT     Risks and benefits of lung cancer screening with LDCT scans discussed:    Significance of positive screen - False-positive LDCT results often occur. 95% of all positive results do not lead to a diagnosis of cancer. Usually further imaging can resolve most false-positive results; however, some patients may require invasive procedures.     Over diagnosis risk - 10% to 12% of screen-detected lung cancer cases are over diagnosed--that is, the cancer would not have been detected in the patient's lifetime without the screening. Need for follow up screens annually to continue lung cancer screening effectiveness     Risks associated with radiation from annual LDCT- Radiation exposure is about the same as for a mammogram, which is about 1/3 of the annual background radiation exposure from everyday life. Starting screening at age 54 is not likely to increase cancer risk from radiation exposure. Patients with comorbidities resulting in life expectancy of < 10 years, or that would preclude treatment of an abnormality identified on CT, should not be screened due to lack of benefit.     To obtain maximal benefit from this screening, smoking cessation and long-term abstinence from smoking is critical

## 2021-06-08 NOTE — PATIENT INSTRUCTIONS
STOP [place] , try new sample called Trelegy ellipta - ONE PUFF ONCE A DAY     What is lung cancer screening? Lung cancer screening is a way in which doctors check the lungs for early signs of cancer in people who have no symptoms of lung cancer. A low-dose CT scan uses much less radiation than a normal CT scan and shows a more detailed image of the lungs than a standard X-ray. The goal of lung cancer screening is to find cancer early, before it has a chance to grow, spread, or cause problems. One large study found that smokers who were screened with low-dose CT scans were less likely to die of lung cancer than those who were screened with standard X-ray. Below is a summary of the things you need to know regarding screening for lung cancer with low-dose computed tomography (LDCT). This is a screening program that involves routine annual screening with LDCT studies of the lung. The LDCTs are done using low-dose radiation that is not thought to increase your cancer risk. If you have other serious medical conditions (other cancers, congestive heart failure) that limit your life expectancy to less than 10 years, you should not undergo lung cancer screening with LDCT. The chance is 20%-60% that the LDCT result will show abnormalities. This would require additional testing which could include repeat imaging or even invasive procedures. Most (about 95%) of \"abnormal\" LDCT results are false in the sense that no lung cancer is ultimately found. Additionally, some (about 10%) of the cancers found would not affect your life expectancy, even if undetected and untreated. If you are still smoking, the single most important thing that you can do to reduce your risk of dying of lung cancer is to quit. For this screening to be covered by Medicare and most other insurers, strict criteria must be met.   If you do not meet these criteria, but still wish to undergo LDCT testing, you will be required to sign a waiver indicating your willingness to pay for the scan.

## 2021-06-09 ENCOUNTER — TELEPHONE (OUTPATIENT)
Dept: PULMONOLOGY | Age: 71
End: 2021-06-09

## 2021-06-09 ASSESSMENT — ENCOUNTER SYMPTOMS
WHEEZING: 0
COUGH: 1
VOMITING: 0
CHEST TIGHTNESS: 1
EYES NEGATIVE: 1
ABDOMINAL PAIN: 0
NAUSEA: 0
DIARRHEA: 0
SHORTNESS OF BREATH: 1

## 2021-06-09 NOTE — TELEPHONE ENCOUNTER
Patient called questioning the 50 mg of prednisone. Pharmacy states it is a high dose and would need an auth from the insurance for this.   Please advise

## 2021-06-09 NOTE — TELEPHONE ENCOUNTER
We prescribe this dose all the time for short burst of 5 days for COPD, I can change it to 40 mg if that makes it easier with the pharmacy .  Just let me know

## 2021-06-21 ENCOUNTER — OFFICE VISIT (OUTPATIENT)
Dept: CARDIOLOGY CLINIC | Age: 71
End: 2021-06-21
Payer: MEDICARE

## 2021-06-21 VITALS
WEIGHT: 164.3 LBS | BODY MASS INDEX: 30.24 KG/M2 | HEART RATE: 93 BPM | DIASTOLIC BLOOD PRESSURE: 56 MMHG | HEIGHT: 62 IN | SYSTOLIC BLOOD PRESSURE: 130 MMHG

## 2021-06-21 DIAGNOSIS — Z01.810 PREOP CARDIOVASCULAR EXAM: Primary | ICD-10-CM

## 2021-06-21 PROCEDURE — 93000 ELECTROCARDIOGRAM COMPLETE: CPT | Performed by: INTERNAL MEDICINE

## 2021-06-21 PROCEDURE — 99213 OFFICE O/P EST LOW 20 MIN: CPT | Performed by: INTERNAL MEDICINE

## 2021-06-21 NOTE — PROGRESS NOTES
[facility]  [address] [facility]  Dept: [phone]  Dept Fax: [phone]  Loc: [phone]    Visit Date: 6/21/2021    Ms. Nii Lloyd is a 79 y.o. female  who presented for:  No chief complaint on file. CAD  HPI:   72 yo F hx of CAD s/p PCI of RCA, COPD on O2, HTN, HLD, PETE on CPAP and left subclavian stenosis s/p PTA/stenting is here for a followup. Denies any chest pain, sob, palpitations, lightheadedness, dizziness, orthopnea, PND or pedal edema. Current Outpatient Medications:     VENTOLIN  (90 Base) MCG/ACT inhaler, Inhale 2 puffs into the lungs every 6 hours as needed for Wheezing or Shortness of Breath, Disp: 1 Inhaler, Rfl: 11    furosemide (LASIX) 20 MG tablet, Take 1 tablet by mouth daily as needed (swelling), Disp: 60 tablet, Rfl: 0    fluticasone-umeclidin-vilant (TRELEGY ELLIPTA) 100-62.5-25 MCG/INH AEPB, Inhale 1 puff into the lungs daily, Disp: 30 each, Rfl: 11    HYDROcodone-acetaminophen (NORCO) 5-325 MG per tablet, Take 1 tablet by mouth every 8 hours as needed for Pain for up to 30 days. , Disp: 90 tablet, Rfl: 0    CPAP Machine MISC, by Does not apply route Please check patient's CPAP machine for proper functioning by [employer].  Turn off ramp, Disp: 1 each, Rfl: 0    albuterol sulfate  (90 Base) MCG/ACT inhaler, inhale 2 puffs INTO THE LUNGS four times a day if needed for wheezing, Disp: 18 g, Rfl: 5    lidocaine (LMX) 4 % cream, Apply topically as needed every 6 hours to left hand for pain., Disp: 1 Tube, Rfl: 2    fluticasone (FLONASE) 50 MCG/ACT nasal spray, 1 spray by Each Nostril route daily, Disp: , Rfl:     omeprazole (PRILOSEC) 40 MG delayed release capsule, Take 40 mg by mouth daily, Disp: , Rfl:     polyethylene glycol (GLYCOLAX) 17 g packet, Take 17 g by mouth daily as needed for Constipation, Disp: , Rfl:     albuterol (PROVENTIL) (2.5 MG/3ML) 0.083% nebulizer solution, Take 3 mLs by nebulization every 6 hours as needed for Wheezing or Shortness of Breath, Disp: 120 mL, Rfl: 11    montelukast (SINGULAIR) 10 MG tablet, Take 1 tablet by mouth nightly, Disp: 30 tablet, Rfl: 3    metFORMIN (GLUCOPHAGE) 500 MG tablet, Take 500 mg by mouth daily (with breakfast), Disp: , Rfl:     triamterene-hydrochlorothiazide (MAXZIDE-25) 37.5-25 MG per tablet, take 1 tablet by mouth every morning, Disp: , Rfl: 0    RA COL-RITE 100 MG capsule, daily , Disp: , Rfl: 0    atorvastatin (LIPITOR) 40 MG tablet, Take 1 tablet by mouth daily, Disp: 30 tablet, Rfl: 3    pseudoephedrine (SUDAFED 12 HR) 120 MG extended release tablet, Take 120 mg by mouth every 12 hours as needed for Congestion, Disp: , Rfl:     amLODIPine (NORVASC) 5 MG tablet, Take 5 mg by mouth daily, Disp: , Rfl:     losartan (COZAAR) 50 MG tablet, Take 50 mg by mouth daily , Disp: , Rfl: 0    OXYGEN,  Inhale into the lungs 3 lpm, per patient at home and when walking, Disp: , Rfl:     aspirin 81 MG EC tablet, Take 81 mg by mouth daily. , Disp: , Rfl:     Past Medical History  Ollie Hankins  has a past medical history of Bladder dysfunction, CAD (coronary artery disease), Cerebral artery occlusion with cerebral infarction [facility]), Cerebrovascular disease, CHF (congestive heart failure) ([facility] [address] COPD (chronic obstructive pulmonary disease) ([facility] [address] DDD (degenerative disc disease), Depression, GERD (gastroesophageal reflux disease), Hyperlipidemia, Hypertension, Obesity, Other disorders of kidney and ureter in diseases classified elsewhere, Other screening mammogram, Pap smear, as part of routine gynecological examination, Pap smear, as part of routine gynecological examination, Peripheral edema, Pneumonia, PONV (postoperative nausea and vomiting), and Unspecified sleep apnea. Social History  Ollie Hankins  reports that she quit smoking about 10 years ago. Her smoking use included cigarettes. She has a 80.00 pack-year smoking history.  She has never used smokeless tobacco. She reports that she does not drink alcohol and does not use drugs. Family History  Nithya family history includes Alzheimer's Disease in her father; COPD in her sister; Cancer in her brother and daughter; Diabetes in her daughter; Heart Disease in her father and mother; Heart Failure in her father and mother; High Blood Pressure in her father and mother; Parkinsonism in her father; Stroke in her brother.     Past Surgical History   Past Surgical History:   Procedure Laterality Date    COLONOSCOPY  7-2009    DILATION AND CURETTAGE OF UTERUS  1970's    ENDOSCOPY, COLON, DIAGNOSTIC      EYE SURGERY  2013    Laser surgery     LUMBAR SPINE SURGERY Left 12/12/2017    LUMBAR RFA L2-3, 3-4, 4-5, L5-S1 LEFT SIDE performed by Radha Palomino MD at [facility] Right 10/7/2019    L-facet RFA @ L2-3, L3-4, L4-5, and L5-S1 RIGHT SIDE FIRST performed by Radha Palomino MD at [place] [address]  2016    burning nerves in back - lumbar @ [place]    OTHER SURGICAL HISTORY Left 12/12/2017    Lumbar RFA L2-3, L3-4, L4-5, L5-S1    PAIN MANAGEMENT PROCEDURE Left 2/7/2020    Left L-facet RFA @ L2-3, L3-4, L4-5, and L5-S1 performed by Radha Palomino MD at [place] [address] Right 7/16/2020    Right L-facet RFA @ L2-3, L3-4, L4-5, and L5-S1 performed by Radha Palomino MD at [place] [address] Left 8/25/2020    LEFT L-facet RFA @ L2-3, L3-4, L4-5, and L5-S1 performed by Radha Palomino MD at [place] [address] Right 2/26/2021    Right L-facet RFA @ L2-3, L3-4, L4-5, and L5-S1 performed by Radha Palomino MD at [place] [address] Left 4/8/2021    Left L-facet RFA @ L2-3, L3-4, L4-5, and L5-S1 performed by Radha Palomino MD at [address] [place] [address] Date    WBC 10.4 05/25/2021    WBC 8.2 05/23/2020    RBC 3.68 05/25/2021    HGB 10.4 05/25/2021    HCT 31.7 05/25/2021    MCV 86 05/25/2021    MCH 28.3 05/25/2021    MCHC 32.8 05/25/2021    RDW 14.5 05/25/2021     05/25/2021     05/23/2020    MPV 9.7 05/25/2021       Lab Results   Component Value Date     05/25/2021    K 4.0 05/25/2021    K 4.0 05/23/2020    CL 92 05/25/2021    CO2 38 05/25/2021    BUN 16 05/25/2021    LABALBU 4.0 05/25/2021    LABALBU 4.4 03/31/2012    CREATININE 1.10 05/25/2021    CALCIUM 9.8 05/25/2021    GFRAA >60 06/20/2019    LABGLOM 83 05/23/2020    GLUCOSE 104 05/25/2021       Lab Results   Component Value Date    ALKPHOS 70 05/25/2021    ALKPHOS 77 05/23/2020    ALT 13 05/25/2021    AST 16 05/25/2021    PROT 7.6 05/25/2021    BILITOT 0.3 05/25/2021    BILITOT NEGATIVE 03/31/2012    BILIDIR <0.2 05/22/2020    LABALBU 4.0 05/25/2021    LABALBU 4.4 03/31/2012       Lab Results   Component Value Date    MG 2.1 05/22/2020       Lab Results   Component Value Date    INR 0.91 01/08/2019    INR 0.89 11/26/2018    INR 0.92 10/04/2018         Lab Results   Component Value Date    LABA1C 5.9 06/30/2018       Lab Results   Component Value Date    TRIG 365 05/25/2021    HDL 45 05/25/2021    LDLCALC 195 05/25/2021    LABVLDL 73 05/25/2021       Lab Results   Component Value Date    TSH 1.180 03/11/2018         Testing Reviewed:      I haveindividually reviewed the below cardiac tests    EKG:SR, no ST-T chagnes    ECHO:   Results for orders placed during the hospital encounter of 08/14/18   Echocardiogram 2D/ M-Mode/ Colorflow/ Do    Narrative Transthoracic Echocardiography Report (TTE)     Demographics      Patient Name    [facility]  Gender               Female                   K      MR #            [id number]      Race                                                      Ethnicity      Account #       [de-identified]      Room Number          7549      Accession [id number]      Date of Study        08/15/2018   Number      Date of Birth   1950     Referring Physician  Omayra Rose MD      Age             79 year(s)     Sonographer          Jose Luis Bell [facility]                                     Interpreting         Echo reader of the                                  Physician            week                                                       Shravan Crowell MD     Procedure    Type of Study      TTE procedure:ECHOCARDIOGRAM COMPLETE 2D W DOPPLER W COLOR. Procedure Date  Date: 08/15/2018 Start: 08:18 AM    Study Location: Bedside  Technical Quality: Limited visualization due to poor acoustical window. Indications:Shortness of breath. Additional Medical History: Former smoker, COPD, GERD, Hyperlipidemia,  Congestive heart failure, Coronary artery disease, Diabetes, Resp failure    Patient Status: Routine    Height: 62 inches Weight: 155 pounds BSA: 1.72 m^2 BMI: 28.35 kg/m^2    BP: 137/64 mmHg     Conclusions      Summary   Technically difficult examination. Ejection fraction is visually estimated at 50%. Overall left ventricular function is normal.   The aortic valve leaflets were not well visualized. Aortic valve appears tricuspid. Aortic valve leaflets are somewhat thickened. Aortic valve leaflets are Mildly calcified. Signature      ----------------------------------------------------------------   Electronically signed by Shravan Crowell MD (Interpreting   physician) on 08/15/2018 at 05:21 PM   ----------------------------------------------------------------      Findings      Mitral Valve   Trace mitral regurgitation is present. Aortic Valve   The aortic valve leaflets were not well visualized. Aortic valve appears tricuspid. Aortic valve leaflets are somewhat thickened. Aortic valve leaflets are Mildly calcified.       Tricuspid Valve Tricuspid valve was not well visualized. Trivial tricuspid regurgitation visualized. Pulmonic Valve   The pulmonic valve was not well visualized . Trivial pulmonic regurgitation visualized. Left Atrium   Left atrial size was normal.      Left Ventricle   Ejection fraction is visually estimated at 50%. Overall left ventricular function is normal.      Right Atrium   Right atrial size was normal.      Right Ventricle   The right ventricular size was normal with normal systolic function and   wall thickness. Pericardial Effusion   The pericardium was normal in appearance with no evidence of a pericardial   effusion. Pleural Effusion   No evidence of pleural effusion. Aorta / Great Vessels   -Aortic root dimension within normal limits.   -The Pulmonary artery is within normal limits. -IVC size is within normal limits with normal respiratory phasic changes.      M-Mode/2D Measurements & Calculations      LV Diastolic    LV Systolic Dimension: 3.2  AV Cusp Separation: 1.6 cmLA   Dimension: 4.9  cm                          Dimension: 3.3 cmAO Root   cm              LV Volume Diastolic: 370 ml Dimension: 2.8 cm   LV FS:34.7 %    LV Volume Systolic: 41 ml   LV PW           LV EDV/LV EDV Index: 737   Diastolic: 0.9  DL/42 O^0BB ESV/LV ESV   cm              Index: 41 ml/24 m^2         RV Diastolic Dimension: 2.8 cm   Septum          EF Calculated: 38.9 %   Diastolic: 0.8                              LA/Aorta: 1.18   cm     Doppler Measurements & Calculations      MV Peak E-Wave: 78.6 cm/s  AV Peak Velocity: 157  LVOT Peak Velocity: 85.9   MV Peak A-Wave: 99.4 cm/s  cm/s                   cm/s   MV E/A Ratio: 0.79         AV Peak Gradient: 9.86 LVOT Peak Gradient: 3   MV Peak Gradient: 2.47     mmHg                   mmHg   mmHg      MV Deceleration Time: 254   msec                                                        PV Peak Velocity: 82.9   MV E' Septal Velocity: 5.1 cm/s   cm/s                       AV DVI (Vmax):0.55     PV Peak Gradient: 2.75   MV A' Septal Velocity: 8.8                        mmHg   cm/s   MV E' Lateral Velocity:   7.1 cm/s   MV A' Lateral Velocity:   11.3 cm/s   E/E' septal: 15.41   E/E' lateral: 11.07     [email or web address] [id number]%3d       STRESS:    CATH:    Assessment/Plan       Diagnosis Orders   1. Preop cardiovascular exam  EKG 12 Lead     CAD s/p PCI  PAD, Left subclavian stenosis s/p PTA  COPD on O2 3L  Former smoker  HTN  HLD  PETE on CPAP       Denies chest pain  Has chronic sob  No cardiac symptoms  Reviewed EKG  Continue DAPT  Continue Aspirin, Plavix  lipitor 20mg daily  Continue amlodipine and losartan  Continue rest of the management  The patient is asked to make an attempt to improve diet and exercise patterns to aid in medical management of this problem. Advised more plant based nutrition/meditarrean diet   Advised patient to call office or seek immediate medical attention if there is any new onset of  any chest pain, sob, palpitations, lightheadedness, dizziness, orthopnea, PND or pedal edema. All medication side effects were discussed in details. Thank youfor allowing me to participate in the care of this patient. Please do not hesitate to contact me for any further questions. Return in about 1 year (around 6/21/2022), or if symptoms worsen or fail to improve, for Regular follow up, Review testing.        Electronically signed by Bill Zhao MD [facility]  6/21/2021 at 11:24 AM

## 2021-07-01 ENCOUNTER — OFFICE VISIT (OUTPATIENT)
Dept: PHYSICAL MEDICINE AND REHAB | Age: 71
End: 2021-07-01
Payer: MEDICARE

## 2021-07-01 VITALS
WEIGHT: 164 LBS | DIASTOLIC BLOOD PRESSURE: 60 MMHG | BODY MASS INDEX: 30.18 KG/M2 | SYSTOLIC BLOOD PRESSURE: 116 MMHG | HEIGHT: 62 IN

## 2021-07-01 DIAGNOSIS — M47.816 SPONDYLOSIS OF LUMBAR REGION WITHOUT MYELOPATHY OR RADICULOPATHY: Primary | ICD-10-CM

## 2021-07-01 DIAGNOSIS — M46.1 SI (SACROILIAC) JOINT INFLAMMATION (HCC): ICD-10-CM

## 2021-07-01 DIAGNOSIS — M54.50 CHRONIC BILATERAL LOW BACK PAIN WITHOUT SCIATICA: ICD-10-CM

## 2021-07-01 DIAGNOSIS — G89.29 CHRONIC BILATERAL LOW BACK PAIN WITHOUT SCIATICA: ICD-10-CM

## 2021-07-01 DIAGNOSIS — M48.061 SPINAL STENOSIS OF LUMBAR REGION WITHOUT NEUROGENIC CLAUDICATION: ICD-10-CM

## 2021-07-01 DIAGNOSIS — G89.4 CHRONIC PAIN SYNDROME: ICD-10-CM

## 2021-07-01 PROCEDURE — 99214 OFFICE O/P EST MOD 30 MIN: CPT | Performed by: NURSE PRACTITIONER

## 2021-07-01 RX ORDER — HYDROCODONE BITARTRATE AND ACETAMINOPHEN 5; 325 MG/1; MG/1
1 TABLET ORAL EVERY 8 HOURS PRN
Qty: 90 TABLET | Refills: 0 | Status: SHIPPED | OUTPATIENT
Start: 2021-07-01 | End: 2021-07-26 | Stop reason: SDUPTHER

## 2021-07-01 ASSESSMENT — ENCOUNTER SYMPTOMS
BACK PAIN: 1
SHORTNESS OF BREATH: 1
COUGH: 1
GASTROINTESTINAL NEGATIVE: 1

## 2021-07-01 NOTE — PROGRESS NOTES
[address] [facility] [address]  Dept: [phone]  Dept Fax: [phone]: [phone]    Visit Date: 7/1/2021    Functionality Assessment/Goals Worksheet     On a scale of 0 (Does not Interfere) to 10 (Completely Interferes)     1. Which number describes how during the past week pain has interfered with       the following:  A. General Activity:  6  B. Mood: 7  C. Walking Ability:  7  D. Normal Work (Includes both work outside the home and housework):  7  E. Relations with Other People:   7  F. Sleep:   7  G. Enjoyment of Life:   8    2. Patient Prefers to Take their Pain Medications:     []  On a regular basis   []  Only when necessary    []  Does not take pain medications    3. What are the Patient's Goals/Expectations for Visiting Pain Management? []  Learn about my pain    []  Receive Medication   []  Physical Therapy     []  Treat Depression   []  Receive Injections    []  Treat Sleep   []  Deal with Anxiety and Stress   []  Treat Opoid Dependence/Addiction   []  Other:      HPI:   Berna Rico is a 79 y.o. female is here today for    Chief Complaint: Low back pain     HPI   10 week FU. Continues to have pain across lower back- aching and continues burning. Up and down. Continues good relief from bilateral L-facet RFA. More noticeable standing   Pain increases with walking, standing and getting up and down. Norco prn remains very effective in decreasing pain     On 3 liters of oxygen     Medications reviewed. Patient denies side effects with medications. Patient states she is taking medications as prescribed. Shedenies receiving pain medications from other sources. She denies any ER visits since last visit. Pain scale with out pain medications or at its worst is 8/10. Pain scale with pain medications or at its best is 3-5/10.   Last dose of Norco was today  Drug screen reviewed from 4/22/2021 and was appropriate  Pill count was completed today and was appropriate  Patient does have naloxone available at home. Patient has not required use of naloxone at home since last office visit. The patientis allergic to codeine, doxycycline, lipitor, simvastatin, and chantix [varenicline tartrate]. Subjective:      Review of Systems   Constitutional: Negative. HENT: Negative. Respiratory: Positive for cough and shortness of breath. On 3 liters of oxygen per nasal cannula    Gastrointestinal: Negative. Genitourinary: Negative. Musculoskeletal: Positive for arthralgias, back pain and myalgias. Negative for gait problem and joint swelling. Not using any assist devices   Skin: Negative. Neurological: Negative for weakness and numbness. Psychiatric/Behavioral: Negative for sleep disturbance. Objective:     Vitals:    07/01/21 1159   BP: 116/60   Weight: 164 lb (74.4 kg)   Height: 5' 2\" (1.575 m)       Physical Exam  Vitals and nursing note reviewed. Constitutional:       General: She is not in acute distress. Appearance: Normal appearance. She is well-developed. She is not diaphoretic. HENT:      Head: Normocephalic and atraumatic. Right Ear: External ear normal.      Left Ear: External ear normal.      Nose: Nose normal.      Mouth/Throat:      Mouth: Mucous membranes are dry. Pharynx: No oropharyngeal exudate. Eyes:      General: No scleral icterus. Right eye: No discharge. Left eye: No discharge. Conjunctiva/sclera: Conjunctivae normal.      Pupils: Pupils are equal, round, and reactive to light. Neck:      Thyroid: No thyromegaly. Cardiovascular:      Rate and Rhythm: Normal rate and regular rhythm. Heart sounds: Normal heart sounds. No murmur heard. No friction rub. No gallop. Pulmonary:      Effort: Pulmonary effort is normal. No respiratory distress.       Breath sounds: Examination of the right-upper field reveals decreased breath sounds and wheezing. Examination of the left-upper field reveals decreased breath sounds and wheezing. Examination of the right-middle field reveals decreased breath sounds. Examination of the left-middle field reveals decreased breath sounds. Examination of the right-lower field reveals decreased breath sounds. Examination of the left-lower field reveals decreased breath sounds. Decreased breath sounds and wheezing present. No rales. Comments: On 3 liters of oxygen   Chest:      Chest wall: No tenderness. Abdominal:      General: Bowel sounds are normal. There is no distension. Palpations: Abdomen is soft. Tenderness: There is no abdominal tenderness. There is no guarding or rebound. Musculoskeletal:         General: Swelling and tenderness present. Right shoulder: Tenderness present. Decreased range of motion. Left shoulder: Tenderness present. Decreased range of motion. Right elbow: Normal.      Left elbow: Normal.      Right wrist: No swelling, tenderness or bony tenderness. Left wrist: Tenderness and bony tenderness present. Decreased range of motion. Cervical back: Full passive range of motion without pain, normal range of motion and neck supple. Spasms and tenderness present. No edema, erythema or rigidity. No muscular tenderness. Normal range of motion. Thoracic back: Tenderness present. Lumbar back: Spasms and tenderness present. Back:       Right hip: Normal.      Left hip: Tenderness present. Decreased range of motion. Decreased strength. Right upper leg: Tenderness present. Left upper leg: Tenderness present. Right knee: Normal.      Left knee: Normal.      Right lower leg: No edema. Left lower leg: No edema. Skin:     General: Skin is warm. Coloration: Skin is not pale. Findings: No erythema or rash. Neurological:      General: No focal deficit present. Mental Status: She is alert and oriented to person, place, and time. She is not disoriented. Cranial Nerves: No cranial nerve deficit. Sensory: No sensory deficit. Motor: Weakness present. No atrophy or abnormal muscle tone. Coordination: Coordination normal.      Gait: Gait normal.      Deep Tendon Reflexes: Reflexes are normal and symmetric. Comments: SLR-  Motor 5/5 BUE BLE   Psychiatric:         Attention and Perception: She is attentive. Mood and Affect: Mood normal. Mood is not anxious or depressed. Affect is not labile, blunt, angry or inappropriate. Speech: Speech normal.         Behavior: Behavior normal. Behavior is not agitated, slowed, aggressive, withdrawn, hyperactive or combative. Thought Content: Thought content normal. Thought content is not paranoid or delusional. Thought content does not include homicidal or suicidal ideation. Thought content does not include homicidal or suicidal plan. Cognition and Memory: Memory is not impaired. She does not exhibit impaired recent memory or impaired remote memory. Judgment: Judgment normal. Judgment is not impulsive or inappropriate. ANASTACIA test: + bilaterally   Yeomans test: + bilaterally   Gaenslen test: + bilaterally      Assessment:     1. Spondylosis of lumbar region without myelopathy or radiculopathy    2. Chronic bilateral low back pain without sciatica    3. Spinal stenosis of lumbar region without neurogenic claudication    4. SI (sacroiliac) joint inflammation (HCC)    5. Chronic pain syndrome            Plan:      · OARRS reviewed. Current MED: 15.00  · Patient was offered naloxone for home. · Discussed long term side effects of medications, tolerance, dependency and addiction. · Previous UDS reviewed  · UDS preformed today for compliance. · Patient told can not receive any pain medications from any other source.   · No evidence of abuse, diversion or aberrant behavior.  Medications and/or procedures to improve function and quality of life- patient understanding with this and that may not be pain free   Discussed with patient about safe storage of medications at home   Discussed possible weaning of medication dosing dependent on treatment/procedure results.  Discussed with patient about risks with procedure including infection, reaction to medication, increased pain, or bleeding. · Procedure notes reviewed in detail   · Continues to recieve 80% relief of pain across lower back from bilateral L-facet RFA  · Continue Norco 5/325 1 tab every 8 hours as needed for breakthrough pain. Ordered refill  · Patient is compliant. Updated UDS ordered today         Meds. Prescribed:   Orders Placed This Encounter   Medications    HYDROcodone-acetaminophen (NORCO) 5-325 MG per tablet     Sig: Take 1 tablet by mouth every 8 hours as needed for Pain for up to 30 days. Dispense:  90 tablet     Refill:  0     Reduce doses taken as pain becomes manageable       Return in about 3 months (around 10/1/2021), or if symptoms worsen or fail to improve, for follow up  for medications.                Electronically signed by CASSANDRA Iyer CNP on7/1/2021 at 12:38 PM negative...

## 2021-07-07 DIAGNOSIS — J44.1 COPD EXACERBATION (HCC): Primary | ICD-10-CM

## 2021-07-07 DIAGNOSIS — J44.9 COPD, SEVERE (HCC): ICD-10-CM

## 2021-07-07 RX ORDER — AZITHROMYCIN 500 MG/1
500 TABLET, FILM COATED ORAL DAILY
Qty: 1 PACKET | Refills: 0 | Status: SHIPPED | OUTPATIENT
Start: 2021-07-07 | End: 2021-07-10

## 2021-07-07 RX ORDER — PREDNISONE 20 MG/1
20 TABLET ORAL DAILY
Qty: 5 TABLET | Refills: 0 | Status: SHIPPED | OUTPATIENT
Start: 2021-07-07 | End: 2021-07-12

## 2021-07-26 DIAGNOSIS — G89.4 CHRONIC PAIN SYNDROME: ICD-10-CM

## 2021-07-27 RX ORDER — HYDROCODONE BITARTRATE AND ACETAMINOPHEN 5; 325 MG/1; MG/1
1 TABLET ORAL EVERY 8 HOURS PRN
Qty: 90 TABLET | Refills: 0 | Status: SHIPPED | OUTPATIENT
Start: 2021-07-30 | End: 2021-08-26 | Stop reason: SDUPTHER

## 2021-08-26 DIAGNOSIS — G89.4 CHRONIC PAIN SYNDROME: ICD-10-CM

## 2021-08-26 RX ORDER — HYDROCODONE BITARTRATE AND ACETAMINOPHEN 5; 325 MG/1; MG/1
1 TABLET ORAL EVERY 8 HOURS PRN
Qty: 90 TABLET | Refills: 0 | Status: SHIPPED | OUTPATIENT
Start: 2021-08-29 | End: 2021-09-27 | Stop reason: SDUPTHER

## 2021-08-26 NOTE — TELEPHONE ENCOUNTER
OARRS reviewed. UDS: + for Dihydrocodeine, Hydrocodone, Norhydrocodone. Last seen: 7/1/2021.  Follow-up: 10/4/21

## 2021-08-31 ENCOUNTER — HOSPITAL ENCOUNTER (OUTPATIENT)
Dept: PULMONOLOGY | Age: 71
Discharge: HOME | End: 2021-08-31
Payer: MEDICARE

## 2021-08-31 ENCOUNTER — HOSPITAL ENCOUNTER (OUTPATIENT)
Dept: CT IMAGING | Age: 71
Discharge: HOME | End: 2021-08-31
Payer: MEDICARE

## 2021-08-31 DIAGNOSIS — Z87.891 PERSONAL HISTORY OF TOBACCO USE: ICD-10-CM

## 2021-08-31 DIAGNOSIS — J44.9 COPD, SEVERE (HCC): ICD-10-CM

## 2021-08-31 PROCEDURE — 94729 DIFFUSING CAPACITY: CPT

## 2021-08-31 PROCEDURE — 71271 CT THORAX LUNG CANCER SCR C-: CPT

## 2021-08-31 PROCEDURE — 94060 EVALUATION OF WHEEZING: CPT

## 2021-08-31 PROCEDURE — 94726 PLETHYSMOGRAPHY LUNG VOLUMES: CPT

## 2021-08-31 NOTE — PROGRESS NOTES
Prescreening performed prior to testing. The following symptoms may indicate COVID-19 infection:        One of the following criteria:   Temperature taken, patient temperature was 97.8 F. Fever greater 100.0 F -no  New onset cough -  no  New onset shortness of breath -no  New onset difficulty breathing -no        And/or   Two or more of the following criteria:  New onset muscle aches -no  New onset headache -no  New onset sore throat -no  New onset loss of smell/taste -no  New onset diarrhea -no    Patient's screening was negative. PFT will be performed.

## 2021-09-10 NOTE — PROGRESS NOTES
[facility]    Patient: Gigi Mitchell, 79 y.o.   : 1950  9/15/2021    Patient of CASSANDRA Hinojosa CNP     Subjective     Chief Complaint   Patient presents with    Follow-up     3 month, COPD, CT&PFT 21         COPD  She complains of chest tightness, cough, shortness of breath and sputum production. There is no hemoptysis or wheezing. This is a chronic problem. The current episode started more than 1 year ago. The problem occurs daily. The problem has been unchanged. The cough is productive of sputum (white frothy). Associated symptoms include chest pain, nasal congestion, orthopnea (has to prop her head on a pillow), postnasal drip, rhinorrhea and sneezing. Pertinent negatives include no fever, sore throat or weight loss. Her symptoms are aggravated by pollen, change in weather, lying down, exercise and strenuous activity (reports using sweeping increases SOB ). Her symptoms are alleviated by beta-agonist. She reports significant improvement on treatment. Risk factors for lung disease include animal exposure and smoking/tobacco exposure. Her past medical history is significant for bronchitis and emphysema. Nelson Alexandra is here for follow up for restrictive lung disease and emphysema. She was last seen by CASSANDRA Trevino CNP and at that time, she was wearing 1-3 lpm O2 continuous during the day and O2 bleed into PAP at night. She was also having an exacerbation at that time and was placed on a burst of prednisone and zithromax. She also had an increase in swelling and was restarted on lasix 20 mg daily as needed. Her stiolto was stopped and she was to start Trelegy. Marilyn also discussed the [place] valve with the patient at that time. Overall patient reports respiratory symptoms have been stable since last appointment. Patient reports poor compliance with inhaled medications (Stiolto).  She had called in on 2021 to CASSANDRA Trevino CNP due to having a breathing flare up and believed it was due to taking Trelegy. She was only trelegy for about 1 month prior to this exacerbation. Marilyn had stopped her trelegy and had re-ordered Stiolto. Patient reports that she stopped the Stiolto as she believes that this also causes her breathing to flare up. She admits to me today that she has been on Spiriva with the Trelegy and with the Stiolto as she did not realize that she was to stop the Spiriva. She does well with the Spiriva and is currently using this. Patient using albuterol 1-3 times per day on average. Patient reports physical limitation due to respiratory symptoms. Her past medical history is significant for restrictive lung disease, emphysema, PETE (follows with Lavon Simon PA-C), hypertension, CHF (follows with Dr. Rosey Dunne), CAD, DDD, and cerebral infarction. MMRC Dyspnea Scale:   0: Dyspneic on strenuous exercise  1: Dyspneic on walking a slight hill  2: Dyspneic on walking level ground; must stop occasionally due to breathlessness  3: Must stop for breathlessness after walking 100 yards or after a few minutes  4: Cannot leave house; breathless on dressing/undressing    MMRC dyspnea score: 3    Progress History:   Since last visit any new medical issues? No  New ER or hospital visits? No  Any new or changes in medicines? No  Using inhalers? Yes, Spiriva and as needed albuterol inhaler (Ventolin CRISTIANA) and neb  Are they helpful? Yes  Previous inhalers? Carli Jiménez - stopped due to cost, Stiolto (patient stopped as she believed this caused her lungs to flare up), Trelegy (stopped as she believed this caused her lungs to flare up)   Any recent exacerbations? Yes 7/6/2021  Last PFT: 8/31/2021  Last 6 MWT: None in Epic    Smoking History:  Smoked 2 PPD for 40 years (80 pack years), quit in 2010  Admits to passive tobacco exposure from parents or work environment. She lives with her daughter who smokes in the house.     Social History:  Patient job history: Retired, previously worked at [employer]   She has not had exposure to aerosolized particles or hazardous fumes. (Coal, dust, asbestos, molds ie Hay)  Denies living on a farm that primarily raised crops, livestock or combination  Admits to exposure to pets/animals at home. Cats at home  Denies exposure to tuberculosis. Denies family history of ALS, GBS, myasthenia gravis, myotonic dystrophy, MS, or spinal muscular atrophy.   Denies family history of autoimmune conditions, such as RA, SLE, etc.    Pneumonia vaccine: [id number] 4/2/2018 & 10/24/2012  COVID-19 vaccine: Vaccinated  Past Medical hx   PMH:  Past Medical History:   Diagnosis Date    Bladder dysfunction     CAD (coronary artery disease)     Cerebral artery occlusion with cerebral infarction ([facility] [address]     TIA    Cerebrovascular disease     CHF (congestive heart failure) ([facility] [facility]     COPD (chronic obstructive pulmonary disease) ([facility] [address] [facility]     uses oxygen 24 hours per day    DDD (degenerative disc disease)     Depression     GERD (gastroesophageal reflux disease)     Hyperlipidemia     Hypertension     Obesity     Other disorders of kidney and ureter in diseases classified elsewhere     Other screening mammogram 2011    Pap smear, as part of routine gynecological examination 2007    Pap smear, as part of routine gynecological examination 2007    Peripheral edema     with ascites    Pneumonia     PONV (postoperative nausea and vomiting)     with back injection in [facility]    Unspecified sleep apnea     c-pap at 10 cm wtr     SURGICAL HISTORY:  Past Surgical History:   Procedure Laterality Date    COLONOSCOPY  7-2009    DILATION AND CURETTAGE OF UTERUS  1970's    ENDOSCOPY, COLON, DIAGNOSTIC      EYE SURGERY  2013    Laser surgery     LUMBAR SPINE SURGERY Left 12/12/2017    LUMBAR RFA L2-3, 3-4, 4-5, L5-S1 LEFT SIDE performed by Elena Dawkins MD at [address] [facility] Right 10/7/2019    L-facet RFA @ L2-3, L3-4, L4-5, and L5-S1 RIGHT SIDE FIRST performed by Gaurang Walker MD at [facility]  2016    burning nerves in back - lumbar @ [facility]    OTHER SURGICAL HISTORY Left 12/12/2017    Lumbar RFA L2-3, L3-4, L4-5, L5-S1    PAIN MANAGEMENT PROCEDURE Left 2/7/2020    Left L-facet RFA @ L2-3, L3-4, L4-5, and L5-S1 performed by Gaurang Walker MD at [place] [address] Right 7/16/2020    Right L-facet RFA @ L2-3, L3-4, L4-5, and L5-S1 performed by Gaurang Walker MD at [place] [address] Left 8/25/2020    LEFT L-facet RFA @ L2-3, L3-4, L4-5, and L5-S1 performed by Gaurang Walker MD at [place] [address] Right 2/26/2021    Right L-facet RFA @ L2-3, L3-4, L4-5, and L5-S1 performed by Gaurang Walker MD at [place] [address] Left 4/8/2021    Left L-facet RFA @ L2-3, L3-4, L4-5, and L5-S1 performed by Gaurang Walker MD at [facility]:  Social History     Tobacco Use    Smoking status: Former Smoker     Packs/day: 2.00     Years: 40.00     Pack years: 80.00     Types: Cigarettes     Quit date: 11/14/2010     Years since quitting: 10.8    Smokeless tobacco: Never Used   Vaping Use    Vaping Use: Never used   Substance Use Topics    Alcohol use: No     Comment: pt hasn't drank since 2007    Drug use: No     ALLERGIES:  Allergies   Allergen Reactions    Codeine Itching     Pt cant remember    Doxycycline      Cant breath     Lipitor Itching    Simvastatin Itching     Stomach pain    Chantix [Varenicline Tartrate] Itching     FAMILY HISTORY:  Family History   Problem Relation Age of Onset    Heart Failure Mother     Heart Disease Mother     High Blood Pressure Mother     Heart Failure Father     Heart Disease Father     Alzheimer's Disease Father     Parkinsonism Father    [facility] High Blood Pressure Father     COPD Sister     Cancer Brother         skin    Stroke Brother     Cancer Daughter         colon    Diabetes Daughter      CURRENT MEDICATIONS:  Current Outpatient Medications   Medication Sig Dispense Refill    budesonide-formoterol (SYMBICORT) 160-4.5 MCG/ACT AERO Inhale 2 puffs into the lungs 2 times daily Rinse mouth after its use. 2 each 11    albuterol (PROVENTIL) (2.5 MG/3ML) 0.083% nebulizer solution Take 3 mLs by nebulization every 6 hours as needed for Wheezing or Shortness of Breath 120 mL 11    tiotropium (SPIRIVA HANDIHALER) 18 MCG inhalation capsule Inhale 1 capsule into the lungs daily 1 capsule via inhalation daily. 30 capsule 11    HYDROcodone-acetaminophen (NORCO) 5-325 MG per tablet Take 1 tablet by mouth every 8 hours as needed for Pain for up to 30 days. 90 tablet 0    VENTOLIN  (90 Base) MCG/ACT inhaler Inhale 2 puffs into the lungs every 6 hours as needed for Wheezing or Shortness of Breath 1 Inhaler 11    furosemide (LASIX) 20 MG tablet Take 1 tablet by mouth daily as needed (swelling) 60 tablet 0    CPAP Machine MISC by Does not apply route Please check patient's CPAP machine for proper functioning by [employer]. Turn off ramp 1 each 0    lidocaine (LMX) 4 % cream Apply topically as needed every 6 hours to left hand for pain.  1 Tube 2    fluticasone (FLONASE) 50 MCG/ACT nasal spray 1 spray by Each Nostril route daily      omeprazole (PRILOSEC) 40 MG delayed release capsule Take 40 mg by mouth daily      polyethylene glycol (GLYCOLAX) 17 g packet Take 17 g by mouth daily as needed for Constipation      montelukast (SINGULAIR) 10 MG tablet Take 1 tablet by mouth nightly 30 tablet 3    metFORMIN (GLUCOPHAGE) 500 MG tablet Take 500 mg by mouth daily (with breakfast)      triamterene-hydrochlorothiazide (MAXZIDE-25) 37.5-25 MG per tablet take 1 tablet by mouth every morning  0    RA COL-RITE 100 MG capsule daily   0    atorvastatin (LIPITOR) 40 MG tablet Take 1 tablet by mouth daily 30 tablet 3    pseudoephedrine (SUDAFED 12 HR) 120 MG extended release tablet Take 120 mg by mouth every 12 hours as needed for Congestion      amLODIPine (NORVASC) 5 MG tablet Take 5 mg by mouth daily      losartan (COZAAR) 50 MG tablet Take 50 mg by mouth daily   0    OXYGEN   Inhale into the lungs 3 lpm, per patient at home and when walking      aspirin 81 MG EC tablet Take 81 mg by mouth daily. No current facility-administered medications for this visit. Bria ALEXANDER   Review of Systems   Constitutional: Negative for chills, fever and weight loss. HENT: Positive for postnasal drip, rhinorrhea and sneezing. Negative for congestion, sinus pressure, sinus pain and sore throat. Eyes: Positive for itching. Respiratory: Positive for cough, sputum production and shortness of breath. Negative for hemoptysis, chest tightness and wheezing. Cardiovascular: Positive for chest pain, palpitations and leg swelling. Following Dr. Balaji Chandler - aware of symptoms, not getting worse   Gastrointestinal: Positive for constipation (taking stool softeners). Negative for abdominal pain and diarrhea. Genitourinary: Negative for difficulty urinating. Musculoskeletal: Positive for back pain. Follows with Pain Management   Allergic/Immunologic: Positive for environmental allergies. Physical exam   /62 (Site: Right Upper Arm, Position: Sitting, Cuff Size: Medium Adult)   Pulse 60   Temp 96.4 °F (35.8 °C)   Ht 5' 1\" (1.549 m)   Wt 162 lb (73.5 kg)   SpO2 96% Comment: r/a  BMI 30.61 kg/m²    Wt Readings from Last 3 Encounters:   09/15/21 162 lb (73.5 kg)   07/01/21 164 lb (74.4 kg)   06/21/21 164 lb 4.8 oz (74.5 kg)       Physical Exam  Constitutional:       Appearance: She is well-developed. Comments: BMI 30.61   HENT:      Right Ear: External ear normal.      Left Ear: External ear normal.      Nose: No congestion or rhinorrhea. IV contrast.        All CT scans at this facility use dose modulation, iterative reconstruction, and/or weight based dosing when appropriate to reduce the radiation dose to as low as reasonably achievable.       COMPARISON: No prior examinations.       FINDINGS:   LUNGS NODULES:   There are no suspicious masses or nodules.       LYMPHADENOPATHY:   There is a stable 1.5 cm lymph node in the AP window. This is unchanged.       OTHER (LUNGS/MEDIASTINUM/MUSCULOSKELETAL/ABDOMEN):   There are emphysematous changes of the lungs. There is scarring at the lung apices.       There is cardiomegaly. There is no pericardial effusion.       There are atherosclerotic changes in the thoracic aorta. There is no aneurysmal dilatation.       There is no pleural effusion or pneumothorax.       The visualized upper abdominal structures are within normal limits.         Impression   1. There are no suspicious masses or nodules within the lung fields. 2. Stable prominent lymph node in the left AP window. 3. LUNGRADS ASSESSMENT VALUE: 1       The LUNG RADS RECOMMENDATIONS for monitoring lung nodules listed below (ACR- Lung-RADS Version 1.0 Assessment Categories Release date\" April 28, 2014)       LUNG RADS RECOMMENDATIONS;   1.  Normal, continue annual screening   2.  Benign appearance or behavior, continue annual screening   3.  6 month CT recommended   4A.  3 month CT recommended; may consider PET/CT   4B.  Additional diagnostics and/or tissue sampling recommended   4X.  Additional diagnostics and/or tissue sampling recommended         **This report has been created using voice recognition software.  It may contain minor errors which are inherent in voice recognition technology. **       Final report electronically signed by Dr Helena Nesbitt on 8/31/2021 2:50 PM           Full PFT 8/31/2021 (Reviewed) restrictive lung disease and decreased DLCO          ECHO 8/14/2018 (Reviewed) EF 50% with normal right ventricular and normal systolic function  Conclusions      Summary   Technically difficult examination. Ejection fraction is visually estimated at 50%. Overall left ventricular function is normal.   The aortic valve leaflets were not well visualized. Aortic valve appears tricuspid. Aortic valve leaflets are somewhat thickened. Aortic valve leaflets are Mildly calcified. Signature      ----------------------------------------------------------------   Electronically signed by Janessa Morales MD (Interpreting   physician) on 08/15/2018 at 05:21 PM   ----------------------------------------------------------------      Findings      Mitral Valve   Trace mitral regurgitation is present. Aortic Valve   The aortic valve leaflets were not well visualized. Aortic valve appears tricuspid. Aortic valve leaflets are somewhat thickened. Aortic valve leaflets are Mildly calcified. Tricuspid Valve   Tricuspid valve was not well visualized. Trivial tricuspid regurgitation visualized. Pulmonic Valve   The pulmonic valve was not well visualized . Trivial pulmonic regurgitation visualized. Left Atrium   Left atrial size was normal.      Left Ventricle   Ejection fraction is visually estimated at 50%. Overall left ventricular function is normal.      Right Atrium   Right atrial size was normal.      Right Ventricle   The right ventricular size was normal with normal systolic function and   wall thickness. Pericardial Effusion   The pericardium was normal in appearance with no evidence of a pericardial   effusion. Pleural Effusion   No evidence of pleural effusion. Aorta / Great Vessels   -Aortic root dimension within normal limits.   -The Pulmonary artery is within normal limits. -IVC size is within normal limits with normal respiratory phasic changes. Six Minute Walk Test  Sheryle Merlin 1950    Six minute walk test done in my office today by my medical assistant.  Patricia's oxygen 18 MCG inhalation capsule   2. Mucopurulent chronic bronchitis ([facility] [address]  DME Order for Home Oxygen as OP    budesonide-formoterol (SYMBICORT) 160-4.5 MCG/ACT AERO    albuterol (PROVENTIL) (2.5 MG/3ML) 0.083% nebulizer solution    tiotropium (SPIRIVA HANDIHALER) 18 MCG inhalation capsule   3. Chronic respiratory failure with hypoxia (HCC)     4. Restrictive lung disease  POCT Nitric Oxide    6 Minute Walk Test    DME Order for Home Oxygen as OP    PK    Angiotensin Converting Enzyme    Rheumatoid factor screen    Sedimentation Rate    CBC With Auto Differential    IgE   5. Obesity (BMI 30-39.9)     6. PETE on CPAP           Plan   1. Pulmonary emphysema, unspecified emphysema type ([facility] [address]  - Not well controlled, currently on Spiriva and as needed albuterol. Using albuterol 1-3 times per day. - Will add Symbicort in addition to her Spiriva, rinse and spit after use  - Does not qualify for [place]  - POCT Nitric Oxide 14 today  - 6 Minute Walk Test - new order for 1.5 lpm at rest and while asleep and 3 lpm with activity  - DME Order for Home Oxygen as OP  - Discussed albuterol inhaler and nebulizer use with the patient. Reviewed signs and symptoms indicating need for use including shortness of breath and wheezing. Discussed with the patient the importance of using the inhaler or nebulizer within the prescribed time frames. Patient verbalized understanding to use one or the other not both within prescribed time frame. Patient also verbalized understanding that if there is no relief of symptoms after using albuterol and resting for 15 minutes they need to go to nearest ER or call 911.  - budesonide-formoterol (SYMBICORT) 160-4.5 MCG/ACT AERO; Inhale 2 puffs into the lungs 2 times daily Rinse mouth after its use. Dispense: 2 each; Refill: 11  - albuterol (PROVENTIL) (2.5 MG/3ML) 0.083% nebulizer solution;  Take 3 mLs by nebulization every 6 hours as needed for Wheezing or Shortness of Breath  Dispense: 120 mL; Refill: 11  - tiotropium (SPIRIVA HANDIHALER) 18 MCG inhalation capsule; Inhale 1 capsule into the lungs daily 1 capsule via inhalation daily. Dispense: 30 capsule; Refill: 11  - Advised to maintain pneumonia vaccine with PCP and to take flu vaccine this coming season. 2. Mucopurulent chronic bronchitis ([address]  - Not well controlled, currently on Spiriva and as needed albuterol. Using albuterol 1-3 times per day  - Will add Symbicort in addition to her Spiriva, rinse and spit after use  - DME Order for Home Oxygen as OP  - budesonide-formoterol (SYMBICORT) 160-4.5 MCG/ACT AERO; Inhale 2 puffs into the lungs 2 times daily Rinse mouth after its use. Dispense: 2 each; Refill: 11  - albuterol (PROVENTIL) (2.5 MG/3ML) 0.083% nebulizer solution; Take 3 mLs by nebulization every 6 hours as needed for Wheezing or Shortness of Breath  Dispense: 120 mL; Refill: 11  - tiotropium (SPIRIVA HANDIHALER) 18 MCG inhalation capsule; Inhale 1 capsule into the lungs daily 1 capsule via inhalation daily. Dispense: 30 capsule; Refill: 11    3. Chronic respiratory failure with hypoxia (HCC)  - 6 Minute Walk Test - new order for 1.5 lpm at rest and while asleep and 3 lpm with activity    4. Restrictive lung disease  - Likely due to her obesity and CHF, advised weight loss and CHF optimization   - Will check bloodwork to r/o connective tissue disease   - Plan to order HRCT at next appointment to r/o interstitial lung disease   - POCT Nitric Oxide 14 today  - 6 Minute Walk Test - new order for 1.5 lpm at rest and while asleep and 3 lpm with activity  - DME Order for Home Oxygen as OP  - PK; Future  - Angiotensin Converting Enzyme; Future  - Rheumatoid factor screen; Future  - Sedimentation Rate; Future  - CBC With Auto Differential; Future  - IgE; Future    5. Obesity (BMI 30-39.9)  - Potentially a cause of her restrictive lung disease, advised she work on weight loss    6. PETE on CPAP  - Continue to follow with the sleep clinic.  Continue PAP therapy with 1.5 lpm bleed into PAP. Advised patient to call office with any changes, questions, or concerns regarding respiratory status or issues with prescribed medications    Return in about 3 months (around 12/15/2021) for Emphysema med check with labs.        Electronically signed by CASSANDRA Beach CNP on 9/15/2021 at 12:23 PM

## 2021-09-15 ENCOUNTER — OFFICE VISIT (OUTPATIENT)
Dept: PULMONOLOGY | Age: 71
End: 2021-09-15
Payer: MEDICARE

## 2021-09-15 VITALS
TEMPERATURE: 96.4 F | SYSTOLIC BLOOD PRESSURE: 132 MMHG | OXYGEN SATURATION: 96 % | BODY MASS INDEX: 30.58 KG/M2 | HEIGHT: 61 IN | DIASTOLIC BLOOD PRESSURE: 62 MMHG | HEART RATE: 60 BPM | WEIGHT: 162 LBS

## 2021-09-15 DIAGNOSIS — J43.9 PULMONARY EMPHYSEMA, UNSPECIFIED EMPHYSEMA TYPE (HCC): Primary | ICD-10-CM

## 2021-09-15 DIAGNOSIS — J41.1 MUCOPURULENT CHRONIC BRONCHITIS (HCC): ICD-10-CM

## 2021-09-15 DIAGNOSIS — G47.33 OSA ON CPAP: ICD-10-CM

## 2021-09-15 DIAGNOSIS — J98.4 RESTRICTIVE LUNG DISEASE: ICD-10-CM

## 2021-09-15 DIAGNOSIS — E66.9 OBESITY (BMI 30-39.9): ICD-10-CM

## 2021-09-15 DIAGNOSIS — J96.11 CHRONIC RESPIRATORY FAILURE WITH HYPOXIA (HCC): ICD-10-CM

## 2021-09-15 DIAGNOSIS — Z99.89 OSA ON CPAP: ICD-10-CM

## 2021-09-15 PROCEDURE — 95012 NITRIC OXIDE EXP GAS DETER: CPT

## 2021-09-15 PROCEDURE — 99214 OFFICE O/P EST MOD 30 MIN: CPT

## 2021-09-15 PROCEDURE — 94010 BREATHING CAPACITY TEST: CPT

## 2021-09-15 PROCEDURE — 94618 PULMONARY STRESS TESTING: CPT

## 2021-09-15 RX ORDER — ALBUTEROL SULFATE 2.5 MG/3ML
2.5 SOLUTION RESPIRATORY (INHALATION) EVERY 6 HOURS PRN
Qty: 120 ML | Refills: 11 | Status: SHIPPED | OUTPATIENT
Start: 2021-09-15

## 2021-09-15 RX ORDER — BUDESONIDE AND FORMOTEROL FUMARATE DIHYDRATE 160; 4.5 UG/1; UG/1
2 AEROSOL RESPIRATORY (INHALATION) 2 TIMES DAILY
Qty: 2 EACH | Refills: 11 | Status: SHIPPED | OUTPATIENT
Start: 2021-09-15 | End: 2021-12-15 | Stop reason: SINTOL

## 2021-09-15 RX ORDER — TIOTROPIUM BROMIDE 18 UG/1
18 CAPSULE ORAL; RESPIRATORY (INHALATION) DAILY
Qty: 30 CAPSULE | Refills: 11 | Status: SHIPPED | OUTPATIENT
Start: 2021-09-15 | End: 2022-04-25

## 2021-09-15 ASSESSMENT — ENCOUNTER SYMPTOMS
CHEST TIGHTNESS: 1
CONSTIPATION: 1
CHEST TIGHTNESS: 0
SINUS PAIN: 0
WHEEZING: 0
SHORTNESS OF BREATH: 1
RHINORRHEA: 1
SORE THROAT: 0
COUGH: 1
ABDOMINAL PAIN: 0
EYE ITCHING: 1
DIARRHEA: 0
BACK PAIN: 1
SINUS PRESSURE: 0
SPUTUM PRODUCTION: 1
HEMOPTYSIS: 0

## 2021-09-15 ASSESSMENT — COPD QUESTIONNAIRES: COPD: 1

## 2021-09-15 NOTE — PATIENT INSTRUCTIONS
Check with Dr. Padmini Ruiz to ensure that you are up to date on your pneumonia vaccine. Based on your oxygen test today, you will need to wear 1.5 lpm at rest and while asleep and 3 lpm with exertion. I want you to continue your spiriva. I am adding Symbicort which will be 2 puffs twice daily. You will need to rinse and spit after using the Symbicort. I also ordered some bloodwork for you to complete before your next appointment.

## 2021-09-27 DIAGNOSIS — G89.4 CHRONIC PAIN SYNDROME: ICD-10-CM

## 2021-09-27 RX ORDER — HYDROCODONE BITARTRATE AND ACETAMINOPHEN 5; 325 MG/1; MG/1
1 TABLET ORAL EVERY 8 HOURS PRN
Qty: 90 TABLET | Refills: 0 | Status: SHIPPED | OUTPATIENT
Start: 2021-09-28 | End: 2021-10-26 | Stop reason: SDUPTHER

## 2021-09-27 NOTE — TELEPHONE ENCOUNTER
OARRS reviewed. UDS: + for Dihydrocodeine, Hydrocodone, Norhydrocodone  Last seen: 7/1/2021.  Follow-up: 10/4/2021

## 2021-10-04 ENCOUNTER — OFFICE VISIT (OUTPATIENT)
Dept: PHYSICAL MEDICINE AND REHAB | Age: 71
End: 2021-10-04
Payer: MEDICARE

## 2021-10-04 VITALS — WEIGHT: 162 LBS | BODY MASS INDEX: 30.58 KG/M2 | HEIGHT: 61 IN

## 2021-10-04 DIAGNOSIS — G89.4 CHRONIC PAIN SYNDROME: ICD-10-CM

## 2021-10-04 DIAGNOSIS — M46.1 SI (SACROILIAC) JOINT INFLAMMATION (HCC): ICD-10-CM

## 2021-10-04 DIAGNOSIS — M47.816 SPONDYLOSIS OF LUMBAR REGION WITHOUT MYELOPATHY OR RADICULOPATHY: Primary | ICD-10-CM

## 2021-10-04 DIAGNOSIS — M48.061 SPINAL STENOSIS OF LUMBAR REGION WITHOUT NEUROGENIC CLAUDICATION: ICD-10-CM

## 2021-10-04 DIAGNOSIS — G89.29 CHRONIC BILATERAL LOW BACK PAIN WITHOUT SCIATICA: ICD-10-CM

## 2021-10-04 DIAGNOSIS — M54.50 CHRONIC BILATERAL LOW BACK PAIN WITHOUT SCIATICA: ICD-10-CM

## 2021-10-04 PROCEDURE — 99214 OFFICE O/P EST MOD 30 MIN: CPT | Performed by: NURSE PRACTITIONER

## 2021-10-04 ASSESSMENT — ENCOUNTER SYMPTOMS
COUGH: 1
GASTROINTESTINAL NEGATIVE: 1
BACK PAIN: 1
CHEST TIGHTNESS: 1
SHORTNESS OF BREATH: 1

## 2021-10-04 NOTE — PROGRESS NOTES
[facility]  [address]  Dept: [phone]  Dept Fax: [phone]: [phone]    Visit Date: 10/4/2021    Functionality Assessment/Goals Worksheet     On a scale of 0 (Does not Interfere) to 10 (Completely Interferes)     1. Which number describes how during the past week pain has interfered with       the following:  A. General Activity:  9  B. Mood: 9  C. Walking Ability:  9  D. Normal Work (Includes both work outside the home and housework):  9  E. Relations with Other People:   8  F. Sleep:   8  G. Enjoyment of Life:   9    2. Patient Prefers to Take their Pain Medications:     [x]  On a regular basis   []  Only when necessary    []  Does not take pain medications    3. What are the Patient's Goals/Expectations for Visiting Pain Management? []  Learn about my pain    [x]  Receive Medication   []  Physical Therapy     []  Treat Depression   [x]  Receive Injections    []  Treat Sleep   []  Deal with Anxiety and Stress   []  Treat Opoid Dependence/Addiction   []  Other:      HPI:   Demetra Ruiz is a 70 y.o. female is here today for    Chief Complaint: Low back pain     HPI   3 month FU. Patient here with complaints of pain across low back which is increasing as effects from bilateral L-facet RFA is waning. Constant sharp pain ggetting harder to walk across the room. Pain increases with bending, lifting, twisting , turning torso, walking, standing and getting up and down. \"Only thing that is really helping at this time make pain more tolerable is prn Norco\"    Having increased shortness of breath. Remains on oxygen currently on 1.5 Liters when not doing any activity and 3 liters with activity. Continues to follow with pulmonology    Medications reviewed. Patient denies side effects with medications. Patient states she is taking medications as prescribed.  Saqib receiving pain medications from other sources. She denies any ER visits since last visit. Pain scale with out pain medications or at its worst is 8/10. Pain scale with pain medications or at its best is 3/10. Last dose of [place] was today   Drug screen reviewed from 7/1/2021 and was appropriate  Pill count was completed today and was appropriate   Patient does have naloxone available at home. Patient has not required use of naloxone at home since last office visit. The patientis allergic to codeine, doxycycline, lipitor, simvastatin, and chantix [varenicline tartrate]. Subjective:      Review of Systems   Constitutional: Negative. HENT: Negative. Respiratory: Positive for cough, chest tightness and shortness of breath. On 1.5 liters of oxygen per nasal cannula at this time   Cardiovascular: Negative. Gastrointestinal: Negative. Genitourinary: Negative. Musculoskeletal: Positive for arthralgias, back pain and myalgias. Negative for gait problem and joint swelling. Not using any assist devices   Skin: Negative. Neurological: Negative for weakness and numbness. Psychiatric/Behavioral: Negative for sleep disturbance. Objective:     Vitals:    10/04/21 1213   Weight: 162 lb (73.5 kg)   Height: 5' 1\" (1.549 m)       Physical Exam  Vitals and nursing note reviewed. Constitutional:       General: She is not in acute distress. Appearance: Normal appearance. She is well-developed. She is not diaphoretic. HENT:      Head: Normocephalic and atraumatic. Right Ear: External ear normal.      Left Ear: External ear normal.      Nose: Nose normal.      Mouth/Throat:      Mouth: Mucous membranes are dry. Pharynx: No oropharyngeal exudate. Eyes:      General: No scleral icterus. Right eye: No discharge. Left eye: No discharge. Conjunctiva/sclera: Conjunctivae normal.      Pupils: Pupils are equal, round, and reactive to light.    Neck: Tenderness present. Right knee: Normal.      Left knee: Normal.      Right lower leg: No edema. Left lower leg: No edema. Skin:     General: Skin is warm. Coloration: Skin is not pale. Findings: No erythema or rash. Neurological:      General: No focal deficit present. Mental Status: She is alert and oriented to person, place, and time. She is not disoriented. Cranial Nerves: No cranial nerve deficit. Sensory: No sensory deficit. Motor: Weakness present. No atrophy or abnormal muscle tone. Coordination: Coordination normal.      Gait: Gait normal.      Deep Tendon Reflexes: Reflexes are normal and symmetric. Comments: SLR-  Motor 5/5 BUE BLE   Psychiatric:         Attention and Perception: She is attentive. Mood and Affect: Mood normal. Mood is not anxious or depressed. Affect is not labile, blunt, angry or inappropriate. Speech: Speech normal.         Behavior: Behavior normal. Behavior is not agitated, slowed, aggressive, withdrawn, hyperactive or combative. Thought Content: Thought content normal. Thought content is not paranoid or delusional. Thought content does not include homicidal or suicidal ideation. Thought content does not include homicidal or suicidal plan. Cognition and Memory: Memory is not impaired. She does not exhibit impaired recent memory or impaired remote memory. Judgment: Judgment normal. Judgment is not impulsive or inappropriate. ANASTACIA test: + bilaterally   Yeomans test: + bilaterally   Gaenslen test: + bilaterally     Assessment:     1. Spondylosis of lumbar region without myelopathy or radiculopathy    2. Chronic bilateral low back pain without sciatica    3. Spinal stenosis of lumbar region without neurogenic claudication    4. SI (sacroiliac) joint inflammation (HCC)    5. Chronic pain syndrome            Plan:      · OARRS reviewed.  Current MED: 15.00  · Patient was offered naloxone for home.   · Discussed long term side effects of medications, tolerance, dependency and addiction. · Previous UDS reviewed  · UDS preformed today for compliance. · Patient told can not receive any pain medications from any other source. · No evidence of abuse, diversion or aberrant behavior.  Medications and/or procedures to improve function and quality of life- patient understanding with this and that may not be pain free   Discussed with patient about safe storage of medications at home   Discussed possible weaning of medication dosing dependent on treatment/procedure results.  Discussed with patient about risks with procedure including infection, reaction to medication, increased pain, or bleeding. · Procedure notes reviewed in detail   · Received over 80% relief of low back pain from previous L-facet RFA for 6-8 months. Left side noticing pain more as RFA is wearing off. · Plan Bilateral L-facet RFA @ L4-5 and L5-S1 for longer term pain relief. Procedure and risks discussed in detail with patient. · Needs to be cleared by Cardiology prior to procedure if not in the past 3 months. On baby aspirin  · Continue Norco 5/325 1 tab every 8 hours as needed for breakthrough pain. Filled 9/30/2021  · Patient is compliant. Updated UDS ordered today          Meds. Prescribed:   No orders of the defined types were placed in this encounter. Return for Bilateral L-facet RFA @ L4-5 and L5-S1. FU after procedure or in 3 months .                Electronically signed by CASSANDRA Lewis CNP on10/4/2021 at 12:27 PM

## 2021-10-11 DIAGNOSIS — J98.4 RESTRICTIVE LUNG DISEASE: ICD-10-CM

## 2021-10-26 DIAGNOSIS — G89.4 CHRONIC PAIN SYNDROME: ICD-10-CM

## 2021-10-26 RX ORDER — HYDROCODONE BITARTRATE AND ACETAMINOPHEN 5; 325 MG/1; MG/1
1 TABLET ORAL EVERY 8 HOURS PRN
Qty: 90 TABLET | Refills: 0 | Status: SHIPPED | OUTPATIENT
Start: 2021-10-30 | End: 2021-11-23 | Stop reason: SDUPTHER

## 2021-11-16 NOTE — PROGRESS NOTES
[facility]    Patient: Ginny Frederick, 70 y.o.   : 1950  12/15/2021    Patient of CASSANDRA Cevallos CNP     Subjective     Chief Complaint   Patient presents with    Follow-up     3 month follow up, [employer] is here for follow up for restrictive lung disease and emphysema. At patient's last appointment, she had a 6 MWT and qualified for 1.5 lpm at rest and during sleep and 3 lpm on exertion. Patient reports that he is wearing compliantly. The patient does not qualify for [place] valve. Overall patient reports respiratory symptoms have been stable since last appointment. Patient reports good compliance with inhaled medications (Spiriva). Patient stopped Symbicort as she felt that it makes her COPD symptoms worsen. Patient using albuterol 1 times per day on average. She likes to use albuterol in the morning. Patient reports some physical limitation due to respiratory symptoms. Her past medical history is significant for restrictive lung disease, emphysema, PETE (follows with Roxanne Fernandes PA-C), hypertension, CHF (follows with Dr. Pedro Vázquez), CAD, DDD, and cerebral infarction. Patient reports that she has a \"head cold\" that started two weeks ago. She reports that her son was COVID-19 (+) about 2 weeks ago and she was exposed to him. She has had chills, fatigue, postnasal drip, rhinorrhea, sinus pain and pressure, sneezing, and a sore throat. She denies a fever. COPD  She complains of cough, shortness of breath, sputum production and wheezing (\"some\"). There is no chest tightness or hemoptysis. This is a chronic problem. The current episode started more than 1 year ago. The problem occurs daily. The problem has been unchanged. The cough is productive of sputum (clear). Associated symptoms include dyspnea on exertion, nasal congestion, postnasal drip, rhinorrhea, sneezing, a sore throat and weight loss (5 lb weight loss).  Pertinent negatives include no appetite change, chest pain or fever. Her symptoms are aggravated by strenuous activity, lying down and exercise (running the sweeper). Her symptoms are alleviated by beta-agonist. She reports significant improvement on treatment. Risk factors for lung disease include animal exposure and smoking/tobacco exposure. Her past medical history is significant for emphysema. MMRC Dyspnea Scale:   0: Dyspneic on strenuous exercise  1: Dyspneic on walking a slight hill  2: Dyspneic on walking level ground; must stop occasionally due to breathlessness  3: Must stop for breathlessness after walking 100 yards or after a few minutes  4: Cannot leave house; breathless on dressing/undressing    MMRC dyspnea score: 3    Progress History:   Since last visit any new medical issues? No  New ER or hospital visits? No  Any new or changes in medicines? No  Using inhalers? Yes Spiriva and as needed albuterol inhaler and neb  Are they helpful? Yes   Previous inhalers? Niteshaldkatt Shermaneller - stopped due to cost, Stiolto (patient stopped as she believed this caused her lungs to worsen), Symbicort (believes caused her symptoms to worsen), Advair (uncertain why it was stopped)  Any recent exacerbations? Yes 7/6/2021  Last PFT: 8/31/2021 - moderate restriction and severely decreased DLCO  Last 6 MWT: 9/15/2021 - 1.5 lpm with rest and sleep and 3 lpm on exertion      Smoking History:  Smoked 2 PPD for 40 years (80 pack years), quit in 2010  Admits to passive tobacco exposure from parents or work environment. She lives with her daughter who smokes in the house.     Social History:  Patient job history: Retired, previously worked at [employer]   She has not had exposure to aerosolized particles or hazardous fumes. (Coal, dust, asbestos, molds ie Hay)  Denies living on a farm that primarily raised crops, livestock or combination  Admits to exposure to pets/animals at home. Cats at home  Denies exposure to tuberculosis.   Denies family history of ALS, GBS, myasthenia gravis, myotonic dystrophy, MS, or spinal muscular atrophy.   Denies family history of autoimmune conditions, such as RA, SLE, etc.     Flu vaccine: Patient has not received yet this year   Pneumonia vaccine: Soniya Olson 4/2/2018 & 10/24/2012  COVID-19 vaccine: Vaccinated  Past Medical hx   PMH:  Past Medical History:   Diagnosis Date    Bladder dysfunction     CAD (coronary artery disease)     Cerebral artery occlusion with cerebral infarction ([address]     TIA    Cerebrovascular disease     CHF (congestive heart failure) ([address]     COPD (chronic obstructive pulmonary disease) ([address]     uses oxygen 24 hours per day    DDD (degenerative disc disease)     Depression     GERD (gastroesophageal reflux disease)     Hyperlipidemia     Hypertension     Obesity     Other disorders of kidney and ureter in diseases classified elsewhere     Other screening mammogram 2011    Pap smear, as part of routine gynecological examination 2007    Pap smear, as part of routine gynecological examination 2007    Peripheral edema     with ascites    Pneumonia     PONV (postoperative nausea and vomiting)     with [facility]    Unspecified sleep apnea     c-pap at 10 cm wtr     SURGICAL HISTORY:  Past Surgical History:   Procedure Laterality Date    COLONOSCOPY  7-2009    DILATION AND CURETTAGE OF UTERUS  1970's    ENDOSCOPY, COLON, DIAGNOSTIC      EYE SURGERY  2013    Laser surgery     LUMBAR SPINE SURGERY Left 12/12/2017    LUMBAR RFA L2-3, 3-4, 4-5, L5-S1 LEFT SIDE performed by Lindsay Mccall MD at [address] [facility]"" Right 10/7/2019    L-facet RFA @ L2-3, L3-4, L4-5, and L5-S1 RIGHT SIDE FIRST performed by Lindsay Mccall MD at [place] [address]  2016    burning nerves in back - lumbar @ [facility]    OTHER SURGICAL HISTORY Left 12/12/2017    Lumbar RFA L2-3, L3-4, L4-5, L5-S1    PAIN MANAGEMENT PROCEDURE Left 2020    Left L-facet RFA @ L2-3, L3-4, L4-5, and L5-S1 performed by Carlton Villatoro MD at [place] [address] Right 2020    Right L-facet RFA @ L2-3, L3-4, L4-5, and L5-S1 performed by Carlton Villatoro MD at [place] [address] Left 2020    LEFT L-facet RFA @ L2-3, L3-4, L4-5, and L5-S1 performed by Carlton Villatoro MD at [place] [address] Right 2021    Right L-facet RFA @ L2-3, L3-4, L4-5, and L5-S1 performed by Carlton Villatoro MD at [place] [address] Left 2021    Left L-facet RFA @ L2-3, L3-4, L4-5, and L5-S1 performed by Carlton Villatoro MD at [facility]:  Social History     Tobacco Use    Smoking status: Former Smoker     Packs/day: 2.00     Years: 40.00     Pack years: 80.00     Types: Cigarettes     Quit date: 2010     Years since quittin.0    Smokeless tobacco: Never Used   Vaping Use    Vaping Use: Never used   Substance Use Topics    Alcohol use: No     Comment: pt hasn't drank since     Drug use: No     ALLERGIES:  Allergies   Allergen Reactions    Codeine Itching     Pt cant remember    Doxycycline      Cant breath     Lipitor Itching    Simvastatin Itching     Stomach pain    Chantix [Varenicline Tartrate] Itching     FAMILY HISTORY:  Family History   Problem Relation Age of Onset    Heart Failure Mother     Heart Disease Mother     High Blood Pressure Mother     Heart Failure Father     Heart Disease Father     Alzheimer's Disease Father     Parkinsonism Father     High Blood Pressure Father     COPD Sister     Cancer Brother         skin    Stroke Brother     Cancer Daughter         colon    Diabetes Daughter      CURRENT MEDICATIONS:  Current Outpatient Medications   Medication Sig Dispense Refill    fluticasone-salmeterol (ADVAIR DISKUS) 250-50 MCG/DOSE AEPB Inhale 1 puff into the lungs every 12 hours Rinse mouth after use. 1 each 11    HYDROcodone-acetaminophen (NORCO) 5-325 MG per tablet Take 1 tablet by mouth every 8 hours as needed for Pain for up to 30 days. 90 tablet 0    albuterol (PROVENTIL) (2.5 MG/3ML) 0.083% nebulizer solution Take 3 mLs by nebulization every 6 hours as needed for Wheezing or Shortness of Breath 120 mL 11    tiotropium (SPIRIVA HANDIHALER) 18 MCG inhalation capsule Inhale 1 capsule into the lungs daily 1 capsule via inhalation daily. 30 capsule 11    VENTOLIN  (90 Base) MCG/ACT inhaler Inhale 2 puffs into the lungs every 6 hours as needed for Wheezing or Shortness of Breath 1 Inhaler 11    furosemide (LASIX) 20 MG tablet Take 1 tablet by mouth daily as needed (swelling) 60 tablet 0    CPAP Machine MISC by Does not apply route Please check patient's CPAP machine for proper functioning by [employer]. Turn off ramp 1 each 0    lidocaine (LMX) 4 % cream Apply topically as needed every 6 hours to left hand for pain.  1 Tube 2    fluticasone (FLONASE) 50 MCG/ACT nasal spray 1 spray by Each Nostril route daily      omeprazole (PRILOSEC) 40 MG delayed release capsule Take 40 mg by mouth daily      polyethylene glycol (GLYCOLAX) 17 g packet Take 17 g by mouth daily as needed for Constipation      montelukast (SINGULAIR) 10 MG tablet Take 1 tablet by mouth nightly 30 tablet 3    metFORMIN (GLUCOPHAGE) 500 MG tablet Take 500 mg by mouth daily (with breakfast)      triamterene-hydrochlorothiazide (MAXZIDE-25) 37.5-25 MG per tablet take 1 tablet by mouth every morning  0    RA COL-RITE 100 MG capsule daily   0    atorvastatin (LIPITOR) 40 MG tablet Take 1 tablet by mouth daily 30 tablet 3    pseudoephedrine (SUDAFED 12 HR) 120 MG extended release tablet Take 120 mg by mouth every 12 hours as needed for Congestion      amLODIPine (NORVASC) 5 MG tablet Take 5 mg by mouth daily      losartan (COZAAR) 50 MG tablet Take 50 mg by mouth daily   0    OXYGEN   Inhale into the lungs 3 lpm, per patient at home and when walking      aspirin 81 MG EC tablet Take 81 mg by mouth daily. No current facility-administered medications for this visit. Sayra ALEXANDER   Review of Systems   Constitutional: Positive for chills, fatigue and weight loss (5 lb weight loss). Negative for appetite change and fever. Patient had exposure to COVID-19 2 weeks ago   HENT: Positive for postnasal drip, rhinorrhea, sinus pressure, sinus pain, sneezing and sore throat. Negative for congestion. Denies loss of taste or smell   Eyes: Negative for itching. Respiratory: Positive for cough, sputum production, shortness of breath and wheezing (\"some\"). Negative for hemoptysis and chest tightness. Cardiovascular: Positive for dyspnea on exertion. Negative for chest pain, palpitations and leg swelling. Gastrointestinal: Positive for constipation (reports is improving). Negative for diarrhea. Genitourinary: Negative for difficulty urinating. Allergic/Immunologic: Positive for environmental allergies. Physical exam   /62   Pulse 68   Temp 97.8 °F (36.6 °C)   Ht 5' 1\" (1.549 m)   Wt 155 lb (70.3 kg)   SpO2 98% Comment: 2 liters continuous  BMI 29.29 kg/m²    Wt Readings from Last 3 Encounters:   12/15/21 155 lb (70.3 kg)   12/06/21 162 lb (73.5 kg)   10/04/21 162 lb (73.5 kg)       Physical Exam  Constitutional:       Appearance: She is well-developed. Comments: BMI 29   HENT:      Right Ear: External ear normal.      Left Ear: External ear normal.      Mouth/Throat:      Mouth: Mucous membranes are moist.      Pharynx: Oropharynx is clear. No oropharyngeal exudate. Eyes:      General:         Right eye: No discharge. Left eye: No discharge. Cardiovascular:      Rate and Rhythm: Normal rate and regular rhythm. Pulmonary:      Effort: Pulmonary effort is normal.      Breath sounds:  No wheezing, rhonchi or rales. Comments: Diminished breath sounds  Chest:      Chest wall: No tenderness. Abdominal:      General: There is no distension. Tenderness: There is no abdominal tenderness. Musculoskeletal:      Cervical back: Neck supple. Right lower leg: No edema. Left lower leg: No edema. Skin:     General: Skin is warm and dry. Neurological:      General: No focal deficit present. Mental Status: She is alert. Psychiatric:         Mood and Affect: Mood normal.         Behavior: Behavior normal.         Thought Content: Thought content normal.         Judgment: Judgment normal.          Results   Lung Nodule Screening     [x] Qualifies    [] Does not qualify   [] Declined    [] Completed  80 PY history, quit in 2010   The USPSTF recommends annual screening for lung cancer with low-dose computed tomography (LDCT) in adults aged 48 to [de-identified] years who have a 20 pack-year smoking history and currently smoke or have quit within the past 15 years. Screening should be discontinued once a person has not smoked for 15 years or develops a health problem that substantially limits life expectancy or the ability or willingness to have curative lung surgery. Assessment      Diagnosis Orders   1. Restrictive lung disease  fluticasone-salmeterol (ADVAIR DISKUS) 250-50 MCG/DOSE AEPB    CT CHEST HIGH RESOLUTION    ECHO Complete 2D W Doppler W Color   2. Pulmonary emphysema, unspecified emphysema type (HCC)  fluticasone-salmeterol (ADVAIR DISKUS) 250-50 MCG/DOSE AEPB    CT CHEST HIGH RESOLUTION    ECHO Complete 2D W Doppler W Color   3. Mucopurulent chronic bronchitis (HCC)  fluticasone-salmeterol (ADVAIR DISKUS) 250-50 MCG/DOSE AEPB    CT CHEST HIGH RESOLUTION    ECHO Complete 2D W Doppler W Color   4. Decreased diffusion capacity of lung  CT CHEST HIGH RESOLUTION   5. Chronic respiratory failure with hypoxia (HCC)  ECHO Complete 2D W Doppler W Color   6.  Shortness of breath  ECHO Complete 2D W Doppler W Color    COVID-19   7. Orthopnea  ECHO Complete 2D W Doppler W Color   8. Rhinorrhea  COVID-19   9. Chills  COVID-19   10. Congestion of nasal sinus  COVID-19   11. Fatigue, unspecified type  COVID-19         Plan   - Patient failed Symbicort and Stiolto. Wilver Richmond was too expensive  - Start Advair. Instructed patient on use and to rinse and spit after use  - Discussed albuterol inhaler and nebulizer use with the patient. Reviewed signs and symptoms indicating need for use including shortness of breath and wheezing. Discussed with the patient the importance of using the inhaler or nebulizer within the prescribed time frames. Patient verbalized understanding to use one or the other not both within prescribed time frame. Patient also verbalized understanding that if there is no relief of symptoms after using albuterol and resting for 15 minutes they need to go to nearest ER or call 911.  - Continue 1.5 lpm at rest and while asleep and 3 lpm with exertion  - COVID-19 testing due to recent exposure and currently symptomatic   - High resolution CT to r/o interstitial lung disease and pulmonary fibrosis  - Echocardiogram to evaluate right ventricle  - Advised patient to follow her cardiologist and call with weight gain of 3 lbs in 1 day or 5 lbs in 1 week  - Advised to maintain pneumonia vaccine with PCP and to take flu vaccine this coming season. - Patient reports that she plans to get the COVID-19 booster soon    Advised patient to call office with any changes, questions, or concerns regarding respiratory status or issues with prescribed medications    Return in about 3 months (around 3/15/2022) for COPD with Echo and HRCT prior.        Electronically signed by CASSANDRA Willson CNP on 12/15/2021 at 11:37 AM

## 2021-11-23 DIAGNOSIS — G89.4 CHRONIC PAIN SYNDROME: ICD-10-CM

## 2021-11-23 RX ORDER — HYDROCODONE BITARTRATE AND ACETAMINOPHEN 5; 325 MG/1; MG/1
1 TABLET ORAL EVERY 8 HOURS PRN
Qty: 90 TABLET | Refills: 0 | Status: SHIPPED | OUTPATIENT
Start: 2021-11-29 | End: 2021-12-27 | Stop reason: SDUPTHER

## 2021-12-06 ENCOUNTER — OFFICE VISIT (OUTPATIENT)
Dept: PHYSICAL MEDICINE AND REHAB | Age: 71
End: 2021-12-06
Payer: MEDICARE

## 2021-12-06 VITALS
HEIGHT: 61 IN | SYSTOLIC BLOOD PRESSURE: 128 MMHG | DIASTOLIC BLOOD PRESSURE: 64 MMHG | WEIGHT: 162 LBS | BODY MASS INDEX: 30.58 KG/M2

## 2021-12-06 DIAGNOSIS — G89.29 CHRONIC BILATERAL LOW BACK PAIN WITHOUT SCIATICA: ICD-10-CM

## 2021-12-06 DIAGNOSIS — M48.061 SPINAL STENOSIS OF LUMBAR REGION WITHOUT NEUROGENIC CLAUDICATION: ICD-10-CM

## 2021-12-06 DIAGNOSIS — G89.4 CHRONIC PAIN SYNDROME: ICD-10-CM

## 2021-12-06 DIAGNOSIS — M54.50 CHRONIC BILATERAL LOW BACK PAIN WITHOUT SCIATICA: ICD-10-CM

## 2021-12-06 DIAGNOSIS — M46.1 SI (SACROILIAC) JOINT INFLAMMATION (HCC): ICD-10-CM

## 2021-12-06 DIAGNOSIS — M47.816 SPONDYLOSIS OF LUMBAR REGION WITHOUT MYELOPATHY OR RADICULOPATHY: Primary | ICD-10-CM

## 2021-12-06 PROCEDURE — 99214 OFFICE O/P EST MOD 30 MIN: CPT | Performed by: NURSE PRACTITIONER

## 2021-12-06 ASSESSMENT — ENCOUNTER SYMPTOMS
COUGH: 1
SHORTNESS OF BREATH: 1
BACK PAIN: 1
GASTROINTESTINAL NEGATIVE: 1
CHEST TIGHTNESS: 1

## 2021-12-06 NOTE — PROGRESS NOTES
[address]  Dept: [phone]  Dept Fax: [phone]: [phone]    Visit Date: 12/6/2021    Functionality Assessment/Goals Worksheet     On a scale of 0 (Does not Interfere) to 10 (Completely Interferes)     1. Which number describes how during the past week pain has interfered with       the following:  A. General Activity:  7  B. Mood: 6  C. Walking Ability:  8  D. Normal Work (Includes both work outside the home and housework):  8  E. Relations with Other People:   6  F. Sleep:   6  G. Enjoyment of Life:   8    2. Patient Prefers to Take their Pain Medications:     [x]  On a regular basis   []  Only when necessary    []  Does not take pain medications    3. What are the Patient's Goals/Expectations for Visiting Pain Management? []  Learn about my pain    [x]  Receive Medication   []  Physical Therapy     []  Treat Depression   [x]  Receive Injections    []  Treat Sleep   []  Deal with Anxiety and Stress   []  Treat Opoid Dependence/Addiction   []  Other:      HPI:   Angeli Terrazas is a 70 y.o. female is here today for    Chief Complaint: Low back pain     HPI   2 month FU. Main complaint remains pain in low back across- aching pain with periods of sharp and burning pain. Did not get L-facet RFA completed that was ordered last visit because of provider being off but now wants to wait till after the new year d/t a lot of things going on in the family and multiple family members having [place]. Pain has been up and down. Norco prn remains effective in decreasing pain to a tolerable level. Pain increases with bending, lifting, walking, standing and getting up and down. On 2 liters of oxygen today     Medications reviewed. Patient denies side effects with medications. Patient states she is taking medications as prescribed.  Shedenies receiving pain medications from other sources. She denies any ER visits since last visit. Pain scale with out pain medications or at its worst is 7-8/10. Pain scale with pain medications or at its best is 3/10. Last dose of [place] was today   Drug screen reviewed from 10/4/2021 and was appropriate  Pill count completed  today and WNL yes         The patientis allergic to codeine, doxycycline, lipitor, simvastatin, and chantix [varenicline tartrate]. Subjective:      Review of Systems   Constitutional: Negative. HENT: Negative. Respiratory: Positive for cough, chest tightness and shortness of breath. On 2 liters of oxygen per nasal cannula at this time   Cardiovascular: Negative. Gastrointestinal: Negative. Genitourinary: Negative. Musculoskeletal: Positive for arthralgias, back pain and myalgias. Negative for gait problem and joint swelling. Not using any assist devices   Skin: Negative. Neurological: Negative for weakness and numbness. Psychiatric/Behavioral: Negative for sleep disturbance. Objective:     Vitals:    12/06/21 1207   BP: 128/64   Weight: 162 lb (73.5 kg)   Height: 5' 1\" (1.549 m)       Physical Exam  Vitals and nursing note reviewed. Constitutional:       General: She is not in acute distress. Appearance: Normal appearance. She is well-developed. She is not diaphoretic. HENT:      Head: Normocephalic and atraumatic. Right Ear: External ear normal.      Left Ear: External ear normal.      Nose: Nose normal.      Mouth/Throat:      Mouth: Mucous membranes are dry. Pharynx: No oropharyngeal exudate. Eyes:      General: No scleral icterus. Right eye: No discharge. Left eye: No discharge. Conjunctiva/sclera: Conjunctivae normal.      Pupils: Pupils are equal, round, and reactive to light. Neck:      Thyroid: No thyromegaly. Cardiovascular:      Rate and Rhythm: Normal rate and regular rhythm.       Heart sounds: Normal heart sounds. No murmur heard. No friction rub. No gallop. Pulmonary:      Effort: Pulmonary effort is normal. No respiratory distress. Breath sounds: Examination of the right-upper field reveals decreased breath sounds and wheezing. Examination of the left-upper field reveals decreased breath sounds and wheezing. Examination of the right-middle field reveals decreased breath sounds. Examination of the left-middle field reveals decreased breath sounds. Examination of the right-lower field reveals decreased breath sounds. Examination of the left-lower field reveals decreased breath sounds. Decreased breath sounds and wheezing present. No rales. Comments: On 2 liters of oxygen   Chest:      Chest wall: No tenderness. Abdominal:      General: Bowel sounds are normal. There is no distension. Palpations: Abdomen is soft. Tenderness: There is no abdominal tenderness. There is no guarding or rebound. Musculoskeletal:         General: Swelling and tenderness present. Right shoulder: Tenderness present. Decreased range of motion. Left shoulder: Tenderness present. Decreased range of motion. Right elbow: Normal.      Left elbow: Normal.      Right wrist: No swelling, tenderness or bony tenderness. Left wrist: Tenderness and bony tenderness present. Decreased range of motion. Cervical back: Full passive range of motion without pain, normal range of motion and neck supple. Spasms and tenderness present. No edema, erythema or rigidity. No muscular tenderness. Normal range of motion. Thoracic back: Tenderness present. Lumbar back: Spasms and tenderness present. Back:       Right hip: Normal.      Left hip: Tenderness present. Decreased range of motion. Decreased strength. Right upper leg: Tenderness present. Left upper leg: Tenderness present. Right knee: Normal.      Left knee: Normal.      Right lower leg: No edema.       Left lower leg: No edema.   Skin:     General: Skin is warm. Coloration: Skin is not pale. Findings: No erythema or rash. Neurological:      General: No focal deficit present. Mental Status: She is alert and oriented to person, place, and time. She is not disoriented. Cranial Nerves: No cranial nerve deficit. Sensory: No sensory deficit. Motor: Weakness present. No atrophy or abnormal muscle tone. Coordination: Coordination normal.      Gait: Gait normal.      Deep Tendon Reflexes: Reflexes are normal and symmetric. Comments: SLR-  Motor 5/5 BUE BLE   Psychiatric:         Attention and Perception: She is attentive. Mood and Affect: Mood normal. Mood is not anxious or depressed. Affect is not labile, blunt, angry or inappropriate. Speech: Speech normal.         Behavior: Behavior normal. Behavior is not agitated, slowed, aggressive, withdrawn, hyperactive or combative. Thought Content: Thought content normal. Thought content is not paranoid or delusional. Thought content does not include homicidal or suicidal ideation. Thought content does not include homicidal or suicidal plan. Cognition and Memory: Memory is not impaired. She does not exhibit impaired recent memory or impaired remote memory. Judgment: Judgment normal. Judgment is not impulsive or inappropriate. ANASTACIA  Patricks test  negative or positive  Yeoman's  positive  Gaenslen's  positive       Assessment:     1. Spondylosis of lumbar region without myelopathy or radiculopathy    2. Chronic bilateral low back pain without sciatica    3. Spinal stenosis of lumbar region without neurogenic claudication    4. SI (sacroiliac) joint inflammation (HCC)    5. Chronic pain syndrome            Plan:      · OARRS reviewed. Current MED: 15.00  · Patient was offered naloxone for home. · Discussed long term side effects of medications, tolerance, dependency and addiction.   · Previous UDS reviewed  · UDS preformed today for compliance. · Patient told can not receive any pain medications from any other source. · No evidence of abuse, diversion or aberrant behavior.  Medications and/or procedures to improve function and quality of life- patient understanding with this and that may not be pain free   Discussed with patient about safe storage of medications at home   Discussed possible weaning of medication dosing dependent on treatment/procedure results.  Discussed with patient about risks with procedure including infection, reaction to medication, increased pain, or bleeding.  Procedure notes reviewed in detail    Received over 80% relief of low back pain from previous L-facet RFA for 6-8 months. Left side noticing pain more as RFA is wearing off.  Discussed repeating Bilateral L-facet RFA at L4-5 and L5-S1 for longer term pain relief. Patient wants to hold off at this timed/t a lot going on   [address], 1 tab every 8 hours as needed for breakthrough pain. Filled 11/29/2021   Patient is compliant. Updated UDS ordered today     Meds. Prescribed:   No orders of the defined types were placed in this encounter. Return in about 10 weeks (around 2/14/2022), or if symptoms worsen or fail to improve, for follow up  for medications.                Electronically signed by CASSANDRA Claros CNP on12/6/2021 at 12:27 PM

## 2021-12-15 ENCOUNTER — OFFICE VISIT (OUTPATIENT)
Dept: PULMONOLOGY | Age: 71
End: 2021-12-15
Payer: MEDICARE

## 2021-12-15 VITALS
SYSTOLIC BLOOD PRESSURE: 126 MMHG | TEMPERATURE: 97.8 F | OXYGEN SATURATION: 98 % | HEART RATE: 68 BPM | BODY MASS INDEX: 29.27 KG/M2 | DIASTOLIC BLOOD PRESSURE: 62 MMHG | HEIGHT: 61 IN | WEIGHT: 155 LBS

## 2021-12-15 DIAGNOSIS — J41.1 MUCOPURULENT CHRONIC BRONCHITIS (HCC): ICD-10-CM

## 2021-12-15 DIAGNOSIS — R53.83 FATIGUE, UNSPECIFIED TYPE: ICD-10-CM

## 2021-12-15 DIAGNOSIS — R06.02 SHORTNESS OF BREATH: ICD-10-CM

## 2021-12-15 DIAGNOSIS — J34.89 RHINORRHEA: ICD-10-CM

## 2021-12-15 DIAGNOSIS — R06.01 ORTHOPNEA: ICD-10-CM

## 2021-12-15 DIAGNOSIS — J98.4 RESTRICTIVE LUNG DISEASE: Primary | ICD-10-CM

## 2021-12-15 DIAGNOSIS — J96.11 CHRONIC RESPIRATORY FAILURE WITH HYPOXIA (HCC): ICD-10-CM

## 2021-12-15 DIAGNOSIS — R94.2 DECREASED DIFFUSION CAPACITY OF LUNG: ICD-10-CM

## 2021-12-15 DIAGNOSIS — J43.9 PULMONARY EMPHYSEMA, UNSPECIFIED EMPHYSEMA TYPE (HCC): ICD-10-CM

## 2021-12-15 DIAGNOSIS — R68.83 CHILLS: ICD-10-CM

## 2021-12-15 DIAGNOSIS — R09.81 CONGESTION OF NASAL SINUS: ICD-10-CM

## 2021-12-15 PROCEDURE — 99214 OFFICE O/P EST MOD 30 MIN: CPT

## 2021-12-15 ASSESSMENT — ENCOUNTER SYMPTOMS
SPUTUM PRODUCTION: 1
WHEEZING: 1
SORE THROAT: 1
EYE ITCHING: 0
COUGH: 1
DIARRHEA: 0
CONSTIPATION: 1
SINUS PAIN: 1
SINUS PRESSURE: 1
SHORTNESS OF BREATH: 1
CHEST TIGHTNESS: 0
HEMOPTYSIS: 0
RHINORRHEA: 1

## 2021-12-15 ASSESSMENT — COPD QUESTIONNAIRES: COPD: 1

## 2021-12-15 NOTE — PATIENT INSTRUCTIONS
Start Advair 1 puff twice daily. Make sure to rinse and spit after use. Continue your albuterol inhaler and nebulizer. You may use one or the other every 6 hours as needed for shortness of breath or wheezing. Do NOT use both at the same time as they continue the same medication. Continue to follow your cardiologist. Make sure to weigh yourself daily. If you gain 3 lbs in 1 day or 5 lbs in 1 week, call your cardiologist.    We will get an echocardiogram and a high resolution CT scan prior to your next appointment. Please have your COVID-19 test done. Continue 1.5 lpm nasal cannula and rest and while asleep and 3 lpm with exertion. I will see you back in about 3 months. Patient Education        Learning About Emphysema  What is emphysema? Emphysema is a long-term (chronic) lung disease. In emphysema, the tiny air sacs (alveoli) at the end of the airways in the lungs are damaged. When the air sacs are damaged or destroyed, the inner walls break down and the sacs become larger. These larger air sacs move less oxygen into the blood. This causes difficulty breathing or shortness of breath that gets worse over time. After air sacs are destroyed, they cannot be replaced. Emphysema is a type of COPD (chronic obstructive pulmonary disease). Emphysema is usually caused by smoking. But chemical fumes, dust, or air pollution also can cause it over time. People who get it in their 35s or 45s may have a disorder that runs in families, called alpha-1 antitrypsin deficiency. But this is rare. What can you expect when you have emphysema? Emphysema gets worse over time. You cannot undo the damage to your lungs. Over time, you may find that:  · You get short of breath even when you do things like get dressed or fix a meal.  · It is hard to eat or exercise. · You feel weaker and limit activity. But there are things you can do to prevent more damage and feel better. What are the symptoms?   The main symptoms of emphysema are:  · A cough that will not go away. · Mucus that comes up when you cough. · Shortness of breath that gets worse when you exercise. At times, your symptoms may suddenly flare up and get much worse. This is a called an exacerbation (say \"egg-ZASS-er-BAY-shun\"). When this happens, your usual symptoms quickly get worse and stay bad. This can be dangerous. You may have to go to the hospital.  How can you keep emphysema from getting worse? Don't smoke. That is the best way to keep emphysema from getting worse. If you already smoke, it is never too late to stop. If you need help quitting, talk to your doctor about stop-smoking programs and medicines. These can increase your chances of quitting for good. You can do other things to keep emphysema from getting worse:  · Avoid bad air. Air pollution, chemical fumes, and dust also can make emphysema worse. · Get a flu shot every year. A shot may keep the flu from turning into something more serious, like pneumonia. A flu shot also may lower your chances of having a flare-up. · Get a pneumococcal shot. A shot can prevent some of the serious complications of pneumonia. Ask your doctor how often you should get this shot. · Get a pertussis shot. A whooping cough (pertussis) shot may help protect you from getting whooping cough. How is emphysema treated? Emphysema is treated with medicines and oxygen. You also can take steps to stay healthy and keep your condition from getting worse. Medicines and oxygen therapy  · You may be taking medicines such as:  ? Bronchodilators. These help open your airways and make breathing easier. Bronchodilators are either short-acting (work for 4 to 9 hours) or long-acting (work for 12 to 24 hours). You inhale most bronchodilators, so they start to act quickly. Always carry your quick-relief inhaler with you in case you need it. ? Corticosteroids. These reduce airway inflammation.  They come in inhaled or pill form.  ? Antibiotics. These medicines are used when you have a bacterial lung infection. · Take your medicines exactly as prescribed. Call your doctor if you think you are having a problem with your medicine. · Oxygen therapy boosts the amount of oxygen in your blood and helps you breathe easier. Use the flow rate your doctor has recommended, and do not change it without talking to your doctor first.  Other care  · If your doctor recommends it, get more exercise. Walking is a good choice. Bit by bit, increase the amount you walk every day. Try for at least 30 minutes on most days of the week. · Learn breathing methods--such as breathing through pursed lips--to help you become less short of breath. · If your doctor has not set you up with a pulmonary rehabilitation program, ask your doctor if rehab is right for you. Rehab includes exercise programs, education about your disease and how to manage it, help with diet and other changes, and emotional support. · Eat regular, healthy meals. Use bronchodilators about 1 hour before you eat to make it easier to eat. Try eating smaller, frequent meals so your stomach is never too full. A full stomach can push on the muscle that helps you breathe (your diaphragm) and make it harder to breathe. Drink beverages at the end of the meal. Avoid foods that are hard to chew. Follow-up care is a key part of your treatment and safety. Be sure to make and go to all appointments, and call your doctor if you are having problems. It's also a good idea to know your test results and keep a list of the medicines you take. Where can you learn more? Go to [email or web address].[email or web address] org and sign in to your [employer] account. Enter Q481 in the Soapbox box to learn more about \"Learning About Emphysema. \"     If you do not have an account, please click on the \"Sign Up Now\" link.   Current as of: July 6, 2021               Content Version: 13.0  © 8577-2121 [employer],

## 2021-12-16 ENCOUNTER — TELEPHONE (OUTPATIENT)
Dept: PULMONOLOGY | Age: 71
End: 2021-12-16

## 2021-12-16 NOTE — TELEPHONE ENCOUNTER
Called patient and she stated she has not found a place to go that is \"over run with people\". She stated that she will try today to find a place that is not overcrowded and if she does not find a place that is not overcrowded she will not have the testing done.

## 2021-12-27 DIAGNOSIS — G89.4 CHRONIC PAIN SYNDROME: ICD-10-CM

## 2021-12-27 RX ORDER — HYDROCODONE BITARTRATE AND ACETAMINOPHEN 5; 325 MG/1; MG/1
1 TABLET ORAL EVERY 8 HOURS PRN
Qty: 90 TABLET | Refills: 0 | Status: SHIPPED | OUTPATIENT
Start: 2021-12-29 | End: 2022-01-25 | Stop reason: SDUPTHER

## 2021-12-27 NOTE — TELEPHONE ENCOUNTER
OARRS reviewed.  UDS: + for Dihydrocodeine, Hydrocodone, Norhydrocodone, Hydromorphone   Last seen: 12/6/21 Follow-up: 2/15/22

## 2022-01-25 DIAGNOSIS — G89.4 CHRONIC PAIN SYNDROME: ICD-10-CM

## 2022-01-25 RX ORDER — HYDROCODONE BITARTRATE AND ACETAMINOPHEN 5; 325 MG/1; MG/1
1 TABLET ORAL EVERY 8 HOURS PRN
Qty: 90 TABLET | Refills: 0 | Status: SHIPPED | OUTPATIENT
Start: 2022-01-25 | End: 2022-02-24 | Stop reason: SDUPTHER

## 2022-01-25 NOTE — TELEPHONE ENCOUNTER
OARRS reviewed. UDS: + for  . hydrocodone  Last seen: Visit date not found.  Follow-up:   Future Appointments   Date Time Provider Jesu Saleh   2/15/2022 11:00 AM CASSANDRA Dang CNP N [facility] [place]   3/10/2022 10:00 AM STR CT IMAGING RM1  [facility] OUT EXP STR Radiolog   3/10/2022 11:00 AM STR ECHO RM1 STRZ ECHO [place] [facility]   3/15/2022 10:00 AM Josiah Brownie, APRN - CNP [facility] [place]   5/23/2022  9:15 AM SUSAN Moreno Pulm Med P - [place]   6/23/2022 11:00 AM Anna Bangura MD N [facility] [place]

## 2022-02-15 ENCOUNTER — OFFICE VISIT (OUTPATIENT)
Dept: PHYSICAL MEDICINE AND REHAB | Age: 72
End: 2022-02-15
Payer: MEDICARE

## 2022-02-15 ENCOUNTER — TELEPHONE (OUTPATIENT)
Dept: CARDIOLOGY CLINIC | Age: 72
End: 2022-02-15

## 2022-02-15 ENCOUNTER — TELEPHONE (OUTPATIENT)
Dept: PHYSICAL MEDICINE AND REHAB | Age: 72
End: 2022-02-15

## 2022-02-15 VITALS
DIASTOLIC BLOOD PRESSURE: 60 MMHG | WEIGHT: 155 LBS | SYSTOLIC BLOOD PRESSURE: 124 MMHG | BODY MASS INDEX: 29.27 KG/M2 | HEIGHT: 61 IN

## 2022-02-15 DIAGNOSIS — M46.1 SI (SACROILIAC) JOINT INFLAMMATION (HCC): ICD-10-CM

## 2022-02-15 DIAGNOSIS — G89.4 CHRONIC PAIN SYNDROME: ICD-10-CM

## 2022-02-15 DIAGNOSIS — M54.50 CHRONIC BILATERAL LOW BACK PAIN WITHOUT SCIATICA: ICD-10-CM

## 2022-02-15 DIAGNOSIS — G89.29 CHRONIC BILATERAL LOW BACK PAIN WITHOUT SCIATICA: ICD-10-CM

## 2022-02-15 DIAGNOSIS — M47.816 SPONDYLOSIS OF LUMBAR REGION WITHOUT MYELOPATHY OR RADICULOPATHY: Primary | ICD-10-CM

## 2022-02-15 PROCEDURE — 99214 OFFICE O/P EST MOD 30 MIN: CPT | Performed by: NURSE PRACTITIONER

## 2022-02-15 ASSESSMENT — ENCOUNTER SYMPTOMS
CHEST TIGHTNESS: 1
SHORTNESS OF BREATH: 1
BACK PAIN: 1
GASTROINTESTINAL NEGATIVE: 1
COUGH: 1

## 2022-02-15 NOTE — PROGRESS NOTES
[address]  Dept: [phone]  Dept Fax: [phone]: [phone]    Visit Date: 2/15/2022    Functionality Assessment/Goals Worksheet     On a scale of 0 (Does not Interfere) to 10 (Completely Interferes)     1. Which number describes how during the past week pain has interfered with       the following:  A. General Activity:  7  B. Mood: 7  C. Walking Ability:  7  D. Normal Work (Includes both work outside the home and housework):  7  E. Relations with Other People:   7  F. Sleep:   7  G. Enjoyment of Life:   7    2. Patient Prefers to Take their Pain Medications:     [x]  On a regular basis   []  Only when necessary    []  Does not take pain medications    3. What are the Patient's Goals/Expectations for Visiting Pain Management? []  Learn about my pain    [x]  Receive Medication   []  Physical Therapy     []  Treat Depression   [x]  Receive Injections    []  Treat Sleep   []  Deal with Anxiety and Stress   []  Treat Opoid Dependence/Addiction   []  Other:      HPI:   Nilton Powell is a 70 y.o. female is here today for    Chief Complaint: Low back pain     HPI   10 week FU. Main complaint is pain in low back all across. Constant burning and aching pain. Really increases at times with even walking across room \"takes my breath away\"    Denies any radicular pain   Pain increases with bending, lifting, twisting , walking, standing, getting up and down and housework or working at job. \"any activity\"     Norco prn continues to help make pain more tolerable     On 3 liters of oxygen today     Medications reviewed. Patient denies side effects with medications. Patient states she is taking medications as prescribed. Shedenies receiving pain medications from other sources. She denies any ER visits since last visit.     Pain scale with out pain medications or at its worst is 8/10. Pain scale with pain medications or at its best is 4/10. Last dose of [place] was today   Drug screen reviewed from 12/6/2021 and was appropriate  Pill count completed  today and WNL: Yes      The patientis allergic to codeine, doxycycline, lipitor, simvastatin, and chantix [varenicline tartrate]. Subjective:      Review of Systems   Constitutional: Negative. HENT: Negative. Respiratory: Positive for cough, chest tightness and shortness of breath. On 3 liters of oxygen per nasal cannula at this time   Cardiovascular: Negative. Gastrointestinal: Negative. Genitourinary: Negative. Musculoskeletal: Positive for arthralgias, back pain and myalgias. Negative for gait problem and joint swelling. Not using any assist devices   Skin: Negative. Neurological: Negative for weakness and numbness. Psychiatric/Behavioral: Negative for sleep disturbance. Objective:     Vitals:    02/15/22 1102   BP: 124/60   Weight: 155 lb (70.3 kg)   Height: 5' 1\" (1.549 m)       Physical Exam  Vitals and nursing note reviewed. Constitutional:       General: She is not in acute distress. Appearance: Normal appearance. She is well-developed. She is not diaphoretic. HENT:      Head: Normocephalic and atraumatic. Right Ear: External ear normal.      Left Ear: External ear normal.      Nose: Nose normal.      Mouth/Throat:      Mouth: Mucous membranes are dry. Pharynx: No oropharyngeal exudate. Eyes:      General: No scleral icterus. Right eye: No discharge. Left eye: No discharge. Conjunctiva/sclera: Conjunctivae normal.      Pupils: Pupils are equal, round, and reactive to light. Neck:      Thyroid: No thyromegaly. Cardiovascular:      Rate and Rhythm: Normal rate and regular rhythm. Heart sounds: Normal heart sounds. No murmur heard. No friction rub. No gallop.     Pulmonary:      Effort: Pulmonary effort is normal. No respiratory distress. Breath sounds: Examination of the right-upper field reveals decreased breath sounds and wheezing. Examination of the left-upper field reveals decreased breath sounds and wheezing. Examination of the right-middle field reveals decreased breath sounds. Examination of the left-middle field reveals decreased breath sounds. Examination of the right-lower field reveals decreased breath sounds. Examination of the left-lower field reveals decreased breath sounds. Decreased breath sounds and wheezing present. No rales. Comments: On 3 liters of oxygen   Chest:      Chest wall: No tenderness. Abdominal:      General: Bowel sounds are normal. There is no distension. Palpations: Abdomen is soft. Tenderness: There is no abdominal tenderness. There is no guarding or rebound. Musculoskeletal:         General: Swelling and tenderness present. Right shoulder: Tenderness present. Decreased range of motion. Left shoulder: Tenderness present. Decreased range of motion. Right elbow: Normal.      Left elbow: Normal.      Right wrist: No swelling, tenderness or bony tenderness. Left wrist: Tenderness and bony tenderness present. Decreased range of motion. Cervical back: Full passive range of motion without pain, normal range of motion and neck supple. Spasms present. No edema, erythema, rigidity or tenderness. No muscular tenderness. Normal range of motion. Thoracic back: Tenderness present. Lumbar back: Spasms and tenderness present. Back:       Right hip: Normal.      Left hip: Tenderness present. Decreased range of motion. Decreased strength. Right upper leg: Tenderness present. Left upper leg: Tenderness present. Right knee: Normal.      Left knee: Normal.      Right lower leg: No edema. Left lower leg: No edema. Skin:     General: Skin is warm. Coloration: Skin is not pale. Findings: No erythema or rash.    Neurological: General: No focal deficit present. Mental Status: She is alert and oriented to person, place, and time. She is not disoriented. Cranial Nerves: No cranial nerve deficit. Sensory: No sensory deficit. Motor: No weakness, atrophy or abnormal muscle tone. Coordination: Coordination normal.      Gait: Gait normal.      Deep Tendon Reflexes: Reflexes are normal and symmetric. Comments: SLR-  Motor 5/5 BUE BLE   Psychiatric:         Attention and Perception: She is attentive. Mood and Affect: Mood normal. Mood is not anxious or depressed. Affect is not labile, blunt, angry or inappropriate. Speech: Speech normal.         Behavior: Behavior normal. Behavior is not agitated, slowed, aggressive, withdrawn, hyperactive or combative. Thought Content: Thought content normal. Thought content is not paranoid or delusional. Thought content does not include homicidal or suicidal ideation. Thought content does not include homicidal or suicidal plan. Cognition and Memory: Memory is not impaired. She does not exhibit impaired recent memory or impaired remote memory. Judgment: Judgment normal. Judgment is not impulsive or inappropriate. ANASTACIA  Patricks test  positive  Yeoman's  positive  Gaenslen's  positive       Assessment:     1. Spondylosis of lumbar region without myelopathy or radiculopathy    2. Chronic bilateral low back pain without sciatica    3. SI (sacroiliac) joint inflammation (HCC)    4. Chronic pain syndrome            Plan:      · OARRS reviewed. Current MED: 15.00  · Patient was offered naloxone for home. · Discussed long term side effects of medications, tolerance, dependency and addiction. · Previous UDS reviewed  · UDS preformed today for compliance. · Patient told can not receive any pain medications from any other source. · No evidence of abuse, diversion or aberrant behavior.    Medications and/or procedures to improve function and quality of life- patient understanding with this and that may not be pain free   Discussed with patient about safe storage of medications at home   Discussed possible weaning of medication dosing dependent on treatment/procedure results.  Discussed with patient about risks with procedure including infection, reaction to medication, increased pain, or bleeding. · Procedure notes reviewed in detail   · Received over 80% relief of low back pain from previous L-facet RFA for 6-8 months. · Plan Bilateral L-facet RFA at L4-5 and L5-S1 for longer term pain relief. Procedure and risks discussed in detail with patient. · Continue Norco 5/325, 1 tab every 8 hours as needed for breakthrough pain. Filled 1/28/2022 and still has plenty  · Patient is compliant. · If patient is on blood thinners will need approval to hold yes or no: On baby aspirin- needs cardiac clearance if she has not in the past 3 months          Meds. Prescribed:   No orders of the defined types were placed in this encounter. Return for  Bilateral L-facet RFA at L4-5 and L5-S1. , follow up after procedure.                Electronically signed by Janus Mcburney, APRN - CNP on2/15/2022 at 11:35 AM

## 2022-02-15 NOTE — TELEPHONE ENCOUNTER
Pre op Risk Assessment    Procedure Bilat L facet RFA @ L4-5 & L5-S1  Physician [facility]  Date of surgery/procedure TBD    Last OV 6-21-21  Last Stress 9-17-18  Last Echo 8-15-18  Last Cath 1-8-19  Last Stent 1-8-19  Is patient on blood thinners ASA 81mg  Hold Meds/how many days 5 days    Fax: [phone]

## 2022-02-16 NOTE — TELEPHONE ENCOUNTER
Reviewed with Dr Cristina Eubanks. Dr Cristina Eubanks cleared patient and signed form. Form scanned.

## 2022-02-18 ENCOUNTER — TELEPHONE (OUTPATIENT)
Dept: PHYSICAL MEDICINE AND REHAB | Age: 72
End: 2022-02-18

## 2022-02-23 ENCOUNTER — TELEPHONE (OUTPATIENT)
Dept: PHYSICAL MEDICINE AND REHAB | Age: 72
End: 2022-02-23

## 2022-02-24 DIAGNOSIS — G89.4 CHRONIC PAIN SYNDROME: ICD-10-CM

## 2022-02-24 RX ORDER — HYDROCODONE BITARTRATE AND ACETAMINOPHEN 5; 325 MG/1; MG/1
1 TABLET ORAL EVERY 8 HOURS PRN
Qty: 90 TABLET | Refills: 0 | Status: SHIPPED | OUTPATIENT
Start: 2022-02-27 | End: 2022-03-24 | Stop reason: SDUPTHER

## 2022-03-24 DIAGNOSIS — G89.4 CHRONIC PAIN SYNDROME: ICD-10-CM

## 2022-03-24 RX ORDER — HYDROCODONE BITARTRATE AND ACETAMINOPHEN 5; 325 MG/1; MG/1
1 TABLET ORAL EVERY 8 HOURS PRN
Qty: 90 TABLET | Refills: 0 | Status: SHIPPED | OUTPATIENT
Start: 2022-03-29 | End: 2022-04-25 | Stop reason: SDUPTHER

## 2022-03-24 NOTE — TELEPHONE ENCOUNTER
OARRS reviewed. UDS: + for hydrocodone  Last seen: 2/15/2022. Follow-up:   Future Appointments   Date Time Provider Jesu Therese   3/30/2022  9:30 AM STR ECHO RM2 STR[facility] ECHO [place] [facility]   3/30/2022 11:20 AM STR CT IMAGING RM1  [facility] OUT EXP STR [facility]   4/4/2022  9:00 AM CASSANDRA Palacios - [facility]E [facility] [place]   5/23/2022  9:15 AM Adeline Arceo PA-C N Pulm Med [facility]   6/23/2022 11:00 AM Lai Romero MD N [facility]   awaiting to see DR. Herminio Clark.

## 2022-04-21 ENCOUNTER — HOSPITAL ENCOUNTER (OUTPATIENT)
Dept: NON INVASIVE DIAGNOSTICS | Age: 72
Discharge: HOME | End: 2022-04-21
Payer: MEDICARE

## 2022-04-21 ENCOUNTER — HOSPITAL ENCOUNTER (OUTPATIENT)
Dept: CT IMAGING | Age: 72
Discharge: HOME | End: 2022-04-21
Payer: MEDICARE

## 2022-04-21 DIAGNOSIS — R94.2 DECREASED DIFFUSION CAPACITY OF LUNG: ICD-10-CM

## 2022-04-21 DIAGNOSIS — J43.9 PULMONARY EMPHYSEMA, UNSPECIFIED EMPHYSEMA TYPE (HCC): ICD-10-CM

## 2022-04-21 DIAGNOSIS — R06.02 SHORTNESS OF BREATH: ICD-10-CM

## 2022-04-21 DIAGNOSIS — J98.4 RESTRICTIVE LUNG DISEASE: ICD-10-CM

## 2022-04-21 DIAGNOSIS — J41.1 MUCOPURULENT CHRONIC BRONCHITIS (HCC): ICD-10-CM

## 2022-04-21 DIAGNOSIS — R06.01 ORTHOPNEA: ICD-10-CM

## 2022-04-21 DIAGNOSIS — J96.11 CHRONIC RESPIRATORY FAILURE WITH HYPOXIA (HCC): ICD-10-CM

## 2022-04-21 LAB
LV EF: 55 %
LVEF MODALITY: NORMAL

## 2022-04-21 PROCEDURE — 71250 CT THORAX DX C-: CPT

## 2022-04-21 PROCEDURE — 93306 TTE W/DOPPLER COMPLETE: CPT

## 2022-04-25 ENCOUNTER — OFFICE VISIT (OUTPATIENT)
Dept: PULMONOLOGY | Age: 72
End: 2022-04-25
Payer: MEDICARE

## 2022-04-25 VITALS
WEIGHT: 163 LBS | OXYGEN SATURATION: 97 % | HEART RATE: 80 BPM | TEMPERATURE: 98.1 F | SYSTOLIC BLOOD PRESSURE: 124 MMHG | HEIGHT: 62 IN | DIASTOLIC BLOOD PRESSURE: 72 MMHG | BODY MASS INDEX: 30 KG/M2

## 2022-04-25 DIAGNOSIS — J43.2 CENTRILOBULAR EMPHYSEMA (HCC): ICD-10-CM

## 2022-04-25 DIAGNOSIS — J30.89 ENVIRONMENTAL AND SEASONAL ALLERGIES: ICD-10-CM

## 2022-04-25 DIAGNOSIS — G89.4 CHRONIC PAIN SYNDROME: ICD-10-CM

## 2022-04-25 DIAGNOSIS — J41.1 MUCOPURULENT CHRONIC BRONCHITIS (HCC): ICD-10-CM

## 2022-04-25 DIAGNOSIS — J98.4 RESTRICTIVE LUNG DISEASE: Primary | ICD-10-CM

## 2022-04-25 PROCEDURE — 99214 OFFICE O/P EST MOD 30 MIN: CPT | Performed by: NURSE PRACTITIONER

## 2022-04-25 RX ORDER — FLUTICASONE FUROATE, UMECLIDINIUM BROMIDE AND VILANTEROL TRIFENATATE 100; 62.5; 25 UG/1; UG/1; UG/1
1 POWDER RESPIRATORY (INHALATION) DAILY
Qty: 30 EACH | Refills: 11 | Status: SHIPPED | OUTPATIENT
Start: 2022-04-25 | End: 2022-10-05

## 2022-04-25 RX ORDER — GUAIFENESIN 600 MG/1
600 TABLET, EXTENDED RELEASE ORAL 2 TIMES DAILY
Qty: 30 TABLET | Refills: 2 | Status: SHIPPED | OUTPATIENT
Start: 2022-04-25 | End: 2022-05-25

## 2022-04-25 RX ORDER — HYDROCODONE BITARTRATE AND ACETAMINOPHEN 5; 325 MG/1; MG/1
1 TABLET ORAL EVERY 8 HOURS PRN
Qty: 90 TABLET | Refills: 0 | Status: SHIPPED | OUTPATIENT
Start: 2022-04-28 | End: 2022-05-24 | Stop reason: SDUPTHER

## 2022-04-25 ASSESSMENT — ENCOUNTER SYMPTOMS
HEMOPTYSIS: 0
CONSTIPATION: 0
NAUSEA: 0
WHEEZING: 0
DIFFICULTY BREATHING: 1
EYE ITCHING: 0
SINUS PRESSURE: 0
COUGH: 1
SORE THROAT: 1
APNEA: 1
SPUTUM PRODUCTION: 1
HOARSE VOICE: 0
FREQUENT THROAT CLEARING: 1
TROUBLE SWALLOWING: 0
HEARTBURN: 0
SINUS PAIN: 0
RHINORRHEA: 1
SHORTNESS OF BREATH: 1
CHEST TIGHTNESS: 0
DIARRHEA: 0

## 2022-04-25 ASSESSMENT — COPD QUESTIONNAIRES: COPD: 1

## 2022-04-25 NOTE — PROGRESS NOTES
[facility]    Patient: Bev Mckay, 70 y.o.   : 1950    Patient of KEN Polanco     Subjective     Chief Complaint   Patient presents with    Follow-up     4mo f/u, restrictive lung disease, CT-22, Echo-22      COPD  She complains of cough, difficulty breathing, frequent throat clearing, shortness of breath and sputum production (thick white, clear). There is no chest tightness, hemoptysis, hoarse voice or wheezing. This is a chronic problem. The current episode started more than 1 year ago. The problem occurs constantly. The problem has been gradually worsening. The cough is productive of sputum and productive. Associated symptoms include dyspnea on exertion, malaise/fatigue, myalgias, nasal congestion, orthopnea, rhinorrhea, sneezing and a sore throat. Pertinent negatives include no appetite change, chest pain, ear congestion, ear pain, fever, headaches, heartburn, postnasal drip, sweats, trouble swallowing or weight loss. Her symptoms are aggravated by climbing stairs, exposure to fumes, exposure to smoke, exercise, strenuous activity and lying down. Her symptoms are alleviated by beta-agonist and rest. She reports significant improvement on treatment. Her past medical history is significant for bronchitis, COPD, emphysema and pneumonia. There is no history of asthma or bronchiectasis. Sharon Patterson is here for follow up for COPD follow up. She states that she has tried multiple inhalers and feels that she ends up with exacerbations after using the maintenance inhalers. She does have a sister who was on oxygen for years and had a single lung transplant and has been doing very well. She uses her oxygen at 3L all the time when she is at home with the stationary oxygen concentrator and 1 to 2 L with the portable tank. Progress History:   Since last visit any new medical issues? No  New ER or hospital visits?  No  Any new or changes in medicines? No  Using inhalers? No, not taking advair or spiriva  Are they helpful? not using   Previous inhalers? Bartolome Martin - stopped due to cost, Stiolto (patient stopped as she believed this caused her lungs to worsen), Symbicort (believes caused her symptoms to worsen), Advair (uncertain why it was stopped)  Any recent exacerbations? Yes 2/2022 treated with antibiotic and steroid at that time; 1/2022- treated with antibiotic and steroid  Last PFT: 8/31/2021 - moderate restriction and severely decreased DLCO  Last 6 MWT: 9/15/2021 - 1.5 lpm with rest and sleep and 3 lpm on exertion      Smoking History:  Smoked 2 PPD for 40 years (80 pack years), quit in 2010  Admits to passive tobacco exposure from parents or work environment. She lives with her daughter who smokes in the house.      Social History:  Patient job history: Retired, previously worked at a [address] not had exposure to aerosolized particles or hazardous fumes. 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 Cats at home  Denies exposure to tuberculosis. Denies family history of ALS, GBS, myasthenia gravis, myotonic dystrophy, MS, or spinal muscular atrophy.   Denies family history of autoimmune conditions, such as RA, SLE, etc.     Flu vaccine: Patient has not received yet this year   Pneumonia vaccine: Lana Huitron 4/2/2018 & 10/24/2012  COVID-19 vaccine: Vaccinated  Past Medical hx   PMH:  Past Medical History:   Diagnosis Date    Bladder dysfunction     CAD (coronary artery disease)     Cerebral artery occlusion with cerebral infarction ([address]     TIA    Cerebrovascular disease     CHF (congestive heart failure) ([address]     COPD (chronic obstructive pulmonary disease) ([address]     uses oxygen 24 hours per day    DDD (degenerative disc disease)     Depression     GERD (gastroesophageal reflux disease)     Hyperlipidemia     Hypertension     Obesity     Other disorders of kidney and ureter in diseases classified elsewhere     Other screening mammogram 2011    Pap smear, as part of routine gynecological examination 2007    Pap smear, as part of routine gynecological examination 2007    Peripheral edema     with ascites    Pneumonia     PONV (postoperative nausea and vomiting)     with [facility]    Unspecified sleep apnea     c-pap at 10 cm wtr     SURGICAL HISTORY:  Past Surgical History:   Procedure Laterality Date    COLONOSCOPY  7-2009    DILATION AND CURETTAGE OF UTERUS  1970's    ENDOSCOPY, COLON, DIAGNOSTIC      EYE SURGERY  2013    Laser surgery     LUMBAR SPINE SURGERY Left 12/12/2017    LUMBAR RFA L2-3, 3-4, 4-5, L5-S1 LEFT SIDE performed by Tiny [facility]es [facility] at [facility] Right 10/7/2019    L-facet RFA @ L2-3, L3-4, L4-5, and L5-S1 RIGHT SIDE FIRST performed by Tiny Reveles MD at [place] [address]  2016    burning nerves in back - lumbar @ [facility]    OTHER SURGICAL HISTORY Left 12/12/2017    Lumbar RFA L2-3, L3-4, L4-5, L5-S1    PAIN MANAGEMENT PROCEDURE Left 2/7/2020    Left L-facet RFA @ L2-3, L3-4, L4-5, and L5-S1 performed by Tiny Reveles MD at [facility] Right 7/16/2020    Right L-facet RFA @ L2-3, L3-4, L4-5, and L5-S1 performed by Tiny Reveles MD at [place] [address] Left 8/25/2020    LEFT L-facet RFA @ L2-3, L3-4, L4-5, and L5-S1 performed by Tiny Reveles MD at [facility] Right 2/26/2021    Right L-facet RFA @ L2-3, L3-4, L4-5, and L5-S1 performed by Tiny Reveles MD at [place] [address] Left 4/8/2021    Left L-facet RFA @ L2-3, L3-4, L4-5, and L5-S1 performed by Tiny Reveles MD at [address] [place] [address] [facility]     SOCIAL HISTORY:  Social History     Tobacco Use    Smoking status: Former Smoker     Packs/day: 2.00     Years: 40.00     Pack years: 80.00     Types: Cigarettes     Quit date: 2010     Years since quittin.4    Smokeless tobacco: Never Used   Vaping Use    Vaping Use: Never used   Substance Use Topics    Alcohol use: No     Comment: pt hasn't drank since     Drug use: No     ALLERGIES:  Allergies   Allergen Reactions    Codeine Itching     Pt cant remember    Doxycycline      Cant breath     Lipitor Itching    Simvastatin Itching     Stomach pain    Chantix [Varenicline Tartrate] Itching     FAMILY HISTORY:  Family History   Problem Relation Age of Onset    Heart Failure Mother     Heart Disease Mother     High Blood Pressure Mother     Heart Failure Father     Heart Disease Father     Alzheimer's Disease Father     Parkinsonism Father     High Blood Pressure Father     COPD Sister     Cancer Brother         skin    Stroke Brother     Cancer Daughter         colon    Diabetes Daughter      CURRENT MEDICATIONS:  Current Outpatient Medications   Medication Sig Dispense Refill    fluticasone-umeclidin-vilant (TRELEGY ELLIPTA) 100-62.5-25 MCG/INH AEPB Inhale 1 puff into the lungs daily 30 each 11    HYDROcodone-acetaminophen (NORCO) 5-325 MG per tablet Take 1 tablet by mouth every 8 hours as needed for Pain for up to 30 days.  90 tablet 0    albuterol (PROVENTIL) (2.5 MG/3ML) 0.083% nebulizer solution Take 3 mLs by nebulization every 6 hours as needed for Wheezing or Shortness of Breath 120 mL 11    VENTOLIN  (90 Base) MCG/ACT inhaler Inhale 2 puffs into the lungs every 6 hours as needed for Wheezing or Shortness of Breath 1 Inhaler 11    furosemide (LASIX) 20 MG tablet Take 1 tablet by mouth daily as needed (swelling) 60 tablet 0    CPAP Machine MISC by Does not apply route Please check patient's CPAP machine for proper functioning by [employer]. Turn off ramp 1 each 0    lidocaine (LMX) 4 % cream Apply topically as needed every 6 hours to left hand for pain. 1 Tube 2    fluticasone (FLONASE) 50 MCG/ACT nasal spray 1 spray by Each Nostril route daily      omeprazole (PRILOSEC) 40 MG delayed release capsule Take 40 mg by mouth daily      polyethylene glycol (GLYCOLAX) 17 g packet Take 17 g by mouth daily as needed for Constipation      montelukast (SINGULAIR) 10 MG tablet Take 1 tablet by mouth nightly 30 tablet 3    metFORMIN (GLUCOPHAGE) 500 MG tablet Take 500 mg by mouth daily (with breakfast)      triamterene-hydrochlorothiazide (MAXZIDE-25) 37.5-25 MG per tablet take 1 tablet by mouth every morning  0    RA COL-RITE 100 MG capsule daily   0    atorvastatin (LIPITOR) 40 MG tablet Take 1 tablet by mouth daily 30 tablet 3    pseudoephedrine (SUDAFED 12 HR) 120 MG extended release tablet Take 120 mg by mouth every 12 hours as needed for Congestion      amLODIPine (NORVASC) 5 MG tablet Take 5 mg by mouth daily      losartan (COZAAR) 50 MG tablet Take 50 mg by mouth daily   0    OXYGEN   Inhale into the lungs 3 lpm, per patient at home and when walking      aspirin 81 MG EC tablet Take 81 mg by mouth daily. No current facility-administered medications for this visit. Celestino ALEXANDER   Review of Systems   Constitutional: Positive for malaise/fatigue. Negative for appetite change, chills, fever, unexpected weight change and weight loss. HENT: Positive for rhinorrhea, sneezing and sore throat. Negative for congestion, ear pain, hoarse voice, postnasal drip, sinus pressure, sinus pain and trouble swallowing. Eyes: Negative for itching. Respiratory: Positive for apnea (uses BiPAP, is having issues with dry mouth), cough, sputum production (thick white, clear) and shortness of breath. Negative for hemoptysis, chest tightness and wheezing. Cardiovascular: Positive for dyspnea on exertion.  Negative for chest pain, palpitations and leg swelling. Gastrointestinal: Negative for constipation, diarrhea, heartburn and nausea. Genitourinary: Negative for difficulty urinating. Musculoskeletal: Positive for myalgias. Allergic/Immunologic: Positive for environmental allergies. Neurological: Negative for headaches. Physical exam   /72 (Site: Left Upper Arm, Position: Sitting, Cuff Size: Medium Adult)   Pulse 80   Temp 98.1 °F (36.7 °C) (Temporal)   Ht 5' 2\" (1.575 m)   Wt 163 lb (73.9 kg)   SpO2 97% Comment: 2lpm O2 nc  BMI 29.81 kg/m²    Wt Readings from Last 3 Encounters:   04/25/22 163 lb (73.9 kg)   02/15/22 155 lb (70.3 kg)   12/15/21 155 lb (70.3 kg)       Physical Exam  Constitutional:       General: She is not in acute distress. HENT:      Head: Normocephalic and atraumatic. Right Ear: External ear normal.      Left Ear: External ear normal.      Nose: No congestion or rhinorrhea. Mouth/Throat:      Mouth: Mucous membranes are moist.      Pharynx: No oropharyngeal exudate or posterior oropharyngeal erythema. Eyes:      General:         Right eye: No discharge. Left eye: No discharge. Cardiovascular:      Rate and Rhythm: Normal rate and regular rhythm. Pulses: Normal pulses. Heart sounds: Normal heart sounds. Pulmonary:      Effort: Pulmonary effort is normal.      Breath sounds: Examination of the right-lower field reveals decreased breath sounds. Examination of the left-lower field reveals decreased breath sounds. Decreased breath sounds present. No wheezing, rhonchi or rales. Comments: On O2 at 2L per NC  Chest:      Chest wall: No tenderness. Abdominal:      General: Bowel sounds are normal.      Palpations: Abdomen is soft. Tenderness: There is no abdominal tenderness. There is no guarding. Musculoskeletal:      Cervical back: Neck supple. Right lower leg: No edema. Left lower leg: No edema.    Skin:     Capillary Refill: Capillary refill takes less than 2 seconds. Neurological:      General: No focal deficit present. Mental Status: She is alert. Psychiatric:         Mood and Affect: Mood normal.         Behavior: Behavior normal.         Thought Content: Thought content normal.       Results   CT chest high resolution  Narrative   PROCEDURE: CT CHEST HIGH RESOLUTION       CLINICAL INFORMATION: Restrictive lung disease. Pulmonary emphysema.       COMPARISON: Low-dose CT chest 8/31/2021.       TECHNIQUE:    1 mm axial imaging from the lung apices to the lung bases every 15 mm with lung enhanced reconstruction. 5 mm axial imaging was then performed from above the lung apices through the adrenal glands without IV contrast.        All CT scans at this facility use dose modulation, iterative reconstruction, and/or weight based dosing when appropriate to reduce the radiation dose to as low as reasonably achievable.           FINDINGS:    Heart/mediastinum: The heart size is normal. Coronary artery calcifications are observed. No pericardial effusion is identified. The aorta is not dilated. No mediastinal, hilar, or axillary lymphadenopathy is observed. The thyroid gland is unremarkable.       Lungs: Dependent atelectasis is noted at the lung bases. Mild emphysematous changes and scarring is most notable at the lung apices. No focal consolidation, pleural effusion, or pneumothorax is visualized. No pulmonary mass or nodules are observed. The    central airways are patent and unremarkable. No honeycombing or bronchiectasis is observed.       Upper abdomen: No acute findings are noted in the limited images through the upper abdomen.       Musculoskeletal: The visualized skeletal structures appear intact. Curvature of the thoracic spine with right convexity is stable.               Impression   Mild centrilobular emphysema is stable. No suspicious pulmonary mass or nodules are observed. No honeycombing or bronchiectasis.  Stable chronic findings are noted.             **This report has been created using voice recognition software.  It may contain minor errors which are inherent in voice recognition technology. **       Final report electronically signed by Dr Melia Moe on 4/21/2022 1:37 PM     ECHO 4/21/2022        Lung Nodule Screening     [x] Qualifies    [] Does not qualify   [] Declined    [x] Completed     The USPSTF recommends annual screening for lung cancer with low-dose computed tomography (LDCT) in adults aged 48 to 80 years who have a 20 pack-year smoking history and currently smoke or have quit within the past 15 years. Screening should be discontinued once a person has not smoked for 15 years or develops a health problem that substantially limits life expectancy or the ability or willingness to have curative lung surgery. Assessment      Diagnosis Orders   1. Restrictive lung disease  fluticasone-umeclidin-vilant (TRELEGY ELLIPTA) 100-62.5-25 MCG/INH AEPB   2. Centrilobular emphysema ([address]  fluticasone-umeclidin-vilant (TRELEGY ELLIPTA) 100-62.5-25 MCG/INH AEPB   3. Mucopurulent chronic bronchitis ([address]  fluticasone-umeclidin-vilant (TRELEGY ELLIPTA) 100-62.5-25 MCG/INH AEPB   4. Environmental and seasonal allergies           Plan   1. Centrilobular emphysema ([address]  - fluticasone-umeclidin-vilant (Vedia Geronimo) 100-62.5-25 MCG/INH AEPB; Inhale 1 puff into the lungs daily  Dispense: 30 each; Refill: 11 Samples of this drug were given to the patient, quantity 2  Lot Number [id number]    - Discussed albuterol inhaler and nebulizer use with the patient. Reviewed signs and symptoms indicating need for use including shortness of breath and wheezing. Discussed with the patient the importance of using the inhaler or nebulizer within the prescribed time frames. Patient verbalized understanding to use one or the other not both within prescribed time frame.  Patient also verbalized understanding that if there is no relief of symptoms after using albuterol and resting for 15 minutes they need to go to nearest ER or call 911.  - Discontinue advair and spiriva due to patient not taking currently, will try trelegy for increased compliance  - Please discuss zephyr qualifications with patient at next appointment   - Continue oxygen as ordered     2. Restrictive lung disease    - fluticasone-umeclidin-vilant (TRELEGY ELLIPTA) 100-62.5-25 MCG/INH AEPB; Inhale 1 puff into the lungs daily  Dispense: 30 each; Refill: 11    3. Mucopurulent chronic bronchitis ([address]  - fluticasone-umeclidin-vilant (Carla Florence) 100-62.5-25 MCG/INH AEPB; Inhale 1 puff into the lungs daily  Dispense: 30 each; Refill: 11  - mucinex 600 mg oral twice per day D 30 R 3  4. Environmental and seasonal allergies  Continue singulair as ordered        - Advised to maintain pneumonia vaccine with PCP and to take flu vaccine this coming season. Advised patient to call office with any changes, questions, or concerns regarding respiratory status or issues with prescribed medications    Return in about 2 months (around 6/25/2022) for copd f/u, med check.        Electronically signed by CASSANDRA Rivas CNP on 4/25/2022 at 2:08 PM

## 2022-05-16 ENCOUNTER — OFFICE VISIT (OUTPATIENT)
Dept: PHYSICAL MEDICINE AND REHAB | Age: 72
End: 2022-05-16
Payer: MEDICARE

## 2022-05-16 ENCOUNTER — TELEPHONE (OUTPATIENT)
Dept: CARDIOLOGY CLINIC | Age: 72
End: 2022-05-16

## 2022-05-16 ENCOUNTER — TELEPHONE (OUTPATIENT)
Dept: PHYSICAL MEDICINE AND REHAB | Age: 72
End: 2022-05-16

## 2022-05-16 VITALS
DIASTOLIC BLOOD PRESSURE: 78 MMHG | BODY MASS INDEX: 30 KG/M2 | WEIGHT: 163 LBS | HEIGHT: 62 IN | SYSTOLIC BLOOD PRESSURE: 124 MMHG

## 2022-05-16 DIAGNOSIS — G89.4 CHRONIC PAIN SYNDROME: ICD-10-CM

## 2022-05-16 DIAGNOSIS — M47.816 SPONDYLOSIS OF LUMBAR REGION WITHOUT MYELOPATHY OR RADICULOPATHY: Primary | ICD-10-CM

## 2022-05-16 DIAGNOSIS — G89.29 CHRONIC BILATERAL LOW BACK PAIN WITHOUT SCIATICA: ICD-10-CM

## 2022-05-16 DIAGNOSIS — M46.1 SI (SACROILIAC) JOINT INFLAMMATION (HCC): ICD-10-CM

## 2022-05-16 DIAGNOSIS — M54.50 CHRONIC BILATERAL LOW BACK PAIN WITHOUT SCIATICA: ICD-10-CM

## 2022-05-16 PROCEDURE — 99214 OFFICE O/P EST MOD 30 MIN: CPT | Performed by: NURSE PRACTITIONER

## 2022-05-16 ASSESSMENT — ENCOUNTER SYMPTOMS
SHORTNESS OF BREATH: 1
COUGH: 1
GASTROINTESTINAL NEGATIVE: 1
BACK PAIN: 1
CHEST TIGHTNESS: 1

## 2022-05-16 NOTE — TELEPHONE ENCOUNTER
Pre op Risk Assessment    Procedure claudine l facet RFA  Physician SR NEURO   Date of surgery/procedure TBA    Last OV 6/21/21  Last Stress   Last Echo 4/21/22  Last Cath 1/8/19 tayo angiogram   10/4/18 cath-stent   Last Stent 1/8/19  Is patient on blood thinners asa  Hold Meds/how many days 5

## 2022-05-16 NOTE — PROGRESS NOTES
[facility] [address]  Dept: [phone]  Dept Fax: [phone]: [phone]    Visit Date: 5/16/2022    Functionality Assessment/Goals Worksheet     On a scale of 0 (Does not Interfere) to 10 (Completely Interferes)     1. Which number describes how during the past week pain has interfered with       the following:  A. General Activity:  8  B. Mood: 8  C. Walking Ability:  8  D. Normal Work (Includes both work outside the home and housework):  8  E. Relations with Other People:   6  F. Sleep:   8  G. Enjoyment of Life:   8    2. Patient Prefers to Take their Pain Medications:     [x]  On a regular basis   []  Only when necessary    []  Does not take pain medications    3. What are the Patient's Goals/Expectations for Visiting Pain Management? []  Learn about my pain    []  Receive Medication   []  Physical Therapy     []  Treat Depression   []  Receive Injections    []  Treat Sleep   []  Deal with Anxiety and Stress   []  Treat Opoid Dependence/Addiction   []  Other:      HPI:   Chito Baum is a 70 y.o. female is here today for    Chief Complaint: Low back pain     HPI   3 month FU. Continues to have pain in low back pain constant aching pain with periods of sharp pain with increased activity. Needs rest breaks at times. Denies any radicular pain. On 2 liters of oxygen today   Pain increases with bending, lifting, twisting , walking, standing, getting up and down and housework or working at job. Brian Talbert continues to help. Feels fatigued and worn out     Medications reviewed. Patient gets occasional constipation side effects with medications. Patient states she is taking medications as prescribed. Shedenies receiving pain medications from other sources. She denies any ER visits since last visit.     Pain scale with out pain medications or at its worst is 8/10. Pain scale with pain medications or at its best is 5/10. Last dose of Norco was yesterday   Drug screen reviewed from 2/15/22 and was appropriate  Pill count completed  today and WNL: Yes      The patientis allergic to codeine, doxycycline, lipitor, simvastatin, and chantix [varenicline tartrate]. Subjective:      Review of Systems   Constitutional: Positive for fatigue. HENT: Negative. Respiratory: Positive for cough, chest tightness and shortness of breath. On 2 liters of oxygen per nasal cannula at this time   Cardiovascular: Negative. Negative for chest pain and leg swelling. Gastrointestinal: Negative. Genitourinary: Negative. Musculoskeletal: Positive for arthralgias, back pain and myalgias. Negative for gait problem and joint swelling. Not using any assist devices   Skin: Negative. Neurological: Negative for weakness and numbness. Psychiatric/Behavioral: Negative for sleep disturbance. Objective:     Vitals:    05/16/22 0923   BP: 124/78   Weight: 163 lb (73.9 kg)   Height: 5' 2\" (1.575 m)       Physical Exam  Vitals and nursing note reviewed. Constitutional:       General: She is not in acute distress. Appearance: Normal appearance. She is well-developed. She is not diaphoretic. HENT:      Head: Normocephalic and atraumatic. Right Ear: External ear normal.      Left Ear: External ear normal.      Nose: Nose normal.      Mouth/Throat:      Mouth: Mucous membranes are dry. Pharynx: No oropharyngeal exudate. Eyes:      General: No scleral icterus. Right eye: No discharge. Left eye: No discharge. Conjunctiva/sclera: Conjunctivae normal.      Pupils: Pupils are equal, round, and reactive to light. Neck:      Thyroid: No thyromegaly. Cardiovascular:      Rate and Rhythm: Normal rate and regular rhythm. Heart sounds: Normal heart sounds. No murmur heard. No friction rub. No gallop.     Pulmonary: Effort: Pulmonary effort is normal. No respiratory distress. Breath sounds: Normal breath sounds. No wheezing or rales. Comments: On 2 liters of oxygen   Chest:      Chest wall: No tenderness. Abdominal:      General: Bowel sounds are normal. There is no distension. Palpations: Abdomen is soft. Tenderness: There is no abdominal tenderness. There is no guarding or rebound. Musculoskeletal:         General: Swelling and tenderness present. Right shoulder: Tenderness present. Decreased range of motion. Left shoulder: Tenderness present. Decreased range of motion. Right elbow: Normal.      Left elbow: Normal.      Right wrist: No swelling, tenderness or bony tenderness. Left wrist: Tenderness and bony tenderness present. Decreased range of motion. Cervical back: Full passive range of motion without pain, normal range of motion and neck supple. Spasms, tenderness and bony tenderness present. No edema, erythema or rigidity. No muscular tenderness. Normal range of motion. Thoracic back: Tenderness present. Lumbar back: Spasms, tenderness and bony tenderness present. Decreased range of motion. Negative right straight leg raise test and negative left straight leg raise test.        Back:       Right hip: Normal.      Left hip: Tenderness present. Decreased range of motion. Decreased strength. Right upper leg: Tenderness present. Left upper leg: Tenderness present. Right knee: Normal.      Left knee: Normal.      Right lower leg: No edema. Left lower leg: No edema. Skin:     General: Skin is warm. Coloration: Skin is not pale. Findings: No erythema or rash. Neurological:      General: No focal deficit present. Mental Status: She is alert and oriented to person, place, and time. She is not disoriented. Cranial Nerves: No cranial nerve deficit. Sensory: No sensory deficit.       Motor: No weakness, atrophy or abnormal muscle tone. Coordination: Coordination normal.      Gait: Gait normal.      Deep Tendon Reflexes: Reflexes are normal and symmetric. Comments:   Motor 5/5 BUE BLE   Psychiatric:         Attention and Perception: She is attentive. Mood and Affect: Mood normal. Mood is not anxious or depressed. Affect is not labile, blunt, angry or inappropriate. Speech: Speech normal.         Behavior: Behavior normal. Behavior is not agitated, slowed, aggressive, withdrawn, hyperactive or combative. Thought Content: Thought content normal. Thought content is not paranoid or delusional. Thought content does not include homicidal or suicidal ideation. Thought content does not include homicidal or suicidal plan. Cognition and Memory: Memory is not impaired. She does not exhibit impaired recent memory or impaired remote memory. Judgment: Judgment normal. Judgment is not impulsive or inappropriate. ANASTACIA  Patricks test  positive  Yeoman's  positive  Gaenslen's  positive     Assessment:     1. Spondylosis of lumbar region without myelopathy or radiculopathy    2. Chronic bilateral low back pain without sciatica    3. SI (sacroiliac) joint inflammation (HCC)    4. Chronic pain syndrome            Plan:      · OARRS reviewed. Current MED: 15.00  · Patient was offered naloxone for home. · Discussed long term side effects of medications, tolerance, dependency and addiction. · Previous UDS reviewed  · UDS preformed today for compliance. · Patient told can not receive any pain medications from any other source. · No evidence of abuse, diversion or aberrant behavior.  Medications and/or procedures to improve function and quality of life- patient understanding with this and that may not be pain free   Discussed with patient about safe storage of medications at home   Discussed possible weaning of medication dosing dependent on treatment/procedure results.     Discussed with patient about risks with procedure including infection, reaction to medication, increased pain, or bleeding. · Procedure notes reviewed in detail   · Received over 80% relief of low back pain from previous L-facet RFA for 6-8 months. · Plan Bilateral L-facet RFA at L4-5 and L5-S1 for longer term pain relief. Procedure and risks discussed in detail with patient. · If patient is on blood thinners will need approval to hold yes or no: On baby aspirin- needs cardiac clearance if she has not in the past 3 months    · Continue Norco 5/325, 1 tab every 8 hours as needed for breakthrough pain. Filled 4/28/2022 and still has plenty       Meds. Prescribed:   No orders of the defined types were placed in this encounter. Return for  Bilateral L-facet RFA at L4-5 and L5-S1. , follow up after procedure.                Electronically signed by CASSANDRA Meraz CNP on5/16/2022 at 9:45 AM

## 2022-05-24 DIAGNOSIS — G89.4 CHRONIC PAIN SYNDROME: ICD-10-CM

## 2022-05-24 RX ORDER — HYDROCODONE BITARTRATE AND ACETAMINOPHEN 5; 325 MG/1; MG/1
1 TABLET ORAL EVERY 8 HOURS PRN
Qty: 90 TABLET | Refills: 0 | Status: SHIPPED | OUTPATIENT
Start: 2022-05-28 | End: 2022-06-27 | Stop reason: SDUPTHER

## 2022-06-10 ENCOUNTER — PREP FOR PROCEDURE (OUTPATIENT)
Dept: PHYSICAL MEDICINE AND REHAB | Age: 72
End: 2022-06-10

## 2022-06-14 ENCOUNTER — TELEPHONE (OUTPATIENT)
Dept: PHYSICAL MEDICINE AND REHAB | Age: 72
End: 2022-06-14

## 2022-06-14 NOTE — TELEPHONE ENCOUNTER
Per . the [facility]t can only be extended  06/22-07/06. Medicare is not allowing resubmission until 60 days. Pt. Contacted. Procedure rescheduled to 6/20/2022 @ 9:25, arrive @ 8:25am. Unable to schedule 6/22/2022-7/06/2022 due to pt. Being diabetic and needing an early appointment. Will attempt to get original auth. Date.

## 2022-06-16 ENCOUNTER — PREP FOR PROCEDURE (OUTPATIENT)
Dept: PHYSICAL MEDICINE AND REHAB | Age: 72
End: 2022-06-16

## 2022-06-20 ENCOUNTER — APPOINTMENT (OUTPATIENT)
Dept: GENERAL RADIOLOGY | Age: 72
End: 2022-06-20
Attending: PAIN MEDICINE
Payer: MEDICARE

## 2022-06-20 ENCOUNTER — ANESTHESIA (OUTPATIENT)
Dept: OPERATING ROOM | Age: 72
End: 2022-06-20
Payer: MEDICARE

## 2022-06-20 ENCOUNTER — HOSPITAL ENCOUNTER (OUTPATIENT)
Age: 72
Setting detail: OUTPATIENT SURGERY
Discharge: HOME | End: 2022-06-20
Attending: PAIN MEDICINE | Admitting: PAIN MEDICINE
Payer: MEDICARE

## 2022-06-20 ENCOUNTER — ANESTHESIA EVENT (OUTPATIENT)
Dept: OPERATING ROOM | Age: 72
End: 2022-06-20
Payer: MEDICARE

## 2022-06-20 VITALS
BODY MASS INDEX: 30.18 KG/M2 | SYSTOLIC BLOOD PRESSURE: 114 MMHG | DIASTOLIC BLOOD PRESSURE: 70 MMHG | WEIGHT: 164 LBS | TEMPERATURE: 96.7 F | OXYGEN SATURATION: 96 % | HEART RATE: 76 BPM | RESPIRATION RATE: 16 BRPM | HEIGHT: 62 IN

## 2022-06-20 LAB — GLUCOSE BLD-MCNC: 133 MG/DL (ref 70–108)

## 2022-06-20 PROCEDURE — 7100000010 HC PHASE II RECOVERY - FIRST 15 MIN: Performed by: PAIN MEDICINE

## 2022-06-20 PROCEDURE — 3700000000 HC ANESTHESIA ATTENDED CARE: Performed by: PAIN MEDICINE

## 2022-06-20 PROCEDURE — 6360000002 HC RX W HCPCS: Performed by: NURSE ANESTHETIST, CERTIFIED REGISTERED

## 2022-06-20 PROCEDURE — 82948 REAGENT STRIP/BLOOD GLUCOSE: CPT

## 2022-06-20 PROCEDURE — 3600000057 HC PAIN LEVEL 4 ADDL 15 MIN: Performed by: PAIN MEDICINE

## 2022-06-20 PROCEDURE — 7100000011 HC PHASE II RECOVERY - ADDTL 15 MIN: Performed by: PAIN MEDICINE

## 2022-06-20 PROCEDURE — 2709999900 HC NON-CHARGEABLE SUPPLY: Performed by: PAIN MEDICINE

## 2022-06-20 PROCEDURE — 64635 DESTROY LUMB/SAC FACET JNT: CPT | Performed by: PAIN MEDICINE

## 2022-06-20 PROCEDURE — 2500000003 HC RX 250 WO HCPCS: Performed by: NURSE ANESTHETIST, CERTIFIED REGISTERED

## 2022-06-20 PROCEDURE — 64636 DESTROY L/S FACET JNT ADDL: CPT | Performed by: PAIN MEDICINE

## 2022-06-20 PROCEDURE — 3600000056 HC PAIN LEVEL 4 BASE: Performed by: PAIN MEDICINE

## 2022-06-20 PROCEDURE — 3209999900 FLUORO FOR SURGICAL PROCEDURES

## 2022-06-20 PROCEDURE — 2500000003 HC RX 250 WO HCPCS: Performed by: PAIN MEDICINE

## 2022-06-20 PROCEDURE — 3700000001 HC ADD 15 MINUTES (ANESTHESIA): Performed by: PAIN MEDICINE

## 2022-06-20 RX ORDER — ONDANSETRON 2 MG/ML
INJECTION INTRAMUSCULAR; INTRAVENOUS PRN
Status: DISCONTINUED | OUTPATIENT
Start: 2022-06-20 | End: 2022-06-20 | Stop reason: SDUPTHER

## 2022-06-20 RX ORDER — BUPIVACAINE HYDROCHLORIDE 2.5 MG/ML
INJECTION, SOLUTION EPIDURAL; INFILTRATION; INTRACAUDAL PRN
Status: DISCONTINUED | OUTPATIENT
Start: 2022-06-20 | End: 2022-06-20 | Stop reason: ALTCHOICE

## 2022-06-20 RX ORDER — LIDOCAINE HYDROCHLORIDE 10 MG/ML
INJECTION, SOLUTION EPIDURAL; INFILTRATION; INTRACAUDAL; PERINEURAL PRN
Status: DISCONTINUED | OUTPATIENT
Start: 2022-06-20 | End: 2022-06-20 | Stop reason: ALTCHOICE

## 2022-06-20 RX ORDER — LIDOCAINE HYDROCHLORIDE 10 MG/ML
INJECTION, SOLUTION INFILTRATION; PERINEURAL PRN
Status: DISCONTINUED | OUTPATIENT
Start: 2022-06-20 | End: 2022-06-20 | Stop reason: SDUPTHER

## 2022-06-20 RX ORDER — DEXAMETHASONE SODIUM PHOSPHATE 10 MG/ML
INJECTION, EMULSION INTRAMUSCULAR; INTRAVENOUS PRN
Status: DISCONTINUED | OUTPATIENT
Start: 2022-06-20 | End: 2022-06-20 | Stop reason: SDUPTHER

## 2022-06-20 RX ORDER — FENTANYL CITRATE 50 UG/ML
INJECTION, SOLUTION INTRAMUSCULAR; INTRAVENOUS PRN
Status: DISCONTINUED | OUTPATIENT
Start: 2022-06-20 | End: 2022-06-20 | Stop reason: SDUPTHER

## 2022-06-20 RX ADMIN — LIDOCAINE HYDROCHLORIDE 20 MG: 10 INJECTION, SOLUTION INFILTRATION; PERINEURAL at 10:16

## 2022-06-20 RX ADMIN — DEXAMETHASONE SODIUM PHOSPHATE 10 MG: 10 INJECTION, EMULSION INTRAMUSCULAR; INTRAVENOUS at 10:18

## 2022-06-20 RX ADMIN — FENTANYL CITRATE 100 MCG: 50 INJECTION, SOLUTION INTRAMUSCULAR; INTRAVENOUS at 10:16

## 2022-06-20 RX ADMIN — PROPOFOL 20 MG: 10 INJECTION, EMULSION INTRAVENOUS at 10:31

## 2022-06-20 RX ADMIN — ONDANSETRON 4 MG: 2 INJECTION INTRAMUSCULAR; INTRAVENOUS at 10:16

## 2022-06-20 ASSESSMENT — ENCOUNTER SYMPTOMS: SHORTNESS OF BREATH: 1

## 2022-06-20 ASSESSMENT — LIFESTYLE VARIABLES: SMOKING_STATUS: 0

## 2022-06-20 ASSESSMENT — COPD QUESTIONNAIRES: CAT_SEVERITY: SEVERE

## 2022-06-20 ASSESSMENT — PAIN - FUNCTIONAL ASSESSMENT: PAIN_FUNCTIONAL_ASSESSMENT: 0-10

## 2022-06-20 NOTE — H&P
[address] [address]  History and Physical Update    Pt Name: Rufus Gaston  MRN: [id number]  YOB: 1950  Date of evaluation: 6/20/2022      I have examined the patient and reviewed the H&P/Consult and there are no changes to the patient or plans.         Electronically signed by Kasandra Penny MD on 6/20/2022 at 8:40 AM

## 2022-06-20 NOTE — H&P
H&P    Patient complains that she continues to have pain in low back pain constant aching pain with periods of sharp pain with increased activity. Needs rest breaks at times.      Denies any radicular pain. On 2 liters of oxygen today   Pain increases with bending, lifting, twisting , walking, standing, getting up and down and housework or working at job.  Tamiko Chambers continues to help.      Feels fatigued and worn out      Medications reviewed. Patient gets occasional constipation side effects with medications. Patient states she is taking medications as prescribed. Shedenies receiving pain medications from other sources. She denies any ER visits since last visit.     Pain scale with out pain medications or at its worst is 8/10. Pain scale with pain medications or at its best is 5/10. Last dose of Norco was yesterday   Drug screen reviewed from 2/15/22 and was appropriate  Pill count completed  today and WNL: Yes        The patientis allergic to codeine, doxycycline, lipitor, simvastatin, and chantix [varenicline tartrate].       Subjective:      Review of Systems   Constitutional: Positive for fatigue. HENT: Negative. Respiratory: Positive for cough, chest tightness and shortness of breath. On 2 liters of oxygen per nasal cannula at this time   Cardiovascular: Negative. Negative for chest pain and leg swelling. Gastrointestinal: Negative. Genitourinary: Negative. Musculoskeletal: Positive for arthralgias, back pain and myalgias. Negative for gait problem and joint swelling. Not using any assist devices   Skin: Negative. Neurological: Negative for weakness and numbness. Psychiatric/Behavioral: Negative for sleep disturbance.         Objective:      Vitals       Vitals:     05/16/22 0923   BP: 124/78   Weight: 163 lb (73.9 kg)   Height: 5' 2\" (1.575 m)            Physical Exam  Vitals and nursing note reviewed. Constitutional:       General: She is not in acute distress. Appearance: Normal appearance. She is well-developed. She is not diaphoretic. HENT:      Head: Normocephalic and atraumatic. Right Ear: External ear normal.      Left Ear: External ear normal.      Nose: Nose normal.      Mouth/Throat:      Mouth: Mucous membranes are dry. Pharynx: No oropharyngeal exudate. Eyes:      General: No scleral icterus. Right eye: No discharge. Left eye: No discharge. Conjunctiva/sclera: Conjunctivae normal.      Pupils: Pupils are equal, round, and reactive to light. Neck:      Thyroid: No thyromegaly. Cardiovascular:      Rate and Rhythm: Normal rate and regular rhythm. Heart sounds: Normal heart sounds. No murmur heard. No friction rub. No gallop. Pulmonary:      Effort: Pulmonary effort is normal. No respiratory distress. Breath sounds: Normal breath sounds. No wheezing or rales. Comments: On 2 liters of oxygen   Chest:      Chest wall: No tenderness. Abdominal:      General: Bowel sounds are normal. There is no distension. Palpations: Abdomen is soft. Tenderness: There is no abdominal tenderness. There is no guarding or rebound. Musculoskeletal:         General: Swelling and tenderness present. Right shoulder: Tenderness present. Decreased range of motion. Left shoulder: Tenderness present. Decreased range of motion. Right elbow: Normal.      Left elbow: Normal.      Right wrist: No swelling, tenderness or bony tenderness. Left wrist: Tenderness and bony tenderness present. Decreased range of motion. Cervical back: Full passive range of motion without pain, normal range of motion and neck supple. Spasms, tenderness and bony tenderness present. No edema, erythema or rigidity. No muscular tenderness. Normal range of motion. Thoracic back: Tenderness present. Lumbar back: Spasms, tenderness and bony tenderness present. Decreased range of motion.  Negative right straight leg raise test and negative left straight leg raise test.        Back:       Right hip: Normal.      Left hip: Tenderness present. Decreased range of motion. Decreased strength. Right upper leg: Tenderness present. Left upper leg: Tenderness present. Right knee: Normal.      Left knee: Normal.      Right lower leg: No edema. Left lower leg: No edema. Skin:     General: Skin is warm. Coloration: Skin is not pale. Findings: No erythema or rash. Neurological:      General: No focal deficit present. Mental Status: She is alert and oriented to person, place, and time. She is not disoriented. Cranial Nerves: No cranial nerve deficit. Sensory: No sensory deficit. Motor: No weakness, atrophy or abnormal muscle tone. Coordination: Coordination normal.      Gait: Gait normal.      Deep Tendon Reflexes: Reflexes are normal and symmetric. Comments:   Motor 5/5 BUE BLE   Psychiatric:         Attention and Perception: She is attentive. Mood and Affect: Mood normal. Mood is not anxious or depressed. Affect is not labile, blunt, angry or inappropriate. Speech: Speech normal.         Behavior: Behavior normal. Behavior is not agitated, slowed, aggressive, withdrawn, hyperactive or combative. Thought Content: Thought content normal. Thought content is not paranoid or delusional. Thought content does not include homicidal or suicidal ideation. Thought content does not include homicidal or suicidal plan. Cognition and Memory: Memory is not impaired. She does not exhibit impaired recent memory or impaired remote memory. Judgment: Judgment normal. Judgment is not impulsive or inappropriate.         ANASTACIA  Patricks test  positive  Yeoman's  positive  Gaenslen's  positive  Assessment:      1. Spondylosis of lumbar region without myelopathy or radiculopathy    2. Chronic bilateral low back pain without sciatica    3.  [facility]    4. Chronic pain syndrome       Plan:      · OARRS reviewed. Current MED: 15.00  · Patient was offered naloxone for home. · Discussed long term side effects of medications, tolerance, dependency and addiction. · Previous UDS reviewed  · UDS preformed today for compliance. · Patient told can not receive any pain medications from any other source. · No evidence of abuse, diversion or aberrant behavior. · Medications and/or procedures to improve function and quality of life- patient understanding with this and that may not be pain free  · Discussed with patient about safe storage of medications at home  · Discussed possible weaning of medication dosing dependent on treatment/procedure results. · Discussed with patient about risks with procedure including infection, reaction to medication, increased pain, or bleeding.   · Procedure notes reviewed in detail   · Received over 80% relief of low back pain from previous L-facet RFA for 6-8 months.   · Plan Bilateral L-facet RFA at L4-5 and L5-S1 for longer term pain relief. Procedure and risks discussed in detail with patient.   · If patient is on blood thinners will need approval to hold yes or no: On baby aspirin- needs cardiac clearance if she has not in the past 3 months    · Continue Norco 5/325, 1 tab every 8 hours as needed for breakthrough pain. Filled 4/28/2022 and still has plenty          Return for  Bilateral L-facet RFA at L4-5 and L5-S1. , follow up after procedure.

## 2022-06-20 NOTE — ANESTHESIA PRE PROCEDURE
Department of Anesthesiology  Preprocedure Note       Name:  Leonor Cortes   Age:  70 y.o.  :  1950                                          MRN:  [id number]         Date:  2022      Surgeon: Breana Mendenhall):  Yinka Taylor MD    Procedure: Procedure(s):  Bilateral L-facet RFA at L4-5 and L5-S1. Medications prior to admission:   Prior to Admission medications    Medication Sig Start Date End Date Taking? Authorizing Provider   HYDROcodone-acetaminophen (NORCO) 5-325 MG per tablet Take 1 tablet by mouth every 8 hours as needed for Pain for up to 30 days. 22  CASSANDRA Hatch CNP   fluticasone-umeclidin-vilant (TRELEGY ELLIPTA) 647-78.0-63 MCG/INH AEPB Inhale 1 puff into the lungs daily 22  CASSANDRA Neumann CNP   albuterol (PROVENTIL) (2.5 MG/3ML) 0.083% nebulizer solution Take 3 mLs by nebulization every 6 hours as needed for Wheezing or Shortness of Breath 9/15/21   CASSANDRA Blake CNP   VENTOLIN  (90 Base) MCG/ACT inhaler Inhale 2 puffs into the lungs every 6 hours as needed for Wheezing or Shortness of Breath 21  CASSANDRA Machado CNP   furosemide (LASIX) 20 MG tablet Take 1 tablet by mouth daily as needed (swelling) 21   CASSANDRA Mccoy CNP   CPAP Machine MISC by Does not apply route Please check patient's CPAP machine for proper functioning by [employer]. Turn off ramp 21   Indira Mercado PA-C   lidocaine (LMX) 4 % cream Apply topically as needed every 6 hours to left hand for pain.  11/10/20   CASSANDRA Hatch CNP   fluticasone (FLONASE) 50 MCG/ACT nasal spray 1 spray by Each Nostril route daily    Historical Provider, MD   omeprazole (PRILOSEC) 40 MG delayed release capsule Take 40 mg by mouth daily    Historical Provider, MD   polyethylene glycol (GLYCOLAX) 17 g packet Take 17 g by mouth daily as needed for Constipation    Historical Provider, MD   montelukast (SINGULAIR) 10 MG tablet Take 1 tablet by mouth nightly 3/16/20   Adriane Galeazzi, APRN - CNP   metFORMIN (GLUCOPHAGE) 500 MG tablet Take 500 mg by mouth daily (with breakfast)    Historical Provider, MD   triamterene-hydrochlorothiazide (MAXZIDE-25) 37.5-25 MG per tablet take 1 tablet by mouth every morning 8/13/19   Historical Provider, MD   RA COL-RITE 100 MG capsule daily  8/22/19   Historical Provider, MD   atorvastatin (LIPITOR) 40 MG tablet Take 1 tablet by mouth daily 10/4/18   Cody Ambrosio MD   pseudoephedrine (SUDAFED 12 HR) 120 MG extended release tablet Take 120 mg by mouth every 12 hours as needed for Congestion    Historical Provider, MD   amLODIPine (NORVASC) 5 MG tablet Take 5 mg by mouth daily    Historical Provider, MD   losartan (COZAAR) 50 MG tablet Take 50 mg by mouth daily  10/20/17   Historical Provider, MD   OXYGEN   Inhale into the lungs 3 lpm, per patient at home and when walking    Historical Provider, MD   aspirin 81 MG EC tablet Take 81 mg by mouth daily. Historical Provider, MD       Current medications:    No current facility-administered medications for this encounter. Allergies: Allergies   Allergen Reactions    Codeine Itching     Pt cant remember    Doxycycline      Cant breath     Lipitor Itching    Simvastatin Itching     Stomach pain    Chantix [Varenicline Tartrate] Itching       Problem List:    Patient Active Problem List   Diagnosis Code    GERD (gastroesophageal reflux disease) K21.9    Hyperlipidemia E78.5    Peripheral edema R60.9    CHF (congestive heart failure) ([facility]) I50.9    Depression F32. A    DDD (degenerative disc disease) [id number]    CAD (coronary artery disease) I25.10    Cerebrovascular disease I67.9    Diabetes type 2, controlled ([facility] [address] E11.9    Bladder dysfunction N31.9    Stage 3 severe COPD by GOLD classification ([facility]) J44.9    Nasal congestion R09.81    Acute sinusitis J01.90    Hypertrophy of both inferior nasal turbinates J34.3    Lumbar spondylosis M47.816    History of panniculitis Z87.39    Hypertension I10    Dyspnea R06.00    Sinus disease J34.9    PETE on CPAP G47.33, Z99.89    Snoring R06.83    Obesity (BMI 30-39. 9) E66.9    Witnessed apneic spells R06.81    Chronic respiratory failure with hypoxia ([facility]) J96.11    COPD exacerbation ([facility]) J44.1    Acute respiratory failure ([facility]) J96.00    Acute on chronic respiratory failure with hypoxia and hypercapnia ([facility]) J96.21, J96.22    Chest pain R07.9    Angina, class III ([facility]) I20.9    Abnormal stress test R94.39    Smoker F17.200    Acute respiratory failure with hypercapnia ([facility]) J96.02    Fatigue R53.83    Peripheral artery disease ([facility]) I73.9    COPD (chronic obstructive pulmonary disease) with acute bronchitis ([facility]) J44.0, J20.9    Pulmonary emphysema ([facility]) J43.9    Mucopurulent chronic bronchitis ([facility]) J41.1    Restrictive lung disease J98.4    Decreased diffusion capacity of lung R94.2       Past Medical History:        Diagnosis Date    Bladder dysfunction     CAD (coronary artery disease)     Cerebral artery occlusion with cerebral infarction ([facility] [address]     TIA    Cerebrovascular disease     CHF (congestive heart failure) ([facility])     COPD (chronic obstructive pulmonary disease) ([facility])     uses oxygen 24 hours per day    DDD (degenerative disc disease)     Depression     GERD (gastroesophageal reflux disease)     Hyperlipidemia     Hypertension     Obesity     Other disorders of kidney and ureter in diseases classified elsewhere     Other screening mammogram 2011    Pap smear, as part of routine gynecological examination 2007    Pap smear, as part of routine gynecological examination 2007    Peripheral edema     with ascites    Pneumonia     PONV (postoperative nausea and vomiting)     with back injection in [facility]    Unspecified sleep apnea     c-pap at 10 cm wtr       Past Surgical History:        Procedure Laterality Date    COLONOSCOPY  7-2009    DILATION AND CURETTAGE OF UTERUS  's    ENDOSCOPY, COLON, DIAGNOSTIC      EYE SURGERY      Laser surgery     LUMBAR SPINE SURGERY Left 2017    LUMBAR RFA L2-3, 3-4, 4-5, L5-S1 LEFT SIDE performed by Jennifer Kessler MD at [address] [facility] Right 10/7/2019    L-facet RFA @ L2-3, L3-4, L4-5, and L5-S1 RIGHT SIDE FIRST performed by Jennifer Kessler MD at [place] [address]  2016    burning nerves in back - lumbar @ [place]    OTHER SURGICAL HISTORY Left 2017    Lumbar RFA L2-3, L3-4, L4-5, L5-S1    PAIN MANAGEMENT PROCEDURE Left 2020    Left L-facet RFA @ L2-3, L3-4, L4-5, and L5-S1 performed by Jennifer Kessler MD at [place] [address] Right 2020    Right L-facet RFA @ L2-3, L3-4, L4-5, and L5-S1 performed by Jennifer Kessler MD at [place] [address] Left 2020    LEFT L-facet RFA @ L2-3, L3-4, L4-5, and L5-S1 performed by Jennifer Kessler MD at [place] [address] Right 2021    Right L-facet RFA @ L2-3, L3-4, L4-5, and L5-S1 performed by Jennifer Kessler MD at [place] [address] Left 2021    Left L-facet RFA @ L2-3, L3-4, L4-5, and L5-S1 performed by Jennifer Kessler MD at [address] [place] [address] Road History:    Social History     Tobacco Use    Smoking status: Former Smoker     Packs/day: 2.00     Years: 40.00     Pack years: 80.00     Types: Cigarettes     Quit date: 2010     Years since quittin.6    Smokeless tobacco: Never Used   Substance Use Topics    Alcohol use: No     Comment: pt hasn't drank since                                 Counseling given: Not Answered      Vital Signs (Current):   Vitals:    22 0923   BP: 125/63   Pulse: 84   Resp: 20   SpO2: 93%   Weight: 164 lb (74.4 kg)   Height: 5' 2\" (1.575 m) BP Readings from Last 3 Encounters:   06/20/22 125/63   05/16/22 124/78   04/25/22 124/72       NPO Status: Time of last liquid consumption: 0630 (sip of water with med)                        Time of last solid consumption: 1930                        Date of last liquid consumption: 06/20/22                        Date of last solid food consumption: 06/19/22    BMI:   Wt Readings from Last 3 Encounters:   06/20/22 164 lb (74.4 kg)   05/16/22 163 lb (73.9 kg)   04/25/22 163 lb (73.9 kg)     Body mass index is 30 kg/m². CBC:   Lab Results   Component Value Date    WBC 10.4 05/25/2021    WBC 8.2 05/23/2020    RBC 3.68 05/25/2021    HGB 10.4 05/25/2021    HCT 31.7 05/25/2021    MCV 86 05/25/2021    RDW 14.5 05/25/2021     05/25/2021     05/23/2020       CMP:   Lab Results   Component Value Date     05/25/2021    K 4.0 05/25/2021    K 4.0 05/23/2020    CL 92 05/25/2021    CO2 38 05/25/2021    BUN 16 05/25/2021    CREATININE 1.10 05/25/2021    GFRAA >60 06/20/2019    LABGLOM 83 05/23/2020    GLUCOSE 104 05/25/2021    PROT 7.6 05/25/2021    CALCIUM 9.8 05/25/2021    BILITOT 0.3 05/25/2021    BILITOT NEGATIVE 03/31/2012    ALKPHOS 70 05/25/2021    ALKPHOS 77 05/23/2020    AST 16 05/25/2021    ALT 13 05/25/2021       POC Tests: No results for input(s): POCGLU, POCNA, POCK, POCCL, POCBUN, POCHEMO, POCHCT in the last 72 hours.     Coags:   Lab Results   Component Value Date    INR 0.91 01/08/2019    APTT 33.6 01/08/2019       HCG (If Applicable): No results found for: PREGTESTUR, PREGSERUM, HCG, HCGQUANT     ABGs: No results found for: PHART, PO2ART, PNH7PDN, GBS8VOH, BEART, K7VUPHFH     Type & Screen (If Applicable):  Lab Results   Component Value Date    LABRH NEG 01/08/2019       Drug/Infectious Status (If Applicable):  No results found for: HIV, HEPCAB    COVID-19 Screening (If Applicable):   Lab Results   Component Value Date    COVID19 Not Detected 08/19/2020 Anesthesia Evaluation  Patient summary reviewed   history of anesthetic complications: PONV. Airway: Mallampati: II  TM distance: >3 FB   Neck ROM: limited  Mouth opening: > = 3 FB   Dental:          Pulmonary:   (+) COPD (on oxygen): severe,  shortness of breath: chronic,  sleep apnea: on CPAP,  decreased breath sounds     (-) not a current smoker (former)                           Cardiovascular:    (+) hypertension:, angina:, CAD:, CHF:, hyperlipidemia        Rhythm: regular  Rate: normal                    Neuro/Psych:   (+) CVA:, neuromuscular disease:, depression/anxiety             GI/Hepatic/Renal:   (+) GERD:,           Endo/Other:    (+) DiabetesType II DM, , .                 Abdominal:             Vascular:   + PVD, aortic or cerebral, . Other Findings:           Anesthesia Plan      MAC     ASA 3       Induction: intravenous. Anesthetic plan and risks discussed with patient. Plan discussed with CRNA.                     Anna Nice MD   6/20/2022

## 2022-06-20 NOTE — OP NOTE
Pre-Procedure Note    Patient Name: Laura Maldonado   YOB: 1950  Medical Record Number: [id number]  Date: 6/20/22    Indication:  Lower back pain  Consent: On file. Vital Signs:   Vitals:    06/20/22 0923   BP: 125/63   Pulse: 84   Resp: 20   SpO2: 93%       Past Medical History:   has a past medical history of Bladder dysfunction, CAD (coronary artery disease), Cerebral artery occlusion with cerebral infarction [facility]), Cerebrovascular disease, CHF (congestive heart failure) ([facility] [address] COPD (chronic obstructive pulmonary disease) ([facility] [address] DDD (degenerative disc disease), Depression, GERD (gastroesophageal reflux disease), Hyperlipidemia, Hypertension, Obesity, Other disorders of kidney and ureter in diseases classified elsewhere, Other screening mammogram, Pap smear, as part of routine gynecological examination, Pap smear, as part of routine gynecological examination, Peripheral edema, Pneumonia, PONV (postoperative nausea and vomiting), and Unspecified sleep apnea. Past Surgical History:   has a past surgical history that includes Dilation and curettage of uterus (1970's); eye surgery (2013); other surgical history (2016); Colonoscopy (7-2009); other surgical history (Left, 12/12/2017); Lumbar spine surgery (Left, 12/12/2017); Endoscopy, colon, diagnostic; Lumbar spine surgery (Right, 10/7/2019); Pain management procedure (Left, 2/7/2020); Pain management procedure (Right, 7/16/2020); Pain management procedure (Left, 8/25/2020); Pain management procedure (Right, 2/26/2021); and Pain management procedure (Left, 4/8/2021). Pre-Sedation Documentation and Exam:   Vital signs have been reviewed (see flow sheet for vitals).      Sedation/ Anesthesia Plan:   MAC    Patient is an appropriate candidate for plan of sedation: yes      Preoperative Diagnosis:  L-spondylosis     Post-Op Dx: as above     Procedure Performed:  :Radiofrequency ablation of median branches at the levels of L4-5 and L5-S1 bilateral under fluoroscopic guidance      Indication for the Procedure:  The patient has ahistory of chronic low back pain that is not responding well to the conservative treatment.  Patient's pain is mostly axial in nature.  Pain is interfering with the activities of daily living.  Physical examination revealed facet tenderness and facet loading is positive.  Patient had undergone lumbar facet joint injections with pain relief that lasted for only a short period of time and had greater than 70% pain relief with confirmatory median branch blocks.  Hence we decided to do radiofrequency abalation of median branches for long term pain releif.   The procedure and risks  were discussed with the patient and an informed consent was obtained.     Procedure:    A meaningful communication was kept up with the patient throughout the procedure. The patient is placed in prone position and skin over the back was prepped and draped in sterile manner.  Then using fluoroscopy the junction of the transverse process of the vertebra with the superior process of the facet joint was observed and the view was optimized.  The skin and deep tissues posterior were infiltrated with 20 ml of  1% xylocaine . The RF canula with the 10 mm active tip was introduced through the skin wheal under fluoroscopy guidance such that the tip of the needle lies in the groove of the transverse process with the superior processes of the facet joint.  Then a lateral view of the lumbar spine was obtained to make sure the tip of needle is not in the neural foramen.  Then electric impedence was checked to make sure it is acceptable. Then a sensory stimulus was applied at 50 Hz up to 1 volt and concordant pain symptoms were reproduced.  Then a motor stimulus was applied at 2 Hz up to 2 volts and no motor stimulation was seen in lower extremities.  Some multifidus stimulus was seen. Nickolas Madrigal after negative aspiration a mixture of depomedrol 80  mg and 0.25%  marcaine 2 cc was injected through the needle.  Then a lesion was done at 80 degrees centigrade for 90 seconds  EBL-0  Patient's vital signs and neurological status remained stable throughout the procedure and post procedural period.  The patient tolerated the procedure well and was discharged home in stable condition.     Electronically signed by Donnie Valero MD on 6/20/22 at 10:17 AM EDT

## 2022-06-27 DIAGNOSIS — G89.4 CHRONIC PAIN SYNDROME: ICD-10-CM

## 2022-06-27 RX ORDER — HYDROCODONE BITARTRATE AND ACETAMINOPHEN 5; 325 MG/1; MG/1
1 TABLET ORAL EVERY 8 HOURS PRN
Qty: 90 TABLET | Refills: 0 | Status: SHIPPED | OUTPATIENT
Start: 2022-06-27 | End: 2022-07-25 | Stop reason: SDUPTHER

## 2022-07-01 DIAGNOSIS — J44.1 COPD EXACERBATION (HCC): ICD-10-CM

## 2022-07-01 DIAGNOSIS — J43.2 CENTRILOBULAR EMPHYSEMA (HCC): Primary | ICD-10-CM

## 2022-07-25 ENCOUNTER — OFFICE VISIT (OUTPATIENT)
Dept: PHYSICAL MEDICINE AND REHAB | Age: 72
End: 2022-07-25
Payer: MEDICARE

## 2022-07-25 VITALS
DIASTOLIC BLOOD PRESSURE: 60 MMHG | BODY MASS INDEX: 30.18 KG/M2 | SYSTOLIC BLOOD PRESSURE: 120 MMHG | WEIGHT: 164 LBS | HEIGHT: 62 IN

## 2022-07-25 DIAGNOSIS — M47.816 SPONDYLOSIS OF LUMBAR REGION WITHOUT MYELOPATHY OR RADICULOPATHY: Primary | ICD-10-CM

## 2022-07-25 DIAGNOSIS — G89.4 CHRONIC PAIN SYNDROME: ICD-10-CM

## 2022-07-25 DIAGNOSIS — G89.29 CHRONIC BILATERAL LOW BACK PAIN WITHOUT SCIATICA: ICD-10-CM

## 2022-07-25 DIAGNOSIS — M54.50 CHRONIC BILATERAL LOW BACK PAIN WITHOUT SCIATICA: ICD-10-CM

## 2022-07-25 DIAGNOSIS — M46.1 SI (SACROILIAC) JOINT INFLAMMATION (HCC): ICD-10-CM

## 2022-07-25 DIAGNOSIS — M48.061 SPINAL STENOSIS OF LUMBAR REGION WITHOUT NEUROGENIC CLAUDICATION: ICD-10-CM

## 2022-07-25 PROCEDURE — 1123F ACP DISCUSS/DSCN MKR DOCD: CPT | Performed by: NURSE PRACTITIONER

## 2022-07-25 PROCEDURE — 99214 OFFICE O/P EST MOD 30 MIN: CPT | Performed by: NURSE PRACTITIONER

## 2022-07-25 RX ORDER — HYDROCODONE BITARTRATE AND ACETAMINOPHEN 5; 325 MG/1; MG/1
1 TABLET ORAL EVERY 8 HOURS PRN
Qty: 90 TABLET | Refills: 0 | Status: SHIPPED | OUTPATIENT
Start: 2022-07-27 | End: 2022-08-23 | Stop reason: SDUPTHER

## 2022-07-25 ASSESSMENT — ENCOUNTER SYMPTOMS
GASTROINTESTINAL NEGATIVE: 1
SHORTNESS OF BREATH: 1
CHEST TIGHTNESS: 1
BACK PAIN: 1
COUGH: 1

## 2022-07-25 NOTE — PROGRESS NOTES
[address] [facility] [address]  Dept: [phone]  Dept Fax: [phone]: [phone]    Visit Date: 7/25/2022    Functionality Assessment/Goals Worksheet     On a scale of 0 (Does not Interfere) to 10 (Completely Interferes)     1. Which number describes how during the past week pain has interfered with       the following:  A. General Activity:  8  B. Mood: 8  C. Walking Ability:  8  D. Normal Work (Includes both work outside the home and housework):  8  E. Relations with Other People:   8  F. Sleep:   8  G. Enjoyment of Life:   8    2. Patient Prefers to Take their Pain Medications:     [x]  On a regular basis   [x]  Only when necessary    []  Does not take pain medications    3. What are the Patient's Goals/Expectations for Visiting Pain Management? []  Learn about my pain    [x]  Receive Medication   []  Physical Therapy     []  Treat Depression   [x]  Receive Injections    []  Treat Sleep   []  Deal with Anxiety and Stress   []  Treat Opoid Dependence/Addiction   []  Other:      HPI:   Minda Ponce is a 70 y.o. female is here today for    Chief Complaint: Low back pain, leg pain    HPI   FU from bilateral L-facet RFA from 6/20/2022. Not receiving any relief from this L-facet RFA and \"I feel that pain is actually a little worse now\"   Also states she got sick from the anesthesia after the procedure   Patient has continued pain in low back - sharp and burning. Some days pain pain will radiate down back of leg leg to calf some numbness. Pain has been bother some   Has been having a lot of shortness of breath   Pain increases with bending, lifting, twisting , walking, standing, getting up and down, and housework or working at job, weather changes     On 3 liters of oxygen per nasal cannula today.  Complaining of increased SOB     Norco prn helping pain     Medications reviewed. Patient denies side effects with medications. Patient states she is taking medications as prescribed. Shedenies receiving pain medications from other sources. She denies any ER visits since last visit. Pain scale with out pain medications or at its worst is 8/10. Pain scale with pain medications or at its best is 5/10. Last dose of [place] was today   Drug screen reviewed from 5/16/2022 and was appropriate  Pill count completed  today and WNL: Yes      The patientis allergic to codeine, doxycycline, lipitor, simvastatin, and chantix [varenicline tartrate]. Subjective:      Review of Systems   Constitutional:  Positive for fatigue. HENT: Negative. Respiratory:  Positive for cough, chest tightness and shortness of breath. On 3 liters of oxygen per nasal cannula at this time   Cardiovascular: Negative. Negative for chest pain and leg swelling. Gastrointestinal: Negative. Genitourinary: Negative. Musculoskeletal:  Positive for arthralgias, back pain and myalgias. Negative for gait problem and joint swelling. Not using any assist devices   Skin: Negative. Neurological:  Negative for weakness and numbness. Psychiatric/Behavioral:  Negative for sleep disturbance. Objective:     Vitals:    07/25/22 0945   BP: 120/60   Weight: 164 lb (74.4 kg)   Height: 5' 2\" (1.575 m)       Physical Exam  Vitals and nursing note reviewed. Constitutional:       General: She is not in acute distress. Appearance: Normal appearance. She is well-developed. She is not diaphoretic. HENT:      Head: Normocephalic and atraumatic. Right Ear: External ear normal.      Left Ear: External ear normal.      Nose: Nose normal.      Mouth/Throat:      Mouth: Mucous membranes are dry. Pharynx: No oropharyngeal exudate. Eyes:      General: No scleral icterus. Right eye: No discharge. Left eye: No discharge.       Conjunctiva/sclera: Conjunctivae normal.      Pupils: Pupils are equal, round, and reactive to light. Neck:      Thyroid: No thyromegaly. Cardiovascular:      Rate and Rhythm: Normal rate and regular rhythm. Pulses: Normal pulses. Heart sounds: Normal heart sounds. No murmur heard. No friction rub. No gallop. Pulmonary:      Effort: Pulmonary effort is normal. No respiratory distress. Breath sounds: Normal breath sounds. No wheezing or rales. Comments: On 3 liters of oxygen   Chest:      Chest wall: No tenderness. Abdominal:      General: Bowel sounds are normal. There is no distension. Palpations: Abdomen is soft. Tenderness: There is no abdominal tenderness. There is no guarding or rebound. Musculoskeletal:         General: Swelling and tenderness present. Right shoulder: Tenderness present. Decreased range of motion. Left shoulder: Tenderness present. Decreased range of motion. Right elbow: Normal.      Left elbow: Normal.      Right wrist: No swelling, tenderness or bony tenderness. Left wrist: Tenderness and bony tenderness present. Decreased range of motion. Cervical back: Full passive range of motion without pain, normal range of motion and neck supple. Spasms, tenderness and bony tenderness present. No edema, erythema or rigidity. No muscular tenderness. Normal range of motion. Thoracic back: Tenderness present. Lumbar back: Spasms, tenderness and bony tenderness present. Decreased range of motion. Negative right straight leg raise test and negative left straight leg raise test.        Back:       Right hip: Normal.      Left hip: Tenderness present. Decreased range of motion. Decreased strength. Right upper leg: Tenderness present. Left upper leg: Tenderness present. Right knee: Normal.      Left knee: Normal.      Right lower leg: No edema. Left lower leg: No edema. Skin:     General: Skin is warm. Coloration: Skin is not pale.       Findings: No erythema or rash. Neurological:      General: No focal deficit present. Mental Status: She is alert and oriented to person, place, and time. She is not disoriented. Cranial Nerves: No cranial nerve deficit. Sensory: No sensory deficit. Motor: No weakness, atrophy or abnormal muscle tone. Coordination: Coordination normal.      Gait: Gait normal.      Deep Tendon Reflexes: Reflexes are normal and symmetric. Comments:   Motor 5/5 BUE BLE   Psychiatric:         Attention and Perception: She is attentive. Mood and Affect: Mood normal. Mood is not anxious or depressed. Affect is not labile, blunt, angry or inappropriate. Speech: Speech normal.         Behavior: Behavior normal. Behavior is not agitated, slowed, aggressive, withdrawn, hyperactive or combative. Thought Content: Thought content normal. Thought content is not paranoid or delusional. Thought content does not include homicidal or suicidal ideation. Thought content does not include homicidal or suicidal plan. Cognition and Memory: Memory is not impaired. She does not exhibit impaired recent memory or impaired remote memory. Judgment: Judgment normal. Judgment is not impulsive or inappropriate. ANASTACIA  Patricks test  positive  Yeoman's  or Gaenslen's positive         Assessment:     1. Spondylosis of lumbar region without myelopathy or radiculopathy    2. Chronic pain syndrome    3. Chronic bilateral low back pain without sciatica    4. SI (sacroiliac) joint inflammation ([address]    5. Spinal stenosis of lumbar region without neurogenic claudication            Plan:      OARRS reviewed. Current MED: 15.00  Patient was offered naloxone for home. Discussed long term side effects of medications, tolerance, dependency and addiction. Previous UDS reviewed  UDS preformed today for compliance. Patient told can not receive any pain medications from any other source.   No evidence of abuse, diversion or aberrant behavior. Medications and/or procedures to improve function and quality of life- patient understanding with this and that may not be pain free  Discussed with patient about safe storage of medications at home  Discussed possible weaning of medication dosing dependent on treatment/procedure results. Discussed with patient about risks with procedure including infection, reaction to medication, increased pain, or bleeding. Procedure notes reviewed in detail   Not noticing relief at this time time from L-facet RFA. Updated Lumbar xray   Discussed possible therapy in future   Continue Norco 5/325, 1 tab every 8 hours as needed for breakthrough pain- ordered refill   Patient is compliant       Meds. Prescribed:   Orders Placed This Encounter   Medications    HYDROcodone-acetaminophen (NORCO) 5-325 MG per tablet     Sig: Take 1 tablet by mouth every 8 hours as needed for Pain for up to 30 days. Dispense:  90 tablet     Refill:  0     Reduce doses taken as pain becomes manageable         Return in about 2 months (around 9/25/2022), or if symptoms worsen or fail to improve, for follow up  for medications.                Electronically signed by CASSANDRA Sanchez CNP on7/25/2022 at 10:38 AM

## 2022-07-27 ENCOUNTER — OFFICE VISIT (OUTPATIENT)
Dept: PULMONOLOGY | Age: 72
End: 2022-07-27
Payer: MEDICARE

## 2022-07-27 VITALS
HEART RATE: 87 BPM | OXYGEN SATURATION: 94 % | DIASTOLIC BLOOD PRESSURE: 76 MMHG | WEIGHT: 164.4 LBS | SYSTOLIC BLOOD PRESSURE: 136 MMHG | TEMPERATURE: 97.8 F | HEIGHT: 60 IN | BODY MASS INDEX: 32.28 KG/M2

## 2022-07-27 DIAGNOSIS — J96.11 CHRONIC RESPIRATORY FAILURE WITH HYPOXIA (HCC): ICD-10-CM

## 2022-07-27 DIAGNOSIS — J98.4 RESTRICTIVE LUNG DISEASE: ICD-10-CM

## 2022-07-27 DIAGNOSIS — J30.89 ENVIRONMENTAL AND SEASONAL ALLERGIES: ICD-10-CM

## 2022-07-27 DIAGNOSIS — J43.2 CENTRILOBULAR EMPHYSEMA (HCC): Primary | ICD-10-CM

## 2022-07-27 DIAGNOSIS — I50.32 CHRONIC DIASTOLIC CONGESTIVE HEART FAILURE (HCC): ICD-10-CM

## 2022-07-27 PROCEDURE — 99214 OFFICE O/P EST MOD 30 MIN: CPT

## 2022-07-27 PROCEDURE — 1123F ACP DISCUSS/DSCN MKR DOCD: CPT

## 2022-07-27 RX ORDER — GUAIFENESIN 600 MG/1
600 TABLET, EXTENDED RELEASE ORAL 2 TIMES DAILY
Qty: 60 TABLET | Refills: 6 | Status: SHIPPED | OUTPATIENT
Start: 2022-07-27 | End: 2022-08-26

## 2022-07-27 RX ORDER — NEBULIZER ACCESSORIES
1 KIT MISCELLANEOUS EVERY 6 HOURS PRN
Qty: 1 KIT | Refills: 1 | Status: SHIPPED | OUTPATIENT
Start: 2022-07-27 | End: 2023-07-27

## 2022-07-27 ASSESSMENT — COPD QUESTIONNAIRES: COPD: 1

## 2022-07-27 ASSESSMENT — ENCOUNTER SYMPTOMS
COUGH: 1
BACK PAIN: 1
SINUS PRESSURE: 0
CHEST TIGHTNESS: 1
HEMOPTYSIS: 0
SHORTNESS OF BREATH: 1
RHINORRHEA: 1
SPUTUM PRODUCTION: 1
SINUS PAIN: 0
WHEEZING: 1

## 2022-07-27 NOTE — PROGRESS NOTES
[facility]    Patient: Tal Francis, 70 y.o.   : 1950    Patient of mine     Subjective     Chief Complaint   Patient presents with    Follow-up     2 month follow up patient has concerns with her new o2 machine       Barney York is here for follow up for mild centrilobular emphysema and restrictive lung disease. Patient was last evaluated by KEN Farley on 2022. At that time the patient had tried multiple inhalers and thought that she was having exacerbations after using maintenance inhalers. Joe Lemos had prescribed her to start Trelegy. She is to use 1.5 lpm at rest and during sleep and 3 lpm with exertion. Patient reports that she was tested on POC at [facility] and qualified for 5 lpm via POC with exertion. She reports that she has been having more shortness of breath being on the POC vs being on O2 tanks. Her tubing on the POC is kinked. At her last appointment, she had a high-resolution CT scan that revealed stable mild centrilobular emphysema and no honeycombing or bronchiectasis. She also had an echo that revealed an EF 55% with normal right ventricular size and normal RVSP. Overall patient reports respiratory symptoms have been stable since last appointment. She reports a lot of flare ups this summer with difficulty in breathing. She related this to the humidity. Patient reports good compliance with inhaled medications (Trelegy). She reports that she used Trelegy for 3 days and reported that her throat \"closed up\". She states that she had phlegm stuck and then coughed it up. She reports after this she was able to use the Trelegy again and is tolerating Trelegy well. She rinses and spits after use. She reports that she does not notice a benefit with this inhaler. Patient using albuterol 2-3 times per day on average. She reports that she has been using her albuterol in the mornings.  Patient reports physical limitation due to respiratory symptoms. Her past medical history is significant for restrictive lung disease, emphysema, PETE (follows with Marilin Barbosa PA-C), hypertension, CHF (follows with Dr. Jono Villareal), CAD, DDD, and cerebral infarction. Patient reports that she takes over the counter Loratadine for her sinuses and does feel this helps her breathing. COPD  She complains of chest tightness (\"sometimes\"), cough, shortness of breath, sputum production and wheezing. There is no hemoptysis. This is a chronic problem. The current episode started more than 1 year ago. The problem occurs 2 to 4 times per day. The problem has been unchanged. The cough is productive of sputum (white). Associated symptoms include dyspnea on exertion, postnasal drip and rhinorrhea. Pertinent negatives include no appetite change, chest pain, fever, sneezing or weight loss. Her symptoms are aggravated by change in weather and minimal activity. Her symptoms are alleviated by beta-agonist. She reports significant improvement on treatment. Risk factors for lung disease include animal exposure and smoking/tobacco exposure. Her past medical history is significant for emphysema. Progress History:   Since last visit any new medical issues? Yes reports recent operation Dr. Mis Pope and having increase in pain in butt and legs at this time  Using inhalers? Yes Trelegy and as needed albuterol   Are they helpful? Yes albuterol is helpful  Previous inhalers? [facility] - stopped due to cost, Stiolto (patient stopped as she believed this caused her lungs to worsen), Symbicort (believes caused her symptoms to worsen), Advair (uncertain why it was stopped)  Any recent exacerbations? Yes  Last PFT: 8/31/2021 - moderate restriction and severely decreased DLCO  Last 6 MWT: 9/15/2021 - 1.5 lpm with rest and sleep and 3 lpm on exertion      Smoking History:  Smoked 2 PPD for 40 years (80 pack years), quit in 2010  Admits to passive tobacco exposure from parents or work environment.  She lives with her daughter who smokes in the house. Social History:  Patient job history: Retired, previously worked at [employer]   She has not had exposure to aerosolized particles or hazardous fumes. (Coal, dust, asbestos, molds ie Hay)  Denies living on a farm that primarily raised crops, livestock or combination  Admits to exposure to pets/animals at home. Cats at home  Denies exposure to tuberculosis. Denies family history of ALS, GBS, myasthenia gravis, myotonic dystrophy, MS, or spinal muscular atrophy.   Denies family history of autoimmune conditions, such as RA, SLE, etc.  She has a sister who was on oxygen for years and had a single lung transplant     Pneumonia vaccine: Jojokurtis Vela 4/2/2018 & 10/24/2012  COVID-19: vaccinated  Past Medical hx   PMH:  Past Medical History:   Diagnosis Date    Bladder dysfunction     CAD (coronary artery disease)     Cerebral artery occlusion with cerebral infarction ([address]     TIA    Cerebrovascular disease     CHF (congestive heart failure) [facility]     COPD (chronic obstructive pulmonary disease) ([facility]     uses oxygen 24 hours per day    DDD (degenerative disc disease)     Depression     GERD (gastroesophageal reflux disease)     Hyperlipidemia     Hypertension     Obesity     Other disorders of kidney and ureter in diseases classified elsewhere     Other screening mammogram 2011    Pap smear, as part of routine gynecological examination 2007    Pap smear, as part of routine gynecological examination 2007    Peripheral edema     with ascites    Pneumonia     PONV (postoperative nausea and vomiting)     with back injection in [facility]    Unspecified sleep apnea     c-pap at 10 cm wtr     SURGICAL HISTORY:  Past Surgical History:   Procedure Laterality Date    COLONOSCOPY  7-2009    DILATION AND CURETTAGE OF UTERUS  1970's    ENDOSCOPY, COLON, DIAGNOSTIC      EYE SURGERY  2013    Laser surgery     LUMBAR SPINE SURGERY Left 12/12/2017    LUMBAR RFA L2-3, 3-4, 4-5, L5-S1 LEFT SIDE performed by Rosanna Lara MD at [facility] Right 10/7/2019    L-facet RFA @ L2-3, L3-4, L4-5, and L5-S1 RIGHT SIDE FIRST performed by Rosanna Lara MD at [place] [address]  2016    burning nerves in back - lumbar @ [facility]    OTHER SURGICAL HISTORY Left 2017    Lumbar RFA L2-3, L3-4, L4-5, L5-S1    PAIN MANAGEMENT PROCEDURE Left 2020    Left L-facet RFA @ L2-3, L3-4, L4-5, and L5-S1 performed by Rosanna Lara MD at [facility] Right 2020    Right L-facet RFA @ L2-3, L3-4, L4-5, and L5-S1 performed by Rosanna Lara MD at [facility] Left 2020    LEFT L-facet RFA @ L2-3, L3-4, L4-5, and L5-S1 performed by Rosanna Lara MD at [facility] Right 2021    Right L-facet RFA @ L2-3, L3-4, L4-5, and L5-S1 performed by Rosanna Lara MD at [facility] Left 2021    Left L-facet RFA @ L2-3, L3-4, L4-5, and L5-S1 performed by Rosanna Lara MD at [facility] Bilateral 2022    Bilateral L-facet RFA at L4-5 and L5-S1. performed by Rosanna Lara MD at [address] [place] [address] Street:  Social History     Tobacco Use    Smoking status: Former     Packs/day: 2.00     Years: 40.00     Pack years: 80.00     Types: Cigarettes     Quit date: 2010     Years since quittin.7    Smokeless tobacco: Never   Vaping Use    Vaping Use: Never used   Substance Use Topics    Alcohol use: No     Comment: pt hasn't drank since     Drug use: No     ALLERGIES:  Allergies   Allergen Reactions    Codeine Itching     Pt cant remember    Doxycycline      Cant breath     Lipitor Itching    Simvastatin Itching     Stomach pain    Chantix [Varenicline Tartrate] Itching FAMILY HISTORY:  Family History   Problem Relation Age of Onset    Heart Failure Mother     Heart Disease Mother     High Blood Pressure Mother     Heart Failure Father     Heart Disease Father     Alzheimer's Disease Father     Parkinsonism Father     High Blood Pressure Father     COPD Sister     Cancer Brother         skin    Stroke Brother     Cancer Daughter         colon    Diabetes Daughter      CURRENT MEDICATIONS:  Current Outpatient Medications   Medication Sig Dispense Refill    Respiratory Therapy Supplies (NEBULIZER/TUBING/MOUTHPIECE) KIT 1 kit by Does not apply route every 6 hours as needed (shortness of breath) 1 kit 1    guaiFENesin (MUCINEX) 600 MG extended release tablet Take 1 tablet by mouth in the morning and 1 tablet before bedtime. 60 tablet 6    HYDROcodone-acetaminophen (NORCO) 5-325 MG per tablet Take 1 tablet by mouth every 8 hours as needed for Pain for up to 30 days. 90 tablet 0    VENTOLIN  (90 Base) MCG/ACT inhaler Inhale 2 puffs into the lungs every 6 hours as needed for Wheezing or Shortness of Breath 18 g 11    fluticasone-umeclidin-vilant (TRELEGY ELLIPTA) 100-62.5-25 MCG/INH AEPB Inhale 1 puff into the lungs daily 30 each 11    albuterol (PROVENTIL) (2.5 MG/3ML) 0.083% nebulizer solution Take 3 mLs by nebulization every 6 hours as needed for Wheezing or Shortness of Breath 120 mL 11    furosemide (LASIX) 20 MG tablet Take 1 tablet by mouth daily as needed (swelling) 60 tablet 0    CPAP Machine MISC by Does not apply route Please check patient's CPAP machine for proper functioning by [employer]. Turn off ramp 1 each 0    lidocaine (LMX) 4 % cream Apply topically as needed every 6 hours to left hand for pain.  1 Tube 2    fluticasone (FLONASE) 50 MCG/ACT nasal spray 1 spray by Each Nostril route daily      montelukast (SINGULAIR) 10 MG tablet Take 1 tablet by mouth nightly 30 tablet 3    metFORMIN (GLUCOPHAGE) 500 MG tablet Take 500 mg by mouth daily (with breakfast) pseudoephedrine (SUDAFED 12 HR) 120 MG extended release tablet Take 120 mg by mouth every 12 hours as needed for Congestion      amLODIPine (NORVASC) 5 MG tablet Take 5 mg by mouth daily      losartan (COZAAR) 50 MG tablet Take 50 mg by mouth daily   0    OXYGEN   Inhale into the lungs 3 lpm, per patient at home and when walking      aspirin 81 MG EC tablet Take 81 mg by mouth daily. omeprazole (PRILOSEC) 40 MG delayed release capsule Take 40 mg by mouth daily (Patient not taking: Reported on 7/27/2022)      polyethylene glycol (GLYCOLAX) 17 g packet Take 17 g by mouth daily as needed for Constipation (Patient not taking: Reported on 7/27/2022)      triamterene-hydrochlorothiazide (MAXZIDE-25) 37.5-25 MG per tablet take 1 tablet by mouth every morning  0    RA COL-RITE 100 MG capsule daily  (Patient not taking: Reported on 7/27/2022)  0    atorvastatin (LIPITOR) 40 MG tablet Take 1 tablet by mouth daily (Patient not taking: Reported on 7/27/2022) 30 tablet 3     No current facility-administered medications for this visit. Erika ALEXANDER   Review of Systems   Constitutional:  Negative for appetite change, fever and weight loss. HENT:  Positive for postnasal drip and rhinorrhea. Negative for congestion, sinus pressure, sinus pain and sneezing. Respiratory:  Positive for cough, sputum production, shortness of breath and wheezing. Negative for hemoptysis. Cardiovascular:  Positive for dyspnea on exertion. Negative for chest pain, palpitations and leg swelling. Genitourinary:  Negative for difficulty urinating. Musculoskeletal:  Positive for back pain. Allergic/Immunologic: Positive for environmental allergies.       Physical exam   /76 (Site: Left Upper Arm, Position: Sitting, Cuff Size: Medium Adult)   Pulse 87   Temp 97.8 °F (36.6 °C)   Ht 5' (1.524 m)   Wt 164 lb 6.4 oz (74.6 kg)   SpO2 94% Comment: patient sitting 3l or 2l o2  BMI 32.11 kg/m²    Wt Readings from Last 3 Encounters: 07/27/22 164 lb 6.4 oz (74.6 kg)   07/25/22 164 lb (74.4 kg)   06/20/22 164 lb (74.4 kg)       Physical Exam  Constitutional:       General: She is not in acute distress. Appearance: She is well-developed. Comments: BMI 32   HENT:      Head: Normocephalic and atraumatic. Right Ear: External ear normal.      Left Ear: External ear normal.      Mouth/Throat:      Mouth: Mucous membranes are moist.      Pharynx: Oropharynx is clear. No oropharyngeal exudate. Eyes:      General:         Right eye: No discharge. Left eye: No discharge. Cardiovascular:      Rate and Rhythm: Normal rate and regular rhythm. Pulmonary:      Effort: Pulmonary effort is normal. No respiratory distress. Breath sounds: No wheezing, rhonchi or rales. Comments: Diminished breath sounds  Chest:      Chest wall: No tenderness. Abdominal:      General: Bowel sounds are normal.      Palpations: Abdomen is soft. Musculoskeletal:      Cervical back: Neck supple. Right lower leg: No edema. Left lower leg: No edema. Skin:     General: Skin is warm and dry. Neurological:      General: No focal deficit present. Mental Status: She is alert. Psychiatric:         Mood and Affect: Mood normal.         Behavior: Behavior normal.         Thought Content: Thought content normal.         Judgment: Judgment normal.        Results   Lung Nodule Screening     [x] Qualifies    [] Does not qualify   [] Declined    [] Completed  80 PY history, quit in 2010   The USPSTF recommends annual screening for lung cancer with low-dose computed tomography (LDCT) in adults aged 48 to [de-identified] years who have a 20 pack-year smoking history and currently smoke or have quit within the past 15 years. Screening should be discontinued once a person has not smoked for 15 years or develops a health problem that substantially limits life expectancy or the ability or willingness to have curative lung surgery.      Assessment Diagnosis Orders   1. Centrilobular emphysema (HCC)  Respiratory Therapy Supplies (NEBULIZER/TUBING/MOUTHPIECE) KIT    guaiFENesin (MUCINEX) 600 MG extended release tablet    Full PFT Study With Bronchodilator      2. Restrictive lung disease        3. Chronic respiratory failure with hypoxia (HCC)        4. Environmental and seasonal allergies  Rast Panel    guaiFENesin (MUCINEX) 600 MG extended release tablet      5. Chronic diastolic congestive heart failure ([facility]              Plan   1. Centrilobular emphysema ([facility]  - Discussed adjusting inhaler therapy with patient as she does not feel benefit with Trelegy. Patient declines at this time and would like to stay on Trelegy to see if she will get more benefit. Advised to rinse and spit after use. -Continue albuterol inhaler or nebulizer 1 or the other every 6 hours as needed for shortness of breath or wheezing  - Respiratory Therapy Supplies (NEBULIZER/TUBING/MOUTHPIECE) KIT; 1 kit by Does not apply route every 6 hours as needed (shortness of breath)  -Start guaiFENesin (MUCINEX) 600 MG extended release tablet; Take 1 tablet by mouth in the morning and 1 tablet before bedtime  - Full PFT Study With Bronchodilator prior to next appointment  -Patient is not a candidate for [place] valve as her RV is not >/= 175% and her TLC is not >/= 100%  - Will discuss A1A testing with patient at her next appointment  -Maintain pneumonia vaccine with primary care provider  -Received COVID-19 vaccination    2. Restrictive lung disease  - Full PFT Study With Bronchodilator prior to next appointment    3. Chronic respiratory failure with hypoxia (HCC)  -Continue 1.5 L/min at rest and 3 L/min with exertion. Advised patient to discuss POC with [facility]'s home medical equipment and advised her to show them the kink in her O2 tubing    4. Environmental and seasonal allergies  -Obtain Rast Panel  -Continue over-the-counter loratadine    5.  CHF  - Advised patient to continue to follow with cardiologist, Dr. Vazquez Lyons patient to take daily weights and to call cardiologist with weight gain of 3 lbs in 1 day or 5 lbs in 1 week     Advised patient to call office with any changes, questions, or concerns regarding respiratory status or issues with prescribed medications    Return in about 2 months (around 9/27/2022) for emphysema with PFT prior.        Electronically signed by CASSANDRA Oropeza CNP on 7/27/2022 at 2:12 PM

## 2022-07-27 NOTE — PATIENT INSTRUCTIONS
Continue Trelegy. Rinse mouth with water and spit after use. Continue your albuterol inhaler and nebulizer. You may use one or the other every 6 hours as needed for shortness of breath or wheezing. Do NOT use both at the same time as they continue the same medication. We will start Mucinex 1 (600 mg tablet) twice daily. If you pick this up over the counter, make sure to get plain Mucinex, not Mucinex DM. Continue with Dr. Jayde Sanders. Make sure to weigh yourself daily. If you gain 3 lbs in 1 day or 5 lbs in 1 week, call your cardiologist.     I will see you back in 2 months with a pulmonary function test prior to your appointment.

## 2022-08-01 NOTE — PROGRESS NOTES
[facility]      Aracelis Hanna         [id number]  8/2/2022   Chief Complaint   Patient presents with    Follow-up     1 year with CLOUD        Pt of Dr. Rei ARANGO Download:   Original or initial AHI: 84     Date of initial study: 10/8/2008      Compliant  100%     Noncompliant 0 %     PAP Type Spont Level  16/8   Avg Hrs/Day [id number]  AHI: 0.6   Recorded compliance dates : 7/2/22-7/31/22  Machine/Mfg:   [x] ResMed    [] Respironics/Dreamstation   Interface:   [] Nasal    [] Nasal pillows   [x] FFM      Provider:      [x] [facility]     [][facility]     [] [employer]    [] Birda Sylvie    [] [facility]               [] [facility]      [] [employer]    [] [address]      [] Other    Neck Size: 18.25  Mallampati 4      Here is a scan of the most recent download:                Presentation:   Jaylyn Faria presents for sleep medicine follow up for obstructive sleep apnea  Since the last visit, Jaylyn Faria is doing ok with PAP. She continues having dry mouth. She is sleeping well and feels rested. Mask is leaking. She has O2 at 3.5 liters bled into PAP and during they day. Equipment issues: The pressure is  acceptable, the mask is acceptable     Sleep issues:  Do you feel better? Yes  More rested? Yes   Better concentration? yes    Progress History:   Since last visit any new medical issues? Yes back pain  New ER or hospital visits? No  Any new or changes in medicines? No  Any new sleep medicines? No    Review of Systems -   Review of Systems   Constitutional:  Negative for activity change, appetite change, chills and fever. HENT:  Negative for congestion and postnasal drip. Dry mouth    Eyes: Negative. Respiratory:  Positive for cough, shortness of breath and wheezing. Negative for chest tightness and stridor. Cardiovascular:  Negative for chest pain and leg swelling. Gastrointestinal:  Negative for diarrhea and nausea. Endocrine: Negative. Genitourinary: Negative. any equipment today? Yes chin strap  - continue O2 at 3.5 liters ATC  - Download reviewed and discussed with patient  - She  was advised to continue current positive airway pressure therapy with above described pressure. - She  advised to keep good compliance with current recommended pressure to get optimal results and clinical improvement  - Recommend 7-9 hours of sleep with PAP  - She was advised to call [employer] regarding supplies if needed.   -She call my office for earlier appointment if needed for worsening of sleep symptoms.   - She was instructed on weight loss  - Rasta Maldonado was educated about my impression and plan. Patient verbalizesunderstanding.   We will see Solis Soria back in: 1 year with download    Information added by my medical assistant/LPN was reviewed today          Lord Ryan DAVIS, CHELSY  8/2/2022

## 2022-08-02 ENCOUNTER — OFFICE VISIT (OUTPATIENT)
Dept: PULMONOLOGY | Age: 72
End: 2022-08-02
Payer: MEDICARE

## 2022-08-02 VITALS
OXYGEN SATURATION: 91 % | TEMPERATURE: 97.8 F | WEIGHT: 164 LBS | SYSTOLIC BLOOD PRESSURE: 122 MMHG | HEART RATE: 77 BPM | HEIGHT: 62 IN | BODY MASS INDEX: 30.18 KG/M2 | DIASTOLIC BLOOD PRESSURE: 70 MMHG

## 2022-08-02 DIAGNOSIS — J96.11 CHRONIC RESPIRATORY FAILURE WITH HYPOXIA (HCC): ICD-10-CM

## 2022-08-02 DIAGNOSIS — E66.9 OBESITY (BMI 30-39.9): ICD-10-CM

## 2022-08-02 DIAGNOSIS — G47.33 OSA ON CPAP: Primary | ICD-10-CM

## 2022-08-02 DIAGNOSIS — Z99.89 OSA ON CPAP: Primary | ICD-10-CM

## 2022-08-02 PROCEDURE — 1123F ACP DISCUSS/DSCN MKR DOCD: CPT | Performed by: PHYSICIAN ASSISTANT

## 2022-08-02 PROCEDURE — 99213 OFFICE O/P EST LOW 20 MIN: CPT | Performed by: PHYSICIAN ASSISTANT

## 2022-08-02 ASSESSMENT — ENCOUNTER SYMPTOMS
STRIDOR: 0
DIARRHEA: 0
SHORTNESS OF BREATH: 1
NAUSEA: 0
WHEEZING: 1
COUGH: 1
CHEST TIGHTNESS: 0
ALLERGIC/IMMUNOLOGIC NEGATIVE: 1
BACK PAIN: 1
EYES NEGATIVE: 1

## 2022-08-02 NOTE — PROGRESS NOTES
Patient reports PAP pressure is: fine    Patient reports some air leaks.  (Thinks she has got it down though)    Patient is falling asleep with difficulty: no    Patient is having difficulty remaining asleep: yes: only to use RR    Patient needs supplies: Yes    Patient is having daytime sleepiness: no    Other: Dry mouth

## 2022-08-23 DIAGNOSIS — G89.4 CHRONIC PAIN SYNDROME: ICD-10-CM

## 2022-08-24 RX ORDER — HYDROCODONE BITARTRATE AND ACETAMINOPHEN 5; 325 MG/1; MG/1
1 TABLET ORAL EVERY 8 HOURS PRN
Qty: 90 TABLET | Refills: 0 | Status: SHIPPED | OUTPATIENT
Start: 2022-08-27 | End: 2022-09-26 | Stop reason: SDUPTHER

## 2022-08-24 NOTE — TELEPHONE ENCOUNTER
OARRS reviewed. UDS: + for Hydrocodone   Last seen: 7/25/2022.  Follow-up: 9/26/22  Future Appointments   Date Time Provider Jesu Saleh   9/26/2022 11:30 AM CASSANDRA Medrano - CNP N [facility]   9/29/2022  2:00 PM STR PULMONARY FUNCTION ROOM 1 [facility] PFT [facility]   10/5/2022  1:30 PM CASSANDRA Oropeza 37   8/1/2023  9:45 AM Dereck Schmid

## 2022-09-12 ENCOUNTER — HOSPITAL ENCOUNTER (OUTPATIENT)
Dept: GENERAL RADIOLOGY | Age: 72
Discharge: HOME | End: 2022-09-12
Payer: MEDICARE

## 2022-09-12 ENCOUNTER — HOSPITAL ENCOUNTER (OUTPATIENT)
Age: 72
Discharge: HOME | End: 2022-09-12
Payer: MEDICARE

## 2022-09-12 DIAGNOSIS — M47.816 SPONDYLOSIS OF LUMBAR REGION WITHOUT MYELOPATHY OR RADICULOPATHY: ICD-10-CM

## 2022-09-12 DIAGNOSIS — M54.50 CHRONIC BILATERAL LOW BACK PAIN WITHOUT SCIATICA: ICD-10-CM

## 2022-09-12 DIAGNOSIS — G89.4 CHRONIC PAIN SYNDROME: ICD-10-CM

## 2022-09-12 DIAGNOSIS — G89.29 CHRONIC BILATERAL LOW BACK PAIN WITHOUT SCIATICA: ICD-10-CM

## 2022-09-12 DIAGNOSIS — J30.89 ENVIRONMENTAL AND SEASONAL ALLERGIES: ICD-10-CM

## 2022-09-12 PROCEDURE — 72100 X-RAY EXAM L-S SPINE 2/3 VWS: CPT

## 2022-09-12 PROCEDURE — 86003 ALLG SPEC IGE CRUDE XTRC EA: CPT

## 2022-09-12 PROCEDURE — 82785 ASSAY OF IGE: CPT

## 2022-09-12 PROCEDURE — 36415 COLL VENOUS BLD VENIPUNCTURE: CPT

## 2022-09-14 LAB
2000687N OAK TREE IGE: < 0.1 KU/L (ref 0–0.34)
ALLERGEN BERMUDA GRASS IGE: < 0.1 KU/L (ref 0–0.34)
ALLERGEN BIRCH IGE: < 0.1 KU/L (ref 0–0.34)
ALLERGEN DOG DANDER IGE: < 0.1 KU/L (ref 0–0.34)
ALLERGEN GERMAN COCKROACH IGE: < 0.1 KU/L (ref 0–0.34)
ALLERGEN HORMODENDRUM IGE: < 0.1 KUL/L (ref 0–0.34)
ALLERGEN MOUSE EPITHELIA IGE: < 0.1 KU/L (ref 0–0.34)
ALLERGEN PECAN TREE IGE: < 0.1 KU/L (ref 0–0.34)
ALLERGEN PIGWEED ROUGH IGE: < 0.1 KU/L (ref 0–0.34)
ALLERGEN SHEEP SORREL (W18) IGE: < 0.1 KU/L (ref 0–0.34)
ALLERGEN TREE SYCAMORE: < 0.1 KU/L (ref 0–0.34)
ALLERGEN WALNUT TREE IGE: < 0.1 KU/L (ref 0–0.34)
ALLERGEN WHITE MULBERRY TREE, IGE: < 0.1 KU/L (ref 0–0.34)
ALLERGEN, TREE, WHITE ASH IGE: < 0.1 KU/L (ref 0–0.34)
ALTERNARIA ALTERNATA: < 0.1 KU/L (ref 0–0.34)
ASPERGILLUS FUMIGATUS: < 0.1 KU/L (ref 0–0.34)
CAT DANDER ANTIBODY: < 0.1 KU/L (ref 0–0.34)
COTTONWOOD TREE: < 0.1 KU/L (ref 0–0.34)
D. FARINAE: < 0.1 KU/L (ref 0–0.34)
D. PTERONYSSINUS: < 0.1 KU/L (ref 0–0.34)
ELM TREE: < 0.1 KU/L (ref 0–0.34)
IGE: 6 IU/ML
MAPLE/BOXELDER TREE: < 0.1 KU/L (ref 0–0.34)
MOUNTAIN CEDAR TREE: < 0.1 KU/L (ref 0–0.34)
MUCOR RACEMOSUS: < 0.1 KU/L (ref 0–0.34)
P. NOTATUM: < 0.1 KU/L (ref 0–0.34)
RUSSIAN THISTLE: < 0.1 KU/L (ref 0–0.34)
SHORT RAGWD(A ARTEMIS.) IGE: < 0.1 KU/L (ref 0–0.34)
TIMOTHY GRASS: < 0.1 KU/L (ref 0–0.34)

## 2022-09-26 ENCOUNTER — OFFICE VISIT (OUTPATIENT)
Dept: PHYSICAL MEDICINE AND REHAB | Age: 72
End: 2022-09-26
Payer: MEDICARE

## 2022-09-26 VITALS
HEIGHT: 62 IN | BODY MASS INDEX: 30.18 KG/M2 | WEIGHT: 164 LBS | OXYGEN SATURATION: 91 % | SYSTOLIC BLOOD PRESSURE: 126 MMHG | DIASTOLIC BLOOD PRESSURE: 64 MMHG

## 2022-09-26 DIAGNOSIS — M46.1 SI (SACROILIAC) JOINT INFLAMMATION (HCC): ICD-10-CM

## 2022-09-26 DIAGNOSIS — G89.4 CHRONIC PAIN SYNDROME: ICD-10-CM

## 2022-09-26 DIAGNOSIS — M80.00XA AGE-RELATED OSTEOPOROSIS WITH CURRENT PATHOLOGICAL FRACTURE, INITIAL ENCOUNTER: ICD-10-CM

## 2022-09-26 DIAGNOSIS — M48.061 SPINAL STENOSIS OF LUMBAR REGION WITHOUT NEUROGENIC CLAUDICATION: ICD-10-CM

## 2022-09-26 DIAGNOSIS — S32.010A COMPRESSION FRACTURE OF L1 VERTEBRA, INITIAL ENCOUNTER (HCC): ICD-10-CM

## 2022-09-26 DIAGNOSIS — M54.50 CHRONIC BILATERAL LOW BACK PAIN WITHOUT SCIATICA: ICD-10-CM

## 2022-09-26 DIAGNOSIS — M47.816 SPONDYLOSIS OF LUMBAR REGION WITHOUT MYELOPATHY OR RADICULOPATHY: Primary | ICD-10-CM

## 2022-09-26 DIAGNOSIS — G89.29 CHRONIC BILATERAL LOW BACK PAIN WITHOUT SCIATICA: ICD-10-CM

## 2022-09-26 PROCEDURE — 99214 OFFICE O/P EST MOD 30 MIN: CPT | Performed by: NURSE PRACTITIONER

## 2022-09-26 PROCEDURE — 1123F ACP DISCUSS/DSCN MKR DOCD: CPT | Performed by: NURSE PRACTITIONER

## 2022-09-26 RX ORDER — HYDROCODONE BITARTRATE AND ACETAMINOPHEN 5; 325 MG/1; MG/1
1 TABLET ORAL EVERY 8 HOURS PRN
Qty: 90 TABLET | Refills: 0 | Status: CANCELLED | OUTPATIENT
Start: 2022-09-26 | End: 2022-10-26

## 2022-09-26 RX ORDER — HYDROCODONE BITARTRATE AND ACETAMINOPHEN 5; 325 MG/1; MG/1
1 TABLET ORAL EVERY 8 HOURS PRN
Qty: 90 TABLET | Refills: 0 | Status: SHIPPED | OUTPATIENT
Start: 2022-09-26 | End: 2022-10-21 | Stop reason: SDUPTHER

## 2022-09-26 ASSESSMENT — ENCOUNTER SYMPTOMS
BACK PAIN: 1
CHEST TIGHTNESS: 1
SHORTNESS OF BREATH: 1
WHEEZING: 1
GASTROINTESTINAL NEGATIVE: 1
COUGH: 1

## 2022-09-26 NOTE — PROGRESS NOTES
[address] [place] [address] [place] [address]  Dept: [phone]  Dept Fax: [phone]: [phone]    Visit Date: 9/26/2022    Functionality Assessment/Goals Worksheet     On a scale of 0 (Does not Interfere) to 10 (Completely Interferes)     1. Which number describes how during the past week pain has interfered with       the following:  A. General Activity:  9  B. Mood: 8  C. Walking Ability:  8  D. Normal Work (Includes both work outside the home and housework):  8  E. Relations with Other People:   8  F. Sleep:   9  G. Enjoyment of Life:   9    2. Patient Prefers to Take their Pain Medications:     []  On a regular basis   [x]  Only when necessary    []  Does not take pain medications    3. What are the Patient's Goals/Expectations for Visiting Pain Management? []  Learn about my pain    [x]  Receive Medication   []  Physical Therapy     []  Treat Depression   [x]  Receive Injections    []  Treat Sleep   []  Deal with Anxiety and Stress   []  Treat Opoid Dependence/Addiction   []  Other:      HPI:   Husam Diaz is a 67 y.o. female is here today for    Chief Complaint: Low back pain, leg pain     HPI   2 month FU. Continues to have pain in low back - aching and stiff and burning is  not as bad as it was previously as she is receiving relief from L-facet RFA   Also having pain in mud back- stiff and aching     Intermittently radiates down left leg posterior down to calf and ankle- charley horse and spasms     Feels short of breath more lately states has been going through COPD exacerbation. On 2.5 liters of oxygen today   Pain increases with bending, lifting, twisting , walking, standing, getting up and down, and housework or working at job. Norco prn remains effective \"Its the only thing that is getting me through\"       Medications reviewed.  Patient denies side effects with medications. Patient states she is taking medications as prescribed. Shedenies receiving pain medications from other sources. She denies any ER visits since last visit. Pain scale with out pain medications or at its worst is 8/10. Pain scale with pain medications or at its best is 4-5/10. Last dose of Norco was today   Drug screen reviewed from 7/25/2022 and was appropriate  Pill count completed  today and WNL: Yes    Lumbar xray:   1. Moderate thoracolumbar levoscoliosis, not appreciably changed from prior study. 2. Moderate diffuse demineralization of the bones, consistent with osteoporosis. Mild superior endplate depression deformities L1, L2, L3 and L4, likely secondary to osteoporosis although the relative acuity of these findings is uncertain. 3. Moderately severe multifocal degenerative facet arthropathy throughout the lumbar spine. Mild antegrade spondylolisthesis of L4 upon L5, 5 mm. 4. For further evaluation of the endplate depression deformities, MRI of the lumbar spine might be considered. .. The patientis allergic to codeine, doxycycline, lipitor, simvastatin, and chantix [varenicline tartrate]. Subjective:      Review of Systems   Constitutional:  Positive for fatigue. HENT: Negative. Respiratory:  Positive for cough, chest tightness, shortness of breath and wheezing. On 2.5 liters of oxygen per nasal cannula at this time   Cardiovascular:  Positive for leg swelling. Negative for chest pain and palpitations. Gastrointestinal: Negative. Genitourinary: Negative. Musculoskeletal:  Positive for arthralgias, back pain and myalgias. Negative for gait problem and joint swelling. Not using any assist devices   Skin: Negative. Neurological:  Positive for weakness. Negative for numbness. Psychiatric/Behavioral:  Negative for sleep disturbance.       Objective:     Vitals:    09/26/22 1131   BP: 126/64   SpO2: 91%   Weight: 164 lb (74.4 kg)   Height: 5' 2\" (1.575 m)       Physical Exam  Vitals and nursing note reviewed. Constitutional:       General: She is not in acute distress. Appearance: Normal appearance. She is well-developed. She is not diaphoretic. HENT:      Head: Normocephalic and atraumatic. Right Ear: External ear normal.      Left Ear: External ear normal.      Nose: Nose normal.      Mouth/Throat:      Mouth: Mucous membranes are dry. Pharynx: No oropharyngeal exudate. Eyes:      General: No scleral icterus. Right eye: No discharge. Left eye: No discharge. Conjunctiva/sclera: Conjunctivae normal.      Pupils: Pupils are equal, round, and reactive to light. Neck:      Thyroid: No thyromegaly. Cardiovascular:      Rate and Rhythm: Normal rate and regular rhythm. Pulses: Normal pulses. Heart sounds: Normal heart sounds. No murmur heard. No friction rub. No gallop. Pulmonary:      Effort: Pulmonary effort is normal. No respiratory distress. Breath sounds: Normal breath sounds. No wheezing or rales. Comments: On 3 liters of oxygen   Chest:      Chest wall: No tenderness. Abdominal:      General: Bowel sounds are normal. There is no distension. Palpations: Abdomen is soft. Tenderness: There is no abdominal tenderness. There is no guarding or rebound. Musculoskeletal:         General: Swelling and tenderness present. Right shoulder: Tenderness present. Decreased range of motion. Left shoulder: Tenderness present. Decreased range of motion. Right elbow: Normal.      Left elbow: Normal.      Right wrist: No swelling, tenderness or bony tenderness. Left wrist: Tenderness and bony tenderness present. Decreased range of motion. Cervical back: Full passive range of motion without pain, normal range of motion and neck supple. Spasms, tenderness and bony tenderness present. No edema, erythema or rigidity. No muscular tenderness. Normal range of motion. Thoracic back: Tenderness present. Lumbar back: Spasms, tenderness and bony tenderness present. Decreased range of motion. Negative right straight leg raise test and negative left straight leg raise test.        Back:       Right hip: Normal.      Left hip: Tenderness present. Decreased range of motion. Decreased strength. Right upper leg: Tenderness present. Left upper leg: Tenderness present. Right knee: Normal.      Left knee: Normal.      Right lower leg: No edema. Left lower leg: No edema. Skin:     General: Skin is warm. Coloration: Skin is not pale. Findings: No erythema or rash. Neurological:      General: No focal deficit present. Mental Status: She is alert and oriented to person, place, and time. She is not disoriented. Cranial Nerves: No cranial nerve deficit. Sensory: No sensory deficit. Motor: Weakness present. No atrophy or abnormal muscle tone. Coordination: Coordination normal.      Gait: Gait normal.      Deep Tendon Reflexes: Reflexes are normal and symmetric. Comments:   Motor 5/5 BUE BLE   Psychiatric:         Attention and Perception: She is attentive. Mood and Affect: Mood normal. Mood is not anxious or depressed. Affect is not labile, blunt, angry or inappropriate. Speech: Speech normal.         Behavior: Behavior normal. Behavior is not agitated, slowed, aggressive, withdrawn, hyperactive or combative. Thought Content: Thought content normal. Thought content is not paranoid or delusional. Thought content does not include homicidal or suicidal ideation. Thought content does not include homicidal or suicidal plan. Cognition and Memory: Memory is not impaired. She does not exhibit impaired recent memory or impaired remote memory. Judgment: Judgment normal. Judgment is not impulsive or inappropriate.      ANASTACIA  Patricks test  positive  Yeoman's  or Gaenslen's positive         Assessment: 1. Spondylosis of lumbar region without myelopathy or radiculopathy    2. Chronic bilateral low back pain without sciatica    3. SI (sacroiliac) joint inflammation (HCC)    4. Spinal stenosis of lumbar region without neurogenic claudication    5. Compression fracture of L1 vertebra, initial encounter ([facility])    6. Age-related osteoporosis with current pathological fracture, initial encounter    7. Chronic pain syndrome            Plan:      OARRS reviewed. Current MED: 15.00  Patient was offered naloxone for home. Discussed long term side effects of medications, tolerance, dependency and addiction. Previous UDS reviewed  UDS preformed today for compliance. Patient told can not receive any pain medications from any other source. No evidence of abuse, diversion or aberrant behavior. Medications and/or procedures to improve function and quality of life- patient understanding with this and that may not be pain free  Discussed with patient about safe storage of medications at home  Discussed possible weaning of medication dosing dependent on treatment/procedure results. Discussed with patient about risks with procedure including infection, reaction to medication, increased pain, or bleeding. Procedure notes reviewed in detail  Is receiving relief from L-facet RFA- pain is improved   Reviewed lumbar xray and discussed possible compression deformities and recommend updated Lumbar MRI but she refused states she does not want anything invasive   Continue Norco 5/325, 1 tab every 8 hours as needed for breakthrough pain- ordered refill- printed as eprescribe is not working   Patient is compliant     Meds. Prescribed:   Orders Placed This Encounter   Medications    HYDROcodone-acetaminophen (NORCO) 5-325 MG per tablet     Sig: Take 1 tablet by mouth every 8 hours as needed for Pain for up to 30 days.      Dispense:  90 tablet     Refill:  0     Reduce doses taken as pain becomes manageable         Return in about 2 months (around 11/26/2022), or if symptoms worsen or fail to improve, for follow up  for medications.                Electronically signed by CASSANDRA Mosley CNP on9/26/2022 at 11:49 AM

## 2022-09-28 ENCOUNTER — PATIENT MESSAGE (OUTPATIENT)
Dept: PULMONOLOGY | Age: 72
End: 2022-09-28

## 2022-09-28 DIAGNOSIS — J44.1 COPD EXACERBATION (HCC): Primary | ICD-10-CM

## 2022-09-28 RX ORDER — PREDNISONE 20 MG/1
40 TABLET ORAL DAILY
Qty: 10 TABLET | Refills: 0 | Status: SHIPPED | OUTPATIENT
Start: 2022-09-28 | End: 2022-10-03

## 2022-09-28 RX ORDER — AZITHROMYCIN 500 MG/1
500 TABLET, FILM COATED ORAL DAILY
Qty: 3 TABLET | Refills: 0 | Status: SHIPPED | OUTPATIENT
Start: 2022-09-28 | End: 2022-10-01

## 2022-09-28 NOTE — TELEPHONE ENCOUNTER
To: Zhao Orozco  From: [facility]  Subject: COPD f/u     Ii AM LUANNE WEN C.O. P. D flare op could you call me in a [address]

## 2022-10-02 NOTE — PROGRESS NOTES
[facility]    Patient: Leslie Howard, 67 y.o.   : 1950  10/5/2022    Patient of mine     Subjective     Chief Complaint   Patient presents with    Follow-up     COPD 2 month f/u. Did not get PFT done due to being sick, kept appt. Nicolle Parker is here for follow up for centrilobular emphysema and RLD. Patient messaged on 22 reporting a COPD flare. I treated patient with prednisone and azithro. Patient reports good compliance with O2 1.5 lpm at rest and 3 lpm with exertion. She does not think that her O2 machine is working well. She reports Carolyn Olmos is coming to look at her oxygen this afternoon. Patient cancelled her ordered pulmonary function test due to increased shortness of breath. Patient reports that her shortness of breath first increased about 1 week ago. She reports great difficulty with completing small exertional tasks, such as walking short distances. She reports that when she called and this was about 2 days after her symptom onset. She completed her azithromycin and prednisone and reports that these did not help her shortness of breath. She does report some leg swelling. She has CHF and has not been weighing herself daily. She has gained 1 pound since her last appointment. She does admit that she is 10 pounds heavier now than what she was 1 year ago. She reports good compliance with taking her baby aspirin. She reports some pain in her sternum that is tender to the touch. She admits that she has been coughing much more than her normal and has had to cough very hard to produce sputum. She reports that this started and pain started after she started coughing hard. She missed her last appointment with her cardiologist, Dr. Dimas Crisostomo and admits that she has not been seen in about 1.5 years. She has been monitoring her pulse ox at home and states that this has been around 97%. Patient reports good compliance with inhaled medications (Trelegy). Patient using albuterol 6 times per day on average in the last week. Patient reports physical limitation due to respiratory symptoms. Her past medical history is significant for restrictive lung disease, emphysema, PETE (follows with Carolyn Donaldson PA-C), hypertension, CHF (follows with Dr. Zachary Carvalho), CAD, DDD, and cerebral infarction. Patient reports that she takes over the counter Loratadine for her sinuses and does feel this helps her breathing. Wells Score:    Sign/Symptom:                  Score:    Symptoms of  DVT :    0.       (3)                                 Tachycardia:               0. (1.5)  Immobilization:           0. (1.5)  History of VTE:           0. (1)  Malignancy:                0. (1)  Hemoptysis:               0. (1)  No alternative diagnosis: 0  (3)    Total score:         0      High Probability > 6. Low  Probability < 2. Likely > 4. Unlikely < 4. COPD  She complains of cough, shortness of breath, sputum production and wheezing. This is a chronic problem. The current episode started more than 1 year ago. The problem occurs 2 to 4 times per day. The problem has been rapidly worsening. The cough is productive of sputum (clear/white). Associated symptoms include chest pain (intermittent sterum - intermittently reproducible), dyspnea on exertion and rhinorrhea. Pertinent negatives include no appetite change or fever. Her symptoms are aggravated by minimal activity. Her symptoms are alleviated by beta-agonist. She reports significant improvement on treatment. MMRC Dyspnea Scale:   0: Dyspneic on strenuous exercise  1: Dyspneic on walking a slight hill  2: Dyspneic on walking level ground; must stop occasionally due to breathlessness  3: Must stop for breathlessness after walking 100 yards or after a few minutes  4: Cannot leave house; breathless on dressing/undressing    MMRC dyspnea score: 3      Progress History:   Since last visit any new medical issues?  No  New ER or hospital visits? No  Any new or changes in medicines? No  Using inhalers? Trelegy and as needed albuterol   Are they helpful? Yes albuterol. She does not think that Trelegy is helping  Previous inhalers? Dulera - beneficial but stopped due to cost, Stiolto (patient stopped as she believed this caused her lungs to worsen), Symbicort (believes caused her symptoms to worsen), Advair (uncertain why it was stopped)  Any recent exacerbations? Yes  High-resolution CT scan that revealed stable mild centrilobular emphysema and no honeycombing or bronchiectasis  Last PFT: 8/31/2021 - moderate restriction and severely decreased DLCO  Last 6 MWT: 9/15/2021 - 1.5 lpm with rest and sleep and 3 lpm on exertion      Smoking History:  Smoked 2 PPD for 40 years (80 pack years), quit in 2010  Admits to passive tobacco exposure from parents or work environment. She lives with her daughter who smokes in the house. Social History:  Patient job history: Retired, previously worked at [employer]   She has not had exposure to aerosolized particles or hazardous fumes. (Coal, dust, asbestos, molds ie Hay)  Denies living on a farm that primarily raised crops, livestock or combination  Admits to exposure to pets/animals at home. Cats at home  Denies exposure to tuberculosis. Denies family history of ALS, GBS, myasthenia gravis, myotonic dystrophy, MS, or spinal muscular atrophy.   Denies family history of autoimmune conditions, such as RA, SLE, etc.  She has a sister who was on oxygen for years and had a single lung transplant    Pneumonia vaccine: Macario Alexandre 4/2/2018 & 10/24/2012  COVID-19: vaccinated  Past Medical hx   PMH:  Past Medical History:   Diagnosis Date    Bladder dysfunction     CAD (coronary artery disease)     Cerebral artery occlusion with cerebral infarction ([facility]     TIA    Cerebrovascular disease     CHF (congestive heart failure) ([facility]     COPD (chronic obstructive pulmonary disease) ([facility] [facility]     uses oxygen 24 hours per day    DDD (degenerative disc disease)     Depression     GERD (gastroesophageal reflux disease)     Hyperlipidemia     Hypertension     Obesity     Other disorders of kidney and ureter in diseases classified elsewhere     Other screening mammogram 2011    Pap smear, as part of routine gynecological examination 2007    Pap smear, as part of routine gynecological examination 2007    Peripheral edema     with ascites    Pneumonia     PONV (postoperative nausea and vomiting)     with back injection in [facility]    Unspecified sleep apnea     c-pap at 10 cm wtr     SURGICAL HISTORY:  Past Surgical History:   Procedure Laterality Date    COLONOSCOPY  7-2009    DILATION AND CURETTAGE OF UTERUS  1970's    ENDOSCOPY, COLON, DIAGNOSTIC      EYE SURGERY  2013    Laser surgery     LUMBAR SPINE SURGERY Left 12/12/2017    LUMBAR RFA L2-3, 3-4, 4-5, L5-S1 LEFT SIDE performed by Ta Foy MD at [facility] Right 10/7/2019    L-facet RFA @ L2-3, L3-4, L4-5, and L5-S1 RIGHT SIDE FIRST performed by Ta Foy MD at [place] [address]  2016    burning nerves in back - lumbar @ [place]    OTHER SURGICAL HISTORY Left 12/12/2017    Lumbar RFA L2-3, L3-4, L4-5, L5-S1    PAIN MANAGEMENT PROCEDURE Left 2/7/2020    Left L-facet RFA @ L2-3, L3-4, L4-5, and L5-S1 performed by Ta Foy MD at [place] [address] Right 7/16/2020    Right L-facet RFA @ L2-3, L3-4, L4-5, and L5-S1 performed by Ta Foy MD at [place] [address] Left 8/25/2020    LEFT L-facet RFA @ L2-3, L3-4, L4-5, and L5-S1 performed by Ta Foy MD at [place] [address] Right 2/26/2021    Right L-facet RFA @ L2-3, L3-4, L4-5, and L5-S1 performed by Ta Foy MD at [place] [address] Left 4/8/2021    Left L-facet RFA @ L2-3, L3-4, L4-5, and L5-S1 performed by Imani Castillo MD at [facility] Bilateral 2022    Bilateral L-facet RFA at L4-5 and L5-S1. performed by Imani Castillo MD at  [facility] [address]:  Social History     Tobacco Use    Smoking status: Former     Packs/day: 2.00     Years: 40.00     Pack years: 80.00     Types: Cigarettes     Quit date: 2010     Years since quittin.8    Smokeless tobacco: Never   Vaping Use    Vaping Use: Never used   Substance Use Topics    Alcohol use: No     Comment: pt hasn't drank since     Drug use: No     ALLERGIES:  Allergies   Allergen Reactions    Codeine Itching     Pt cant remember    Doxycycline      Cant breath     Lipitor Itching    Simvastatin Itching     Stomach pain    Chantix [Varenicline Tartrate] Itching     FAMILY HISTORY:  Family History   Problem Relation Age of Onset    Heart Failure Mother     Heart Disease Mother     High Blood Pressure Mother     Heart Failure Father     Heart Disease Father     Alzheimer's Disease Father     Parkinsonism Father     High Blood Pressure Father     COPD Sister     Cancer Brother         skin    Stroke Brother     Cancer Daughter         colon    Diabetes Daughter      CURRENT MEDICATIONS:  Current Outpatient Medications   Medication Sig Dispense Refill    guaiFENesin (MUCINEX) 600 MG extended release tablet Take 1 tablet by mouth 2 times daily 60 tablet 6    sodium chloride, Inhalant, 3 % nebulizer solution Take 4 mLs by nebulization as needed for Cough 360 mL 11    mometasone-formoterol (DULERA) 200-5 MCG/ACT inhaler Inhale 2 puffs into the lungs in the morning and 2 puffs in the evening. Rinse mouth after its use. . 13 g 11    HYDROcodone-acetaminophen (NORCO) 5-325 MG per tablet Take 1 tablet by mouth every 8 hours as needed for Pain for up to 30 days.  90 tablet 0    Respiratory Therapy Supplies (NEBULIZER/TUBING/MOUTHPIECE) KIT 1 kit by Does not apply route every 6 hours as needed (shortness of breath) 1 kit 1    VENTOLIN  (90 Base) MCG/ACT inhaler Inhale 2 puffs into the lungs every 6 hours as needed for Wheezing or Shortness of Breath 18 g 11    albuterol (PROVENTIL) (2.5 MG/3ML) 0.083% nebulizer solution Take 3 mLs by nebulization every 6 hours as needed for Wheezing or Shortness of Breath 120 mL 11    furosemide (LASIX) 20 MG tablet Take 1 tablet by mouth daily as needed (swelling) 60 tablet 0    CPAP Machine MISC by Does not apply route Please check patient's CPAP machine for proper functioning by [employer]. Turn off ramp 1 each 0    lidocaine (LMX) 4 % cream Apply topically as needed every 6 hours to left hand for pain. 1 Tube 2    fluticasone (FLONASE) 50 MCG/ACT nasal spray 1 spray by Each Nostril route daily      omeprazole (PRILOSEC) 40 MG delayed release capsule Take 40 mg by mouth daily      polyethylene glycol (GLYCOLAX) 17 g packet Take 17 g by mouth daily as needed for Constipation      montelukast (SINGULAIR) 10 MG tablet Take 1 tablet by mouth nightly 30 tablet 3    metFORMIN (GLUCOPHAGE) 500 MG tablet Take 500 mg by mouth daily (with breakfast)      triamterene-hydrochlorothiazide (MAXZIDE-25) 37.5-25 MG per tablet take 1 tablet by mouth every morning  0    RA COL-RITE 100 MG capsule daily  0    atorvastatin (LIPITOR) 40 MG tablet Take 1 tablet by mouth daily 30 tablet 3    pseudoephedrine (SUDAFED 12 HR) 120 MG extended release tablet Take 120 mg by mouth every 12 hours as needed for Congestion      amLODIPine (NORVASC) 5 MG tablet Take 5 mg by mouth daily      losartan (COZAAR) 50 MG tablet Take 50 mg by mouth daily   0    OXYGEN   Inhale into the lungs 3 lpm, per patient at home and when walking      aspirin 81 MG EC tablet Take 81 mg by mouth daily. No current facility-administered medications for this visit. Divya ALEXANDER   Review of Systems   Constitutional:  Negative for appetite change, chills and fever.   HENT:  Positive for congestion and rhinorrhea. Respiratory:  Positive for cough, sputum production, shortness of breath and wheezing. Negative for chest tightness. Denies hemoptysis   Cardiovascular:  Positive for chest pain (intermittent sterum - intermittently reproducible), dyspnea on exertion and leg swelling (chronic L > R). Negative for palpitations. Allergic/Immunologic: Positive for environmental allergies. On loratadine      Physical exam   /62   Pulse 74   Temp 97.9 °F (36.6 °C)   Ht 5' 2\" (1.575 m)   Wt 165 lb 3.2 oz (74.9 kg)   SpO2 93% Comment: 2 lpm  BMI 30.22 kg/m²    Wt Readings from Last 3 Encounters:   10/05/22 165 lb 3.2 oz (74.9 kg)   09/26/22 164 lb (74.4 kg)   08/02/22 164 lb (74.4 kg)       Physical Exam  Constitutional:       General: She is not in acute distress. Appearance: She is well-developed. Comments: BMI 30   HENT:      Head: Normocephalic and atraumatic. Right Ear: External ear normal.      Left Ear: External ear normal.      Mouth/Throat:      Mouth: Mucous membranes are moist.      Pharynx: Oropharynx is clear. No oropharyngeal exudate. Eyes:      General:         Right eye: No discharge. Left eye: No discharge. Cardiovascular:      Rate and Rhythm: Normal rate and regular rhythm. Pulmonary:      Effort: Pulmonary effort is normal. No respiratory distress. Breath sounds: No wheezing, rhonchi or rales. Comments: Diminished breath sounds  Chest:      Chest wall: No tenderness. Musculoskeletal:      Cervical back: Neck supple. Right lower leg: Edema present. Left lower leg: Edema present. Comments: +1 non pitting   Skin:     General: Skin is warm and dry. Neurological:      General: No focal deficit present. Mental Status: She is alert. Psychiatric:         Mood and Affect: Mood normal.         Behavior: Behavior normal.         Thought Content:  Thought content normal. Judgment: Judgment normal.        Results   Lung Nodule Screening     [x] Qualifies    [] Does not qualify   [] Declined    [] Completed  [de-identified] PY history, quit in 2010   The USPSTF recommends annual screening for lung cancer with low-dose computed tomography (LDCT) in adults aged 48 to [de-identified] years who have a 20 pack-year smoking history and currently smoke or have quit within the past 15 years. Screening should be discontinued once a person has not smoked for 15 years or develops a health problem that substantially limits life expectancy or the ability or willingness to have curative lung surgery. Assessment      Diagnosis Orders   1. Centrilobular emphysema (HCC)  mometasone-formoterol (DULERA) 200-5 MCG/ACT inhaler      2. Restrictive lung disease        3. Mucopurulent chronic bronchitis (HCC)  guaiFENesin (MUCINEX) 600 MG extended release tablet    sodium chloride, Inhalant, 3 % nebulizer solution      4. Decreased diffusion capacity of lung        5. Environmental and seasonal allergies        6. Chronic respiratory failure with hypoxia (HCC)        7. Chronic diastolic congestive heart failure ([address]        8. SOB (shortness of breath) on exertion  D-Dimer, Quantitative    XR CHEST (2 VW)            Plan   -Patient's breathing symptoms did not improve when treated with prednisone and azithromycin  -Patient had no benefit with Trelegy. Will stop Trelegy. She reports some benefit with Garry Dodson in the past, will start Arthor Dodson. -Advised patient to take Mucinex for chest congestion  -Start 3% saline nebulizer to alleviate chest congestion  -We will obtain chest x-ray to evaluate for pneumonia  -Obtain D-dimer to evaluate for possible PE with acute shortness of breath.   Advised patient that we will call her with result and if her D-dimer is elevated I will recommend a stat CTA scan of the chest. Patient advised me that if she does not answer her phone, we may give results to her daughter Mariela Fair.  -Continue oxygen as prescribed - 1.5 lpm with rest and sleep and 3 lpm on exertion   -Reschedule spirometry testing for prior to her next scheduled appointment  -Patient denies increase in weight or leg swelling. I did advise patient to schedule follow up with her cardiologist for increased shortness of breath and heart failure  -Maintain Pneumonia vaccine with primary care provider   -Received COVID-19 vaccination  -Advised patient to seek treatment in the ED with increased symptoms. Advised patient to call office with any changes, questions, or concerns regarding respiratory status or issues with prescribed medications    Return in about 3 months (around 1/5/2023) for COPD with spirometry .        Electronically signed by CASSANDRA Camargo CNP on 10/5/2022 at 2:37 PM     (Please note that portions of this note may have been completed with a voice recognition program. Efforts were made to edit the dictation but occasionally words are mis-transcribed)

## 2022-10-05 ENCOUNTER — HOSPITAL ENCOUNTER (OUTPATIENT)
Dept: CT IMAGING | Age: 72
Discharge: HOME | End: 2022-10-05
Payer: MEDICARE

## 2022-10-05 ENCOUNTER — NURSE ONLY (OUTPATIENT)
Dept: LAB | Age: 72
End: 2022-10-05

## 2022-10-05 ENCOUNTER — TELEPHONE (OUTPATIENT)
Dept: PULMONOLOGY | Age: 72
End: 2022-10-05

## 2022-10-05 ENCOUNTER — OFFICE VISIT (OUTPATIENT)
Dept: PULMONOLOGY | Age: 72
End: 2022-10-05
Payer: MEDICARE

## 2022-10-05 VITALS
DIASTOLIC BLOOD PRESSURE: 62 MMHG | WEIGHT: 165.2 LBS | BODY MASS INDEX: 30.4 KG/M2 | OXYGEN SATURATION: 93 % | HEART RATE: 74 BPM | HEIGHT: 62 IN | SYSTOLIC BLOOD PRESSURE: 128 MMHG | TEMPERATURE: 97.9 F

## 2022-10-05 DIAGNOSIS — J41.1 MUCOPURULENT CHRONIC BRONCHITIS (HCC): ICD-10-CM

## 2022-10-05 DIAGNOSIS — J96.11 CHRONIC RESPIRATORY FAILURE WITH HYPOXIA (HCC): ICD-10-CM

## 2022-10-05 DIAGNOSIS — R79.89 ELEVATED D-DIMER: Primary | ICD-10-CM

## 2022-10-05 DIAGNOSIS — R06.02 SOB (SHORTNESS OF BREATH) ON EXERTION: ICD-10-CM

## 2022-10-05 DIAGNOSIS — R79.89 ELEVATED D-DIMER: ICD-10-CM

## 2022-10-05 DIAGNOSIS — R06.00 ACUTE DYSPNEA: ICD-10-CM

## 2022-10-05 DIAGNOSIS — J98.4 RESTRICTIVE LUNG DISEASE: ICD-10-CM

## 2022-10-05 DIAGNOSIS — J43.2 CENTRILOBULAR EMPHYSEMA (HCC): Primary | ICD-10-CM

## 2022-10-05 DIAGNOSIS — I50.32 CHRONIC DIASTOLIC CONGESTIVE HEART FAILURE (HCC): ICD-10-CM

## 2022-10-05 LAB
D-DIMER QUANTITATIVE: 611 NG/ML FEU (ref 0–500)
POC CREATININE WHOLE BLOOD: 1 MG/DL (ref 0.5–1.2)

## 2022-10-05 PROCEDURE — 99214 OFFICE O/P EST MOD 30 MIN: CPT

## 2022-10-05 PROCEDURE — 1123F ACP DISCUSS/DSCN MKR DOCD: CPT

## 2022-10-05 PROCEDURE — 82565 ASSAY OF CREATININE: CPT

## 2022-10-05 PROCEDURE — 6360000004 HC RX CONTRAST MEDICATION: Performed by: INTERNAL MEDICINE

## 2022-10-05 PROCEDURE — 71275 CT ANGIOGRAPHY CHEST: CPT

## 2022-10-05 RX ORDER — GUAIFENESIN 600 MG/1
600 TABLET, EXTENDED RELEASE ORAL 2 TIMES DAILY
Qty: 60 TABLET | Refills: 6 | Status: SHIPPED | OUTPATIENT
Start: 2022-10-05 | End: 2022-10-13

## 2022-10-05 RX ORDER — SODIUM CHLORIDE FOR INHALATION 3 %
4 VIAL, NEBULIZER (ML) INHALATION PRN
Qty: 360 ML | Refills: 11 | Status: SHIPPED | OUTPATIENT
Start: 2022-10-05

## 2022-10-05 RX ADMIN — IOPAMIDOL 80 ML: 755 INJECTION, SOLUTION INTRAVENOUS at 19:32

## 2022-10-05 ASSESSMENT — ENCOUNTER SYMPTOMS
COUGH: 1
CHEST TIGHTNESS: 0
WHEEZING: 1
SPUTUM PRODUCTION: 1
SHORTNESS OF BREATH: 1
RHINORRHEA: 1

## 2022-10-05 ASSESSMENT — COPD QUESTIONNAIRES: COPD: 1

## 2022-10-05 NOTE — PATIENT INSTRUCTIONS
Stop Trelegy. Start [place]. Rinse mouth with water and spit after use. Continue your albuterol inhaler and nebulizer. You may use one or the other every 6 hours as needed for shortness of breath or wheezing. Do NOT use both at the same time as they continue the same medication. I am starting you on a saline nebulizer to use up to 3 times daily for chest congestion and thick sputum. This is NOT to be used as a rescue medication. Call me if your oxygen level is 88% or below. Make sure to get your lab drawn right after your appointment. Please complete your chest x-ray. Make sure to make an appointment with your heart doctor.     I will see you in 3 months with your Spirometry test

## 2022-10-05 NOTE — PROGRESS NOTES
Patient is experiencing SOB: Yes    Patient is experiencing wheezing: Yes    Patient states they have had a Strong, productive = 1 cough. Phlegm is daily, color:  clear/white    Patient is coughing up blood: no    Patient has been experiencing chest pains: non-existent    Patient is currently taking the following inhaler(s): Trelegy, Albuterol    Patient is currently taking the following nebulizer treatment(s): Albuterol    Patient is using their rescue inhaler 4-5 times per last few days. Patient is currently using 3.5 liters of oxygen during rest and sleep. Patient is currently using 3 liters of oxygen during exertion.

## 2022-10-05 NOTE — TELEPHONE ENCOUNTER
I called and spoke to patient regarding Stat cta Chest due to High D-dimer and they are able to get patient in later since patient had a pop and a small candy bar I advised patient to go to the [facility] in [address] [place] [address] Radiology at 630 for a 7pm appt. They Will call [place] once they get the results on her cell number and I offered patient to either get cta done today or go to the er and Patient was okay of getting CTA done today.

## 2022-10-05 NOTE — TELEPHONE ENCOUNTER
Please notify patient that her D. Dimer came back elevated at 611. We will get a STAT CTA Chest, read and hold until I am called with results. Please assist patient in scheduling this. Thanks!

## 2022-10-06 ENCOUNTER — TELEPHONE (OUTPATIENT)
Dept: CARDIOLOGY CLINIC | Age: 72
End: 2022-10-06

## 2022-10-06 ENCOUNTER — TELEPHONE (OUTPATIENT)
Dept: PULMONOLOGY | Age: 72
End: 2022-10-06

## 2022-10-06 ENCOUNTER — NURSE ONLY (OUTPATIENT)
Dept: LAB | Age: 72
End: 2022-10-06

## 2022-10-06 DIAGNOSIS — R06.02 SOB (SHORTNESS OF BREATH) ON EXERTION: ICD-10-CM

## 2022-10-06 DIAGNOSIS — R06.00 ACUTE DYSPNEA: ICD-10-CM

## 2022-10-06 DIAGNOSIS — R59.0 MEDIASTINAL LYMPHADENOPATHY: ICD-10-CM

## 2022-10-06 DIAGNOSIS — R59.0 MEDIASTINAL LYMPHADENOPATHY: Primary | ICD-10-CM

## 2022-10-06 LAB
BASOPHILS # BLD: 0.4 %
BASOPHILS ABSOLUTE: 0 THOU/MM3 (ref 0–0.1)
CREAT SERPL-MCNC: 1.1 MG/DL (ref 0.4–1.2)
EOSINOPHIL # BLD: 4.2 %
EOSINOPHILS ABSOLUTE: 0.4 THOU/MM3 (ref 0–0.4)
ERYTHROCYTE [DISTWIDTH] IN BLOOD BY AUTOMATED COUNT: 13.5 % (ref 11.5–14.5)
ERYTHROCYTE [DISTWIDTH] IN BLOOD BY AUTOMATED COUNT: 46.4 FL (ref 35–45)
GFR SERPL CREATININE-BSD FRML MDRD: 49 ML/MIN/1.73M2
HCT VFR BLD CALC: 34.9 % (ref 37–47)
HEMOGLOBIN: 10.6 GM/DL (ref 12–16)
IMMATURE GRANS (ABS): 0.05 THOU/MM3 (ref 0–0.07)
IMMATURE GRANULOCYTES: 0.5 %
LYMPHOCYTES # BLD: 25.9 %
LYMPHOCYTES ABSOLUTE: 2.7 THOU/MM3 (ref 1–4.8)
MCH RBC QN AUTO: 28.7 PG (ref 26–33)
MCHC RBC AUTO-ENTMCNC: 30.4 GM/DL (ref 32.2–35.5)
MCV RBC AUTO: 94.6 FL (ref 81–99)
MONOCYTES # BLD: 9.3 %
MONOCYTES ABSOLUTE: 1 THOU/MM3 (ref 0.4–1.3)
NUCLEATED RED BLOOD CELLS: 0 /100 WBC
PLATELET # BLD: 333 THOU/MM3 (ref 130–400)
PMV BLD AUTO: 11.1 FL (ref 9.4–12.4)
PRO-BNP: 132.5 PG/ML (ref 0–900)
RBC # BLD: 3.69 MILL/MM3 (ref 4.2–5.4)
SEG NEUTROPHILS: 59.7 %
SEGMENTED NEUTROPHILS ABSOLUTE COUNT: 6.3 THOU/MM3 (ref 1.8–7.7)
WBC # BLD: 10.5 THOU/MM3 (ref 4.8–10.8)

## 2022-10-13 ENCOUNTER — OFFICE VISIT (OUTPATIENT)
Dept: CARDIOLOGY CLINIC | Age: 72
End: 2022-10-13
Payer: MEDICARE

## 2022-10-13 VITALS
WEIGHT: 164.2 LBS | HEART RATE: 93 BPM | OXYGEN SATURATION: 94 % | BODY MASS INDEX: 30.22 KG/M2 | HEIGHT: 62 IN | SYSTOLIC BLOOD PRESSURE: 153 MMHG | DIASTOLIC BLOOD PRESSURE: 65 MMHG

## 2022-10-13 DIAGNOSIS — R06.02 SHORTNESS OF BREATH: Primary | ICD-10-CM

## 2022-10-13 DIAGNOSIS — I50.32 CHRONIC DIASTOLIC CONGESTIVE HEART FAILURE (HCC): ICD-10-CM

## 2022-10-13 PROCEDURE — 93000 ELECTROCARDIOGRAM COMPLETE: CPT | Performed by: INTERNAL MEDICINE

## 2022-10-13 PROCEDURE — 1123F ACP DISCUSS/DSCN MKR DOCD: CPT | Performed by: INTERNAL MEDICINE

## 2022-10-13 PROCEDURE — 99214 OFFICE O/P EST MOD 30 MIN: CPT | Performed by: INTERNAL MEDICINE

## 2022-10-13 RX ORDER — FLUTICASONE FUROATE, UMECLIDINIUM BROMIDE AND VILANTEROL TRIFENATATE 100; 62.5; 25 UG/1; UG/1; UG/1
1 POWDER RESPIRATORY (INHALATION) DAILY
COMMUNITY
End: 2022-10-29 | Stop reason: ALTCHOICE

## 2022-10-13 NOTE — PROGRESS NOTES
Carol Fleming 89 CARDIOLOGY  [address]  [address] [place] [address] Road [address]  Dept: [phone]  Dept Fax: [phone]  Loc: [phone]    Visit Date: 10/13/2022    Ms. Carmencita Escalante is a 67 y.o. female  who presented for:  Chief Complaint   Patient presents with    Follow-up     Persistent SOB for one week, had COPD check. CAD  HPI:   68 yo F hx of CAD s/p PCI of RCA, COPD on O2, HTN, HLD, PETE on CPAP and left subclavian stenosis s/p PTA/stenting is here for a followup. She is reporting more shortness of breath over last 1 week. Follows up with pulmonary. She underwent workup. Labs showed proBNP of only 132. Continues to be short of breath. Current Outpatient Medications:     fluticasone-umeclidin-vilant (TRELEGY ELLIPTA) 100-62.5-25 MCG/INH AEPB, Inhale 1 puff into the lungs daily, Disp: , Rfl:     sodium chloride, Inhalant, 3 % nebulizer solution, Take 4 mLs by nebulization as needed for Cough, Disp: 360 mL, Rfl: 11    HYDROcodone-acetaminophen (NORCO) 5-325 MG per tablet, Take 1 tablet by mouth every 8 hours as needed for Pain for up to 30 days. , Disp: 90 tablet, Rfl: 0    Respiratory Therapy Supplies (NEBULIZER/TUBING/MOUTHPIECE) KIT, 1 kit by Does not apply route every 6 hours as needed (shortness of breath), Disp: 1 kit, Rfl: 1    VENTOLIN  (90 Base) MCG/ACT inhaler, Inhale 2 puffs into the lungs every 6 hours as needed for Wheezing or Shortness of Breath, Disp: 18 g, Rfl: 11    albuterol (PROVENTIL) (2.5 MG/3ML) 0.083% nebulizer solution, Take 3 mLs by nebulization every 6 hours as needed for Wheezing or Shortness of Breath, Disp: 120 mL, Rfl: 11    furosemide (LASIX) 20 MG tablet, Take 1 tablet by mouth daily as needed (swelling), Disp: 60 tablet, Rfl: 0    CPAP Machine MISC, by Does not apply route Please check patient's CPAP machine for proper functioning by [employer].  Turn off ramp, Disp: 1 each, Rfl: 0    lidocaine (LMX) 4 % cream, Apply topically as needed every 6 hours to left hand for pain., Disp: 1 Tube, Rfl: 2    fluticasone (FLONASE) 50 MCG/ACT nasal spray, 1 spray by Each Nostril route daily, Disp: , Rfl:     polyethylene glycol (GLYCOLAX) 17 g packet, Take 17 g by mouth daily as needed for Constipation, Disp: , Rfl:     metFORMIN (GLUCOPHAGE) 500 MG tablet, Take 500 mg by mouth daily (with breakfast), Disp: , Rfl:     RA COL-RITE 100 MG capsule, daily, Disp: , Rfl: 0    atorvastatin (LIPITOR) 40 MG tablet, Take 1 tablet by mouth daily, Disp: 30 tablet, Rfl: 3    pseudoephedrine (SUDAFED 12 HR) 120 MG extended release tablet, Take 120 mg by mouth every 12 hours as needed for Congestion, Disp: , Rfl:     amLODIPine (NORVASC) 5 MG tablet, Take 5 mg by mouth daily, Disp: , Rfl:     losartan (COZAAR) 50 MG tablet, Take 50 mg by mouth daily , Disp: , Rfl: 0    OXYGEN,  Inhale into the lungs 3 lpm, per patient at home and when walking, Disp: , Rfl:     aspirin 81 MG EC tablet, Take 81 mg by mouth daily.   , Disp: , Rfl:     omeprazole (PRILOSEC) 40 MG delayed release capsule, Take 40 mg by mouth daily (Patient not taking: Reported on 10/13/2022), Disp: , Rfl:     montelukast (SINGULAIR) 10 MG tablet, Take 1 tablet by mouth nightly (Patient not taking: Reported on 10/13/2022), Disp: 30 tablet, Rfl: 3    triamterene-hydrochlorothiazide (MAXZIDE-25) 37.5-25 MG per tablet, take 1 tablet by mouth every morning (Patient not taking: Reported on 10/13/2022), Disp: , Rfl: 0    Past Medical History  Earle Sierra  has a past medical history of Bladder dysfunction, CAD (coronary artery disease), Cerebral artery occlusion with cerebral infarction [facility]), Cerebrovascular disease, CHF (congestive heart failure) ([facility] [address] COPD (chronic obstructive pulmonary disease) ([facility] [address] DDD (degenerative disc disease), Depression, GERD (gastroesophageal reflux disease), Hyperlipidemia, Hypertension, Obesity, Other disorders of kidney and ureter in diseases classified elsewhere, Other screening mammogram, Pap smear, as part of routine gynecological examination, Pap smear, as part of routine gynecological examination, Peripheral edema, Pneumonia, PONV (postoperative nausea and vomiting), and Unspecified sleep apnea. Social History  Earle Sierra  reports that she quit smoking about 11 years ago. Her smoking use included cigarettes. She has a 80.00 pack-year smoking history. She has never used smokeless tobacco. She reports that she does not drink alcohol and does not use drugs. Family History  Earle Sierra family history includes Alzheimer's Disease in her father; COPD in her sister; Cancer in her brother and daughter; Diabetes in her daughter; Heart Disease in her father and mother; Heart Failure in her father and mother; High Blood Pressure in her father and mother; Parkinsonism in her father; Stroke in her brother.     Past Surgical History   Past Surgical History:   Procedure Laterality Date    COLONOSCOPY  7-2009    DILATION AND CURETTAGE OF UTERUS  1970's    ENDOSCOPY, COLON, DIAGNOSTIC      EYE SURGERY  2013    Laser surgery     LUMBAR SPINE SURGERY Left 12/12/2017    LUMBAR RFA L2-3, 3-4, 4-5, L5-S1 LEFT SIDE performed by Sigifredo Talavera MD at [facility] Right 10/7/2019    L-facet RFA @ L2-3, L3-4, L4-5, and L5-S1 RIGHT SIDE FIRST performed by Sigifredo Talavera MD at [place] [address]  2016    burning nerves in back - lumbar @ [place]    OTHER SURGICAL HISTORY Left 12/12/2017    Lumbar RFA L2-3, L3-4, L4-5, L5-S1    PAIN MANAGEMENT PROCEDURE Left 2/7/2020    Left L-facet RFA @ L2-3, L3-4, L4-5, and L5-S1 performed by Sigifredo Talavera MD at [facility] Right 7/16/2020    Right L-facet RFA @ L2-3, L3-4, L4-5, and L5-S1 performed by Sigifredo Talavera MD at [facility] Left 8/25/2020    LEFT L-facet RFA @ L2-3, L3-4, L4-5, and L5-S1 performed by Imani Castillo MD at [facility] Right 2/26/2021    Right L-facet RFA @ L2-3, L3-4, L4-5, and L5-S1 performed by Imani Castillo MD at [facility] Left 4/8/2021    Left L-facet RFA @ L2-3, L3-4, L4-5, and L5-S1 performed by Imani Castillo MD at [facility] Bilateral 6/20/2022    Bilateral L-facet RFA at L4-5 and L5-S1. performed by Imani Castillo MD at [address]:     REVIEW OF SYSTEMS  Constitutional: denies sweats, chills and fever  HENT: denies  congestion, sinus pressure, sneezing and sore throat. Eyes: denies  pain, discharge, redness and itching. Respiratory: denies apnea, cough  Gastrointestinal: denies blood in stool, constipation, diarrhea   Endocrine: denies cold intolerance, heat intolerance, polydipsia. Genitourinary: denies dysuria, enuresis, flank pain and hematuria. Musculoskeletal: denies arthralgias, joint swelling and neck pain. Neurological: denies numbness and headaches. Psychiatric/Behavioral: denies agitation, confusion, decreased concentration and dysphoric mood    All others reviewed and are negative. Objective:     BP (!) 153/65   Pulse 93   Ht 5' 2\" (1.575 m)   Wt 164 lb 3.2 oz (74.5 kg)   SpO2 94%   BMI 30.03 kg/m²     Wt Readings from Last 3 Encounters:   10/13/22 164 lb 3.2 oz (74.5 kg)   10/05/22 165 lb 3.2 oz (74.9 kg)   09/26/22 164 lb (74.4 kg)     BP Readings from Last 3 Encounters:   10/13/22 (!) 153/65   10/05/22 128/62   09/26/22 126/64       PHYSICAL EXAM  Constitutional: Oriented to person, place, and time. Appears well-developed and well-nourished. HENT:   Head: Normocephalic and atraumatic. Eyes: EOM are normal. Pupils are equal, round, and reactive to light. Neck: Normal range of motion. Neck supple. No JVD present.    Cardiovascular: Normal rate , normal heart sounds and intact distal pulses. Pulmonary/Chest: Effort normal and breath sounds normal. No respiratory distress. No wheezes. No rales. Abdominal: Soft. Bowel sounds are normal. No distension. There is no tenderness. Musculoskeletal: Normal range of motion. No edema. Neurological: Alert and oriented to person, place, and time. No cranial nerve deficit. Coordination normal.   Skin: Skin is warm and dry. Psychiatric: Normal mood and affect.        Lab Results   Component Value Date/Time    CKTOTAL 85 03/11/2018 09:25 PM       Lab Results   Component Value Date/Time    WBC 10.5 10/06/2022 12:04 PM    RBC 3.69 10/06/2022 12:04 PM    RBC 3.68 05/25/2021 10:37 AM    HGB 10.6 10/06/2022 12:04 PM    HCT 34.9 10/06/2022 12:04 PM    MCV 94.6 10/06/2022 12:04 PM    MCH 28.7 10/06/2022 12:04 PM    MCHC 30.4 10/06/2022 12:04 PM    RDW 14.5 05/25/2021 10:37 AM     10/06/2022 12:04 PM    MPV 11.1 10/06/2022 12:04 PM       Lab Results   Component Value Date/Time     05/25/2021 10:37 AM    K 4.0 05/25/2021 10:37 AM    K 4.0 05/23/2020 05:36 AM    CL 92 05/25/2021 10:37 AM    CO2 38 05/25/2021 10:37 AM    BUN 16 05/25/2021 10:37 AM    LABALBU 4.0 05/25/2021 10:37 AM    LABALBU 4.4 03/31/2012 01:18 PM    CREATININE 1.1 10/06/2022 12:04 PM    CALCIUM 9.8 05/25/2021 10:37 AM    GFRAA >60 06/20/2019 02:30 PM    LABGLOM 49 10/06/2022 12:04 PM    GLUCOSE 104 05/25/2021 10:37 AM       Lab Results   Component Value Date/Time    ALKPHOS 70 05/25/2021 10:37 AM    ALKPHOS 77 05/23/2020 05:36 AM    ALT 13 05/25/2021 10:37 AM    AST 16 05/25/2021 10:37 AM    PROT 7.6 05/25/2021 10:37 AM    BILITOT 0.3 05/25/2021 10:37 AM    BILITOT NEGATIVE 03/31/2012 10:25 AM    BILIDIR <0.2 05/22/2020 05:10 PM    LABALBU 4.0 05/25/2021 10:37 AM    LABALBU 4.4 03/31/2012 01:18 PM       Lab Results   Component Value Date/Time    MG 2.1 05/22/2020 05:10 PM       Lab Results   Component Value Date    INR 0.91 01/08/2019    INR 0.89 11/26/2018    INR 0.92 10/04/2018         Lab Results   Component Value Date/Time    LABA1C 5.9 06/30/2018 12:57 AM       Lab Results   Component Value Date/Time    TRIG 365 05/25/2021 10:37 AM    HDL 45 05/25/2021 10:37 AM    LDLCALC 195 05/25/2021 10:37 AM    LABVLDL 73 05/25/2021 10:37 AM       Lab Results   Component Value Date/Time    TSH 1.180 03/11/2018 05:25 PM         Testing Reviewed:      I haveindividually reviewed the below cardiac tests    EKG:SR, no ST-T chagnes    ECHO:   Results for orders placed during the hospital encounter of 08/14/18   Echocardiogram 2D/ M-Mode/ Colorflow/ Do    Narrative Transthoracic Echocardiography Report (TTE)     Demographics      Patient Name    [facility]  Gender               Female                   K      MR #            [id number]      Race                                                      Ethnicity      Account #       [de-identified]      Room Number          4673      Accession       [id number]      Date of Study        08/15/2018   Number      Date of Birth   1950     Referring Physician  Mahsa Cuevas MD      Age             79 year(s)     Sonographer          Samra Phan RDCS                                     Interpreting         Echo reader of the                                  Physician            week                                                       Jamey Wood MD     Procedure    Type of Study      TTE procedure:ECHOCARDIOGRAM COMPLETE 2D W DOPPLER W COLOR. Procedure Date  Date: 08/15/2018 Start: 08:18 AM    Study Location: Bedside  Technical Quality: Limited visualization due to poor acoustical window. Indications:Shortness of breath. Additional Medical History: Former smoker, COPD, GERD, Hyperlipidemia,  Congestive heart failure, Coronary artery disease, Diabetes, Resp failure    Patient Status: Routine    Height: 62 inches Weight: 155 pounds BSA: 1.72 m^2 BMI: 28.35 kg/m^2    BP: 137/64 mmHg     Conclusions      Summary   Technically difficult examination. Ejection fraction is visually estimated at 50%. Overall left ventricular function is normal.   The aortic valve leaflets were not well visualized. Aortic valve appears tricuspid. Aortic valve leaflets are somewhat thickened. Aortic valve leaflets are Mildly calcified. Signature      ----------------------------------------------------------------   Electronically signed by Jennifer Felipe MD (Interpreting   physician) on 08/15/2018 at 05:21 PM   ----------------------------------------------------------------      Findings      Mitral Valve   Trace mitral regurgitation is present. Aortic Valve   The aortic valve leaflets were not well visualized. Aortic valve appears tricuspid. Aortic valve leaflets are somewhat thickened. Aortic valve leaflets are Mildly calcified. Tricuspid Valve   Tricuspid valve was not well visualized. Trivial tricuspid regurgitation visualized. Pulmonic Valve   The pulmonic valve was not well visualized . Trivial pulmonic regurgitation visualized. Left Atrium   Left atrial size was normal.      Left Ventricle   Ejection fraction is visually estimated at 50%. Overall left ventricular function is normal.      Right Atrium   Right atrial size was normal.      Right Ventricle   The right ventricular size was normal with normal systolic function and   wall thickness. Pericardial Effusion   The pericardium was normal in appearance with no evidence of a pericardial   effusion. Pleural Effusion   No evidence of pleural effusion. Aorta / Great Vessels   -Aortic root dimension within normal limits.   -The Pulmonary artery is within normal limits. -IVC size is within normal limits with normal respiratory phasic changes.      M-Mode/2D Measurements & Calculations      LV Diastolic    LV Systolic Dimension: 3.2  AV Cusp Separation: 1.6 cmLA   Dimension: 4.9  cm                          Dimension: 3.3 cmAO Root   cm              LV Volume Diastolic: 299 ml Dimension: 2.8 cm   LV FS:34.7 %    LV Volume Systolic: 41 ml   LV PW           LV EDV/LV EDV Index: 206   Diastolic: 0.9  GP/80 D^5BD ESV/LV ESV   cm              Index: 41 ml/24 m^2         RV Diastolic Dimension: 2.8 cm   Septum          EF Calculated: 62.4 %   Diastolic: 0.8                              LA/Aorta: 1.18   cm     Doppler Measurements & Calculations      MV Peak E-Wave: 78.6 cm/s  AV Peak Velocity: 157  LVOT Peak Velocity: 85.9   MV Peak A-Wave: 99.4 cm/s  cm/s                   cm/s   MV E/A Ratio: 0.79         AV Peak Gradient: 9.86 LVOT Peak Gradient: 3   MV Peak Gradient: 2.47     mmHg                   mmHg   mmHg      MV Deceleration Time: 254   msec                                                        PV Peak Velocity: 82.9   MV E' Septal Velocity: 5.1                        cm/s   cm/s                       AV DVI (Vmax):0.55     PV Peak Gradient: 2.75   MV A' Septal Velocity: 8.8                        mmHg   cm/s   MV E' Lateral Velocity:   7.1 cm/s   MV A' Lateral Velocity:   11.3 cm/s   E/E' septal: 15.41   E/E' lateral: 11.07     [email or web address] [id number]       STRESS:    CATH:    Assessment/Plan       Diagnosis Orders   1.  Shortness of breath  EKG 12 lead        Shortness of breath  CAD s/p PCI  PAD, Left subclavian stenosis s/p PTA  COPD on O2 3L  Former smoker  HTN  HLD  PETE on CPAP     ProBNP within normal limits  Denies orthopnea  Apparently told by her pulmonary team   Will refer to PFTs and Echo  Patient wants to wait on Echo  If no abnormal findings, consider RHC/LHC  Continue DAPT  Continue Aspirin, Plavix  lipitor 20mg daily  Continue amlodipine and losartan  Continue rest of the management  The patient is asked to make an attempt to improve diet and exercise patterns to aid in medical management of this problem. Advised more plant based nutrition/meditarrean diet   Advised patient to call office or seek immediate medical attention if there is any new onset of  any chest pain, sob, palpitations, lightheadedness, dizziness, orthopnea, PND or pedal edema. All medication side effects were discussed in details. Thank youfor allowing me to participate in the care of this patient. Please do not hesitate to contact me for any further questions. Return in about 4 weeks (around 11/10/2022), or if symptoms worsen or fail to improve, for Review testing, Regular follow up.        Electronically signed by Kendal Lyn MD [facility]  10/13/2022 at 11:24 AM

## 2022-10-21 ENCOUNTER — TELEPHONE (OUTPATIENT)
Dept: PULMONOLOGY | Age: 72
End: 2022-10-21

## 2022-10-21 DIAGNOSIS — G89.4 CHRONIC PAIN SYNDROME: ICD-10-CM

## 2022-10-21 DIAGNOSIS — J43.9 PULMONARY EMPHYSEMA, UNSPECIFIED EMPHYSEMA TYPE (HCC): ICD-10-CM

## 2022-10-21 DIAGNOSIS — J41.1 MUCOPURULENT CHRONIC BRONCHITIS (HCC): Primary | ICD-10-CM

## 2022-10-21 RX ORDER — HYDROCODONE BITARTRATE AND ACETAMINOPHEN 5; 325 MG/1; MG/1
1 TABLET ORAL EVERY 8 HOURS PRN
Qty: 90 TABLET | Refills: 0 | Status: SHIPPED | OUTPATIENT
Start: 2022-10-26 | End: 2022-11-25

## 2022-10-21 NOTE — TELEPHONE ENCOUNTER
OARRS reviewed. UDS: + for  Hydrocodone. Last seen: 9/26/2022.  Follow-up:   Future Appointments   Date Time Provider Jesu Saleh   11/28/2022 11:15 AM CASSANDRA Rosales CNP SRPX Pain [facility] - [place] [facility]   1/6/2023  9:00 AM STR CT IMAGING RM1  [facility] STR[facility] OUT EXP STR Radiolog   1/6/2023  9:30 AM STR [facility] FUNCTION ROOM [facility]   1/11/2023 10:00 AM CASSANDRA Gross CNP N Pulm Med [address]   8/1/2023  9:45 AM Alphonse Lott PA-C N Pulm Med [address]

## 2022-10-24 NOTE — TELEPHONE ENCOUNTER
I can try to send it elsewhere to see if it is available at another pharmacy or patient can call around to see if any pharmacies have this medication available. Please let me know what she would like to do. Thanks!

## 2022-10-27 NOTE — TELEPHONE ENCOUNTER
Patient will call [facility] and see when they will have that and if not then she will ask if they know which pharmacy would have that, Also I got approval the [facility] but I looks like the order is , Please advise.

## 2022-11-22 DIAGNOSIS — G89.4 CHRONIC PAIN SYNDROME: ICD-10-CM

## 2022-11-22 RX ORDER — HYDROCODONE BITARTRATE AND ACETAMINOPHEN 5; 325 MG/1; MG/1
1 TABLET ORAL EVERY 8 HOURS PRN
Qty: 90 TABLET | Refills: 0 | Status: SHIPPED | OUTPATIENT
Start: 2022-11-25 | End: 2022-12-25

## 2022-11-22 NOTE — TELEPHONE ENCOUNTER
OARRS reviewed. UDS: + for  Hydrocodone. Last seen: 9/26/2022.  Follow-up:   Future Appointments   Date Time Provider Jesu Saleh   11/28/2022 11:15 AM CASSANDRA Mathew Sa, CNP SRPX Pain P - [place] [facility]   1/6/2023  9:00 AM STR CT IMAGING RM1  [facility] STRZ OUT EXP STR Radiolog   1/6/2023  9:30 AM STR PULMONARY FUNCTION ROOM [facility]   1/11/2023 10:00 AM CASSANDRA Brewster CNP N Pulm Med [address]   8/1/2023  9:45 AM Keith Aguayo PA-C N Pulm Med [address]

## 2022-11-28 ENCOUNTER — OFFICE VISIT (OUTPATIENT)
Dept: PHYSICAL MEDICINE AND REHAB | Age: 72
End: 2022-11-28
Payer: MEDICARE

## 2022-11-28 VITALS
SYSTOLIC BLOOD PRESSURE: 124 MMHG | DIASTOLIC BLOOD PRESSURE: 62 MMHG | BODY MASS INDEX: 30.18 KG/M2 | HEIGHT: 62 IN | WEIGHT: 164 LBS

## 2022-11-28 DIAGNOSIS — M48.061 SPINAL STENOSIS OF LUMBAR REGION WITHOUT NEUROGENIC CLAUDICATION: ICD-10-CM

## 2022-11-28 DIAGNOSIS — G89.4 CHRONIC PAIN SYNDROME: ICD-10-CM

## 2022-11-28 DIAGNOSIS — M47.816 SPONDYLOSIS OF LUMBAR REGION WITHOUT MYELOPATHY OR RADICULOPATHY: Primary | ICD-10-CM

## 2022-11-28 DIAGNOSIS — M54.50 CHRONIC BILATERAL LOW BACK PAIN WITHOUT SCIATICA: ICD-10-CM

## 2022-11-28 DIAGNOSIS — G89.29 CHRONIC BILATERAL LOW BACK PAIN WITHOUT SCIATICA: ICD-10-CM

## 2022-11-28 DIAGNOSIS — M46.1 SI (SACROILIAC) JOINT INFLAMMATION (HCC): ICD-10-CM

## 2022-11-28 DIAGNOSIS — S32.010D CLOSED COMPRESSION FRACTURE OF L1 LUMBAR VERTEBRA WITH ROUTINE HEALING, SUBSEQUENT ENCOUNTER: ICD-10-CM

## 2022-11-28 DIAGNOSIS — M80.00XA AGE-RELATED OSTEOPOROSIS WITH CURRENT PATHOLOGICAL FRACTURE, INITIAL ENCOUNTER: ICD-10-CM

## 2022-11-28 PROCEDURE — 1123F ACP DISCUSS/DSCN MKR DOCD: CPT | Performed by: NURSE PRACTITIONER

## 2022-11-28 PROCEDURE — 3074F SYST BP LT 130 MM HG: CPT | Performed by: NURSE PRACTITIONER

## 2022-11-28 PROCEDURE — 3078F DIAST BP <80 MM HG: CPT | Performed by: NURSE PRACTITIONER

## 2022-11-28 PROCEDURE — 99214 OFFICE O/P EST MOD 30 MIN: CPT | Performed by: NURSE PRACTITIONER

## 2022-11-28 ASSESSMENT — ENCOUNTER SYMPTOMS
WHEEZING: 0
COUGH: 1
SHORTNESS OF BREATH: 1
GASTROINTESTINAL NEGATIVE: 1
BACK PAIN: 1
CHEST TIGHTNESS: 0

## 2022-11-28 NOTE — PROGRESS NOTES
[address] [facility] [address]  Dept: [phone]  Dept Fax: [phone]: [phone]    Visit Date: 11/28/2022    Functionality Assessment/Goals Worksheet     On a scale of 0 (Does not Interfere) to 10 (Completely Interferes)     1. Which number describes how during the past week pain has interfered with       the following:  A. General Activity:  8  B. Mood: 8  C. Walking Ability:  7  D. Normal Work (Includes both work outside the home and housework):  7  E. Relations with Other People:   6  F. Sleep:   8  G. Enjoyment of Life:   8    2. Patient Prefers to Take their Pain Medications:     []  On a regular basis   [x]  Only when necessary    []  Does not take pain medications    3. What are the Patient's Goals/Expectations for Visiting Pain Management? []  Learn about my pain    [x]  Receive Medication   []  Physical Therapy     []  Treat Depression   [x]  Receive Injections    []  Treat Sleep   []  Deal with Anxiety and Stress   []  Treat Opoid Dependence/Addiction   []  Other:      HPI:   Felix Flood is a 67 y.o. female is here today for    Chief Complaint: Low back pain     HPI   2 month FU. Continues to have pain in low back- aching, dull and stiff pain. Still good relief from L-facet RFA and medications. \"I have been doing well with pain\" and states pain has been vbery manageable. Has been having more frequent COPD flare-ups and states recovering from being very sick the past 3 weeks. Just finished a course of antibiotics last week and finishing prednisone. Continues intermittent spasms in legs   Pain increases with bending, lifting, twisting , walking, standing, getting up and down, and housework or working at job. On 2.5 liters of oxygen per nasal cannula at this time. At home usually on 3- 3.5 liters         Medications reviewed.  Patient denies side effects with medications. Patient states she is taking medications as prescribed. Shedenies receiving pain medications from other sources. She denies any ER visits since last visit. Pain scale with out pain medications or at its worst is 7-8/10. Pain scale with pain medications or at its best is 2-4/10. Last dose of Norco was today   Drug screen reviewed from 9/26/2022 and was appropriate  Pill count completed  today and WNL: Yes      The patientis allergic to codeine, doxycycline, lipitor, simvastatin, and chantix [varenicline tartrate]. Subjective:      Review of Systems   Constitutional:  Positive for fatigue. HENT: Negative. Respiratory:  Positive for cough and shortness of breath. Negative for chest tightness and wheezing. On 2.5 liters of oxygen per nasal cannula at this time   Cardiovascular:  Positive for leg swelling. Negative for chest pain and palpitations. Gastrointestinal: Negative. Genitourinary: Negative. Musculoskeletal:  Positive for arthralgias, back pain and myalgias. Negative for gait problem and joint swelling. Not using any assist devices   Skin: Negative. Neurological:  Positive for weakness. Negative for numbness. Psychiatric/Behavioral:  Negative for sleep disturbance. Objective:     Vitals:    11/28/22 1111   BP: 124/62   Weight: 164 lb (74.4 kg)   Height: 5' 2\" (1.575 m)       Physical Exam  Vitals and nursing note reviewed. Constitutional:       General: She is not in acute distress. Appearance: Normal appearance. She is well-developed. She is not diaphoretic. HENT:      Head: Normocephalic and atraumatic. Right Ear: External ear normal.      Left Ear: External ear normal.      Nose: Nose normal.      Mouth/Throat:      Mouth: Mucous membranes are dry. Pharynx: No oropharyngeal exudate. Eyes:      General: No scleral icterus. Right eye: No discharge. Left eye: No discharge.       Conjunctiva/sclera: Conjunctivae normal.      Pupils: Pupils are equal, round, and reactive to light. Neck:      Thyroid: No thyromegaly. Cardiovascular:      Rate and Rhythm: Normal rate and regular rhythm. Pulses: Normal pulses. Heart sounds: Normal heart sounds. No murmur heard. No friction rub. No gallop. Pulmonary:      Effort: Pulmonary effort is normal. No tachypnea or respiratory distress. Breath sounds: No wheezing or rales. Comments: On 2.5 liters of oxygen, diminished   Chest:      Chest wall: No tenderness. Abdominal:      General: Bowel sounds are normal. There is no distension. Palpations: Abdomen is soft. Tenderness: There is no abdominal tenderness. There is no guarding or rebound. Musculoskeletal:         General: Swelling and tenderness present. Right shoulder: Tenderness present. Decreased range of motion. Left shoulder: Tenderness present. Decreased range of motion. Right elbow: Normal.      Left elbow: Normal.      Right wrist: No swelling, tenderness or bony tenderness. Left wrist: Tenderness and bony tenderness present. Decreased range of motion. Cervical back: Full passive range of motion without pain, normal range of motion and neck supple. Spasms, tenderness and bony tenderness present. No edema, erythema or rigidity. No muscular tenderness. Normal range of motion. Thoracic back: Tenderness present. Lumbar back: Spasms, tenderness and bony tenderness present. Decreased range of motion. Negative right straight leg raise test and negative left straight leg raise test.        Back:       Right hip: Normal.      Left hip: Tenderness present. Decreased range of motion. Decreased strength. Right upper leg: Tenderness present. Left upper leg: Tenderness present. Right knee: Normal.      Left knee: Normal.      Right lower leg: No edema. Left lower leg: No edema. Skin:     General: Skin is warm.       Coloration: Skin is not pale. Findings: No erythema or rash. Neurological:      General: No focal deficit present. Mental Status: She is alert and oriented to person, place, and time. She is not disoriented. Cranial Nerves: No cranial nerve deficit. Sensory: No sensory deficit. Motor: Weakness present. No atrophy or abnormal muscle tone. Coordination: Coordination normal.      Gait: Gait normal.      Deep Tendon Reflexes: Reflexes are normal and symmetric. Comments:   Motor 5/5 BUE BLE   Psychiatric:         Attention and Perception: She is attentive. Mood and Affect: Mood normal. Mood is not anxious or depressed. Affect is not labile, blunt, angry or inappropriate. Speech: Speech normal.         Behavior: Behavior normal. Behavior is not agitated, slowed, aggressive, withdrawn, hyperactive or combative. Thought Content: Thought content normal. Thought content is not paranoid or delusional. Thought content does not include homicidal or suicidal ideation. Thought content does not include homicidal or suicidal plan. Cognition and Memory: Memory is not impaired. She does not exhibit impaired recent memory or impaired remote memory. Judgment: Judgment normal. Judgment is not impulsive or inappropriate. ANASTACIA  Patricks test  positive  Yeoman's  or Gaenslen's positive       Assessment:     1. Spondylosis of lumbar region without myelopathy or radiculopathy    2. Chronic bilateral low back pain without sciatica    3. SI (sacroiliac) joint inflammation (HCC)    4. Spinal stenosis of lumbar region without neurogenic claudication    5. Closed compression fracture of L1 lumbar vertebra with routine healing, subsequent encounter    6. Age-related osteoporosis with current pathological fracture, initial encounter    7. Chronic pain syndrome            Plan:      OARRS reviewed. Current MED: 15.00  Patient was offered naloxone for home.    Discussed long term side effects of medications, tolerance, dependency and addiction. Previous UDS reviewed  UDS preformed today for compliance. Patient told can not receive any pain medications from any other source. No evidence of abuse, diversion or aberrant behavior. Medications and/or procedures to improve function and quality of life- patient understanding with this and that may not be pain free  Discussed with patient about safe storage of medications at home  Discussed possible weaning of medication dosing dependent on treatment/procedure results. Discussed with patient about risks with procedure including infection, reaction to medication, increased pain, or bleeding. Procedure notes reviewed in detail  Continues to receive 80% relief of low back pain from L-facet RFA  Pain remains tolerable with medications and procedures. Continue Norco 5/325, 1 tab every 8 hours as needed for breakthrough pain-Filled recently on 11/25/2022. Patient is compliant     Meds. Prescribed:   No orders of the defined types were placed in this encounter. Return in about 2 months (around 1/28/2023), or if symptoms worsen or fail to improve, for follow up  for medications.                Electronically signed by CASSANDRA Dotson CNP on11/28/2022 at 11:28 AM

## 2022-12-20 DIAGNOSIS — G89.4 CHRONIC PAIN SYNDROME: ICD-10-CM

## 2022-12-20 RX ORDER — HYDROCODONE BITARTRATE AND ACETAMINOPHEN 5; 325 MG/1; MG/1
1 TABLET ORAL EVERY 8 HOURS PRN
Qty: 90 TABLET | Refills: 0 | Status: SHIPPED | OUTPATIENT
Start: 2022-12-25 | End: 2023-01-24

## 2023-01-03 ENCOUNTER — TELEPHONE (OUTPATIENT)
Dept: PULMONOLOGY | Age: 73
End: 2023-01-03

## 2023-01-03 NOTE — TELEPHONE ENCOUNTER
Patient has CT Thorax ordered at [facility] and is pending authorization. Per portal, although clinical notes were submitted, it is pending denial based on medical necessity. This may warrant a peer to peer if a clinician is available. The information is as follows:    DOS 01/06/2023     Procedures:     [id number] - CT CHEST WO CONTRAST  Insurance:  [facility]  Does Secondary Coverage Require Auth: no  Source: Website   Source Details: [facility]  Case/Tracking/Auth/Ref # :  [id number]   Case Status: Pending Review  Submitted clinicals via Fax consisting of: OV/Consult; OV/Consult; CT; ; ; .  Additional Comments:  Sent the following to [facility] via fax:      Phone number for peer to peer: [phone]. Please advise.  Thank you,  Javier Gonzalez, MSN, RN

## 2023-01-04 NOTE — TELEPHONE ENCOUNTER
It looks like patient cancelled her appointment for her CT Chest and pulmonary function test. I adjusted the CT Chest order to W contrast as I wanted it with contrast to follow her lymph node. Can we check with patient to see if she is OK with completing the testing prior to completing P2P? Thanks!

## 2023-01-04 NOTE — TELEPHONE ENCOUNTER
CT order adjusted to W Contrast.
I advised patient where that enlarged lymph node is pt voiced her understanding and she also got that blood work done.
I called and spoke to patient and she voiced her understanding on the results of her CTA and she was asking where the enlarge lymph node was? Also she will get that blood work done soon, She did state that [facility] was out there yesterday helping her with her Oxygen and they had switched out her machine and she states she has seen a difference already but it not perfect but its improving. I did add the ct chest the same-day in Jan as when her isaiah is. Patient is aware of that.
Please notify patient that her CTA chest completed last night was negative for pulmonary embolism and negative for pneumonia. It did show an enlarged lymph node that I would like to follow with the CT chest in 3 months. I have ordered this please assist patient in scheduling this to be completed prior to her next appointment. Please also notify patient that I would like to get a complete blood cell count and a proBNP for further evaluation of her shortness of breath since the CTA scan was negative. Please have her complete this in the next 1 to 2 days and we will call her with the results. Please also ensure that patient calls her cardiologist for a follow up appointment as she has not been seen for 1.5 yr. If patient's shortness of breath worsens, I recommend she seek treatment in the ED. Thank you!
That is good to hear! The enlarged lymph node is in her chest near her heart and left lung. Thanks!
Tonia Alvarado, it looks like it is in process. Will follow results. Thanks!
Janet Daley MD

## 2023-01-19 DIAGNOSIS — G89.4 CHRONIC PAIN SYNDROME: ICD-10-CM

## 2023-01-20 RX ORDER — HYDROCODONE BITARTRATE AND ACETAMINOPHEN 5; 325 MG/1; MG/1
1 TABLET ORAL EVERY 8 HOURS PRN
Qty: 90 TABLET | Refills: 0 | Status: SHIPPED | OUTPATIENT
Start: 2023-01-25 | End: 2023-02-24

## 2023-02-07 ENCOUNTER — TELEPHONE (OUTPATIENT)
Dept: PULMONOLOGY | Age: 73
End: 2023-02-07

## 2023-02-07 NOTE — TELEPHONE ENCOUNTER
Please be advised the authorization for CT CHEST W CONTRAST that is scheduled at [facility]. Dinora's was cannot be resubmitted because the date of service is sooner than 60 days from the previous request.     I contacted [facility] and was told you would need to APPEAL this case by calling the number on the back of the patient's insurance card or wait until 60 days after the decision was made on that case. Please refer to the following information:     DOS 02/27/2023    Procedures:     Shelley [address]:  [facility] Details: [phone] [facility]  Case/Tracking/Auth/Ref # :  [id number]   Case Status: Unable to submit a new request. Too soon. Please advise. Thank you for your attention.    Artemio Mann., MSN, RN

## 2023-02-09 NOTE — TELEPHONE ENCOUNTER
I would be glad to help, but I am not sure I understand what is needed. Can you please clarify on what the next step is to help obtain the CT Chest for this patient? Thanks!

## 2023-02-20 DIAGNOSIS — G89.4 CHRONIC PAIN SYNDROME: ICD-10-CM

## 2023-02-20 RX ORDER — HYDROCODONE BITARTRATE AND ACETAMINOPHEN 5; 325 MG/1; MG/1
1 TABLET ORAL EVERY 8 HOURS PRN
Qty: 90 TABLET | Refills: 0 | Status: SHIPPED | OUTPATIENT
Start: 2023-02-24 | End: 2023-03-26

## 2023-03-01 ENCOUNTER — OFFICE VISIT (OUTPATIENT)
Dept: PHYSICAL MEDICINE AND REHAB | Age: 73
End: 2023-03-01

## 2023-03-01 VITALS
BODY MASS INDEX: 30.18 KG/M2 | DIASTOLIC BLOOD PRESSURE: 42 MMHG | SYSTOLIC BLOOD PRESSURE: 138 MMHG | WEIGHT: 164 LBS | HEIGHT: 62 IN

## 2023-03-01 DIAGNOSIS — M47.816 SPONDYLOSIS OF LUMBAR REGION WITHOUT MYELOPATHY OR RADICULOPATHY: Primary | ICD-10-CM

## 2023-03-01 DIAGNOSIS — G89.29 CHRONIC BILATERAL LOW BACK PAIN WITHOUT SCIATICA: ICD-10-CM

## 2023-03-01 DIAGNOSIS — G89.4 CHRONIC PAIN SYNDROME: ICD-10-CM

## 2023-03-01 DIAGNOSIS — M80.00XA AGE-RELATED OSTEOPOROSIS WITH CURRENT PATHOLOGICAL FRACTURE, INITIAL ENCOUNTER: ICD-10-CM

## 2023-03-01 DIAGNOSIS — S32.010D CLOSED COMPRESSION FRACTURE OF L1 LUMBAR VERTEBRA WITH ROUTINE HEALING, SUBSEQUENT ENCOUNTER: ICD-10-CM

## 2023-03-01 DIAGNOSIS — M54.50 CHRONIC BILATERAL LOW BACK PAIN WITHOUT SCIATICA: ICD-10-CM

## 2023-03-01 DIAGNOSIS — M46.1 SI (SACROILIAC) JOINT INFLAMMATION (HCC): ICD-10-CM

## 2023-03-01 DIAGNOSIS — M48.061 SPINAL STENOSIS OF LUMBAR REGION WITHOUT NEUROGENIC CLAUDICATION: ICD-10-CM

## 2023-03-01 ASSESSMENT — ENCOUNTER SYMPTOMS
WHEEZING: 0
SHORTNESS OF BREATH: 1
GASTROINTESTINAL NEGATIVE: 1
COUGH: 0
BACK PAIN: 1
CHEST TIGHTNESS: 0

## 2023-03-01 NOTE — PROGRESS NOTES
[address] [facility] [address]  Dept: [phone]  Dept Fax: [phone]: [phone]    Visit Date: 3/1/2023    Functionality Assessment/Goals Worksheet     On a scale of 0 (Does not Interfere) to 10 (Completely Interferes)     1. Which number describes how during the past week pain has interfered with       the following:  A. General Activity:  7  B. Mood: 7  C. Walking Ability:  8  D. Normal Work (Includes both work outside the home and housework):  8  E. Relations with Other People:   7  F. Sleep:   7  G. Enjoyment of Life:   8    2. Patient Prefers to Take their Pain Medications:     [x]  On a regular basis   []  Only when necessary    []  Does not take pain medications    3. What are the Patient's Goals/Expectations for Visiting Pain Management? []  Learn about my pain    [x]  Receive Medication   []  Physical Therapy     []  Treat Depression   [x]  Receive Injections    []  Treat Sleep   []  Deal with Anxiety and Stress   []  Treat Opoid Dependence/Addiction   []  Other:        HPI:   Valarie March is a 67 y.o. female is here today for    Chief Complaint: Low back pain     HPI   3 month FU. Patient reports that a week and a half ago she had carbon monoxide poisoning from a bad furnace. Had that fixed and she is doing well now. She was having issues with COPD flareups and breathing and is happy she is alive. She continue sto have pain in low back all axial- aching and burning pain. Constant but up and down. Not as bad as it used to be as still getting relief from L-facet RFA. Remains active   On 2 liters of oxygen today per nasal cannula   Pain increases with bending, lifting, twisting , walking, standing, getting up and down, and housework or working at job. Norco prn remains effective. Medications reviewed.  Patient denies side effects with medications. Patient states she is taking medications as prescribed. Shedenies receiving pain medications from other sources. She denies any ER visits since last visit. Pain scale with out pain medications or at its worst is 8/10. Pain scale with pain medications or at its best is 4/10. Last dose of [place] was today   Drug screen reviewed from 11/28/2022 and was appropriate  Pill count completed  today and WNL: Yes      The patientis allergic to codeine, doxycycline, lipitor, simvastatin, and chantix [varenicline tartrate]. Subjective:      Review of Systems   Constitutional:  Positive for fatigue. HENT: Negative. Respiratory:  Positive for shortness of breath. Negative for cough, chest tightness and wheezing. On 2 liters of oxygen per nasal cannula at this time   Cardiovascular:  Positive for leg swelling. Negative for chest pain and palpitations. Gastrointestinal: Negative. Genitourinary: Negative. Musculoskeletal:  Positive for arthralgias, back pain and myalgias. Negative for gait problem and joint swelling. Not using any assist devices   Skin: Negative. Neurological:  Positive for weakness. Negative for numbness. Psychiatric/Behavioral:  Positive for sleep disturbance. Objective:     Vitals:    03/01/23 1153   BP: (!) 138/42   Site: Right Upper Arm   Position: Sitting   Weight: 164 lb (74.4 kg)   Height: 5' 2\" (1.575 m)       Physical Exam  Vitals and nursing note reviewed. Constitutional:       General: She is not in acute distress. Appearance: Normal appearance. She is well-developed. She is not diaphoretic. HENT:      Head: Normocephalic and atraumatic. Right Ear: External ear normal.      Left Ear: External ear normal.      Nose: Nose normal.      Mouth/Throat:      Mouth: Mucous membranes are dry. Pharynx: No oropharyngeal exudate. Eyes:      General: No scleral icterus. Right eye: No discharge. Left eye: No discharge. Conjunctiva/sclera: Conjunctivae normal.      Pupils: Pupils are equal, round, and reactive to light. Neck:      Thyroid: No thyromegaly. Cardiovascular:      Rate and Rhythm: Normal rate and regular rhythm. Pulses: Normal pulses. Heart sounds: Normal heart sounds. No murmur heard. No friction rub. No gallop. Pulmonary:      Effort: Pulmonary effort is normal. No tachypnea or respiratory distress. Breath sounds: No wheezing or rales. Comments: On 2.5 liters of oxygen, diminished   Chest:      Chest wall: No tenderness. Abdominal:      General: Bowel sounds are normal. There is no distension. Palpations: Abdomen is soft. Tenderness: There is no abdominal tenderness. There is no guarding or rebound. Musculoskeletal:         General: Swelling and tenderness present. Right shoulder: Tenderness present. Decreased range of motion. Left shoulder: Tenderness present. Decreased range of motion. Right elbow: Normal.      Left elbow: Normal.      Right wrist: No swelling, tenderness or bony tenderness. Left wrist: Tenderness and bony tenderness present. Decreased range of motion. Cervical back: Full passive range of motion without pain, normal range of motion and neck supple. Spasms, tenderness and bony tenderness present. No edema, erythema or rigidity. No muscular tenderness. Normal range of motion. Thoracic back: Tenderness present. Lumbar back: Spasms, tenderness and bony tenderness present. Decreased range of motion. Negative right straight leg raise test and negative left straight leg raise test.        Back:       Right hip: Normal.      Left hip: Tenderness present. Decreased range of motion. Decreased strength. Right upper leg: Tenderness present. Left upper leg: Tenderness present. Right knee: Normal.      Left knee: Normal.      Right lower leg: No edema. Left lower leg: No edema. Skin:     General: Skin is warm. Coloration: Skin is not pale. Findings: No erythema or rash. Neurological:      General: No focal deficit present. Mental Status: She is alert and oriented to person, place, and time. She is not disoriented. Cranial Nerves: No cranial nerve deficit. Sensory: No sensory deficit. Motor: Weakness present. No atrophy or abnormal muscle tone. Coordination: Coordination normal.      Gait: Gait normal.      Deep Tendon Reflexes: Reflexes are normal and symmetric. Comments:   Motor 5/5 BUE BLE   Psychiatric:         Attention and Perception: She is attentive. Mood and Affect: Mood normal. Mood is not anxious or depressed. Affect is not labile, blunt, angry or inappropriate. Speech: Speech normal.         Behavior: Behavior normal. Behavior is not agitated, slowed, aggressive, withdrawn, hyperactive or combative. Thought Content: Thought content normal. Thought content is not paranoid or delusional. Thought content does not include homicidal or suicidal ideation. Thought content does not include homicidal or suicidal plan. Cognition and Memory: Memory is not impaired. She does not exhibit impaired recent memory or impaired remote memory. Judgment: Judgment normal. Judgment is not impulsive or inappropriate. ANASTACIA  Patricks test  positive  Yeoman's  or Gaenslen's positive         Assessment:     1. Spondylosis of lumbar region without myelopathy or radiculopathy    2. Chronic bilateral low back pain without sciatica    3. SI (sacroiliac) joint inflammation (HCC)    4. Spinal stenosis of lumbar region without neurogenic claudication    5. Closed compression fracture of L1 lumbar vertebra with routine healing, subsequent encounter    6. Age-related osteoporosis with current pathological fracture, initial encounter    7. Chronic pain syndrome            Plan:      OARRS reviewed. Current MED: 15.00  Patient was offered naloxone for home.    Discussed long term side effects of medications, tolerance, dependency and addiction. Previous UDS reviewed  UDS preformed today for compliance. Patient told can not receive any pain medications from any other source. No evidence of abuse, diversion or aberrant behavior. Medications and/or procedures to improve function and quality of life- patient understanding with this and that may not be pain free  Discussed with patient about safe storage of medications at home  Discussed possible weaning of medication dosing dependent on treatment/procedure results. Discussed with patient about risks with procedure including infection, reaction to medication, increased pain, or bleeding. Procedure notes reviewed in detail  Continues to receive 80% relief of low back pain from L-facet RFA. Discussed repeating when needing to   Pain remains tolerable with medications and procedures. Continue Norco 5/325, 1 tab every 8 hours as needed for breakthrough pain- filled 2/24/2023  Patient is compliant       Meds. Prescribed:   No orders of the defined types were placed in this encounter. Return in about 10 weeks (around 5/10/2023), or if symptoms worsen or fail to improve, for follow up  for medications.                Electronically signed by CASSANDRA Blackwood CNP on3/1/2023 at 12:32 PM

## 2023-03-03 NOTE — TELEPHONE ENCOUNTER
No response was ever received. Called and spoke to [facility]. They reported that the case was closed 1/6/23. They had all of the wrong information for the office fax, phone, address etc. Another PA can not be attempted until 60 days after the initial denial 1/6/23 (3/6/23). Provider can call in for P2P however this will not change the decision, they will only review the reasons that the case was denied. I am going to reach out to the PA Dept here at [facility] to make sure that they have the correct information for our office for future PA that they submit. I will personally submit a new PA 3/6/23 for patient CT. FYI.

## 2023-03-15 NOTE — TELEPHONE ENCOUNTER
Bedside SBAR report received from  [facility], care assumed. Spoke to patients plan, her case is pending due and to close with a decision by 3/16/23, last PA was denied, they advised that provider do a P2P today, because if case is closed 3/16/23 will not be available for a reconsideration. Would you like to complete a P2P today?

## 2023-03-20 DIAGNOSIS — G89.4 CHRONIC PAIN SYNDROME: ICD-10-CM

## 2023-03-20 RX ORDER — HYDROCODONE BITARTRATE AND ACETAMINOPHEN 5; 325 MG/1; MG/1
1 TABLET ORAL EVERY 8 HOURS PRN
Qty: 90 TABLET | Refills: 0 | Status: SHIPPED | OUTPATIENT
Start: 2023-03-26 | End: 2023-04-25

## 2023-04-17 ENCOUNTER — TELEPHONE (OUTPATIENT)
Dept: PULMONOLOGY | Age: 73
End: 2023-04-17

## 2023-04-17 NOTE — TELEPHONE ENCOUNTER
Called and left message for patient regarding results. Informed her to call back with any further questions.

## 2023-04-17 NOTE — TELEPHONE ENCOUNTER
----- Message from CASSANDRA Mccallum CNP sent at 4/14/2023  3:04 PM EDT -----  Please notify patient that her lymph nodes are stable on her CT scan. We can discuss this in more detail at her scheduled follow up appt. Thanks!

## 2023-04-22 DIAGNOSIS — G89.4 CHRONIC PAIN SYNDROME: ICD-10-CM

## 2023-04-24 ENCOUNTER — TRANSCRIBE ORDERS (OUTPATIENT)
Dept: ADMINISTRATIVE | Age: 73
End: 2023-04-24

## 2023-04-24 DIAGNOSIS — N63.21 BREAST LUMP ON LEFT SIDE AT 1 O'CLOCK POSITION: Primary | ICD-10-CM

## 2023-04-24 RX ORDER — HYDROCODONE BITARTRATE AND ACETAMINOPHEN 5; 325 MG/1; MG/1
1 TABLET ORAL EVERY 8 HOURS PRN
Qty: 90 TABLET | Refills: 0 | Status: SHIPPED | OUTPATIENT
Start: 2023-04-25 | End: 2023-05-25

## 2023-04-27 ENCOUNTER — HOSPITAL ENCOUNTER (OUTPATIENT)
Dept: WOMENS IMAGING | Age: 73
Discharge: HOME | End: 2023-04-27
Payer: MEDICARE

## 2023-04-27 DIAGNOSIS — N63.21 BREAST LUMP ON LEFT SIDE AT 1 O'CLOCK POSITION: ICD-10-CM

## 2023-04-27 PROCEDURE — 76642 ULTRASOUND BREAST LIMITED: CPT

## 2023-04-27 PROCEDURE — G0279 TOMOSYNTHESIS, MAMMO: HCPCS

## 2023-05-04 ENCOUNTER — HOSPITAL ENCOUNTER (OUTPATIENT)
Dept: WOMENS IMAGING | Age: 73
Discharge: HOME | End: 2023-05-04
Payer: MEDICARE

## 2023-05-04 DIAGNOSIS — N63.21 MASS OF UPPER OUTER QUADRANT OF LEFT BREAST: ICD-10-CM

## 2023-05-04 PROCEDURE — G0279 TOMOSYNTHESIS, MAMMO: HCPCS

## 2023-05-04 PROCEDURE — 88341 IMHCHEM/IMCYTCHM EA ADD ANTB: CPT

## 2023-05-04 PROCEDURE — 88377 M/PHMTRC ALYS ISHQUANT/SEMIQ: CPT

## 2023-05-04 PROCEDURE — 88305 TISSUE EXAM BY PATHOLOGIST: CPT

## 2023-05-04 PROCEDURE — 19083 BX BREAST 1ST LESION US IMAG: CPT

## 2023-05-04 PROCEDURE — 88342 IMHCHEM/IMCYTCHM 1ST ANTB: CPT

## 2023-05-04 PROCEDURE — 88360 TUMOR IMMUNOHISTOCHEM/MANUAL: CPT

## 2023-05-04 NOTE — PROGRESS NOTES
Breast Biopsy Flowsheet/Post-Operative Care    Date of Procedure: 5/4/2023  Physician: Dr. Africa Vieira  Technologist: Beverly Eaton RT(R)(M)    Biopsy:ultrasound guided breast biopsy  Lesion type: Non-palpable  Breast: left    Clock face position: Site #1: UOQ          Primary Method of Detection: Palpation      Microcalcification's: no   Distribution: N/A    Asymmetry: asymmetric    Biopsy Method:   Sertera:    Site # 1    Gauge: 14    # of Passes: 4     Clip: Jay       Pre-Op Assessment: (BI-RADS)   5.  Highly Suggestive of Malignancy    Patient Tolerated Procedure: good  Complications: none  Comments: none    Post Operative Care  Steri strips: Yes  Dressing: Gauze, Tape   Ice Applied to Site:  Yes  Evidence of Bleeding:  No    Pain Verbalized: No      Written Discharge Instructions: Yes  Condition at Discharge: good  Time of Discharge: 1050    Electronically signed by Tari Parham on 5/4/2023 at 10:50 AM

## 2023-05-04 NOTE — PROGRESS NOTES
[facility] [address]  Pre-Biopsy Assessment      Patient Education    Written information about procedure Yes  left   Procedural steps explained Yes Ultrasound Biopsy   Post-op potential: bruising, hematoma, pain Yes    Self-care: activity, care of dressing Yes    Patient verbalized understanding Yes    Consent signed and witnessed Yes      Hormone Therapy Status: none    Recent Medication: Aspirin Last Dose: 4 days ago                                     Hormone Replacement Therapy: no    Previous Breast Biopsy: no    Previous Diagnosis Cancer: no    Hysterectomy:no    Emotional Status: Calm    Language or Physical Barriers: COPD, on oxygen, heart disease    Comments: none      Electronically signed by Jack Coronado on 5/4/2023 at 10:46 AM

## 2023-05-05 ENCOUNTER — CLINICAL DOCUMENTATION (OUTPATIENT)
Dept: WOMENS IMAGING | Age: 73
End: 2023-05-05

## 2023-05-05 NOTE — PROGRESS NOTES
Contact [facility]: [facility]    Diagnosis:  Invasive Ductal Carcinoma grade 2    Home Disposition: Lives with her daughter    Contact Information: Arrived alone for biopsy results. Dr. Rafael Hawkins discussed pathology and recommended integrated breast clinic. Encouraged Breast Clinic attendance. All cards given for surgeons and oncologists. Requests Referral to Doctor(s) with appointment(s): undecided, will talk to family and friends over the weekend    Additional Referral(s): Mauricio Franco/Arlyn Rodriguez: [facility]ig[facility],        Integrated breast cancer clinic appointment: 23   7:30 am    Biopsy site status: no issues    Teaching Sheets provided: Cancer Type: IDC, What is Breast Cancer, Tumor Markers, Needle Localization, [place] Lymph Node Surgery, Lumpectomy/Mastectomy Comparison, Radiation Therapy, Breast Cancer Navigation Packet, Nurse Navigation contact information, Breast Clinic appointment and map. Currently on HRT: no    If yes, was patient instructed to stop immediately: N/A     Notes: Explained terms doctor and navigators will discuss at next appointments. Questions addressed and encouraged patient to ask [facility] Navigators. Will receive call .  Referral emailed to Navigation. Answered yes on Genetic Risk Assessment: daughter  of Colon cancer (diagnosed at 40)     Additional information: she is on oxygen, COPD, heart disease, daughter lives with her and helps her do the things she can not do. She is up every morning at 4 am to feed her cats and goes to bed early.      Results Faxed to:  Dr. Kurt Garnica

## 2023-05-08 ENCOUNTER — CLINICAL DOCUMENTATION (OUTPATIENT)
Dept: CASE MANAGEMENT | Age: 73
End: 2023-05-08

## 2023-05-08 NOTE — PROGRESS NOTES
Name: Rachel Emerson  : 1950  MRN: [id number]    Oncology Navigation- Initial Note: Teaching    Intake-  Contact Type: Teaching with navigator    Diagnosis: Breast-malignant    Home Disposition: Lives with other who is able to assist, lives with daughter Lucinda Enamorado    Patient needs and barriers to care: Coordination of Care, Knowledge deficit, Emotional Issues/ Fear/ Anxiety, and Symptom Management, Hx of COPD, currently on continuous oxygen Binnie Cull CNP),  DDD managed by pain management, had cardiac stent placed  and left subclavian stent places  (Dr. Cat Hood)     Referral Source: Outside Provider    Receptive to Advanced Care Planning/ Palliative Care:  staging pending tumor receptors (HER2)    Interventions-   General Interventions: Called patient to arrange meeting to discuss new diagnoses of breast cancer. Prefers to talk on phone. Would like to get in to see a surgeon as soon as possible, and cancel clinic appt for 23. States she stopped taking ASA 81mg prior to biopsy and has not restarted. Encourage to resume ASA 81mg. Stopped smoking 2010 with an 80 pack year history. Genetics/Family Hx: Daughter with colon cancer at 40, . Meets criteria for genetic testing. Education/Screenings:  yes -  Breast cancer information packet and navigation welcome packet given by [facility]. States she read the information this weekend. Denies any questions. Currently on HRT: no     Referrals: [facility], Dr. Owens Nephew per request for 23 at 1:30 pm.  Instructed to arrive 20 min prior to appt time     Continuum of Care: Diagnosis/Active Treatment    Notes: Teaching completed, questions addressed, emotional support provided. Contact information provided and patient encouraged to call with any questions or concerns. Will follow through continuum of care.     Electronically signed by Alanna Miller RN on 2023 at 12:58 PM

## 2023-05-08 NOTE — PROGRESS NOTES
[facility]    Patient: Robert Ricardo, 67 y.o.   : 1950    Patient of mine     Subjective     Chief Complaint   Patient presents with    Follow-up     Pre op clearance for breast surgery. Centrilobular emphysema        NORMA Fallon is here for follow up for preop clearance for left breast lumpectomy with Dr. Troy Hall. She is following with pulm for centrilobular emphysema and restrictive lung disease. She reports good compliance with 1.5 lpm with rest and sleep and 3 lpm on exertion. She is here with stable pulmonary function test. Overall patient reports respiratory symptoms have been fluctuating a bit since last appointment. Patient reports good compliance with inhaled medications Sarah Shipman). She does not feel that Lurchade Hiss is helping. Patient using albuterol 4-5 times per day on average. Patient reports physical limitation due to respiratory symptoms. Her past medical history is significant for restrictive lung disease, emphysema, PETE (follows with ROSIO Coughlin), hypertension, CHF (follows with Dr. Wynelle Brittle), CAD, DDD, and cerebral infarction.     Expectorants: Saline neb    She is using BiPAP intermittently during the day     CT Chest 23 - stable prominent mediastinal and hilar lymph nodes  Mammogram 23 - mass in upper outer left breast  Biopsy of breast - Invasive ductal adenocarcinoma, Abi grade 2    Complaints: shortness of breath   Onset Duration: over a year  Exacerbating factors: small exertion  Alleviating factors: rest and BiPAP  Associated symptoms: wheezing, chest tightness \"sometimes\", and cough \"sometimes' with clear or white phlegm production  Pertinent negatives: hemoptysis    Allergy regimen: loratadine    MMRC Dyspnea Scale:   0: Dyspneic on strenuous exercise  1: Dyspneic on walking a slight hill  2: Dyspneic on walking level ground; must stop occasionally due to breathlessness  3: Must stop for breathlessness after

## 2023-05-09 ENCOUNTER — OFFICE VISIT (OUTPATIENT)
Dept: PULMONOLOGY | Age: 73
End: 2023-05-09
Payer: MEDICARE

## 2023-05-09 VITALS
BODY MASS INDEX: 27.97 KG/M2 | RESPIRATION RATE: 20 BRPM | HEART RATE: 78 BPM | WEIGHT: 152 LBS | SYSTOLIC BLOOD PRESSURE: 136 MMHG | TEMPERATURE: 97.9 F | OXYGEN SATURATION: 93 % | HEIGHT: 62 IN | DIASTOLIC BLOOD PRESSURE: 56 MMHG

## 2023-05-09 DIAGNOSIS — J96.11 CHRONIC RESPIRATORY FAILURE WITH HYPOXIA (HCC): ICD-10-CM

## 2023-05-09 DIAGNOSIS — J43.2 CENTRILOBULAR EMPHYSEMA (HCC): ICD-10-CM

## 2023-05-09 DIAGNOSIS — J98.4 RESTRICTIVE LUNG DISEASE: ICD-10-CM

## 2023-05-09 DIAGNOSIS — Z01.811 PREOP RESPIRATORY EXAM: Primary | ICD-10-CM

## 2023-05-09 PROCEDURE — 3075F SYST BP GE 130 - 139MM HG: CPT

## 2023-05-09 PROCEDURE — 3078F DIAST BP <80 MM HG: CPT

## 2023-05-09 PROCEDURE — 1123F ACP DISCUSS/DSCN MKR DOCD: CPT

## 2023-05-09 PROCEDURE — 99214 OFFICE O/P EST MOD 30 MIN: CPT

## 2023-05-09 RX ORDER — METOPROLOL SUCCINATE 25 MG/1
25 TABLET, EXTENDED RELEASE ORAL DAILY
COMMUNITY
Start: 2023-04-11

## 2023-05-09 ASSESSMENT — ENCOUNTER SYMPTOMS
WHEEZING: 1
CHEST TIGHTNESS: 1
SHORTNESS OF BREATH: 1
COUGH: 1
SINUS PAIN: 0
SINUS PRESSURE: 0
RHINORRHEA: 0

## 2023-05-09 NOTE — PATIENT INSTRUCTIONS
Learning About Using an [employer]  What is an incentive spirometer? An incentive spirometer is a handheld device that exercises your lungs and measures how much air you can breathe in. It tells you and your doctor how well your lungs are working. The spirometer can help you practice taking deep breaths. Deep breaths can help open your airways and prevent fluid or mucus from building up in your lungs, and make it easier for you to breathe. Using the device can help prevent serious lung infections like pneumonia, improve your breathing after you've had pneumonia or surgery, and keep your airways open and lungs active if you can't get out of bed. How do you use an incentive spirometer? When you use an incentive spirometer, you breathe in air through a tube that is connected to a large air column containing a piston or ball. As you breathe in, the piston or ball inside the column moves up. The height of the piston or ball shows how much air you breathed in. You may feel lightheaded when you breathe in deeply for this exercise. If you feel dizzy or feel like you're going to pass out, stop the exercise and rest.  Each time you do this exercise, keep track of your progress by writing down how high the piston or ball moves up the column. Move the slider on the outside of the large column to the level that you want to reach or that your doctor recommended. Sit or stand up straight, and hold the spirometer in front of you. Be sure to keep it level. To start, breathe out normally. Then close your lips tightly around the mouthpiece. Make sure that you don't block the mouthpiece with your tongue. Take a slow, deep breath. Breathe in as deeply as you can. As you breathe in, the piston or ball inside the large column will move up. Try to move the piston or ball as high up as you can or to the level your doctor recommended.   When you can't breathe in anymore, hold your breath for 2 to 5

## 2023-05-10 ENCOUNTER — OFFICE VISIT (OUTPATIENT)
Dept: PHYSICAL MEDICINE AND REHAB | Age: 73
End: 2023-05-10
Payer: MEDICARE

## 2023-05-10 VITALS
SYSTOLIC BLOOD PRESSURE: 126 MMHG | DIASTOLIC BLOOD PRESSURE: 76 MMHG | HEIGHT: 62 IN | WEIGHT: 152 LBS | BODY MASS INDEX: 27.97 KG/M2

## 2023-05-10 DIAGNOSIS — Z85.3 HISTORY OF LEFT BREAST CANCER: Primary | ICD-10-CM

## 2023-05-10 DIAGNOSIS — M80.00XA AGE-RELATED OSTEOPOROSIS WITH CURRENT PATHOLOGICAL FRACTURE, INITIAL ENCOUNTER: ICD-10-CM

## 2023-05-10 DIAGNOSIS — M46.1 SI (SACROILIAC) JOINT INFLAMMATION (HCC): ICD-10-CM

## 2023-05-10 DIAGNOSIS — M48.061 SPINAL STENOSIS OF LUMBAR REGION WITHOUT NEUROGENIC CLAUDICATION: ICD-10-CM

## 2023-05-10 DIAGNOSIS — M54.50 CHRONIC BILATERAL LOW BACK PAIN WITHOUT SCIATICA: ICD-10-CM

## 2023-05-10 DIAGNOSIS — G89.4 CHRONIC PAIN SYNDROME: ICD-10-CM

## 2023-05-10 DIAGNOSIS — S32.010D CLOSED COMPRESSION FRACTURE OF L1 LUMBAR VERTEBRA WITH ROUTINE HEALING, SUBSEQUENT ENCOUNTER: ICD-10-CM

## 2023-05-10 DIAGNOSIS — G89.29 CHRONIC BILATERAL LOW BACK PAIN WITHOUT SCIATICA: ICD-10-CM

## 2023-05-10 DIAGNOSIS — M47.816 SPONDYLOSIS OF LUMBAR REGION WITHOUT MYELOPATHY OR RADICULOPATHY: ICD-10-CM

## 2023-05-10 PROCEDURE — 3074F SYST BP LT 130 MM HG: CPT | Performed by: NURSE PRACTITIONER

## 2023-05-10 PROCEDURE — 99214 OFFICE O/P EST MOD 30 MIN: CPT | Performed by: NURSE PRACTITIONER

## 2023-05-10 PROCEDURE — 3078F DIAST BP <80 MM HG: CPT | Performed by: NURSE PRACTITIONER

## 2023-05-10 PROCEDURE — 1123F ACP DISCUSS/DSCN MKR DOCD: CPT | Performed by: NURSE PRACTITIONER

## 2023-05-10 ASSESSMENT — ENCOUNTER SYMPTOMS
COUGH: 1
SHORTNESS OF BREATH: 1
GASTROINTESTINAL NEGATIVE: 1
CHEST TIGHTNESS: 0
WHEEZING: 1
BACK PAIN: 1

## 2023-05-10 NOTE — PROGRESS NOTES
[facility] [address]  Dept: [phone]  Dept Fax: [phone]: [phone]    Visit Date: 5/10/2023    Functionality Assessment/Goals Worksheet     On a scale of 0 (Does not Interfere) to 10 (Completely Interferes)     1. Which number describes how during the past week pain has interfered with       the following:  A. General Activity:  5  B. Mood: 5  C. Walking Ability:  5  D. Normal Work (Includes both work outside the home and housework):  5  E. Relations with Other People:   5  F. Sleep:   5  G. Enjoyment of Life:   5    2. Patient Prefers to Take their Pain Medications:     []  On a regular basis   []  Only when necessary    []  Does not take pain medications    3. What are the Patient's Goals/Expectations for Visiting Pain Management? []  Learn about my pain    []  Receive Medication   []  Physical Therapy     []  Treat Depression   []  Receive Injections    []  Treat Sleep   []  Deal with Anxiety and Stress   []  Treat Opoid Dependence/Addiction   []  Other:        HPI:   Nirav Parker is a 67 y.o. female is here today for    Chief Complaint: Low back pain, left breast pain     HPI   States she had a biopsy of her left breast mass  last Thursday and found to have Invasive ductal adenocarcinoma, Agness grade 2. Is meeting with surgeon Dr. Fatmata Hutchison tomorrow to discuss removal of this. Having pain is side of left breast tender pain. Also continues to have pain in low back- all axial. Dull ache. Has been having some numbness in left posterior leg- intermittent     Remains on 3 liters of oxygen per nasal cannula. Pain increases with bending, lifting, turning torso, walking, standing, getting up and down, and housework or working at job. Norco prn continues to help make pain tolerable.  \"I fit wasn't for this I would not be on my

## 2023-05-11 ENCOUNTER — OFFICE VISIT (OUTPATIENT)
Dept: SURGERY | Age: 73
End: 2023-05-11
Payer: MEDICARE

## 2023-05-11 ENCOUNTER — TELEPHONE (OUTPATIENT)
Dept: SURGERY | Age: 73
End: 2023-05-11

## 2023-05-11 VITALS
HEIGHT: 62 IN | RESPIRATION RATE: 16 BRPM | OXYGEN SATURATION: 93 % | TEMPERATURE: 97.7 F | HEART RATE: 78 BPM | SYSTOLIC BLOOD PRESSURE: 150 MMHG | BODY MASS INDEX: 29.85 KG/M2 | WEIGHT: 162.2 LBS | DIASTOLIC BLOOD PRESSURE: 60 MMHG

## 2023-05-11 DIAGNOSIS — E11.21 CONTROLLED TYPE 2 DIABETES MELLITUS WITH DIABETIC NEPHROPATHY, WITHOUT LONG-TERM CURRENT USE OF INSULIN (HCC): ICD-10-CM

## 2023-05-11 DIAGNOSIS — C50.412 MALIGNANT NEOPLASM OF UPPER-OUTER QUADRANT OF LEFT BREAST IN FEMALE, ESTROGEN RECEPTOR POSITIVE (HCC): Primary | ICD-10-CM

## 2023-05-11 DIAGNOSIS — Z17.0 MALIGNANT NEOPLASM OF UPPER-OUTER QUADRANT OF LEFT BREAST IN FEMALE, ESTROGEN RECEPTOR POSITIVE (HCC): Primary | ICD-10-CM

## 2023-05-11 DIAGNOSIS — Z01.818 PRE-OP TESTING: ICD-10-CM

## 2023-05-11 PROCEDURE — 3078F DIAST BP <80 MM HG: CPT | Performed by: SURGERY

## 2023-05-11 PROCEDURE — 99205 OFFICE O/P NEW HI 60 MIN: CPT | Performed by: SURGERY

## 2023-05-11 PROCEDURE — 3074F SYST BP LT 130 MM HG: CPT | Performed by: SURGERY

## 2023-05-11 PROCEDURE — 1123F ACP DISCUSS/DSCN MKR DOCD: CPT | Performed by: SURGERY

## 2023-05-11 NOTE — TELEPHONE ENCOUNTER
Per Alisa    No Authorization Required  Service Type  2 - Surgical    Place of Service  22 - On [facility]    Service From - To Date  2023-05-17 - 2023-05-17    Quantity  1 Units  Level of Service  Elective    Diagnosis Code 1  S07382 - Malig neoplasm of upper-outer quadrant of left female breast    Procedure Code 1  75450 - PARTIAL MASTECTOMY    Quantity  1 Units    Status  NO AUTH REQUIRED    Procedure Code 2  69615 - BIOPSY/REMOVAL LYMPH NODES    Quantity  1 Units    Status  NO AUTH REQUIRED

## 2023-05-12 ENCOUNTER — CLINICAL DOCUMENTATION (OUTPATIENT)
Dept: CASE MANAGEMENT | Age: 73
End: 2023-05-12

## 2023-05-12 ENCOUNTER — TELEPHONE (OUTPATIENT)
Dept: SURGERY | Age: 73
End: 2023-05-12

## 2023-05-12 DIAGNOSIS — Z17.0 MALIGNANT NEOPLASM OF UPPER-OUTER QUADRANT OF LEFT BREAST IN FEMALE, ESTROGEN RECEPTOR POSITIVE (HCC): Primary | ICD-10-CM

## 2023-05-12 DIAGNOSIS — C50.412 MALIGNANT NEOPLASM OF UPPER-OUTER QUADRANT OF LEFT BREAST IN FEMALE, ESTROGEN RECEPTOR POSITIVE (HCC): Primary | ICD-10-CM

## 2023-05-12 LAB
ANION GAP SERPL CALCULATED.3IONS-SCNC: 10 MEQ/L (ref 7–16)
BUN BLDV-MCNC: 14 MG/DL (ref 8–23)
CALCIUM SERPL-MCNC: 9.9 MG/DL (ref 8.5–10.5)
CHLORIDE BLD-SCNC: 94 MEQ/L (ref 95–107)
CO2: 34 MEQ/L (ref 19–31)
CREAT SERPL-MCNC: 0.96 MG/DL (ref 0.6–1.3)
EGFR IF NONAFRICAN AMERICAN: 63 ML/MIN/1.73
GLUCOSE: 99 MG/DL (ref 70–99)
HCT VFR BLD CALC: 35.4 % (ref 34–45)
HEMOGLOBIN: 11.5 G/DL (ref 11.5–15.5)
POTASSIUM SERPL-SCNC: 4.8 MEQ/L (ref 3.5–5.4)
SODIUM BLD-SCNC: 138 MEQ/L (ref 133–146)

## 2023-05-12 NOTE — TELEPHONE ENCOUNTER
Per Dr. Brennan Avila patient does not need sentinel lymph node biopsy. Julian lymph node injection canceled. Dayana Lancaster contacted and advised that patient is interested in Genetic Testing. Dayana Lancaster will reach out to the patient.

## 2023-05-12 NOTE — PROGRESS NOTES
Name: Ian Vizcaino  : 1950  MRN: [id number]    Oncology Navigation Follow-Up Note    Contact Type:  Breast Conference recommendations. Patient did not attend clinic. Notes: Breast team recommendations (Jackeline Ko): Meets criteria for genetics, lumpectomy, No SLN, OncoType Dx, RT (probably short course), AI. Received a call from Mey Anderson from Dr. Teresa Bustamante office stating patient would like to have genetic testing. Called patient to set up lab draw. Message left.     Electronically signed by Shelbi Munroe RN on 2023 at 9:51 AM

## 2023-05-15 ENCOUNTER — PREP FOR PROCEDURE (OUTPATIENT)
Dept: SURGERY | Age: 73
End: 2023-05-15

## 2023-05-15 ENCOUNTER — TELEPHONE (OUTPATIENT)
Dept: CASE MANAGEMENT | Age: 73
End: 2023-05-15

## 2023-05-15 RX ORDER — SODIUM CHLORIDE 9 MG/ML
INJECTION, SOLUTION INTRAVENOUS CONTINUOUS
Status: CANCELLED | OUTPATIENT
Start: 2023-05-15

## 2023-05-15 NOTE — TELEPHONE ENCOUNTER
Name: Princess Keating  : 1950  MRN: [id number]    Oncology Navigation Follow-Up Note - Late note 23 1310    Contact Type:  Telephone    Notes: Patient returned call. Questioned what genetic are about. Discussed the genetic process as well as purpose. States she not sure if she wants to do testing. She will discuss with daughter and contact us if testing wanted. Katie Mejía at Dr. Rocha Quiet office notified.      Electronically signed by Larry Hernández RN on 5/15/2023 at 11:27 AM

## 2023-05-17 ENCOUNTER — ANESTHESIA (OUTPATIENT)
Dept: OPERATING ROOM | Age: 73
End: 2023-05-17
Payer: MEDICARE

## 2023-05-17 ENCOUNTER — HOSPITAL ENCOUNTER (OUTPATIENT)
Age: 73
Setting detail: OUTPATIENT SURGERY
Discharge: HOME | End: 2023-05-17
Attending: SURGERY | Admitting: SURGERY
Payer: MEDICARE

## 2023-05-17 ENCOUNTER — HOSPITAL ENCOUNTER (OUTPATIENT)
Dept: WOMENS IMAGING | Age: 73
Discharge: HOME | End: 2023-05-17
Payer: MEDICARE

## 2023-05-17 ENCOUNTER — ANESTHESIA EVENT (OUTPATIENT)
Dept: OPERATING ROOM | Age: 73
End: 2023-05-17
Payer: MEDICARE

## 2023-05-17 VITALS
WEIGHT: 160.2 LBS | OXYGEN SATURATION: 97 % | SYSTOLIC BLOOD PRESSURE: 137 MMHG | RESPIRATION RATE: 16 BRPM | DIASTOLIC BLOOD PRESSURE: 60 MMHG | BODY MASS INDEX: 29.48 KG/M2 | TEMPERATURE: 95.5 F | HEIGHT: 62 IN | HEART RATE: 64 BPM

## 2023-05-17 DIAGNOSIS — E11.21 CONTROLLED TYPE 2 DIABETES MELLITUS WITH DIABETIC NEPHROPATHY, WITHOUT LONG-TERM CURRENT USE OF INSULIN (HCC): ICD-10-CM

## 2023-05-17 DIAGNOSIS — C50.412 MALIGNANT NEOPLASM OF UPPER-OUTER QUADRANT OF LEFT BREAST IN FEMALE, ESTROGEN RECEPTOR POSITIVE (HCC): ICD-10-CM

## 2023-05-17 DIAGNOSIS — C50.412 MALIGNANT NEOPLASM OF UPPER-OUTER QUADRANT OF LEFT BREAST IN FEMALE, ESTROGEN RECEPTOR POSITIVE (HCC): Primary | ICD-10-CM

## 2023-05-17 DIAGNOSIS — Z17.0 MALIGNANT NEOPLASM OF UPPER-OUTER QUADRANT OF LEFT BREAST IN FEMALE, ESTROGEN RECEPTOR POSITIVE (HCC): ICD-10-CM

## 2023-05-17 DIAGNOSIS — Z17.0 MALIGNANT NEOPLASM OF UPPER-OUTER QUADRANT OF LEFT BREAST IN FEMALE, ESTROGEN RECEPTOR POSITIVE (HCC): Primary | ICD-10-CM

## 2023-05-17 DIAGNOSIS — C50.912 INVASIVE DUCTAL CARCINOMA OF BREAST, FEMALE, LEFT (HCC): ICD-10-CM

## 2023-05-17 DIAGNOSIS — Z01.818 PRE-OP TESTING: ICD-10-CM

## 2023-05-17 LAB — GLUCOSE BLD STRIP.AUTO-MCNC: 105 MG/DL (ref 70–108)

## 2023-05-17 PROCEDURE — 7100000010 HC PHASE II RECOVERY - FIRST 15 MIN: Performed by: SURGERY

## 2023-05-17 PROCEDURE — 6360000002 HC RX W HCPCS: Performed by: SURGERY

## 2023-05-17 PROCEDURE — 3700000000 HC ANESTHESIA ATTENDED CARE: Performed by: SURGERY

## 2023-05-17 PROCEDURE — 6360000002 HC RX W HCPCS: Performed by: NURSE PRACTITIONER

## 2023-05-17 PROCEDURE — 88307 TISSUE EXAM BY PATHOLOGIST: CPT

## 2023-05-17 PROCEDURE — 3600000012 HC SURGERY LEVEL 2 ADDTL 15MIN: Performed by: SURGERY

## 2023-05-17 PROCEDURE — 2580000003 HC RX 258: Performed by: NURSE PRACTITIONER

## 2023-05-17 PROCEDURE — 6370000000 HC RX 637 (ALT 250 FOR IP)

## 2023-05-17 PROCEDURE — 3600000002 HC SURGERY LEVEL 2 BASE: Performed by: SURGERY

## 2023-05-17 PROCEDURE — 6370000000 HC RX 637 (ALT 250 FOR IP): Performed by: SURGERY

## 2023-05-17 PROCEDURE — 2500000003 HC RX 250 WO HCPCS: Performed by: SURGERY

## 2023-05-17 PROCEDURE — G0279 TOMOSYNTHESIS, MAMMO: HCPCS

## 2023-05-17 PROCEDURE — 3700000001 HC ADD 15 MINUTES (ANESTHESIA): Performed by: SURGERY

## 2023-05-17 PROCEDURE — 82948 REAGENT STRIP/BLOOD GLUCOSE: CPT

## 2023-05-17 PROCEDURE — 6360000002 HC RX W HCPCS

## 2023-05-17 PROCEDURE — 7100000011 HC PHASE II RECOVERY - ADDTL 15 MIN: Performed by: SURGERY

## 2023-05-17 PROCEDURE — 88305 TISSUE EXAM BY PATHOLOGIST: CPT

## 2023-05-17 PROCEDURE — 19301 PARTIAL MASTECTOMY: CPT | Performed by: SURGERY

## 2023-05-17 PROCEDURE — 7100000001 HC PACU RECOVERY - ADDTL 15 MIN: Performed by: SURGERY

## 2023-05-17 PROCEDURE — 19285 PERQ DEV BREAST 1ST US IMAG: CPT

## 2023-05-17 PROCEDURE — 2709999900 HC NON-CHARGEABLE SUPPLY: Performed by: SURGERY

## 2023-05-17 PROCEDURE — 6360000002 HC RX W HCPCS: Performed by: ANESTHESIOLOGY

## 2023-05-17 PROCEDURE — 76098 X-RAY EXAM SURGICAL SPECIMEN: CPT

## 2023-05-17 PROCEDURE — 2500000003 HC RX 250 WO HCPCS

## 2023-05-17 PROCEDURE — 7100000000 HC PACU RECOVERY - FIRST 15 MIN: Performed by: SURGERY

## 2023-05-17 RX ORDER — ONDANSETRON 2 MG/ML
4 INJECTION INTRAMUSCULAR; INTRAVENOUS ONCE
Status: DISCONTINUED | OUTPATIENT
Start: 2023-05-17 | End: 2023-05-17

## 2023-05-17 RX ORDER — DEXAMETHASONE SODIUM PHOSPHATE 4 MG/ML
INJECTION, SOLUTION INTRA-ARTICULAR; INTRALESIONAL; INTRAMUSCULAR; INTRAVENOUS; SOFT TISSUE PRN
Status: DISCONTINUED | OUTPATIENT
Start: 2023-05-17 | End: 2023-05-17 | Stop reason: SDUPTHER

## 2023-05-17 RX ORDER — BUPIVACAINE HYDROCHLORIDE AND EPINEPHRINE 5; 5 MG/ML; UG/ML
INJECTION, SOLUTION EPIDURAL; INTRACAUDAL; PERINEURAL PRN
Status: DISCONTINUED | OUTPATIENT
Start: 2023-05-17 | End: 2023-05-17 | Stop reason: ALTCHOICE

## 2023-05-17 RX ORDER — ONDANSETRON 4 MG/1
4 TABLET, ORALLY DISINTEGRATING ORAL EVERY 6 HOURS PRN
Qty: 30 TABLET | Refills: 0 | Status: SHIPPED | OUTPATIENT
Start: 2023-05-17

## 2023-05-17 RX ORDER — SCOLOPAMINE TRANSDERMAL SYSTEM 1 MG/1
PATCH, EXTENDED RELEASE TRANSDERMAL PRN
Status: DISCONTINUED | OUTPATIENT
Start: 2023-05-17 | End: 2023-05-17 | Stop reason: SDUPTHER

## 2023-05-17 RX ORDER — PROPOFOL 10 MG/ML
INJECTION, EMULSION INTRAVENOUS PRN
Status: DISCONTINUED | OUTPATIENT
Start: 2023-05-17 | End: 2023-05-17 | Stop reason: SDUPTHER

## 2023-05-17 RX ORDER — ONDANSETRON 4 MG/1
4 TABLET, FILM COATED ORAL ONCE
Status: COMPLETED | OUTPATIENT
Start: 2023-05-17 | End: 2023-05-17

## 2023-05-17 RX ORDER — SODIUM CHLORIDE 9 MG/ML
INJECTION, SOLUTION INTRAVENOUS CONTINUOUS
Status: DISCONTINUED | OUTPATIENT
Start: 2023-05-17 | End: 2023-05-17 | Stop reason: HOSPADM

## 2023-05-17 RX ORDER — ONDANSETRON 2 MG/ML
INJECTION INTRAMUSCULAR; INTRAVENOUS PRN
Status: DISCONTINUED | OUTPATIENT
Start: 2023-05-17 | End: 2023-05-17 | Stop reason: SDUPTHER

## 2023-05-17 RX ORDER — PROMETHAZINE HYDROCHLORIDE 25 MG/ML
25 INJECTION, SOLUTION INTRAMUSCULAR; INTRAVENOUS ONCE
Status: COMPLETED | OUTPATIENT
Start: 2023-05-17 | End: 2023-05-17

## 2023-05-17 RX ORDER — HYDROCODONE BITARTRATE AND ACETAMINOPHEN 5; 325 MG/1; MG/1
1 TABLET ORAL EVERY 6 HOURS PRN
Qty: 12 TABLET | Refills: 0 | Status: SHIPPED | OUTPATIENT
Start: 2023-05-17 | End: 2023-05-20

## 2023-05-17 RX ORDER — KETAMINE HYDROCHLORIDE 50 MG/ML
INJECTION, SOLUTION, CONCENTRATE INTRAMUSCULAR; INTRAVENOUS PRN
Status: DISCONTINUED | OUTPATIENT
Start: 2023-05-17 | End: 2023-05-17 | Stop reason: SDUPTHER

## 2023-05-17 RX ADMIN — PHENYLEPHRINE HYDROCHLORIDE 100 MCG: 10 INJECTION INTRAVENOUS at 13:08

## 2023-05-17 RX ADMIN — PROPOFOL 200 MG: 10 INJECTION, EMULSION INTRAVENOUS at 12:50

## 2023-05-17 RX ADMIN — DEXAMETHASONE SODIUM PHOSPHATE 10 MG: 4 INJECTION, SOLUTION INTRAMUSCULAR; INTRAVENOUS at 12:52

## 2023-05-17 RX ADMIN — HYDROMORPHONE HYDROCHLORIDE 0.5 MG: 1 INJECTION, SOLUTION INTRAMUSCULAR; INTRAVENOUS; SUBCUTANEOUS at 14:07

## 2023-05-17 RX ADMIN — ONDANSETRON HYDROCHLORIDE 4 MG: 4 TABLET, FILM COATED ORAL at 15:22

## 2023-05-17 RX ADMIN — PROMETHAZINE HYDROCHLORIDE 25 MG: 25 INJECTION INTRAMUSCULAR; INTRAVENOUS at 16:23

## 2023-05-17 RX ADMIN — Medication 2000 MG: at 12:54

## 2023-05-17 RX ADMIN — PHENYLEPHRINE HYDROCHLORIDE 150 MCG: 10 INJECTION INTRAVENOUS at 13:50

## 2023-05-17 RX ADMIN — HYDROMORPHONE HYDROCHLORIDE 0.5 MG: 1 INJECTION, SOLUTION INTRAMUSCULAR; INTRAVENOUS; SUBCUTANEOUS at 14:15

## 2023-05-17 RX ADMIN — SCOPALAMINE 1 PATCH: 1 PATCH, EXTENDED RELEASE TRANSDERMAL at 12:52

## 2023-05-17 RX ADMIN — SODIUM CHLORIDE: 9 INJECTION, SOLUTION INTRAVENOUS at 10:55

## 2023-05-17 RX ADMIN — PHENYLEPHRINE HYDROCHLORIDE 100 MCG: 10 INJECTION INTRAVENOUS at 13:45

## 2023-05-17 RX ADMIN — ONDANSETRON 4 MG: 2 INJECTION INTRAMUSCULAR; INTRAVENOUS at 13:23

## 2023-05-17 RX ADMIN — KETAMINE HYDROCHLORIDE 50 MG: 50 INJECTION, SOLUTION INTRAMUSCULAR; INTRAVENOUS at 12:50

## 2023-05-17 RX ADMIN — PHENYLEPHRINE HYDROCHLORIDE 100 MCG: 10 INJECTION INTRAVENOUS at 13:19

## 2023-05-17 ASSESSMENT — ENCOUNTER SYMPTOMS
DIARRHEA: 0
SHORTNESS OF BREATH: 0
TROUBLE SWALLOWING: 0
VOMITING: 0
ABDOMINAL PAIN: 0
BLOOD IN STOOL: 0
SHORTNESS OF BREATH: 0
CONSTIPATION: 0
TROUBLE SWALLOWING: 0
VOMITING: 0
BACK PAIN: 0
BLOOD IN STOOL: 0
ABDOMINAL PAIN: 0
SHORTNESS OF BREATH: 1
CHEST TIGHTNESS: 0
COUGH: 0
CHEST TIGHTNESS: 0
COUGH: 0
CONSTIPATION: 0
NAUSEA: 0
NAUSEA: 0
SORE THROAT: 0
DIARRHEA: 0
BACK PAIN: 0
SORE THROAT: 0

## 2023-05-17 ASSESSMENT — PAIN DESCRIPTION - DESCRIPTORS
DESCRIPTORS: PATIENT UNABLE TO DESCRIBE
DESCRIPTORS: ACHING

## 2023-05-17 ASSESSMENT — COPD QUESTIONNAIRES: CAT_SEVERITY: SEVERE

## 2023-05-17 ASSESSMENT — PAIN DESCRIPTION - ORIENTATION
ORIENTATION: LEFT

## 2023-05-17 ASSESSMENT — PAIN DESCRIPTION - LOCATION
LOCATION: BREAST

## 2023-05-17 ASSESSMENT — PAIN - FUNCTIONAL ASSESSMENT: PAIN_FUNCTIONAL_ASSESSMENT: 0-10

## 2023-05-17 ASSESSMENT — PAIN DESCRIPTION - FREQUENCY: FREQUENCY: CONTINUOUS

## 2023-05-17 ASSESSMENT — PAIN SCALES - GENERAL
PAINLEVEL_OUTOF10: 10
PAINLEVEL_OUTOF10: 7

## 2023-05-17 ASSESSMENT — PAIN DESCRIPTION - PAIN TYPE: TYPE: SURGICAL PAIN

## 2023-05-17 NOTE — PROGRESS NOTES
Pt has met discharge criteria and states she is ready for discharge to home. Pt states her nausea has improved. IV removed, gauze and tape applied. Dressed in own clothes and personal belongings gathered. Discharge instructions (with opioid medication education information) given to pt and daughter. Pt and family verbalized understanding of discharge instructions, prescriptions and follow up appointments. Pt transported to discharge lobby by [facility] staff.

## 2023-05-17 NOTE — PROGRESS NOTES
Pt admitted to [facility] room 15 and oriented to unit. SCD sleeves applied. Nares swabbed. Pt verbalized permission for first name, last initial and physicians name on white board. SDS board and discharge criteria explained, pt and family verbalized understanding. Pt denies thoughts of harming self or others. Call light in reach. Family at the bedside.

## 2023-05-17 NOTE — PROGRESS NOTES
Pt up to side of be having c/o nausea, pt dry heaving. Dr. Crescencio Martinez notified new orders phenegran 25 mg. Refer to order.

## 2023-05-17 NOTE — H&P
tablet by mouth daily as needed (swelling) 60 tablet 0    CPAP Machine MISC by Does not apply route Please check patient's CPAP machine for proper functioning by [employer]. Turn off ramp 1 each 0    lidocaine (LMX) 4 % cream Apply topically as needed every 6 hours to left hand for pain. 1 Tube 2    fluticasone (FLONASE) 50 MCG/ACT nasal spray 1 spray by Each Nostril route daily      omeprazole (PRILOSEC) 40 MG delayed release capsule Take 1 capsule by mouth daily      polyethylene glycol (GLYCOLAX) 17 g packet Take 17 g by mouth daily as needed for Constipation      montelukast (SINGULAIR) 10 MG tablet Take 1 tablet by mouth nightly 30 tablet 3    metFORMIN (GLUCOPHAGE) 500 MG tablet Take 1 tablet by mouth daily (with breakfast)      triamterene-hydrochlorothiazide (MAXZIDE-25) 37.5-25 MG per tablet   0    RA COL-RITE 100 MG capsule daily  0    atorvastatin (LIPITOR) 40 MG tablet Take 1 tablet by mouth daily 30 tablet 3    pseudoephedrine (SUDAFED 12 HR) 120 MG extended release tablet Take 1 tablet by mouth every 12 hours as needed for Congestion      amLODIPine (NORVASC) 5 MG tablet Take 1 tablet by mouth daily      losartan (COZAAR) 50 MG tablet Take 1 tablet by mouth daily  0    OXYGEN   Inhale into the lungs 3 lpm, per patient at home and when walking      aspirin 81 MG EC tablet Take 1 tablet by mouth daily       No facility-administered encounter medications on file as of 5/11/2023. Current Outpatient Medications   Medication Sig Dispense Refill    BiPAP Machine MISC by Does not apply route      metoprolol succinate (TOPROL XL) 25 MG extended release tablet Take 1 tablet by mouth daily      HYDROcodone-acetaminophen (NORCO) 5-325 MG per tablet Take 1 tablet by mouth every 8 hours as needed for Pain for up to 30 days. 90 tablet 0    mometasone-formoterol (DULERA) 200-5 MCG/ACT inhaler Inhale 2 puffs into the lungs in the morning and 2 puffs in the evening. Rinse mouth after its use. . 13 g 11    sodium

## 2023-05-17 NOTE — PROGRESS NOTES
Contact [facility]    Contact Information: Arrived with daughter for needle localization. Having breast surgery today with Dr. Vivian Mccarty. Diagnosis: Invasive ductal carcinoma    Patient Expresses Need(s) For: n/a     Teaching Sheets/Booklets Provided: n/a    Notes: Procedure explained and questions addressed as needed. Dr. Reginald Moralez placed wire. Secured with gauze and tape per protocol. Gift given. Transported patient with belongings to Same Day Surgery via wheelchair at [facility]

## 2023-05-17 NOTE — ANESTHESIA POSTPROCEDURE EVALUATION
Department of Anesthesiology  Postprocedure Note    Patient: Gris Ware  MRN: [id number]  YOB: 1950  Date of evaluation: 5/17/2023      Procedure Summary     Date: 05/17/23 Room / Location: [address] [place]    Anesthesia Start: 5859 Anesthesia Stop: 8893    Procedure: Left Breast Lumpectomy with preop needle localization (Left: Breast) Diagnosis:       Invasive ductal carcinoma of breast, female, left ([address]      (Invasive ductal carcinoma of breast, female, left ([address] Ulisses Maldonado)    Surgeons: Baljeet Coyne MD Responsible Provider: Mike Bueno DO    Anesthesia Type: general ASA Status: 4          Anesthesia Type: No value filed.     Eryn Phase I: Eryn Score: 8    Eryn Phase II: Eryn Score: 9      Anesthesia Post Evaluation    Patient location during evaluation: PACU  Patient participation: complete - patient participated  Level of consciousness: awake  Airway patency: patent  Nausea & Vomiting: no nausea  Complications: no  Cardiovascular status: hemodynamically stable  Respiratory status: acceptable  Hydration status: stable

## 2023-05-17 NOTE — DISCHARGE INSTRUCTIONS
DR. Hampton Branch DISCHARGE INSTRUCTIONS    Pt Name: Sylvia Khan Record Number: [id number]  Today's Date: 5/17/2023  GENERAL ANESTHESIA OR SEDATION   1. Do not drive or operate hazardous machinery for 24 hours. 2. Do not make important business or personal decisions for 24 hours. 3. Do not drink alcoholic beverages or use tobacco for 24 hours. ACTIVITY INSTRUCTIONS   Rest today. Resume light to normal activity tomorrow. You may resume normal activity tomorrow. Do not engage in strenuous activity that may place stress on your incision. Do not drive for 3-5 days and avoid heavy lifting, tugging, pullings greater than 10-20 lbs until seen in the office. DIET INSTRUCTIONS   Begin with clear liquids. If not nauseated, may increase to a low-fat diet when you desire. Greasy and spicy foods are not advised. Regular diet as tolerated. MEDICATIONS   Prescription written for norco. Take as directed. Do not drink alcohol or drive while taking pain medications. You may experience dizziness or drowsiness with these medications. You may also experience constipation which can be relieved with stool softners or laxatives. WOUND & DRESSING INSTRUCTIONS   Always ensure you and your care giver clean hands before and after caring for the wound. Keep dressing clean and dry for 48 hours. Change when soiled or wet. Allow steri-strips to fall off on their own if present, allow surgical glue to peel off on its own  Ice operative site for 20 minutes 4 times a day. May wash over incision in shower in 48 hours, but do not soak in a bath. BREAST PROCEDURES   Following breast procedures it is important to use supportive garments  It is also normal with sentinel lymph node biopsy for the urine to have a bluish color. FOLLOW UP CARE, SPECIFICALLY WATCH FOR:    Fever over 101 degrees by mouth   Increased redness, warmth, hardness at operative site.    Blood soaked dressing (small amounts of oozing may be

## 2023-05-17 NOTE — PROGRESS NOTES
1400: Patient arrives to pacu, awakens to verbal stimuli. Pt on 3LNC, respirations easy and unlabored. VSS  1407: Pt groaning in pain and states pain 10/10, 0.5mg of dilaudid administered  1415: Pt states pain 7/10, 0.5mg of dilaudid given  1425: Pt sleeping at this time, easily awakens to voice. Pain tolerable  1435: Pt meets criteria for discharge from pacu, transported back to [facility] in stable condition.  VSS

## 2023-05-17 NOTE — OP NOTE
Operative Note      Patient: Shayy Jacobsen  YOB: 1950  MRN: [id number]    Date of Procedure: 5/17/2023    Pre-Op Diagnosis Codes:     * Invasive ductal carcinoma of breast, female, left ([facility] [C50.912]    Post-Op Diagnosis: Same       Procedure(s):  Left Breast Lumpectomy with preop needle localization    Surgeon(s): Adri Kirkland MD    Assistant:   * No surgical staff found *    Anesthesia: General    Estimated Blood Loss (mL): 20 ml    Complications: None    Specimens:   ID Type Source Tests Collected by Time Destination   A : Left Breast Tissue Tissue Breast SURGICAL PATHOLOGY Adri Kirkland MD 5/17/2023 1310    B : Left Breast Additional Skin Margin Tissue Breast SURGICAL PATHOLOGY Adri Kirkland MD 5/17/2023 1331    C : Skin Ellipse Tissue Breast SURGICAL PATHOLOGY Adri Kirkland MD 5/17/2023 1338        Implants:  * No implants in log *      Drains: * No LDAs found *    Findings:   Hard mass  Skin margin with nodular masses and additional specimen taken     This procedure was not performed to treat breast cancer through sentinel node biopsy. Patient candidate for omission of SLN biopsu         Detailed Description of Procedure:   She was seen department holding room where informed consent was reviewed. She has been doing woman's wellness where needle localization of been performed. Images were reviewed. She is taken the operating placed the operative table after adequate anesthesia formed was performed she was prepped and draped normal sterile fashion. Over the palpable mass and using the wire as a guide, a curvilinear incision was made in the lateral aspect of her breast.  Cautery was used to deepen this. The mass was circumferentially dissected out. The entire mass was removed with fatty tissue around it. It was spent to [facility] [place]. After completing this skin margin there were nodular fatty masses concerning for malignancy.   Additional skin margin was taken and was

## 2023-05-17 NOTE — ANESTHESIA PRE PROCEDURE
Department of Anesthesiology  Preprocedure Note       Name:  Awais Camargo   Age:  67 y.o.  :  1950                                          MRN:  [id number]         Date:  2023      Surgeon: Sanchez Lundberg): Robyn Park MD    Procedure: Procedure(s):  Left Breast Lumpectomy with preop needle localization with sentinel lymph node biopsy    Medications prior to admission:   Prior to Admission medications    Medication Sig Start Date End Date Taking? Authorizing Provider   HYDROcodone-acetaminophen (NORCO) 5-325 MG per tablet Take 1 tablet by mouth every 6 hours as needed for Pain for up to 3 days. Intended supply: 3 days. Take lowest dose possible to manage pain Max Daily Amount: 4 tablets 23 Yes Robyn Park MD   BiPAP Machine MISC by Does not apply route    Historical Provider, MD   metoprolol succinate (TOPROL XL) 25 MG extended release tablet Take 1 tablet by mouth daily 23   Historical Provider, MD   HYDROcodone-acetaminophen (NORCO) 5-325 MG per tablet Take 1 tablet by mouth every 8 hours as needed for Pain for up to 30 days. 23  CASSANDRA Alex - KYLEE   mometasone-formoterol (DULERA) 200-5 MCG/ACT inhaler Inhale 2 puffs into the lungs in the morning and 2 puffs in the evening. Rinse mouth after its use. . 10/29/22 10/29/23  CASSANDRA Salcido - CNP   sodium chloride, Inhalant, 3 % nebulizer solution Take 4 mLs by nebulization as needed for Cough 10/5/22   Amina Jha APRN - CNP   Respiratory Therapy Supplies (NEBULIZER/TUBING/MOUTHPIECE) KIT 1 kit by Does not apply route every 6 hours as needed (shortness of breath) 22  Amina Jha APRN - CNP   VENTOLIN  (90 Base) MCG/ACT inhaler Inhale 2 puffs into the lungs every 6 hours as needed for Wheezing or Shortness of Breath 22  Amina Jha APRN - CNP   albuterol (PROVENTIL) (2.5 MG/3ML) 0.083% nebulizer solution Take 3 mLs by nebulization every 6 hours as

## 2023-05-17 NOTE — PROGRESS NOTES
Pt having c/o nausea, previously received zofran 4 mg IV. Family reports pt does tend to have nausea issues post surgery at home as well. Dr. Kenny Cox notified. New orders ok for patient to have additional zofran 4mg oral once.

## 2023-05-17 NOTE — PROGRESS NOTES
Pt returned SDS room 15. Daughter present in room. Food and drink given. Discharge criteria explained. Pt and family verbalized understanding of discharge criteria. Call light within reach.

## 2023-05-18 ENCOUNTER — TELEPHONE (OUTPATIENT)
Dept: SURGERY | Age: 73
End: 2023-05-18

## 2023-05-18 DIAGNOSIS — G89.4 CHRONIC PAIN SYNDROME: ICD-10-CM

## 2023-05-18 RX ORDER — HYDROCODONE BITARTRATE AND ACETAMINOPHEN 5; 325 MG/1; MG/1
1 TABLET ORAL EVERY 6 HOURS PRN
Qty: 120 TABLET | Refills: 0 | Status: SHIPPED | OUTPATIENT
Start: 2023-05-21 | End: 2023-06-20

## 2023-05-18 NOTE — TELEPHONE ENCOUNTER
Called pt for a S/P f/u. No concerns at this time. Pt advised to call office for any questions or concerns. Pt verbalized understanding. Review post op f/u appt time and date with pt.

## 2023-05-25 ENCOUNTER — OFFICE VISIT (OUTPATIENT)
Dept: SURGERY | Age: 73
End: 2023-05-25

## 2023-05-25 VITALS
SYSTOLIC BLOOD PRESSURE: 133 MMHG | OXYGEN SATURATION: 93 % | BODY MASS INDEX: 29.44 KG/M2 | TEMPERATURE: 97.5 F | WEIGHT: 160 LBS | HEIGHT: 62 IN | DIASTOLIC BLOOD PRESSURE: 60 MMHG | HEART RATE: 95 BPM

## 2023-05-25 DIAGNOSIS — Z17.0 MALIGNANT NEOPLASM OF UPPER-OUTER QUADRANT OF LEFT BREAST IN FEMALE, ESTROGEN RECEPTOR POSITIVE (HCC): Primary | ICD-10-CM

## 2023-05-25 DIAGNOSIS — C50.412 MALIGNANT NEOPLASM OF UPPER-OUTER QUADRANT OF LEFT BREAST IN FEMALE, ESTROGEN RECEPTOR POSITIVE (HCC): Primary | ICD-10-CM

## 2023-05-25 PROCEDURE — 99024 POSTOP FOLLOW-UP VISIT: CPT | Performed by: SURGERY

## 2023-05-25 NOTE — PROGRESS NOTES
Merlin Dock, MD  [address] Post op Note    Pt Name: Bradley Dover  Medical Record Number: [id number]  Date of Birth 1950   Today's Date: 5/25/2023    ASSESSMENT       ICD-10-CM    1. Malignant neoplasm of upper-outer quadrant of left breast in female, estrogen receptor positive [facility])  [facility] Oncology    Z17.0 Oncotype Dx           PLAN   Postoperatively doing well no signs of seroma or hematoma. I will see her back in 6 months time with mammogram  Specimen has been sent for Oncotype to determine if patient would benefit from chemotherapy. Oncology referral placed  Radiation oncology placed  Venecia Dodson is doing well, her pain is well controlled. She is not having any drainage from her wound. I reviewed her pathology with her. She has closest skin margin however additional skin margin was taken with adequate margins in all directions. She remains into a staging. 5/17/23: Lumpectomy with additional margins. All final margins negative greater than 3mm. Cancer Staging   Malignant neoplasm of upper-outer quadrant of left breast in female, estrogen receptor positive ([facility] [address]  Staging form: Breast, AJCC 8th Edition  - Clinical stage from 5/11/2023: Stage IIA (cT2, cN0, cM0, G3, ER+, WY+, HER2-) - Signed by Merlin Dock, MD on 5/11/2023      CURRENT MEDICATIONS     Current Outpatient Medications on File Prior to Visit   Medication Sig Dispense Refill    HYDROcodone-acetaminophen (NORCO) 5-325 MG per tablet Take 1 tablet by mouth every 6 hours as needed for Pain for up to 30 days.  Max Daily Amount: 4 tablets 120 tablet 0    ondansetron (ZOFRAN-ODT) 4 MG disintegrating tablet Take 1 tablet by mouth every 6 hours as needed for Nausea or Vomiting 30 tablet 0    BiPAP Machine MISC by Does not apply route      metoprolol succinate (TOPROL XL) 25 MG extended release tablet Take 1 tablet by mouth daily

## 2023-06-05 ENCOUNTER — TELEPHONE (OUTPATIENT)
Dept: CASE MANAGEMENT | Age: 73
End: 2023-06-05

## 2023-06-05 DIAGNOSIS — Z17.0 MALIGNANT NEOPLASM OF UPPER-OUTER QUADRANT OF LEFT BREAST IN FEMALE, ESTROGEN RECEPTOR POSITIVE (HCC): Primary | ICD-10-CM

## 2023-06-05 DIAGNOSIS — C50.412 MALIGNANT NEOPLASM OF UPPER-OUTER QUADRANT OF LEFT BREAST IN FEMALE, ESTROGEN RECEPTOR POSITIVE (HCC): Primary | ICD-10-CM

## 2023-06-05 NOTE — TELEPHONE ENCOUNTER
Name: Kathy Montague  : 1950  MRN: [id number]    Oncology Navigation Follow-Up Note    Contact Type:  Telephone    Notes: Called patient to set up time for genetic blood draw. States she is not interested in having genetic testing at this time.      Electronically signed by Lizbeth Adame RN on 2023 at 3:45 PM

## 2023-06-06 NOTE — PROGRESS NOTES
Patient has a raised, dark pink, patch on her right upper back-near shoulder; smaller than a nickel. Mom, Annita Ramires, is a patient of Dr Hawkins. She is asking to be seen before 2/3 for a new patient and for potential removal. Mom had the same patch on her back that was removed. Please advise, Annita, can be reached at [phone].   visit.    Thank you for allowing my assistance in the care of your patient. HISTORY OF PRESENT ILLNESS:  Oncology History Overview Note   Chalino Irizarry is a 67 y.o. female      HISTORY:    23 According to Dr. Parminder Quezada note,  Juan Haynes is a 67 y.o.  female seen in the office for evaluation of breast cancer. She was referred for evaluation and discussion of treatment options for carcinoma of the left UOQ. she is had surgical biopsy, left demonstrating an intermediate grade invasive ductal cancer of the left breast. Estrogen receptor status is positive. Progesterone receptor status is positive. HER-2/edward receptors negative. Prior to the biopsy she complained of new or changing breast lumps and denied nipple discharge. Risk assessment: family hx of colon CA. She has not been on previous estrogen therapy. .. Palpable breast mass in left upper outer qudrant\"      LABS:    ONCOTYPE DX RECURRENCE SCORE PENDING        IMAGIN/14/23 CT CHEST W CON  IMPRESSION:  The prominent mediastinal and hilar lymph nodes are stable and remain nonspecific. No acute intrathoracic process is observed. Chronic findings are discussed. 23 [facility]/L  US BREAST LIMITED LEFT  IMPRESSION:  2.1 cm mass in the upper outer left breast. This is suspicious for malignancy. An ultrasound-guided biopsy is recommended. An ultrasound-guided biopsy is recommended. These results were discussed with the patient. The tech navigator was notified. We will arrange scheduling the patient for her procedure per department protocol.   BI-RADS CATEGORY 5 - Highly suggestive of malignancy           Malignant neoplasm of upper-outer quadrant of left breast in female, estrogen receptor positive ([facility] [address]   2023 Surgery    Clinical Information: LEFT BREAST MASS      ADDENDUM  2023     FINAL DIAGNOSIS:   Left breast mass, upper outer quadrant, barbell clip, core biopsy:     Invasive ductal adenocarcinoma, [place]

## 2023-06-07 ENCOUNTER — HOSPITAL ENCOUNTER (OUTPATIENT)
Dept: RADIATION ONCOLOGY | Age: 73
Discharge: HOME | End: 2023-06-07
Payer: MEDICARE

## 2023-06-07 ENCOUNTER — SOCIAL WORK (OUTPATIENT)
Dept: RADIATION ONCOLOGY | Age: 73
End: 2023-06-07

## 2023-06-07 VITALS
TEMPERATURE: 97.6 F | OXYGEN SATURATION: 91 % | HEART RATE: 71 BPM | RESPIRATION RATE: 20 BRPM | SYSTOLIC BLOOD PRESSURE: 159 MMHG | DIASTOLIC BLOOD PRESSURE: 70 MMHG

## 2023-06-07 PROCEDURE — 99202 OFFICE O/P NEW SF 15 MIN: CPT | Performed by: RADIOLOGY

## 2023-06-07 ASSESSMENT — ENCOUNTER SYMPTOMS
BACK PAIN: 1
WHEEZING: 1
CONSTIPATION: 0
NAUSEA: 0
DIARRHEA: 0
BLOOD IN STOOL: 0
TROUBLE SWALLOWING: 0
VOMITING: 0
SHORTNESS OF BREATH: 1
COUGH: 1
RECTAL PAIN: 0
ABDOMINAL PAIN: 0

## 2023-06-07 ASSESSMENT — PAIN DESCRIPTION - PAIN TYPE: TYPE: CHRONIC PAIN

## 2023-06-07 ASSESSMENT — PAIN SCALES - GENERAL: PAINLEVEL_OUTOF10: 7

## 2023-06-07 ASSESSMENT — PAIN DESCRIPTION - LOCATION: LOCATION: BACK

## 2023-06-07 NOTE — PROGRESS NOTES
Oncology Social Work    Date: 6/7/2023  Time: 1:49 PM  Name: Chalino Irizarry  MRN: [id number]     Contact Type: Follow-up    Note:   Situation: This staff contacted Chalino Irizarry via phone support to introduce myself as their assigned Oncology Social Worker. Background:  Kristen Le had completed a Distress Screening at her initial appointment at the [facility]. This staff was calling to review the results of her distress levels. Assessment: Although there were no reflected elevated concerns indicated on the Distress Screening, there was also no opportunity to discuss directly with Kristen Le since the attempt to contact her went directly to voicemail.   - Education regarding the services provided by the SW were relayed on the message. Recommendation: Follow-up will be initiated by Kristen Le based on need.  provided her with my contact information and will remain available for support.         Rolly Merlin, MSW, CARTER, OSCAR  Oncology Social Worker      Electronically signed by Rolly Merlin, MSW, OSCAR MACHADO on 6/7/2023 at 1:49 PM

## 2023-06-16 ENCOUNTER — HOSPITAL ENCOUNTER (OUTPATIENT)
Dept: INFUSION THERAPY | Age: 73
Discharge: HOME | End: 2023-06-16
Payer: MEDICARE

## 2023-06-16 VITALS
HEART RATE: 69 BPM | RESPIRATION RATE: 18 BRPM | DIASTOLIC BLOOD PRESSURE: 61 MMHG | TEMPERATURE: 97.9 F | SYSTOLIC BLOOD PRESSURE: 139 MMHG | OXYGEN SATURATION: 90 %

## 2023-06-16 DIAGNOSIS — Z17.0 MALIGNANT NEOPLASM OF UPPER-OUTER QUADRANT OF LEFT BREAST IN FEMALE, ESTROGEN RECEPTOR POSITIVE (HCC): ICD-10-CM

## 2023-06-16 DIAGNOSIS — C50.412 MALIGNANT NEOPLASM OF UPPER-OUTER QUADRANT OF LEFT BREAST IN FEMALE, ESTROGEN RECEPTOR POSITIVE (HCC): ICD-10-CM

## 2023-06-16 LAB
ALBUMIN SERPL BCG-MCNC: 4.4 G/DL (ref 3.5–5.1)
ALP SERPL-CCNC: 127 U/L (ref 38–126)
ALT SERPL W/O P-5'-P-CCNC: 10 U/L (ref 11–66)
ANION GAP SERPL CALC-SCNC: 10 MEQ/L (ref 8–16)
AST SERPL-CCNC: 13 U/L (ref 5–40)
BASOPHILS ABSOLUTE: 0 THOU/MM3 (ref 0–0.1)
BASOPHILS NFR BLD AUTO: 0.6 %
BILIRUB SERPL-MCNC: 0.2 MG/DL (ref 0.3–1.2)
BUN SERPL-MCNC: 13 MG/DL (ref 7–22)
CALCIUM SERPL-MCNC: 9.8 MG/DL (ref 8.5–10.5)
CHLORIDE SERPL-SCNC: 95 MEQ/L (ref 98–111)
CO2 SERPL-SCNC: 34 MEQ/L (ref 23–33)
CREAT SERPL-MCNC: 0.8 MG/DL (ref 0.4–1.2)
DEPRECATED RDW RBC AUTO: 47.5 FL (ref 35–45)
EOSINOPHIL NFR BLD AUTO: 5.5 %
EOSINOPHILS ABSOLUTE: 0.4 THOU/MM3 (ref 0–0.4)
ERYTHROCYTE [DISTWIDTH] IN BLOOD BY AUTOMATED COUNT: 13.8 % (ref 11.5–14.5)
GFR SERPL CREATININE-BSD FRML MDRD: > 60 ML/MIN/1.73M2
GLUCOSE SERPL-MCNC: 100 MG/DL (ref 70–108)
HCT VFR BLD AUTO: 39.4 % (ref 37–47)
HGB BLD-MCNC: 11.4 GM/DL (ref 12–16)
HYPOCHROMIA BLD QL SMEAR: PRESENT
IMM GRANULOCYTES # BLD AUTO: 0.05 THOU/MM3 (ref 0–0.07)
IMM GRANULOCYTES NFR BLD AUTO: 0.6 %
LYMPHOCYTES ABSOLUTE: 2.5 THOU/MM3 (ref 1–4.8)
LYMPHOCYTES NFR BLD AUTO: 31.2 %
MCH RBC QN AUTO: 27.3 PG (ref 26–33)
MCHC RBC AUTO-ENTMCNC: 28.9 GM/DL (ref 32.2–35.5)
MCV RBC AUTO: 94.5 FL (ref 81–99)
MONOCYTES ABSOLUTE: 0.7 THOU/MM3 (ref 0.4–1.3)
MONOCYTES NFR BLD AUTO: 9.2 %
NEUTROPHILS NFR BLD AUTO: 52.9 %
NRBC BLD AUTO-RTO: 0 /100 WBC
PLATELET # BLD AUTO: 304 THOU/MM3 (ref 130–400)
PLATELET BLD QL SMEAR: ADEQUATE
PMV BLD AUTO: 11.2 FL (ref 9.4–12.4)
POTASSIUM SERPL-SCNC: 4.4 MEQ/L (ref 3.5–5.2)
PROT SERPL-MCNC: 8.1 G/DL (ref 6.1–8)
RBC # BLD AUTO: 4.17 MILL/MM3 (ref 4.2–5.4)
SCAN OF BLOOD SMEAR: NORMAL
SEGMENTED NEUTROPHILS ABSOLUTE COUNT: 4.2 THOU/MM3 (ref 1.8–7.7)
SODIUM SERPL-SCNC: 139 MEQ/L (ref 135–145)
STOMATOCYTES: ABNORMAL
WBC # BLD AUTO: 8 THOU/MM3 (ref 4.8–10.8)

## 2023-06-16 PROCEDURE — 80053 COMPREHEN METABOLIC PANEL: CPT

## 2023-06-16 PROCEDURE — 85025 COMPLETE CBC W/AUTO DIFF WBC: CPT

## 2023-06-16 PROCEDURE — 99211 OFF/OP EST MAY X REQ PHY/QHP: CPT

## 2023-06-16 PROCEDURE — 36415 COLL VENOUS BLD VENIPUNCTURE: CPT

## 2023-06-19 ENCOUNTER — SOCIAL WORK (OUTPATIENT)
Dept: INFUSION THERAPY | Age: 73
End: 2023-06-19

## 2023-06-19 DIAGNOSIS — G89.4 CHRONIC PAIN SYNDROME: ICD-10-CM

## 2023-06-19 RX ORDER — HYDROCODONE BITARTRATE AND ACETAMINOPHEN 5; 325 MG/1; MG/1
1 TABLET ORAL EVERY 6 HOURS PRN
Qty: 120 TABLET | Refills: 0 | Status: SHIPPED | OUTPATIENT
Start: 2023-06-20 | End: 2023-07-20

## 2023-06-19 NOTE — PROGRESS NOTES
Oncology Social Work    Date: 6/19/2023  Time: 9:23 AM  Name: John Martinez  MRN: [id number]     Contact Type: Follow-up    Note:   Situation: This staff contacted John Martinez via phone support to introduce myself as her Oncology Social Worker. Background:  Renan Conroy had completed a Distress Screening at her initial appointment at the [facility]. This staff was calling to review the results of her distress levels. Assessment: Although there were no reflected elevated concerns indicated on the Distress Screening, this staff reviewed the services provided by the SW as part of her care team.  - Since no immediate needs were referenced by Renan Conroy at this time, she asked that we be able to meet in person at her next appointment. This staff shared my office location in the radiation areas. Renan Conroy plans to be coming for an appt soon and promised to drop by and introduce herself. At that time we can discuss the options of support this staff can assist her with during this journey. - Referred him to her local [employer] for additional support should she want to pursue that avenue. Recommendation: Follow-up will be initiated by Renan Conroy based on need.  provided her with my contact information and will remain available for support.         PAPA Ramos, CARTER, OSCAR  Oncology Social Worker      Electronically signed by PAPA Ramos, CARTER, OSCAR on 6/19/2023 at 9:23 AM

## 2023-06-26 ENCOUNTER — HOSPITAL ENCOUNTER (OUTPATIENT)
Dept: RADIATION ONCOLOGY | Age: 73
Discharge: HOME | End: 2023-06-26
Payer: MEDICARE

## 2023-06-26 ENCOUNTER — HOSPITAL ENCOUNTER (OUTPATIENT)
Dept: CT IMAGING | Age: 73
Discharge: HOME | End: 2023-06-26

## 2023-06-26 DIAGNOSIS — C50.412 MALIGNANT NEOPLASM OF UPPER-OUTER QUADRANT OF LEFT BREAST IN FEMALE, ESTROGEN RECEPTOR POSITIVE (HCC): ICD-10-CM

## 2023-06-26 DIAGNOSIS — Z17.0 MALIGNANT NEOPLASM OF UPPER-OUTER QUADRANT OF LEFT BREAST IN FEMALE, ESTROGEN RECEPTOR POSITIVE (HCC): ICD-10-CM

## 2023-06-26 PROCEDURE — 3209999900 CT GUIDE RADIATION THERAPY NO CHARGE

## 2023-06-26 PROCEDURE — 77290 THER RAD SIMULAJ FIELD CPLX: CPT | Performed by: RADIOLOGY

## 2023-06-26 PROCEDURE — 77332 RADIATION TREATMENT AID(S): CPT | Performed by: RADIOLOGY

## 2023-06-30 ENCOUNTER — TELEPHONE (OUTPATIENT)
Dept: SPIRITUAL SERVICES | Facility: CLINIC | Age: 73
End: 2023-06-30

## 2023-07-03 ENCOUNTER — HOSPITAL ENCOUNTER (OUTPATIENT)
Dept: WOMENS IMAGING | Age: 73
Discharge: HOME | End: 2023-07-03
Attending: INTERNAL MEDICINE
Payer: MEDICARE

## 2023-07-03 ENCOUNTER — HOSPITAL ENCOUNTER (OUTPATIENT)
Dept: RADIATION ONCOLOGY | Age: 73
Discharge: HOME | End: 2023-07-03
Payer: MEDICARE

## 2023-07-03 DIAGNOSIS — Z79.811 ENCOUNTER FOR MONITORING AROMATASE INHIBITOR THERAPY: ICD-10-CM

## 2023-07-03 DIAGNOSIS — Z51.81 ENCOUNTER FOR MONITORING AROMATASE INHIBITOR THERAPY: ICD-10-CM

## 2023-07-03 DIAGNOSIS — C50.412 MALIGNANT NEOPLASM OF UPPER-OUTER QUADRANT OF LEFT BREAST IN FEMALE, ESTROGEN RECEPTOR POSITIVE (HCC): ICD-10-CM

## 2023-07-03 DIAGNOSIS — Z17.0 MALIGNANT NEOPLASM OF UPPER-OUTER QUADRANT OF LEFT BREAST IN FEMALE, ESTROGEN RECEPTOR POSITIVE (HCC): ICD-10-CM

## 2023-07-03 PROCEDURE — 77080 DXA BONE DENSITY AXIAL: CPT

## 2023-07-03 PROCEDURE — 77334 RADIATION TREATMENT AID(S): CPT | Performed by: RADIOLOGY

## 2023-07-10 ENCOUNTER — OFFICE VISIT (OUTPATIENT)
Dept: PHYSICAL MEDICINE AND REHAB | Age: 73
End: 2023-07-10
Payer: MEDICARE

## 2023-07-10 VITALS
SYSTOLIC BLOOD PRESSURE: 124 MMHG | DIASTOLIC BLOOD PRESSURE: 62 MMHG | HEIGHT: 62 IN | WEIGHT: 161 LBS | BODY MASS INDEX: 29.63 KG/M2

## 2023-07-10 DIAGNOSIS — M46.1 SI (SACROILIAC) JOINT INFLAMMATION (HCC): Primary | ICD-10-CM

## 2023-07-10 DIAGNOSIS — M47.816 SPONDYLOSIS OF LUMBAR REGION WITHOUT MYELOPATHY OR RADICULOPATHY: ICD-10-CM

## 2023-07-10 DIAGNOSIS — M80.00XA AGE-RELATED OSTEOPOROSIS WITH CURRENT PATHOLOGICAL FRACTURE, INITIAL ENCOUNTER: ICD-10-CM

## 2023-07-10 DIAGNOSIS — Z85.3 HISTORY OF LEFT BREAST CANCER: ICD-10-CM

## 2023-07-10 DIAGNOSIS — M48.061 SPINAL STENOSIS OF LUMBAR REGION WITHOUT NEUROGENIC CLAUDICATION: ICD-10-CM

## 2023-07-10 DIAGNOSIS — G89.29 CHRONIC BILATERAL LOW BACK PAIN WITHOUT SCIATICA: ICD-10-CM

## 2023-07-10 DIAGNOSIS — M54.50 CHRONIC BILATERAL LOW BACK PAIN WITHOUT SCIATICA: ICD-10-CM

## 2023-07-10 DIAGNOSIS — S32.010D CLOSED COMPRESSION FRACTURE OF L1 LUMBAR VERTEBRA WITH ROUTINE HEALING, SUBSEQUENT ENCOUNTER: ICD-10-CM

## 2023-07-10 DIAGNOSIS — G89.4 CHRONIC PAIN SYNDROME: ICD-10-CM

## 2023-07-10 PROCEDURE — 3078F DIAST BP <80 MM HG: CPT | Performed by: NURSE PRACTITIONER

## 2023-07-10 PROCEDURE — 1123F ACP DISCUSS/DSCN MKR DOCD: CPT | Performed by: NURSE PRACTITIONER

## 2023-07-10 PROCEDURE — 3074F SYST BP LT 130 MM HG: CPT | Performed by: NURSE PRACTITIONER

## 2023-07-10 PROCEDURE — 99214 OFFICE O/P EST MOD 30 MIN: CPT | Performed by: NURSE PRACTITIONER

## 2023-07-10 ASSESSMENT — ENCOUNTER SYMPTOMS
COUGH: 1
SHORTNESS OF BREATH: 1
BACK PAIN: 1
WHEEZING: 1
GASTROINTESTINAL NEGATIVE: 1
CHEST TIGHTNESS: 0

## 2023-07-10 NOTE — PROGRESS NOTES
[address] [facility]  Dept: [phone]  Dept Fax: [phone]: [phone]    Visit Date: 7/10/2023    Functionality Assessment/Goals Worksheet     On a scale of 0 (Does not Interfere) to 10 (Completely Interferes)     1. Which number describes how during the past week pain has interfered with       the following:  A. General Activity:  7  B. Mood: 7  C. Walking Ability:  7  D. Normal Work (Includes both work outside the home and housework):  7  E. Relations with Other People:   5  F. Sleep:   7  G. Enjoyment of Life:   9    2. Patient Prefers to Take their Pain Medications:     []  On a regular basis   []  Only when necessary    []  Does not take pain medications    3. What are the Patient's Goals/Expectations for Visiting Pain Management? []  Learn about my pain    [x]  Receive Medication   []  Physical Therapy     []  Treat Depression   [x]  Receive Injections    []  Treat Sleep   []  Deal with Anxiety and Stress   []  Treat Opoid Dependence/Addiction   []  Other:      HPI:   Carlos Briggs is a 67 y.o. female is here today for    Chief Complaint: Low back pain, left breast pain     HPI   2 month FU. Continues to have pain in low back all axial constant dull aching pain. Gets intermittent numbness in right lower extremity from hip down to ankle laterally usually she gets this in late evening when laying. Continues to have pain in left breast \"has improved and not too bad\" aching pain. Had left breast lumpectomy since last visit on 5/17/2023. Is starting radiation therapy tomorrow. Remains on 3 liters of oxygen per nasal cannula. More sob lately. Awaiting appointment with pulmonary   Pain increases with bending, lifting, twisting , walking, raising arms, getting up and down, and any increased activity .     Norco prn remains effective in decreasing pain

## 2023-07-11 ENCOUNTER — HOSPITAL ENCOUNTER (OUTPATIENT)
Dept: RADIATION ONCOLOGY | Age: 73
Discharge: HOME | End: 2023-07-11
Payer: MEDICARE

## 2023-07-11 VITALS
RESPIRATION RATE: 20 BRPM | BODY MASS INDEX: 29.76 KG/M2 | SYSTOLIC BLOOD PRESSURE: 144 MMHG | OXYGEN SATURATION: 95 % | HEART RATE: 76 BPM | WEIGHT: 162.7 LBS | TEMPERATURE: 98.5 F | DIASTOLIC BLOOD PRESSURE: 66 MMHG

## 2023-07-11 DIAGNOSIS — C50.412 MALIGNANT NEOPLASM OF UPPER-OUTER QUADRANT OF LEFT BREAST IN FEMALE, ESTROGEN RECEPTOR POSITIVE (HCC): Primary | ICD-10-CM

## 2023-07-11 DIAGNOSIS — Z17.0 MALIGNANT NEOPLASM OF UPPER-OUTER QUADRANT OF LEFT BREAST IN FEMALE, ESTROGEN RECEPTOR POSITIVE (HCC): Primary | ICD-10-CM

## 2023-07-11 PROCEDURE — 77280 THER RAD SIMULAJ FIELD SMPL: CPT | Performed by: RADIOLOGY

## 2023-07-11 PROCEDURE — 77412 RADIATION TX DELIVERY LVL 3: CPT | Performed by: RADIOLOGY

## 2023-07-11 ASSESSMENT — PAIN DESCRIPTION - LOCATION: LOCATION: BACK

## 2023-07-11 ASSESSMENT — PAIN DESCRIPTION - FREQUENCY: FREQUENCY: CONTINUOUS

## 2023-07-11 ASSESSMENT — PAIN DESCRIPTION - ORIENTATION: ORIENTATION: LOWER

## 2023-07-11 ASSESSMENT — PAIN SCALES - GENERAL: PAINLEVEL_OUTOF10: 7

## 2023-07-12 ENCOUNTER — HOSPITAL ENCOUNTER (OUTPATIENT)
Dept: RADIATION ONCOLOGY | Age: 73
Discharge: HOME | End: 2023-07-12
Payer: MEDICARE

## 2023-07-12 PROCEDURE — 77387 GUIDANCE FOR RADJ TX DLVR: CPT | Performed by: RADIOLOGY

## 2023-07-12 PROCEDURE — 77412 RADIATION TX DELIVERY LVL 3: CPT | Performed by: RADIOLOGY

## 2023-07-12 PROCEDURE — G6002 STEREOSCOPIC X-RAY GUIDANCE: HCPCS | Performed by: RADIOLOGY

## 2023-07-13 ENCOUNTER — HOSPITAL ENCOUNTER (OUTPATIENT)
Dept: RADIATION ONCOLOGY | Age: 73
Discharge: HOME | End: 2023-07-13
Payer: MEDICARE

## 2023-07-13 PROCEDURE — 77412 RADIATION TX DELIVERY LVL 3: CPT | Performed by: RADIOLOGY

## 2023-07-13 PROCEDURE — 77387 GUIDANCE FOR RADJ TX DLVR: CPT | Performed by: RADIOLOGY

## 2023-07-13 PROCEDURE — G6002 STEREOSCOPIC X-RAY GUIDANCE: HCPCS | Performed by: RADIOLOGY

## 2023-07-14 ENCOUNTER — HOSPITAL ENCOUNTER (OUTPATIENT)
Dept: RADIATION ONCOLOGY | Age: 73
Discharge: HOME | End: 2023-07-14
Payer: MEDICARE

## 2023-07-14 PROCEDURE — 77412 RADIATION TX DELIVERY LVL 3: CPT | Performed by: RADIOLOGY

## 2023-07-14 PROCEDURE — 77387 GUIDANCE FOR RADJ TX DLVR: CPT | Performed by: RADIOLOGY

## 2023-07-17 ENCOUNTER — HOSPITAL ENCOUNTER (OUTPATIENT)
Dept: RADIATION ONCOLOGY | Age: 73
Discharge: HOME | End: 2023-07-17
Payer: MEDICARE

## 2023-07-17 DIAGNOSIS — G89.4 CHRONIC PAIN SYNDROME: ICD-10-CM

## 2023-07-17 PROCEDURE — 77387 GUIDANCE FOR RADJ TX DLVR: CPT | Performed by: RADIOLOGY

## 2023-07-17 PROCEDURE — 77412 RADIATION TX DELIVERY LVL 3: CPT | Performed by: RADIOLOGY

## 2023-07-17 RX ORDER — HYDROCODONE BITARTRATE AND ACETAMINOPHEN 5; 325 MG/1; MG/1
1 TABLET ORAL EVERY 6 HOURS PRN
Qty: 120 TABLET | Refills: 0 | Status: SHIPPED | OUTPATIENT
Start: 2023-07-20 | End: 2023-08-19

## 2023-07-18 ENCOUNTER — HOSPITAL ENCOUNTER (OUTPATIENT)
Dept: RADIATION ONCOLOGY | Age: 73
Discharge: HOME | End: 2023-07-18
Payer: MEDICARE

## 2023-07-18 VITALS
WEIGHT: 161.38 LBS | HEART RATE: 71 BPM | SYSTOLIC BLOOD PRESSURE: 135 MMHG | BODY MASS INDEX: 29.52 KG/M2 | RESPIRATION RATE: 18 BRPM | OXYGEN SATURATION: 96 % | TEMPERATURE: 98.3 F | DIASTOLIC BLOOD PRESSURE: 72 MMHG

## 2023-07-18 PROCEDURE — G6002 STEREOSCOPIC X-RAY GUIDANCE: HCPCS | Performed by: RADIOLOGY

## 2023-07-18 PROCEDURE — 77412 RADIATION TX DELIVERY LVL 3: CPT | Performed by: RADIOLOGY

## 2023-07-18 PROCEDURE — 77387 GUIDANCE FOR RADJ TX DLVR: CPT | Performed by: RADIOLOGY

## 2023-07-19 ENCOUNTER — HOSPITAL ENCOUNTER (OUTPATIENT)
Dept: RADIATION ONCOLOGY | Age: 73
Discharge: HOME | End: 2023-07-19
Payer: MEDICARE

## 2023-07-19 PROCEDURE — G6002 STEREOSCOPIC X-RAY GUIDANCE: HCPCS | Performed by: RADIOLOGY

## 2023-07-19 PROCEDURE — 77412 RADIATION TX DELIVERY LVL 3: CPT | Performed by: RADIOLOGY

## 2023-07-19 PROCEDURE — 77387 GUIDANCE FOR RADJ TX DLVR: CPT | Performed by: RADIOLOGY

## 2023-07-20 ENCOUNTER — HOSPITAL ENCOUNTER (OUTPATIENT)
Dept: RADIATION ONCOLOGY | Age: 73
Discharge: HOME | End: 2023-07-20
Payer: MEDICARE

## 2023-07-20 PROCEDURE — 77412 RADIATION TX DELIVERY LVL 3: CPT | Performed by: RADIOLOGY

## 2023-07-20 PROCEDURE — 77387 GUIDANCE FOR RADJ TX DLVR: CPT | Performed by: RADIOLOGY

## 2023-07-21 ENCOUNTER — HOSPITAL ENCOUNTER (OUTPATIENT)
Dept: RADIATION ONCOLOGY | Age: 73
Discharge: HOME | End: 2023-07-21
Payer: MEDICARE

## 2023-07-21 PROCEDURE — 77387 GUIDANCE FOR RADJ TX DLVR: CPT | Performed by: RADIOLOGY

## 2023-07-21 PROCEDURE — 77412 RADIATION TX DELIVERY LVL 3: CPT | Performed by: RADIOLOGY

## 2023-07-24 ENCOUNTER — HOSPITAL ENCOUNTER (OUTPATIENT)
Dept: RADIATION ONCOLOGY | Age: 73
Discharge: HOME | End: 2023-07-24
Payer: MEDICARE

## 2023-07-24 PROCEDURE — 77387 GUIDANCE FOR RADJ TX DLVR: CPT | Performed by: RADIOLOGY

## 2023-07-24 PROCEDURE — 77412 RADIATION TX DELIVERY LVL 3: CPT | Performed by: RADIOLOGY

## 2023-07-24 PROCEDURE — G6002 STEREOSCOPIC X-RAY GUIDANCE: HCPCS | Performed by: RADIOLOGY

## 2023-07-25 ENCOUNTER — HOSPITAL ENCOUNTER (OUTPATIENT)
Dept: RADIATION ONCOLOGY | Age: 73
Discharge: HOME | End: 2023-07-25
Payer: MEDICARE

## 2023-07-25 VITALS
TEMPERATURE: 97.9 F | HEART RATE: 75 BPM | RESPIRATION RATE: 18 BRPM | DIASTOLIC BLOOD PRESSURE: 63 MMHG | BODY MASS INDEX: 29.31 KG/M2 | WEIGHT: 160.27 LBS | SYSTOLIC BLOOD PRESSURE: 135 MMHG | OXYGEN SATURATION: 95 %

## 2023-07-25 PROCEDURE — 77387 GUIDANCE FOR RADJ TX DLVR: CPT | Performed by: RADIOLOGY

## 2023-07-25 PROCEDURE — 77412 RADIATION TX DELIVERY LVL 3: CPT | Performed by: RADIOLOGY

## 2023-07-25 PROCEDURE — G6002 STEREOSCOPIC X-RAY GUIDANCE: HCPCS | Performed by: RADIOLOGY

## 2023-07-25 NOTE — PROGRESS NOTES
effects of radiation for the patient's treatment. Continue local/topical care. Continue current radiation course as prescribed.

## 2023-07-26 ENCOUNTER — HOSPITAL ENCOUNTER (OUTPATIENT)
Dept: RADIATION ONCOLOGY | Age: 73
Discharge: HOME | End: 2023-07-26
Payer: MEDICARE

## 2023-07-26 PROCEDURE — 77412 RADIATION TX DELIVERY LVL 3: CPT | Performed by: RADIOLOGY

## 2023-07-26 PROCEDURE — 77387 GUIDANCE FOR RADJ TX DLVR: CPT | Performed by: RADIOLOGY

## 2023-07-26 NOTE — DISCHARGE INSTRUCTIONS
PATIENT DISCHARGE INSTRUCTIONS    Remember that side effects present at the end of your treatments will improve within a few weeks after the last treatment. Eat well balanced meals even though your treatments are finished. This will help speed the healing process. Continue any special diets prescribed to control side effects until these side effects have been resolved. Get plenty of rest.  If you have experienced fatigue and/or weakness, this may continue for several weeks after your last treatment. Continue with your daily activities according to the way you feel. Continue to be gentle with your skin. Follow your present skin care instructions until your follow-up visit. IF YOU DEVELOP ANY CHANGES IN YOUR SKIN IN THE AREA TREATED WITH RADIATION, PLEASE CALL THE RADIATION ONCOLOGY NURSE -[phone]. Protect your skin from any injury and avoid direct sun exposure in the treatment area. The skin in the treated area may always be more sensitive than the rest of your skin. Always use SPF 27 or higher sun block if you will be in the sun and cannot avoid exposure. Please contact your referring physician for a follow-up appointment in addition to your Radiation Oncology appointment. You may receive a survey via text message or e-mail at some point after treatment. We would appreciate your time in filling out this survey and giving us your honest feedback about your experience with us. We strive for excellence and hope that you were \"Very Satisfied\" with your care and our service. Presence of pain:   Medication Taper: No    See Instructions Dated: N/A  Follow up orders:  Will be discussed at Follow-Up

## 2023-07-27 ENCOUNTER — HOSPITAL ENCOUNTER (OUTPATIENT)
Dept: RADIATION ONCOLOGY | Age: 73
Discharge: HOME | End: 2023-07-27
Payer: MEDICARE

## 2023-07-27 DIAGNOSIS — J43.2 CENTRILOBULAR EMPHYSEMA (HCC): ICD-10-CM

## 2023-07-27 PROCEDURE — 77412 RADIATION TX DELIVERY LVL 3: CPT | Performed by: RADIOLOGY

## 2023-07-27 PROCEDURE — 77387 GUIDANCE FOR RADJ TX DLVR: CPT | Performed by: RADIOLOGY

## 2023-07-27 RX ORDER — ALBUTEROL SULFATE 90 UG/1
AEROSOL, METERED RESPIRATORY (INHALATION)
Qty: 18 G | Refills: 11 | Status: SHIPPED | OUTPATIENT
Start: 2023-07-27

## 2023-07-27 NOTE — TELEPHONE ENCOUNTER
Received refill request for albuterol inhaler. Medication was last ordered by Clemens Krabbe. Medication was last ordered on 7/6/22 with 11 refills. Patient was last seen in the office 5/9/23. Does patient have a scheduled follow up?: yes     Medication needs to be sent to [address] [place] [address] [id number] - [place], [facility] [phone] - [phone]. Thank you, please advise! Patient's Allergies:   Allergies   Allergen Reactions    Codeine Itching     Pt cant remember    Doxycycline      Cant breath     Lipitor Itching    Simvastatin Itching     Stomach pain    Chantix [Varenicline Tartrate] Itching

## 2023-07-28 ENCOUNTER — HOSPITAL ENCOUNTER (OUTPATIENT)
Dept: RADIATION ONCOLOGY | Age: 73
Discharge: HOME | End: 2023-07-28
Payer: MEDICARE

## 2023-07-28 PROCEDURE — 77412 RADIATION TX DELIVERY LVL 3: CPT | Performed by: RADIOLOGY

## 2023-07-28 PROCEDURE — 77387 GUIDANCE FOR RADJ TX DLVR: CPT | Performed by: RADIOLOGY

## 2023-07-31 ENCOUNTER — HOSPITAL ENCOUNTER (OUTPATIENT)
Dept: RADIATION ONCOLOGY | Age: 73
End: 2023-07-31
Payer: MEDICARE

## 2023-07-31 PROCEDURE — 77412 RADIATION TX DELIVERY LVL 3: CPT | Performed by: RADIOLOGY

## 2023-07-31 PROCEDURE — 77387 GUIDANCE FOR RADJ TX DLVR: CPT | Performed by: RADIOLOGY

## 2023-07-31 PROCEDURE — G6002 STEREOSCOPIC X-RAY GUIDANCE: HCPCS | Performed by: RADIOLOGY

## 2023-08-01 PROCEDURE — 77336 RADIATION PHYSICS CONSULT: CPT | Performed by: RADIOLOGY

## 2023-08-02 ENCOUNTER — HOSPITAL ENCOUNTER (OUTPATIENT)
Dept: RADIATION ONCOLOGY | Age: 73
End: 2023-08-02
Payer: MEDICARE

## 2023-08-10 ENCOUNTER — PATIENT MESSAGE (OUTPATIENT)
Dept: PULMONOLOGY | Age: 73
End: 2023-08-10

## 2023-08-10 DIAGNOSIS — J44.1 COPD EXACERBATION (HCC): Primary | ICD-10-CM

## 2023-08-14 RX ORDER — AZITHROMYCIN 500 MG/1
500 TABLET, FILM COATED ORAL DAILY
Qty: 3 TABLET | Refills: 0 | Status: SHIPPED | OUTPATIENT
Start: 2023-08-14 | End: 2023-08-17

## 2023-08-14 RX ORDER — PREDNISONE 20 MG/1
40 TABLET ORAL DAILY
Qty: 10 TABLET | Refills: 0 | Status: SHIPPED | OUTPATIENT
Start: 2023-08-14 | End: 2023-08-19

## 2023-08-16 DIAGNOSIS — G89.4 CHRONIC PAIN SYNDROME: ICD-10-CM

## 2023-08-16 RX ORDER — HYDROCODONE BITARTRATE AND ACETAMINOPHEN 5; 325 MG/1; MG/1
1 TABLET ORAL EVERY 6 HOURS PRN
Qty: 120 TABLET | Refills: 0 | Status: SHIPPED | OUTPATIENT
Start: 2023-08-19 | End: 2023-09-18

## 2023-08-16 NOTE — TELEPHONE ENCOUNTER
OARRS reviewed. UDS: + for  hydrocodone. Last seen: 7/10/2023.  Follow-up:   Future Appointments   Date Time Provider [address]   8/25/2023 11:00 AM Roman Shi MD N Oncology [facility]   9/7/2023  1:00 PM CASSANDRA Bentley - [facility]   9/11/2023 12:30 PM CASSANDRA Verma - CNP N [facility] Pain [facility]   9/12/2023  2:30 PM SCHEDULE, Selma Marrero  [facility]   11/21/2023  9:30 AM Bernardino Mcardle, [address] [facility]

## 2023-08-25 ENCOUNTER — HOSPITAL ENCOUNTER (OUTPATIENT)
Dept: INFUSION THERAPY | Age: 73
Discharge: HOME | End: 2023-08-25
Payer: MEDICARE

## 2023-08-25 ENCOUNTER — OFFICE VISIT (OUTPATIENT)
Dept: ONCOLOGY | Age: 73
End: 2023-08-25
Payer: MEDICARE

## 2023-08-25 VITALS
HEART RATE: 60 BPM | OXYGEN SATURATION: 92 % | RESPIRATION RATE: 18 BRPM | DIASTOLIC BLOOD PRESSURE: 63 MMHG | SYSTOLIC BLOOD PRESSURE: 142 MMHG | TEMPERATURE: 97.8 F

## 2023-08-25 VITALS
HEIGHT: 62 IN | WEIGHT: 159.8 LBS | RESPIRATION RATE: 18 BRPM | SYSTOLIC BLOOD PRESSURE: 142 MMHG | OXYGEN SATURATION: 92 % | HEART RATE: 60 BPM | DIASTOLIC BLOOD PRESSURE: 63 MMHG | BODY MASS INDEX: 29.4 KG/M2 | TEMPERATURE: 97.8 F

## 2023-08-25 DIAGNOSIS — C50.412 MALIGNANT NEOPLASM OF UPPER-OUTER QUADRANT OF LEFT BREAST IN FEMALE, ESTROGEN RECEPTOR POSITIVE (HCC): Primary | ICD-10-CM

## 2023-08-25 DIAGNOSIS — Z17.0 MALIGNANT NEOPLASM OF UPPER-OUTER QUADRANT OF LEFT BREAST IN FEMALE, ESTROGEN RECEPTOR POSITIVE (HCC): Primary | ICD-10-CM

## 2023-08-25 PROCEDURE — 1123F ACP DISCUSS/DSCN MKR DOCD: CPT | Performed by: INTERNAL MEDICINE

## 2023-08-25 PROCEDURE — 3078F DIAST BP <80 MM HG: CPT | Performed by: INTERNAL MEDICINE

## 2023-08-25 PROCEDURE — 99213 OFFICE O/P EST LOW 20 MIN: CPT | Performed by: INTERNAL MEDICINE

## 2023-08-25 PROCEDURE — 99211 OFF/OP EST MAY X REQ PHY/QHP: CPT

## 2023-08-25 PROCEDURE — 3077F SYST BP >= 140 MM HG: CPT | Performed by: INTERNAL MEDICINE

## 2023-08-25 NOTE — PROGRESS NOTES
[facility] [address] [place] [address] [address]  [address] [facility] [address] [id number]  Dept: [phone]  Dept Fax: [phone]: [phone]      Visit Date:8/25/2023     Lucia Alonso is a 67 y.o. female who presents today for:   Chief Complaint   Patient presents with    Follow-up     Malignant neoplasm of upper-outer quadrant of left breast in female, estrogen receptor positive    Results     Rv Scan        HPI:   Lucia Alonso is a 67 y.o. female referred to Hematology/Oncology clinic for evaluation of left-sided breast cancer. Patient initially presented with abnormality in the left breast this was positive mammographically and because of this she underwent a biopsy in the upper outer quadrant. The biopsy demonstrated intermediate grade invasive ductal carcinoma it was ER positive WV negative HER2/edward negative. The patient was seen by Dr. Marquis Yoder and underwent a left sided lumpectomy. No sentinel lymph node biopsy was performed. The final pathology was positive for a 2.4 cm invasive ductal adenocarcinoma that was ER/WV positive HER2/edward negative. The patient did have an Onco Dx testing performed and this demonstrated recurrence score of 16. The patient underwent adjuvant radiation therapy completing this in July 2023. Status: The patient returns. She reports she is doing very well she is not having problems her energy level is good she has not started the letrozole. She has completed all of her radiation therapy. She denies fevers chills nausea vomiting changes in bowel or bladder habits changes in hearing or vision.         Past Medical History:   Diagnosis Date    Bladder dysfunction     pt denies    CAD (coronary artery disease)     Cerebral artery occlusion with cerebral infarction [facility])     TIA    Cerebrovascular disease     CHF (congestive heart failure) (HCC)     COPD (chronic obstructive pulmonary disease) ([facility])     uses oxygen 24 hours per day    DDD (degenerative disc disease)

## 2023-09-06 NOTE — PROGRESS NOTES
[facility]    Patient: Tiffany Laguna, 67 y.o.   : 1950    Patient of mine     Subjective     Chief Complaint   Patient presents with    Follow-up     3 month f/u emphysema and rld        HPI  Ko Luo is here for follow up for emphysema. Overall patient reports respiratory symptoms have been fluctuating a bit since last appointment. Patient reports good compliance with inhaled medications Raul Palomino). Patient using albuterol 2-3 times per day on average. Patient reports physical limitation due to respiratory symptoms. Her past medical history is significant for restrictive lung disease, emphysema, PETE (follows with ROSIO Shay), hypertension, CHF (follows with Dr. Stephenie Aparicio), CAD, DDD, and cerebral infarction. Patient was treated for COPD flare up 8/10/23. Expectorants: Saline neb  Allergy regimen: loratadine      CT Chest 23 - stable prominent mediastinal and hilar lymph nodes  Mammogram 23 - mass in upper outer left breast  Biopsy of breast - Invasive ductal adenocarcinoma, Abi grade 2  Patient underwent left breast lumpectomy 23 with Dr. Pam Bruce     She is following with Dr. Amber Wan with oncology    Complaints: Shortness of Breath  Onset Duration: Over a year  Exacerbating factors: Exertion  Alleviating factors: Rest  Associated symptoms: Cough, Sputum Production (clear), Wheezing, and Chest Tightness  Pertinent negatives: Hemoptysis  Risk factors for lung disease: tobacco exposure and animal exposure    Progress History:   Since last visit any new medical issues? See above  New ER or hospital visits for breathing/pulm reasons? No  Any new or changes in medicines? No  Using inhalers? Yes Dulera and as needed albuterol   Are they helpful? Yes   Previous inhalers?  Stiolto (patient stopped as she believed this caused her lungs to worsen), Symbicort (believes caused her symptoms to worsen), Advair (uncertain why it was

## 2023-09-07 ENCOUNTER — OFFICE VISIT (OUTPATIENT)
Dept: PULMONOLOGY | Age: 73
End: 2023-09-07
Payer: MEDICARE

## 2023-09-07 VITALS
TEMPERATURE: 97.9 F | OXYGEN SATURATION: 93 % | HEIGHT: 60 IN | DIASTOLIC BLOOD PRESSURE: 60 MMHG | SYSTOLIC BLOOD PRESSURE: 130 MMHG | BODY MASS INDEX: 31.02 KG/M2 | HEART RATE: 68 BPM | WEIGHT: 158 LBS

## 2023-09-07 DIAGNOSIS — J96.11 CHRONIC RESPIRATORY FAILURE WITH HYPOXIA (HCC): ICD-10-CM

## 2023-09-07 DIAGNOSIS — J41.1 MUCOPURULENT CHRONIC BRONCHITIS (HCC): ICD-10-CM

## 2023-09-07 DIAGNOSIS — J43.2 CENTRILOBULAR EMPHYSEMA (HCC): Primary | ICD-10-CM

## 2023-09-07 DIAGNOSIS — R59.0 HILAR LYMPHADENOPATHY: ICD-10-CM

## 2023-09-07 PROCEDURE — 1123F ACP DISCUSS/DSCN MKR DOCD: CPT

## 2023-09-07 PROCEDURE — 3075F SYST BP GE 130 - 139MM HG: CPT

## 2023-09-07 PROCEDURE — 99214 OFFICE O/P EST MOD 30 MIN: CPT

## 2023-09-07 PROCEDURE — 3078F DIAST BP <80 MM HG: CPT

## 2023-09-07 ASSESSMENT — ENCOUNTER SYMPTOMS
SINUS PRESSURE: 0
SINUS PAIN: 0
WHEEZING: 1
RHINORRHEA: 0
CHEST TIGHTNESS: 1
SHORTNESS OF BREATH: 1
COUGH: 1

## 2023-09-11 ENCOUNTER — OFFICE VISIT (OUTPATIENT)
Dept: PHYSICAL MEDICINE AND REHAB | Age: 73
End: 2023-09-11
Payer: MEDICARE

## 2023-09-11 VITALS
DIASTOLIC BLOOD PRESSURE: 78 MMHG | BODY MASS INDEX: 31.02 KG/M2 | SYSTOLIC BLOOD PRESSURE: 122 MMHG | WEIGHT: 158 LBS | HEIGHT: 60 IN

## 2023-09-11 DIAGNOSIS — G89.29 CHRONIC BILATERAL LOW BACK PAIN WITHOUT SCIATICA: Primary | ICD-10-CM

## 2023-09-11 DIAGNOSIS — G89.4 CHRONIC PAIN SYNDROME: ICD-10-CM

## 2023-09-11 DIAGNOSIS — Z85.3 HISTORY OF LEFT BREAST CANCER: ICD-10-CM

## 2023-09-11 DIAGNOSIS — S32.010D CLOSED COMPRESSION FRACTURE OF L1 LUMBAR VERTEBRA WITH ROUTINE HEALING, SUBSEQUENT ENCOUNTER: ICD-10-CM

## 2023-09-11 DIAGNOSIS — M47.816 SPONDYLOSIS OF LUMBAR REGION WITHOUT MYELOPATHY OR RADICULOPATHY: ICD-10-CM

## 2023-09-11 DIAGNOSIS — M46.1 SI (SACROILIAC) JOINT INFLAMMATION (HCC): ICD-10-CM

## 2023-09-11 DIAGNOSIS — M48.061 SPINAL STENOSIS OF LUMBAR REGION WITHOUT NEUROGENIC CLAUDICATION: ICD-10-CM

## 2023-09-11 DIAGNOSIS — M54.50 CHRONIC BILATERAL LOW BACK PAIN WITHOUT SCIATICA: Primary | ICD-10-CM

## 2023-09-11 PROCEDURE — 3078F DIAST BP <80 MM HG: CPT | Performed by: NURSE PRACTITIONER

## 2023-09-11 PROCEDURE — 3074F SYST BP LT 130 MM HG: CPT | Performed by: NURSE PRACTITIONER

## 2023-09-11 PROCEDURE — 99214 OFFICE O/P EST MOD 30 MIN: CPT | Performed by: NURSE PRACTITIONER

## 2023-09-11 PROCEDURE — 1123F ACP DISCUSS/DSCN MKR DOCD: CPT | Performed by: NURSE PRACTITIONER

## 2023-09-11 RX ORDER — HYDROCODONE BITARTRATE AND ACETAMINOPHEN 5; 325 MG/1; MG/1
1 TABLET ORAL EVERY 6 HOURS PRN
Qty: 120 TABLET | Refills: 0 | Status: SHIPPED | OUTPATIENT
Start: 2023-09-18 | End: 2023-10-18

## 2023-09-11 ASSESSMENT — ENCOUNTER SYMPTOMS
SHORTNESS OF BREATH: 1
GASTROINTESTINAL NEGATIVE: 1
CHEST TIGHTNESS: 0
COUGH: 0
BACK PAIN: 1
WHEEZING: 0

## 2023-09-11 NOTE — PROGRESS NOTES
[address] [facility]  Dept: [phone]  Dept Fax: [phone]: [phone]    Visit Date: 9/11/2023    Functionality Assessment/Goals Worksheet     On a scale of 0 (Does not Interfere) to 10 (Completely Interferes)     1. Which number describes how during the past week pain has interfered with       the following:  A. General Activity:  5  B. Mood: 5  C. Walking Ability:  6  D. Normal Work (Includes both work outside the home and housework):  5  E. Relations with Other People:   4  F. Sleep:   4  G. Enjoyment of Life:   6    2. Patient Prefers to Take their Pain Medications:     []  On a regular basis   [x]  Only when necessary    []  Does not take pain medications    3. What are the Patient's Goals/Expectations for Visiting Pain Management? []  Learn about my pain    [x]  Receive Medication   []  Physical Therapy     []  Treat Depression   [x]  Receive Injections    []  Treat Sleep   []  Deal with Anxiety and Stress   []  Treat Opoid Dependence/Addiction   []  Other:        HPI:   Tami Rodriguez is a 67 y.o. female is here today for    Chief Complaint: Low back pain, left breast pain     HPI   2 month FU. Continues to have pain in low back all axial- constant dull aching pain. States worse after long periods and then getting up. Denies any radicular pain. Has aching pain in left breast still- all finished with radiation. On 3 liters of oxygen per nasal cannula. States breathing has been improved and less SOB with medication changes with Pulmonary- Dulera and Spriva Respimat- states not using Bipap as much at night   Pain increases with bending, lifting, twisting , walking, standing, getting up and down, and housework or working at job.     Norco prn remains effective \"pain decreases to a tolerable level\" Able to tolerate daily activity     Prior

## 2023-09-12 ENCOUNTER — HOSPITAL ENCOUNTER (OUTPATIENT)
Dept: RADIATION ONCOLOGY | Age: 73
Discharge: HOME | End: 2023-09-12
Payer: MEDICARE

## 2023-09-12 ENCOUNTER — OFFICE VISIT (OUTPATIENT)
Dept: PULMONOLOGY | Age: 73
End: 2023-09-12
Payer: MEDICARE

## 2023-09-12 VITALS
SYSTOLIC BLOOD PRESSURE: 108 MMHG | HEART RATE: 68 BPM | BODY MASS INDEX: 31.58 KG/M2 | RESPIRATION RATE: 18 BRPM | OXYGEN SATURATION: 97 % | DIASTOLIC BLOOD PRESSURE: 62 MMHG | WEIGHT: 159 LBS | TEMPERATURE: 98.3 F

## 2023-09-12 VITALS
TEMPERATURE: 98.3 F | DIASTOLIC BLOOD PRESSURE: 62 MMHG | WEIGHT: 159.6 LBS | SYSTOLIC BLOOD PRESSURE: 108 MMHG | OXYGEN SATURATION: 97 % | BODY MASS INDEX: 31.33 KG/M2 | HEIGHT: 60 IN | HEART RATE: 68 BPM

## 2023-09-12 DIAGNOSIS — G47.33 OSA TREATED WITH BIPAP: Primary | ICD-10-CM

## 2023-09-12 DIAGNOSIS — E66.9 OBESITY (BMI 30-39.9): ICD-10-CM

## 2023-09-12 PROCEDURE — 99213 OFFICE O/P EST LOW 20 MIN: CPT | Performed by: PHYSICIAN ASSISTANT

## 2023-09-12 PROCEDURE — 3074F SYST BP LT 130 MM HG: CPT | Performed by: PHYSICIAN ASSISTANT

## 2023-09-12 PROCEDURE — 99212 OFFICE O/P EST SF 10 MIN: CPT

## 2023-09-12 PROCEDURE — 3078F DIAST BP <80 MM HG: CPT | Performed by: PHYSICIAN ASSISTANT

## 2023-09-12 PROCEDURE — 1123F ACP DISCUSS/DSCN MKR DOCD: CPT | Performed by: PHYSICIAN ASSISTANT

## 2023-09-12 ASSESSMENT — ENCOUNTER SYMPTOMS
BACK PAIN: 1
NAUSEA: 0
SHORTNESS OF BREATH: 1
ABDOMINAL PAIN: 0
DIARRHEA: 0
WHEEZING: 0
NAUSEA: 0
COUGH: 0
RECTAL PAIN: 0
SHORTNESS OF BREATH: 0
ALLERGIC/IMMUNOLOGIC NEGATIVE: 1
COUGH: 0
BLOOD IN STOOL: 0
TROUBLE SWALLOWING: 0
BACK PAIN: 0
EYES NEGATIVE: 1
DIARRHEA: 0
STRIDOR: 0
CHEST TIGHTNESS: 0

## 2023-09-12 ASSESSMENT — PAIN DESCRIPTION - LOCATION: LOCATION: BACK

## 2023-09-12 ASSESSMENT — PAIN SCALES - GENERAL: PAINLEVEL_OUTOF10: 6

## 2023-09-12 NOTE — PROGRESS NOTES
[address] [facility]           [id number] [place] [address], [address] [place] [address] Tess Villagomez Cheeks: [phone]        F: [phone]       [email or web address]            FOLLOW UP NOTE    Date of Service: 2023  Patient ID: Brent Lopez   : 1950  MRN: [id number]   Acct Number: [de-identified]       DATE OF SERVICE: 2023   LOCATION: [facility]  PROVIDER: Bartolo Robles PA-C    FOLLOW UP PHYSICIANS: Dr. Navneet Cast (Surgery) Dr. Kar Mckee (PCP)  CASSANDRA Lott (Pulm), Anabelle Oliva (Pain Mgt), Dr. Eunice Goins (Cardio)    ASSESSMENT AND PLAN:   Cancer Staging   Malignant neoplasm of upper-outer quadrant of left breast in female, estrogen receptor positive ([address])  Staging form: Breast, AJCC 8th Edition  - Clinical stage from 2023: Stage IIA (cT2, cN0, cM0, G3, ER+, ID+, HER2-) - Signed by Tyler Gutierrez MD on 2023  - Pathologic stage from 2023: Stage IA (pT2, pN0, cM0, G2, ER+, ID+, HER2-) - Signed by Tyler Gutierrez MD on 2023      - Brent Lopez is a 67 y.o. female who presents today for regularly-scheduled follow-up for her radiation for her left breast cancer. She underwent adjuvant radiation to the left breast, which was completed on 23  - She appears to be doing well since radiation, with minimal radiation side effects. She notes mild aching of the left breast, stable and intermittent since surgery. She also notes some redness of the left breast. She denies swelling of the left breast or arm or decreased range of motion of the left arm  - Left breast skin reaction healing well on limited exam  - The patient has begun on anti-estrogen therapy, managed by medical oncology  - Continue to follow with medical oncology, surgery, and all other medical providers. Patient will let me know if she would like to see PT    The patient will return to clinic in 6 months or sooner if clinically indicated.  They have our clinic number to call with any

## 2023-09-12 NOTE — PROGRESS NOTES
[facility]      Martinez Hobson         [id number]  9/12/2023   Chief Complaint   Patient presents with    Follow-up     1yr PETE f/u w/[facility] download. Doing well. Need script for PAP supplies. Pt of Shiela Garibay PA-C    PAP Download:   Original or initial AHI: 84     Date of initial study: 10/8/2008      Compliant  100%     Noncompliant 0 %     PAP Type BiPAP    Level  16/8/ cmH2O   Avg Hrs/Day 10hrs 11mins  AHI: 0.4   Recorded compliance dates , 8/13/23  to 9/11/23   Machine/Mfg:   [x] ResMed    [] Respironics/Dreamstation   Interface:   [] Nasal    [] Nasal pillows   [x] FFM      Provider:      [x] -HMFABIANO     []Andrew     [] Margaret    [] Claudy Mora    [] Rivka               [] [facility]      [] Adaptive    [] [address] [facility] [address]:      [] Other    Neck Size: 18  Mallampati 4  ESS:  6  SAQLI: 95    Here is a scan of the most recent download:                Presentation:   Ivonne Stovall presents for sleep medicine follow up for obstructive sleep apnea  Since the last visit, Ivonne Stovall is doing well with PAP. She is sleeping well and feels rested. She wants to try a new mask, she states her headgear is causing her hair to ge thin on top. Equipment issues: The pressure is  acceptable, the mask is unacceptable     Sleep issues:  Do you feel better? Yes  More rested? Yes   Better concentration? yes    Progress History:   Since last visit any new medical issues? Yes Breast cancer  New ER or hospital visits? Yes   Any new or changes in medicines? No  Any new sleep medicines? No    Review of Systems -   Review of Systems   Constitutional:  Negative for activity change, appetite change, chills and fever. HENT:  Negative for congestion and postnasal drip. Eyes: Negative. Respiratory:  Positive for shortness of breath. Negative for cough, chest tightness, wheezing and stridor. Cardiovascular:  Negative for chest pain and leg swelling.    Gastrointestinal:  Negative

## 2023-09-18 DIAGNOSIS — G89.4 CHRONIC PAIN SYNDROME: ICD-10-CM

## 2023-09-18 RX ORDER — HYDROCODONE BITARTRATE AND ACETAMINOPHEN 5; 325 MG/1; MG/1
1 TABLET ORAL EVERY 6 HOURS PRN
Qty: 120 TABLET | Refills: 0 | OUTPATIENT
Start: 2023-09-18 | End: 2023-10-18

## 2023-10-05 ENCOUNTER — TELEPHONE (OUTPATIENT)
Dept: PHYSICAL MEDICINE AND REHAB | Age: 73
End: 2023-10-05

## 2023-10-05 NOTE — TELEPHONE ENCOUNTER
Jorge Black requested a refill of Oxycodone. Pt will need a PA with her updated insurance . Please call this into the patient's pharmacy.

## 2023-10-11 NOTE — TELEPHONE ENCOUNTER
Key: [id number] - PA Case ID: [id number]    Approved today  PA Case: [id number], Status: Approved, Coverage Starts on: 1/1/2023 12:00:00 AM, Coverage Ends on: 12/31/2024 12:00:00 AM.     Pharmacist at [facility] states this patient should still have 7 days left of her previous Norco script and the new approved pa should go through okay

## 2023-10-20 ENCOUNTER — OFFICE VISIT (OUTPATIENT)
Dept: ONCOLOGY | Age: 73
End: 2023-10-20
Payer: MEDICARE

## 2023-10-20 ENCOUNTER — HOSPITAL ENCOUNTER (OUTPATIENT)
Dept: INFUSION THERAPY | Age: 73
Discharge: HOME | End: 2023-10-20
Payer: MEDICARE

## 2023-10-20 ENCOUNTER — CLINICAL DOCUMENTATION (OUTPATIENT)
Dept: CASE MANAGEMENT | Age: 73
End: 2023-10-20

## 2023-10-20 VITALS
SYSTOLIC BLOOD PRESSURE: 121 MMHG | OXYGEN SATURATION: 97 % | HEART RATE: 55 BPM | TEMPERATURE: 98.4 F | WEIGHT: 156.4 LBS | DIASTOLIC BLOOD PRESSURE: 64 MMHG | BODY MASS INDEX: 30.7 KG/M2 | RESPIRATION RATE: 18 BRPM | HEIGHT: 60 IN

## 2023-10-20 VITALS
SYSTOLIC BLOOD PRESSURE: 121 MMHG | RESPIRATION RATE: 18 BRPM | BODY MASS INDEX: 31.53 KG/M2 | HEIGHT: 59 IN | TEMPERATURE: 98.4 F | DIASTOLIC BLOOD PRESSURE: 64 MMHG | OXYGEN SATURATION: 97 % | WEIGHT: 156.4 LBS | HEART RATE: 55 BPM

## 2023-10-20 DIAGNOSIS — Z17.0 MALIGNANT NEOPLASM OF UPPER-OUTER QUADRANT OF LEFT BREAST IN FEMALE, ESTROGEN RECEPTOR POSITIVE (HCC): Primary | ICD-10-CM

## 2023-10-20 DIAGNOSIS — C50.412 MALIGNANT NEOPLASM OF UPPER-OUTER QUADRANT OF LEFT BREAST IN FEMALE, ESTROGEN RECEPTOR POSITIVE (HCC): Primary | ICD-10-CM

## 2023-10-20 PROCEDURE — 3078F DIAST BP <80 MM HG: CPT | Performed by: INTERNAL MEDICINE

## 2023-10-20 PROCEDURE — G8399 PT W/DXA RESULTS DOCUMENT: HCPCS | Performed by: INTERNAL MEDICINE

## 2023-10-20 PROCEDURE — G9899 SCRN MAM PERF RSLTS DOC: HCPCS | Performed by: INTERNAL MEDICINE

## 2023-10-20 PROCEDURE — 1090F PRES/ABSN URINE INCON ASSESS: CPT | Performed by: INTERNAL MEDICINE

## 2023-10-20 PROCEDURE — 1123F ACP DISCUSS/DSCN MKR DOCD: CPT | Performed by: INTERNAL MEDICINE

## 2023-10-20 PROCEDURE — 3017F COLORECTAL CA SCREEN DOC REV: CPT | Performed by: INTERNAL MEDICINE

## 2023-10-20 PROCEDURE — G8417 CALC BMI ABV UP PARAM F/U: HCPCS | Performed by: INTERNAL MEDICINE

## 2023-10-20 PROCEDURE — 99211 OFF/OP EST MAY X REQ PHY/QHP: CPT

## 2023-10-20 PROCEDURE — G8484 FLU IMMUNIZE NO ADMIN: HCPCS | Performed by: INTERNAL MEDICINE

## 2023-10-20 PROCEDURE — G8427 DOCREV CUR MEDS BY ELIG CLIN: HCPCS | Performed by: INTERNAL MEDICINE

## 2023-10-20 PROCEDURE — 3074F SYST BP LT 130 MM HG: CPT | Performed by: INTERNAL MEDICINE

## 2023-10-20 PROCEDURE — 1036F TOBACCO NON-USER: CPT | Performed by: INTERNAL MEDICINE

## 2023-10-20 PROCEDURE — 99213 OFFICE O/P EST LOW 20 MIN: CPT | Performed by: INTERNAL MEDICINE

## 2023-10-20 RX ORDER — LETROZOLE 2.5 MG/1
2.5 TABLET, FILM COATED ORAL DAILY
Qty: 90 TABLET | Refills: 3 | Status: SHIPPED | OUTPATIENT
Start: 2023-10-20

## 2023-10-20 NOTE — PROGRESS NOTES
[facility] [address] [place] [address] Geovanni Huynh 101 200  [address] [facility] [address] [address]  Dept: [phone]  Dept Fax: [phone]: [phone]      Visit Date:10/20/2023     Anna Powell is a 68 y.o. female who presents today for:   Chief Complaint   Patient presents with    Follow-up     Malignant neoplasm of upper-outer quadrant of left breast in female, estrogen receptor positive ([address])        HPI:   Anna Powell is a 68 y.o. female referred to Hematology/Oncology clinic for evaluation of left-sided breast cancer. Patient initially presented with abnormality in the left breast this was positive mammographically and because of this she underwent a biopsy in the upper outer quadrant. The biopsy demonstrated intermediate grade invasive ductal carcinoma it was ER positive [place] negative HER2/edward negative. The patient was seen by Dr. Rasheed Berger and underwent a left sided lumpectomy. No sentinel lymph node biopsy was performed. The final pathology was positive for a 2.4 cm invasive ductal adenocarcinoma that was ER/[place] positive HER2/edward negative. The patient did have an Onco Dx testing performed and this demonstrated recurrence score of 16. The patient underwent adjuvant radiation therapy completing this in July 2023. Has been on adjuvant letrozole since then    Status: The patient returns. She reports that she has been taking her adjuvant letrozole and would like a refill. She reports that she is having no fevers chills nausea and changes in bowel or bladder habits changes in hearing or vision. She does tell me that she is not having any hot flashes or other side effects. She has no joint pain. Denies headaches.   Past Medical History:   Diagnosis Date    Bladder dysfunction     pt denies    CAD (coronary artery disease)     Cerebral artery occlusion with cerebral infarction [facility])     TIA    Cerebrovascular disease     CHF (congestive heart failure) (HCC)     COPD (chronic obstructive pulmonary

## 2023-10-20 NOTE — PROGRESS NOTES
Name: Lucia Alonso  : 1950  MRN: [id number]    Oncology Navigation Follow-Up Note    Contact Type:  Medical Oncology    Subjective: Patient seen in conjunction with Dr. Ilana Glasgow for follow up Patient arrived alone    Objective: Left breast lumpectomy 23, radiation completed 23. States she is tolerating AI without events. Assistance Needed: No needs mentioned today. Lives with daughter, O2 dependent. Receptive to Advanced Care Planning / Palliative Care:  NA    Referrals: None today    Education: SCP and survivor visit discussed. Questions addressed. Pt willing to attend. Notes:  Will follow through survivorship    Electronically signed by Viktoria Arcos RN on 10/20/2023 at 12:09 PM

## 2023-10-22 ENCOUNTER — CLINICAL DOCUMENTATION (OUTPATIENT)
Dept: SPIRITUAL SERVICES | Facility: CLINIC | Age: 73
End: 2023-10-22

## 2023-10-27 ENCOUNTER — CLINICAL DOCUMENTATION (OUTPATIENT)
Facility: HOSPITAL | Age: 73
End: 2023-10-27

## 2023-11-13 ENCOUNTER — OFFICE VISIT (OUTPATIENT)
Dept: PHYSICAL MEDICINE AND REHAB | Age: 73
End: 2023-11-13
Payer: MEDICARE

## 2023-11-13 VITALS
BODY MASS INDEX: 31.51 KG/M2 | WEIGHT: 156.31 LBS | DIASTOLIC BLOOD PRESSURE: 64 MMHG | SYSTOLIC BLOOD PRESSURE: 122 MMHG | HEIGHT: 59 IN

## 2023-11-13 DIAGNOSIS — M48.061 SPINAL STENOSIS OF LUMBAR REGION WITHOUT NEUROGENIC CLAUDICATION: ICD-10-CM

## 2023-11-13 DIAGNOSIS — Z85.3 HISTORY OF LEFT BREAST CANCER: ICD-10-CM

## 2023-11-13 DIAGNOSIS — M46.1 SI (SACROILIAC) JOINT INFLAMMATION (HCC): ICD-10-CM

## 2023-11-13 DIAGNOSIS — M54.50 CHRONIC BILATERAL LOW BACK PAIN WITHOUT SCIATICA: ICD-10-CM

## 2023-11-13 DIAGNOSIS — M80.00XA AGE-RELATED OSTEOPOROSIS WITH CURRENT PATHOLOGICAL FRACTURE, INITIAL ENCOUNTER: ICD-10-CM

## 2023-11-13 DIAGNOSIS — G89.4 CHRONIC PAIN SYNDROME: ICD-10-CM

## 2023-11-13 DIAGNOSIS — S32.010D CLOSED COMPRESSION FRACTURE OF L1 LUMBAR VERTEBRA WITH ROUTINE HEALING, SUBSEQUENT ENCOUNTER: ICD-10-CM

## 2023-11-13 DIAGNOSIS — M47.816 SPONDYLOSIS OF LUMBAR REGION WITHOUT MYELOPATHY OR RADICULOPATHY: Primary | ICD-10-CM

## 2023-11-13 DIAGNOSIS — G89.29 CHRONIC BILATERAL LOW BACK PAIN WITHOUT SCIATICA: ICD-10-CM

## 2023-11-13 PROCEDURE — G8417 CALC BMI ABV UP PARAM F/U: HCPCS | Performed by: NURSE PRACTITIONER

## 2023-11-13 PROCEDURE — 3078F DIAST BP <80 MM HG: CPT | Performed by: NURSE PRACTITIONER

## 2023-11-13 PROCEDURE — G8484 FLU IMMUNIZE NO ADMIN: HCPCS | Performed by: NURSE PRACTITIONER

## 2023-11-13 PROCEDURE — 1090F PRES/ABSN URINE INCON ASSESS: CPT | Performed by: NURSE PRACTITIONER

## 2023-11-13 PROCEDURE — 1036F TOBACCO NON-USER: CPT | Performed by: NURSE PRACTITIONER

## 2023-11-13 PROCEDURE — 99214 OFFICE O/P EST MOD 30 MIN: CPT | Performed by: NURSE PRACTITIONER

## 2023-11-13 PROCEDURE — G9899 SCRN MAM PERF RSLTS DOC: HCPCS | Performed by: NURSE PRACTITIONER

## 2023-11-13 PROCEDURE — G8399 PT W/DXA RESULTS DOCUMENT: HCPCS | Performed by: NURSE PRACTITIONER

## 2023-11-13 PROCEDURE — G8427 DOCREV CUR MEDS BY ELIG CLIN: HCPCS | Performed by: NURSE PRACTITIONER

## 2023-11-13 PROCEDURE — 3074F SYST BP LT 130 MM HG: CPT | Performed by: NURSE PRACTITIONER

## 2023-11-13 PROCEDURE — 1123F ACP DISCUSS/DSCN MKR DOCD: CPT | Performed by: NURSE PRACTITIONER

## 2023-11-13 PROCEDURE — 3017F COLORECTAL CA SCREEN DOC REV: CPT | Performed by: NURSE PRACTITIONER

## 2023-11-13 RX ORDER — HYDROCODONE BITARTRATE AND ACETAMINOPHEN 5; 325 MG/1; MG/1
1 TABLET ORAL EVERY 6 HOURS PRN
Qty: 120 TABLET | Refills: 0 | Status: SHIPPED | OUTPATIENT
Start: 2023-11-17 | End: 2023-12-17

## 2023-11-13 ASSESSMENT — ENCOUNTER SYMPTOMS
WHEEZING: 0
SHORTNESS OF BREATH: 1
BACK PAIN: 1
GASTROINTESTINAL NEGATIVE: 1
CHEST TIGHTNESS: 0
COUGH: 0

## 2023-12-04 DIAGNOSIS — Z17.0 MALIGNANT NEOPLASM OF UPPER-OUTER QUADRANT OF LEFT BREAST IN FEMALE, ESTROGEN RECEPTOR POSITIVE (HCC): Primary | ICD-10-CM

## 2023-12-04 DIAGNOSIS — C50.412 MALIGNANT NEOPLASM OF UPPER-OUTER QUADRANT OF LEFT BREAST IN FEMALE, ESTROGEN RECEPTOR POSITIVE (HCC): Primary | ICD-10-CM

## 2023-12-13 DIAGNOSIS — G89.4 CHRONIC PAIN SYNDROME: ICD-10-CM

## 2023-12-14 DIAGNOSIS — C50.412 MALIGNANT NEOPLASM OF UPPER-OUTER QUADRANT OF LEFT BREAST IN FEMALE, ESTROGEN RECEPTOR POSITIVE (HCC): Primary | ICD-10-CM

## 2023-12-14 DIAGNOSIS — Z17.0 MALIGNANT NEOPLASM OF UPPER-OUTER QUADRANT OF LEFT BREAST IN FEMALE, ESTROGEN RECEPTOR POSITIVE (HCC): Primary | ICD-10-CM

## 2023-12-14 RX ORDER — HYDROCODONE BITARTRATE AND ACETAMINOPHEN 5; 325 MG/1; MG/1
1 TABLET ORAL EVERY 6 HOURS PRN
Qty: 120 TABLET | Refills: 0 | Status: SHIPPED | OUTPATIENT
Start: 2023-12-17 | End: 2024-01-16

## 2024-01-09 ENCOUNTER — HOSPITAL ENCOUNTER (OUTPATIENT)
Dept: WOMENS IMAGING | Age: 74
End: 2024-01-09
Attending: SURGERY
Payer: MEDICARE

## 2024-01-09 ENCOUNTER — HOSPITAL ENCOUNTER (OUTPATIENT)
Dept: WOMENS IMAGING | Age: 74
Discharge: HOME | End: 2024-01-09
Attending: SURGERY
Payer: MEDICARE

## 2024-01-09 DIAGNOSIS — Z17.0 MALIGNANT NEOPLASM OF UPPER-OUTER QUADRANT OF LEFT BREAST IN FEMALE, ESTROGEN RECEPTOR POSITIVE (HCC): ICD-10-CM

## 2024-01-09 DIAGNOSIS — C50.412 MALIGNANT NEOPLASM OF UPPER-OUTER QUADRANT OF LEFT BREAST IN FEMALE, ESTROGEN RECEPTOR POSITIVE (HCC): ICD-10-CM

## 2024-01-09 PROCEDURE — G0279 TOMOSYNTHESIS, MAMMO: HCPCS

## 2024-01-10 ENCOUNTER — TELEPHONE (OUTPATIENT)
Dept: SURGERY | Age: 74
End: 2024-01-10

## 2024-01-10 NOTE — TELEPHONE ENCOUNTER
Pt called stating she wanted to cancel her appt. She stated its very hard for her to get around with being on oxygen. She stated she isnt having any issues. She said she was given the results of Mammo by a \"chani\" and a nurse over at the place that does her mammo and said everything is fine. She also stated he told her she doesn't need an US.

## 2024-01-15 DIAGNOSIS — G89.4 CHRONIC PAIN SYNDROME: ICD-10-CM

## 2024-01-16 RX ORDER — HYDROCODONE BITARTRATE AND ACETAMINOPHEN 5; 325 MG/1; MG/1
1 TABLET ORAL EVERY 6 HOURS PRN
Qty: 120 TABLET | Refills: 0 | Status: SHIPPED | OUTPATIENT
Start: 2024-01-16 | End: 2024-02-15

## 2024-01-18 ENCOUNTER — OFFICE VISIT (OUTPATIENT)
Dept: PHYSICAL MEDICINE AND REHAB | Age: 74
End: 2024-01-18
Payer: MEDICARE

## 2024-01-18 VITALS
SYSTOLIC BLOOD PRESSURE: 134 MMHG | WEIGHT: 156.31 LBS | DIASTOLIC BLOOD PRESSURE: 70 MMHG | BODY MASS INDEX: 31.51 KG/M2 | HEIGHT: 59 IN

## 2024-01-18 DIAGNOSIS — G89.4 CHRONIC PAIN SYNDROME: ICD-10-CM

## 2024-01-18 DIAGNOSIS — M47.816 SPONDYLOSIS OF LUMBAR REGION WITHOUT MYELOPATHY OR RADICULOPATHY: Primary | ICD-10-CM

## 2024-01-18 DIAGNOSIS — M54.50 CHRONIC BILATERAL LOW BACK PAIN WITHOUT SCIATICA: ICD-10-CM

## 2024-01-18 DIAGNOSIS — M48.061 SPINAL STENOSIS OF LUMBAR REGION WITHOUT NEUROGENIC CLAUDICATION: ICD-10-CM

## 2024-01-18 DIAGNOSIS — S32.010D CLOSED COMPRESSION FRACTURE OF L1 LUMBAR VERTEBRA WITH ROUTINE HEALING, SUBSEQUENT ENCOUNTER: ICD-10-CM

## 2024-01-18 DIAGNOSIS — M46.1 SI (SACROILIAC) JOINT INFLAMMATION (HCC): ICD-10-CM

## 2024-01-18 DIAGNOSIS — M80.00XA AGE-RELATED OSTEOPOROSIS WITH CURRENT PATHOLOGICAL FRACTURE, INITIAL ENCOUNTER: ICD-10-CM

## 2024-01-18 DIAGNOSIS — G89.29 CHRONIC BILATERAL LOW BACK PAIN WITHOUT SCIATICA: ICD-10-CM

## 2024-01-18 DIAGNOSIS — Z85.3 HISTORY OF LEFT BREAST CANCER: ICD-10-CM

## 2024-01-18 PROCEDURE — G9899 SCRN MAM PERF RSLTS DOC: HCPCS | Performed by: NURSE PRACTITIONER

## 2024-01-18 PROCEDURE — 3078F DIAST BP <80 MM HG: CPT | Performed by: NURSE PRACTITIONER

## 2024-01-18 PROCEDURE — 1090F PRES/ABSN URINE INCON ASSESS: CPT | Performed by: NURSE PRACTITIONER

## 2024-01-18 PROCEDURE — G8399 PT W/DXA RESULTS DOCUMENT: HCPCS | Performed by: NURSE PRACTITIONER

## 2024-01-18 PROCEDURE — 3017F COLORECTAL CA SCREEN DOC REV: CPT | Performed by: NURSE PRACTITIONER

## 2024-01-18 PROCEDURE — G8417 CALC BMI ABV UP PARAM F/U: HCPCS | Performed by: NURSE PRACTITIONER

## 2024-01-18 PROCEDURE — G8427 DOCREV CUR MEDS BY ELIG CLIN: HCPCS | Performed by: NURSE PRACTITIONER

## 2024-01-18 PROCEDURE — G8484 FLU IMMUNIZE NO ADMIN: HCPCS | Performed by: NURSE PRACTITIONER

## 2024-01-18 PROCEDURE — 1123F ACP DISCUSS/DSCN MKR DOCD: CPT | Performed by: NURSE PRACTITIONER

## 2024-01-18 PROCEDURE — 99214 OFFICE O/P EST MOD 30 MIN: CPT | Performed by: NURSE PRACTITIONER

## 2024-01-18 PROCEDURE — 3075F SYST BP GE 130 - 139MM HG: CPT | Performed by: NURSE PRACTITIONER

## 2024-01-18 PROCEDURE — 1036F TOBACCO NON-USER: CPT | Performed by: NURSE PRACTITIONER

## 2024-01-18 ASSESSMENT — ENCOUNTER SYMPTOMS
COUGH: 1
GASTROINTESTINAL NEGATIVE: 1
WHEEZING: 0
CHEST TIGHTNESS: 0
SHORTNESS OF BREATH: 1
BACK PAIN: 1

## 2024-01-18 NOTE — PROGRESS NOTES
fail to improve, for follow up  for medications.               Electronically signed by Dave Fleming, CASSANDRA - CNP on1/18/2024 at 12:36 PM

## 2024-02-12 DIAGNOSIS — G89.4 CHRONIC PAIN SYNDROME: ICD-10-CM

## 2024-02-13 RX ORDER — HYDROCODONE BITARTRATE AND ACETAMINOPHEN 5; 325 MG/1; MG/1
1 TABLET ORAL EVERY 6 HOURS PRN
Qty: 120 TABLET | Refills: 0 | Status: SHIPPED | OUTPATIENT
Start: 2024-02-15 | End: 2024-03-16

## 2024-02-29 NOTE — TELEPHONE ENCOUNTER
[facility]  NEUROLOGY  Canelo Belcher Jr., M.D., F.A.C.P.  John Archuleta, DNP, APRN, ACNS-BC  Kingsley Calderon, MSN, APRN-FNP-C  Terra Foley, MSN, APRN-FNP-C  Lizzette Sagastume, MSPAS, PA-C  Annabelle Murrell, MSN, APRN-FNP-C  [address] [facility], [place] [id number]  Phone: [phone]  Fax: [phone]       Jeannette Salinas is a 64 y.o. right handed female     Patient follows for MS    She was a longtime patient of Dr. Altamirano in [place].  Previous to that she was seeing Dr. Álvarez at the [place] [facility] at [facility].  She was diagnosed with MS back in March 16, 2000.  For the past 19 years she was on Avelox and steroids.  She is no longer on DMT therapy.  She believes her last MS exacerbation was 6 weeks ago when her vision became blurry and her right side became numb and tingly.  She also had some issues with numbness to the right side of her tongue but she did not seek medical treatment and her symptoms did resolve.  Patient has been in a wheelchair for the past 5 years.  She is able to move her bilateral upper extremities but her right arm is weaker than her left.  She is unable to lift or move her right leg but she is able to lift and move her left leg.  She notes no vision changes since being diagnosed with MS and no red desaturation.  She does have bowel/bladder incontinence due to her MS.  She has seen urology in the past along with having a bladder sling but still notes incontinence at times.  She can tell when she has to use bathroom she just has no control with holding it in.  She lives with her son and daughter-in-law, her daughter-in-law is her caregiver.  Her last MRI brain was back in April 2017 which was noted to have several MS lesions bilaterally but no active lesions.  She cannot remember the last time she had any MRIs of her spine, but does note that she does have MS lesions in her cervical spine.      She also has complaints of difficulty swallowing.  She has  OARRS reviewed. UDS: + for  Hydrocodone consistent. Narcan offered: yes  Last seen: 1/29/2020.  Follow-up:   Future Appointments   Date Time Provider Jesu Saleh   3/2/2020  9:45 AM CASSANDRA Aquino - CNP SRPX Pain [facility]   3/23/2020  9:30 AM CASSANDRA Nieves - CNP Pulm Med [facility]

## 2024-03-11 ENCOUNTER — TELEPHONE (OUTPATIENT)
Dept: PULMONOLOGY | Age: 74
End: 2024-03-11

## 2024-03-11 NOTE — TELEPHONE ENCOUNTER
Patient's daughter sent a message in her chart regarding this patient. Her daughter, azra stated she was diagnosed with viral pneumonia and treated with antibiotics and prednisone. She reports her mother is \"just as bad.\" I reviewed Azra's UC visit and she was treated for COPD exacerbation. Chest x-ray read was negative for pneumonia, however UC provider questioned possible bacterial pneumonia. Please clarify Patricia's symptoms. If symptoms worsen or oxygen drops recommend ED for evaluation. Thanks!

## 2024-03-12 DIAGNOSIS — G89.4 CHRONIC PAIN SYNDROME: ICD-10-CM

## 2024-03-12 RX ORDER — HYDROCODONE BITARTRATE AND ACETAMINOPHEN 5; 325 MG/1; MG/1
1 TABLET ORAL EVERY 6 HOURS PRN
Qty: 120 TABLET | Refills: 0 | Status: SHIPPED | OUTPATIENT
Start: 2024-03-16 | End: 2024-04-15

## 2024-03-12 NOTE — TELEPHONE ENCOUNTER
Called and lvm on number provided in patient chart. Looks like its daughters phone number. Advised her via vm if any worsening symptoms or desat to be evaluated. Advised her to call back with symptoms.

## 2024-04-15 DIAGNOSIS — G89.4 CHRONIC PAIN SYNDROME: ICD-10-CM

## 2024-04-15 RX ORDER — HYDROCODONE BITARTRATE AND ACETAMINOPHEN 5; 325 MG/1; MG/1
1 TABLET ORAL EVERY 6 HOURS PRN
Qty: 120 TABLET | Refills: 0 | OUTPATIENT
Start: 2024-04-16 | End: 2024-05-16

## 2024-04-15 NOTE — TELEPHONE ENCOUNTER
Called pt. And LVM of Jass Fleming NP response on denial of script and why. Asked to recall back to reschedule aptm.

## 2024-04-15 NOTE — TELEPHONE ENCOUNTER
CC:  Raymundo King is here today for   Chief Complaint   Patient presents with   • Workers Compensation Follow Up Visit     WC DOI 9/28/19 LT elbow \" PT hasn't been helping still the same \"     .   PCP Damian Macario MD    Medications: medications verified, no change  Refills needed today?  NO  denies known Latex allergy or symptoms of Latex sensitivity.  Patient would like communication of their results via:      Cell Phone:   Telephone Information:   Mobile [phone]     Okay to leave a message containing results? Yes  Tobacco history: verified                 This patient has canceled multiple FU appointments. She was last seen in January so it has already been 3 months another month is too long. I refused prescription at this time please schedule FU sooner I have openings Wednesday and Thursday. Please reset prescription when sooner date is scheduled for appointment with enough medications to get to that time..

## 2024-04-17 ENCOUNTER — OFFICE VISIT (OUTPATIENT)
Dept: PHYSICAL MEDICINE AND REHAB | Age: 74
End: 2024-04-17
Payer: MEDICARE

## 2024-04-17 VITALS
WEIGHT: 156.31 LBS | HEIGHT: 59 IN | SYSTOLIC BLOOD PRESSURE: 128 MMHG | DIASTOLIC BLOOD PRESSURE: 64 MMHG | BODY MASS INDEX: 31.51 KG/M2

## 2024-04-17 DIAGNOSIS — G89.29 CHRONIC BILATERAL LOW BACK PAIN WITHOUT SCIATICA: ICD-10-CM

## 2024-04-17 DIAGNOSIS — M48.061 SPINAL STENOSIS OF LUMBAR REGION WITHOUT NEUROGENIC CLAUDICATION: ICD-10-CM

## 2024-04-17 DIAGNOSIS — M80.00XA AGE-RELATED OSTEOPOROSIS WITH CURRENT PATHOLOGICAL FRACTURE, INITIAL ENCOUNTER: ICD-10-CM

## 2024-04-17 DIAGNOSIS — M54.50 CHRONIC BILATERAL LOW BACK PAIN WITHOUT SCIATICA: ICD-10-CM

## 2024-04-17 DIAGNOSIS — S32.010D CLOSED COMPRESSION FRACTURE OF L1 LUMBAR VERTEBRA WITH ROUTINE HEALING, SUBSEQUENT ENCOUNTER: ICD-10-CM

## 2024-04-17 DIAGNOSIS — M47.816 SPONDYLOSIS OF LUMBAR REGION WITHOUT MYELOPATHY OR RADICULOPATHY: Primary | ICD-10-CM

## 2024-04-17 DIAGNOSIS — Z85.3 HISTORY OF LEFT BREAST CANCER: ICD-10-CM

## 2024-04-17 DIAGNOSIS — M46.1 SI (SACROILIAC) JOINT INFLAMMATION (HCC): ICD-10-CM

## 2024-04-17 DIAGNOSIS — G89.4 CHRONIC PAIN SYNDROME: ICD-10-CM

## 2024-04-17 PROCEDURE — 1123F ACP DISCUSS/DSCN MKR DOCD: CPT | Performed by: NURSE PRACTITIONER

## 2024-04-17 PROCEDURE — G8399 PT W/DXA RESULTS DOCUMENT: HCPCS | Performed by: NURSE PRACTITIONER

## 2024-04-17 PROCEDURE — 3017F COLORECTAL CA SCREEN DOC REV: CPT | Performed by: NURSE PRACTITIONER

## 2024-04-17 PROCEDURE — G8417 CALC BMI ABV UP PARAM F/U: HCPCS | Performed by: NURSE PRACTITIONER

## 2024-04-17 PROCEDURE — 1036F TOBACCO NON-USER: CPT | Performed by: NURSE PRACTITIONER

## 2024-04-17 PROCEDURE — 3078F DIAST BP <80 MM HG: CPT | Performed by: NURSE PRACTITIONER

## 2024-04-17 PROCEDURE — G8428 CUR MEDS NOT DOCUMENT: HCPCS | Performed by: NURSE PRACTITIONER

## 2024-04-17 PROCEDURE — 99214 OFFICE O/P EST MOD 30 MIN: CPT | Performed by: NURSE PRACTITIONER

## 2024-04-17 PROCEDURE — 1090F PRES/ABSN URINE INCON ASSESS: CPT | Performed by: NURSE PRACTITIONER

## 2024-04-17 PROCEDURE — G9899 SCRN MAM PERF RSLTS DOC: HCPCS | Performed by: NURSE PRACTITIONER

## 2024-04-17 PROCEDURE — 3074F SYST BP LT 130 MM HG: CPT | Performed by: NURSE PRACTITIONER

## 2024-04-17 RX ORDER — HYDROCODONE BITARTRATE AND ACETAMINOPHEN 5; 325 MG/1; MG/1
1 TABLET ORAL EVERY 6 HOURS PRN
Qty: 120 TABLET | Refills: 0 | Status: SHIPPED | OUTPATIENT
Start: 2024-04-17 | End: 2024-05-17

## 2024-04-17 ASSESSMENT — ENCOUNTER SYMPTOMS
BACK PAIN: 1
CHEST TIGHTNESS: 0
COUGH: 1
GASTROINTESTINAL NEGATIVE: 1
WHEEZING: 0
SHORTNESS OF BREATH: 0

## 2024-04-17 NOTE — PROGRESS NOTES
plan.         Cognition and Memory: Memory is not impaired. She does not exhibit impaired recent memory or impaired remote memory.         Judgment: Judgment normal. Judgment is not impulsive or inappropriate.       ANASTACIA  Patricks test  positive  Yeoman's  or Gaenslen's positive       Assessment:     1. Spondylosis of lumbar region without myelopathy or radiculopathy    2. Chronic bilateral low back pain without sciatica    3. SI (sacroiliac) joint inflammation (HCC)    4. Spinal stenosis of lumbar region without neurogenic claudication    5. History of left breast cancer    6. Age-related osteoporosis with current pathological fracture, initial encounter    7. Closed compression fracture of L1 lumbar vertebra with routine healing, subsequent encounter    8. Chronic pain syndrome         Assessment & Plan   Plan:      OARRS reviewed. Current MED: 20.00  Patient was offered naloxone for home.   Discussed long term side effects of medications, tolerance, dependency and addiction.  Previous UDS reviewed  UDS preformed today for compliance.  Patient told can not receive any pain medications from any other source.  No evidence of abuse, diversion or aberrant behavior.  Medications and/or procedures to improve function and quality of life- patient understanding with this and that may not be pain free  Discussed with patient about safe storage of medications at home  Discussed possible weaning of medication dosing dependent on treatment/procedure results.   Discussed with patient about risks with procedure including infection, reaction to medication, increased pain, or bleeding.  Procedure notes reviewed in detail   Discussed repeating bilateral L-facet MBB @ L4-5 and L5-S1 as she received good relief from RFA in past. Would likely need to start over as it has been 2 years. She states pain is tolerable with medications and does not want at this time.   Ordered updated lumbar xray as last one was in 2022  Continue Norco

## 2024-05-02 ENCOUNTER — OFFICE VISIT (OUTPATIENT)
Dept: ONCOLOGY | Age: 74
End: 2024-05-02
Payer: MEDICARE

## 2024-05-02 ENCOUNTER — HOSPITAL ENCOUNTER (OUTPATIENT)
Dept: INFUSION THERAPY | Age: 74
Discharge: HOME | End: 2024-05-02
Payer: MEDICARE

## 2024-05-02 VITALS
OXYGEN SATURATION: 94 % | BODY MASS INDEX: 30.64 KG/M2 | TEMPERATURE: 97.8 F | SYSTOLIC BLOOD PRESSURE: 136 MMHG | HEIGHT: 59 IN | WEIGHT: 152 LBS | RESPIRATION RATE: 16 BRPM | DIASTOLIC BLOOD PRESSURE: 57 MMHG | HEART RATE: 113 BPM

## 2024-05-02 VITALS
DIASTOLIC BLOOD PRESSURE: 57 MMHG | TEMPERATURE: 97.8 F | HEART RATE: 113 BPM | RESPIRATION RATE: 16 BRPM | SYSTOLIC BLOOD PRESSURE: 136 MMHG | OXYGEN SATURATION: 94 %

## 2024-05-02 DIAGNOSIS — Z08 ENCOUNTER FOR FOLLOW-UP SURVEILLANCE OF BREAST CANCER: ICD-10-CM

## 2024-05-02 DIAGNOSIS — Z12.31 VISIT FOR SCREENING MAMMOGRAM: Primary | ICD-10-CM

## 2024-05-02 DIAGNOSIS — Z85.3 ENCOUNTER FOR FOLLOW-UP SURVEILLANCE OF BREAST CANCER: ICD-10-CM

## 2024-05-02 DIAGNOSIS — C50.412 MALIGNANT NEOPLASM OF UPPER-OUTER QUADRANT OF LEFT BREAST IN FEMALE, ESTROGEN RECEPTOR POSITIVE (HCC): ICD-10-CM

## 2024-05-02 DIAGNOSIS — Z17.0 MALIGNANT NEOPLASM OF UPPER-OUTER QUADRANT OF LEFT BREAST IN FEMALE, ESTROGEN RECEPTOR POSITIVE (HCC): ICD-10-CM

## 2024-05-02 PROCEDURE — 99211 OFF/OP EST MAY X REQ PHY/QHP: CPT

## 2024-05-02 PROCEDURE — G9899 SCRN MAM PERF RSLTS DOC: HCPCS | Performed by: PHYSICIAN ASSISTANT

## 2024-05-02 PROCEDURE — 1090F PRES/ABSN URINE INCON ASSESS: CPT | Performed by: PHYSICIAN ASSISTANT

## 2024-05-02 PROCEDURE — 99215 OFFICE O/P EST HI 40 MIN: CPT | Performed by: PHYSICIAN ASSISTANT

## 2024-05-02 PROCEDURE — 3017F COLORECTAL CA SCREEN DOC REV: CPT | Performed by: PHYSICIAN ASSISTANT

## 2024-05-02 PROCEDURE — 1123F ACP DISCUSS/DSCN MKR DOCD: CPT | Performed by: PHYSICIAN ASSISTANT

## 2024-05-02 PROCEDURE — G8417 CALC BMI ABV UP PARAM F/U: HCPCS | Performed by: PHYSICIAN ASSISTANT

## 2024-05-02 PROCEDURE — 3078F DIAST BP <80 MM HG: CPT | Performed by: PHYSICIAN ASSISTANT

## 2024-05-02 PROCEDURE — G8427 DOCREV CUR MEDS BY ELIG CLIN: HCPCS | Performed by: PHYSICIAN ASSISTANT

## 2024-05-02 PROCEDURE — 1036F TOBACCO NON-USER: CPT | Performed by: PHYSICIAN ASSISTANT

## 2024-05-02 PROCEDURE — G8399 PT W/DXA RESULTS DOCUMENT: HCPCS | Performed by: PHYSICIAN ASSISTANT

## 2024-05-02 PROCEDURE — 3075F SYST BP GE 130 - 139MM HG: CPT | Performed by: PHYSICIAN ASSISTANT

## 2024-05-02 RX ORDER — LETROZOLE 2.5 MG/1
2.5 TABLET, FILM COATED ORAL DAILY
Qty: 90 TABLET | Refills: 3 | Status: SHIPPED | OUTPATIENT
Start: 2024-05-02

## 2024-05-02 RX ORDER — BISACODYL 5 MG/1
5 TABLET, DELAYED RELEASE ORAL DAILY PRN
COMMUNITY

## 2024-05-02 NOTE — PROGRESS NOTES
[facility] [place] [id number] [facility], [address]  [place] [place] [facility] [address]  Dept: [phone]  Dept Fax: [phone] Loc: [phone]      Visit Date:5/2/2024     Patricia Bautista is a 73 y.o. female who presents today for:   Survivorship Visit    HPI:   Patricia Bautista is a 73 y.o. female with history of breast cancer. The patient has completed treatment at [facility]. Her cancer team includes:  General Surgery: Dr. Polanco  Medical Oncology: Dr. Parsons  Radiation Oncology: Dr. Nguyen  Plastic Surgery: n/a      Treatment history includes:   As per Dr. Parsons's note on 10/20/2023:    Patient initially presented with abnormality in the left breast this was positive mammographically and because of this she underwent a biopsy in the upper outer quadrant.  The biopsy demonstrated intermediate grade invasive ductal carcinoma it was ER positive [place] negative HER2/edward negative.  The patient was seen by Dr. Polanco and underwent a left sided lumpectomy.  No sentinel lymph node biopsy was performed.  The final pathology was positive for a 2.4 cm invasive ductal adenocarcinoma that was ER/CA positive HER2/edward negative.  The patient did have an Onco Dx testing performed and this demonstrated recurrence score of 16.  The patient underwent adjuvant radiation therapy completing this in July 2023.  Has been on adjuvant letrozole since then         Interval History 5/2/2024:  Today, the patient presents for Survivorship visit. Her treatment history is summarized above. She continues regular follow up with her cancer team.  She states that she has felt well since her last visit in our office in October 2023. She denies any changes to her overall health. She denies any ED visits or hospitalizations. She states she had viral pneumonia one month ago which took several weeks to get over but did not require ED visit. She is chronically on oxygen for years per patient and denies changes in oxygen

## 2024-05-02 NOTE — PATIENT INSTRUCTIONS
Obtain bilateral mammogram with [facility] anytime in the next month  Return to clinic in 6 months with Dr. Parsons  Please call for questions or concerns.

## 2024-05-06 ENCOUNTER — SOCIAL WORK (OUTPATIENT)
Dept: INFUSION THERAPY | Age: 74
End: 2024-05-06

## 2024-05-06 NOTE — PROGRESS NOTES
Oncology Social Work    Date: 5/6/2024  Time: 1:51 PM  Name: Patricia Bautista  MRN: [id number]     Contact Type: Follow-up    Note:   Situation: This staff called Patricia Bautista via phone support to introduce myself as her Oncology Social Worker.     Background:  Patricia was referred to this staff by the Med ONC Dept of the Cancer Center. This staff was calling to review his outstanding concerns identified on his coping thermometer.     Assessment: The contact number provided to this staff and Medical Records is her number and it was identified as such in the voicemail recording. Since the call went immediately to a voicemail, a message was left regarding the purpose of the call.   - Education regarding the services was provided on the recorded message from the SW.  - A quick review of his Power of  document was provided since he doesn't have the documents on file.   - No community referrals were placed at this time because Patricia is in the survivorship program. Therefore the need for a SW is very limited.     Recommendation: Follow-up will be initiated by Patricia based on need.  provided her with my contact information and will remain available for support.        PAPA Monge, CARTER, OSCAR  Oncology Social Worker      Electronically signed by PAPA Monge LSW, OSCAR on 5/6/2024 at 1:51 PM

## 2024-05-08 ENCOUNTER — HOSPITAL ENCOUNTER (OUTPATIENT)
Dept: WOMENS IMAGING | Age: 74
Discharge: HOME | End: 2024-05-08
Payer: MEDICARE

## 2024-05-08 DIAGNOSIS — R92.30 DENSE BREASTS: ICD-10-CM

## 2024-05-08 DIAGNOSIS — Z08 ENCOUNTER FOR FOLLOW-UP SURVEILLANCE OF BREAST CANCER: ICD-10-CM

## 2024-05-08 DIAGNOSIS — Z17.0 MALIGNANT NEOPLASM OF UPPER-OUTER QUADRANT OF LEFT BREAST IN FEMALE, ESTROGEN RECEPTOR POSITIVE (HCC): ICD-10-CM

## 2024-05-08 DIAGNOSIS — C50.412 MALIGNANT NEOPLASM OF UPPER-OUTER QUADRANT OF LEFT BREAST IN FEMALE, ESTROGEN RECEPTOR POSITIVE (HCC): ICD-10-CM

## 2024-05-08 DIAGNOSIS — Z85.3 ENCOUNTER FOR FOLLOW-UP SURVEILLANCE OF BREAST CANCER: ICD-10-CM

## 2024-05-08 DIAGNOSIS — Z12.31 VISIT FOR SCREENING MAMMOGRAM: ICD-10-CM

## 2024-05-08 PROCEDURE — 76641 ULTRASOUND BREAST COMPLETE: CPT

## 2024-05-08 PROCEDURE — 77063 BREAST TOMOSYNTHESIS BI: CPT

## 2024-05-09 ENCOUNTER — HOSPITAL ENCOUNTER (OUTPATIENT)
Dept: CT IMAGING | Age: 74
Discharge: HOME | End: 2024-05-09
Payer: MEDICARE

## 2024-05-09 DIAGNOSIS — R59.0 HILAR LYMPHADENOPATHY: ICD-10-CM

## 2024-05-09 DIAGNOSIS — J96.11 CHRONIC RESPIRATORY FAILURE WITH HYPOXIA (HCC): ICD-10-CM

## 2024-05-09 DIAGNOSIS — J41.1 MUCOPURULENT CHRONIC BRONCHITIS (HCC): ICD-10-CM

## 2024-05-09 DIAGNOSIS — J43.2 CENTRILOBULAR EMPHYSEMA (HCC): ICD-10-CM

## 2024-05-09 LAB — POC CREATININE WHOLE BLOOD: 1 MG/DL (ref 0.5–1.2)

## 2024-05-09 PROCEDURE — 82565 ASSAY OF CREATININE: CPT

## 2024-05-09 PROCEDURE — 71260 CT THORAX DX C+: CPT

## 2024-05-09 PROCEDURE — 6360000004 HC RX CONTRAST MEDICATION

## 2024-05-09 RX ADMIN — IOPAMIDOL 80 ML: 755 INJECTION, SOLUTION INTRAVENOUS at 12:41

## 2024-05-13 DIAGNOSIS — M47.816 SPONDYLOSIS OF LUMBAR REGION WITHOUT MYELOPATHY OR RADICULOPATHY: ICD-10-CM

## 2024-05-13 DIAGNOSIS — M46.1 SI (SACROILIAC) JOINT INFLAMMATION (HCC): ICD-10-CM

## 2024-05-13 DIAGNOSIS — G89.4 CHRONIC PAIN SYNDROME: ICD-10-CM

## 2024-05-13 DIAGNOSIS — M54.50 CHRONIC BILATERAL LOW BACK PAIN WITHOUT SCIATICA: ICD-10-CM

## 2024-05-13 DIAGNOSIS — G89.29 CHRONIC BILATERAL LOW BACK PAIN WITHOUT SCIATICA: ICD-10-CM

## 2024-05-13 RX ORDER — HYDROCODONE BITARTRATE AND ACETAMINOPHEN 5; 325 MG/1; MG/1
1 TABLET ORAL EVERY 6 HOURS PRN
Qty: 120 TABLET | Refills: 0 | Status: SHIPPED | OUTPATIENT
Start: 2024-05-17 | End: 2024-06-16

## 2024-05-14 NOTE — PROGRESS NOTES
[place] [facility]    Patient: ROMERO ROJAS, 73 y.o.   : 1950    Patient of mine    Assessment/Plan   1. Centrilobular emphysema (HCC) - stable  -Continue albuterol inhaler and nebulizer, one or the other, every 6 hours as needed for shortness of breath or wheezing   -OK to remain off of Dulera and Spiriva  -Reviewed preventative vaccinations    2. Lymphadenopathy  -Reviewed CT Chest with patient with resolved hilar LAD. Advised of prominent stable 12 mm left prevascular lymph node. Will review with Dr. Giles to consider biopsy of lymph node given patient's history of breast cancer    3. Malignant neoplasm of upper-outer quadrant of left breast in female, estrogen receptor positive (HCC)  -Stable mammogram/US Chest. Continue close follow up with oncology    4. Chronic respiratory failure with hypoxia (HCC)  -Continue  1.5 lpm with rest and sleep and 3 lpm on exertion     5. Restrictive lung disease  -Will plan repeat pulmonary function test prior to next appt to follow restriction     6. Need for pneumococcal vaccination  -Reviewed indications for PCV20 vaccination in office today. Patient is a candidate for vaccination and wishes to receive in office. Advised patient to stay in office for 15-20 minutes for monitoring post immunization. Patient verbalized understanding. Vaccine education given to patient by staff.      7. Personal history of tobacco use  -Qualifies for lung screening until     8. Trace left pleural effusion  -Advised patient of trace left pleural effusion on CT Chest. Too small for thoracentesis. Advised patient to take her as needed Lasix. Advised patient to call when she obtains her chest x-ray for pain management. She verbalized understanding    Advised patient to call office with any changes, questions, or concerns regarding respiratory status or issues with prescribed medications    No follow-ups on file. Advised patient we will call for

## 2024-05-16 ENCOUNTER — OFFICE VISIT (OUTPATIENT)
Dept: PULMONOLOGY | Age: 74
End: 2024-05-16
Payer: MEDICARE

## 2024-05-16 VITALS
TEMPERATURE: 97.7 F | HEIGHT: 59 IN | SYSTOLIC BLOOD PRESSURE: 132 MMHG | DIASTOLIC BLOOD PRESSURE: 60 MMHG | HEART RATE: 72 BPM | WEIGHT: 150.2 LBS | OXYGEN SATURATION: 92 % | BODY MASS INDEX: 30.28 KG/M2

## 2024-05-16 DIAGNOSIS — Z17.0 MALIGNANT NEOPLASM OF UPPER-OUTER QUADRANT OF LEFT BREAST IN FEMALE, ESTROGEN RECEPTOR POSITIVE (HCC): ICD-10-CM

## 2024-05-16 DIAGNOSIS — Z23 NEED FOR PNEUMOCOCCAL VACCINATION: ICD-10-CM

## 2024-05-16 DIAGNOSIS — Z87.891 PERSONAL HISTORY OF TOBACCO USE: ICD-10-CM

## 2024-05-16 DIAGNOSIS — J98.4 RESTRICTIVE LUNG DISEASE: ICD-10-CM

## 2024-05-16 DIAGNOSIS — R59.1 LYMPHADENOPATHY: ICD-10-CM

## 2024-05-16 DIAGNOSIS — J43.2 CENTRILOBULAR EMPHYSEMA (HCC): Primary | ICD-10-CM

## 2024-05-16 DIAGNOSIS — J90 PLEURAL EFFUSION, LEFT: ICD-10-CM

## 2024-05-16 DIAGNOSIS — C50.412 MALIGNANT NEOPLASM OF UPPER-OUTER QUADRANT OF LEFT BREAST IN FEMALE, ESTROGEN RECEPTOR POSITIVE (HCC): ICD-10-CM

## 2024-05-16 DIAGNOSIS — J96.11 CHRONIC RESPIRATORY FAILURE WITH HYPOXIA (HCC): ICD-10-CM

## 2024-05-16 PROCEDURE — G0009 ADMIN PNEUMOCOCCAL VACCINE: HCPCS

## 2024-05-16 PROCEDURE — 90471 IMMUNIZATION ADMIN: CPT

## 2024-05-16 PROCEDURE — G9899 SCRN MAM PERF RSLTS DOC: HCPCS

## 2024-05-16 PROCEDURE — 1036F TOBACCO NON-USER: CPT

## 2024-05-16 PROCEDURE — 90677 PCV20 VACCINE IM: CPT

## 2024-05-16 PROCEDURE — 3075F SYST BP GE 130 - 139MM HG: CPT

## 2024-05-16 PROCEDURE — G8417 CALC BMI ABV UP PARAM F/U: HCPCS

## 2024-05-16 PROCEDURE — 1090F PRES/ABSN URINE INCON ASSESS: CPT

## 2024-05-16 PROCEDURE — 3078F DIAST BP <80 MM HG: CPT

## 2024-05-16 PROCEDURE — 1123F ACP DISCUSS/DSCN MKR DOCD: CPT

## 2024-05-16 PROCEDURE — G8427 DOCREV CUR MEDS BY ELIG CLIN: HCPCS

## 2024-05-16 PROCEDURE — G8399 PT W/DXA RESULTS DOCUMENT: HCPCS

## 2024-05-16 PROCEDURE — 3023F SPIROM DOC REV: CPT

## 2024-05-16 PROCEDURE — 99214 OFFICE O/P EST MOD 30 MIN: CPT

## 2024-05-16 PROCEDURE — 3017F COLORECTAL CA SCREEN DOC REV: CPT

## 2024-05-16 RX ORDER — ALBUTEROL SULFATE 2.5 MG/3ML
2.5 SOLUTION RESPIRATORY (INHALATION) EVERY 6 HOURS PRN
Qty: 120 ML | Refills: 11 | Status: SHIPPED | OUTPATIENT
Start: 2024-05-16

## 2024-05-16 ASSESSMENT — ENCOUNTER SYMPTOMS
CHEST TIGHTNESS: 1
COUGH: 1
WHEEZING: 0
SINUS PRESSURE: 0
SHORTNESS OF BREATH: 1
SINUS PAIN: 0

## 2024-05-16 NOTE — PROGRESS NOTES
After obtaining consent, and per orders of barbara barfield, injection of prevnar 20 given in Right arm by Esperanza Anaya MA. Patient instructed to remain in clinic for 20 minutes afterwards, and to report any adverse reaction to me immediately.       Lot#[id number]  NDC#3646-3275-84  EXP# [id number]

## 2024-06-13 DIAGNOSIS — G89.4 CHRONIC PAIN SYNDROME: ICD-10-CM

## 2024-06-13 DIAGNOSIS — G89.29 CHRONIC BILATERAL LOW BACK PAIN WITHOUT SCIATICA: ICD-10-CM

## 2024-06-13 DIAGNOSIS — M54.50 CHRONIC BILATERAL LOW BACK PAIN WITHOUT SCIATICA: ICD-10-CM

## 2024-06-13 DIAGNOSIS — M47.816 SPONDYLOSIS OF LUMBAR REGION WITHOUT MYELOPATHY OR RADICULOPATHY: ICD-10-CM

## 2024-06-13 DIAGNOSIS — M46.1 SI (SACROILIAC) JOINT INFLAMMATION (HCC): ICD-10-CM

## 2024-06-13 RX ORDER — HYDROCODONE BITARTRATE AND ACETAMINOPHEN 5; 325 MG/1; MG/1
1 TABLET ORAL EVERY 6 HOURS PRN
Qty: 120 TABLET | Refills: 0 | Status: SHIPPED | OUTPATIENT
Start: 2024-06-16 | End: 2024-07-16

## 2024-06-19 ENCOUNTER — OFFICE VISIT (OUTPATIENT)
Dept: PHYSICAL MEDICINE AND REHAB | Age: 74
End: 2024-06-19
Payer: MEDICARE

## 2024-06-19 VITALS
DIASTOLIC BLOOD PRESSURE: 80 MMHG | BODY MASS INDEX: 30.27 KG/M2 | SYSTOLIC BLOOD PRESSURE: 126 MMHG | HEIGHT: 59 IN | WEIGHT: 150.13 LBS

## 2024-06-19 DIAGNOSIS — G89.4 CHRONIC PAIN SYNDROME: ICD-10-CM

## 2024-06-19 DIAGNOSIS — Z85.3 HISTORY OF LEFT BREAST CANCER: ICD-10-CM

## 2024-06-19 DIAGNOSIS — M48.061 SPINAL STENOSIS OF LUMBAR REGION WITHOUT NEUROGENIC CLAUDICATION: ICD-10-CM

## 2024-06-19 DIAGNOSIS — G89.29 CHRONIC BILATERAL LOW BACK PAIN WITHOUT SCIATICA: ICD-10-CM

## 2024-06-19 DIAGNOSIS — M54.50 CHRONIC BILATERAL LOW BACK PAIN WITHOUT SCIATICA: ICD-10-CM

## 2024-06-19 DIAGNOSIS — M47.816 SPONDYLOSIS OF LUMBAR REGION WITHOUT MYELOPATHY OR RADICULOPATHY: Primary | ICD-10-CM

## 2024-06-19 DIAGNOSIS — M47.816 LUMBAR FACET ARTHROPATHY: ICD-10-CM

## 2024-06-19 DIAGNOSIS — S32.010D CLOSED COMPRESSION FRACTURE OF L1 LUMBAR VERTEBRA WITH ROUTINE HEALING, SUBSEQUENT ENCOUNTER: ICD-10-CM

## 2024-06-19 DIAGNOSIS — M46.1 SI (SACROILIAC) JOINT INFLAMMATION (HCC): ICD-10-CM

## 2024-06-19 DIAGNOSIS — M80.00XA AGE-RELATED OSTEOPOROSIS WITH CURRENT PATHOLOGICAL FRACTURE, INITIAL ENCOUNTER: ICD-10-CM

## 2024-06-19 PROCEDURE — G8399 PT W/DXA RESULTS DOCUMENT: HCPCS | Performed by: NURSE PRACTITIONER

## 2024-06-19 PROCEDURE — 3017F COLORECTAL CA SCREEN DOC REV: CPT | Performed by: NURSE PRACTITIONER

## 2024-06-19 PROCEDURE — 3074F SYST BP LT 130 MM HG: CPT | Performed by: NURSE PRACTITIONER

## 2024-06-19 PROCEDURE — 1123F ACP DISCUSS/DSCN MKR DOCD: CPT | Performed by: NURSE PRACTITIONER

## 2024-06-19 PROCEDURE — 3079F DIAST BP 80-89 MM HG: CPT | Performed by: NURSE PRACTITIONER

## 2024-06-19 PROCEDURE — G9899 SCRN MAM PERF RSLTS DOC: HCPCS | Performed by: NURSE PRACTITIONER

## 2024-06-19 PROCEDURE — 1036F TOBACCO NON-USER: CPT | Performed by: NURSE PRACTITIONER

## 2024-06-19 PROCEDURE — 1090F PRES/ABSN URINE INCON ASSESS: CPT | Performed by: NURSE PRACTITIONER

## 2024-06-19 PROCEDURE — G8427 DOCREV CUR MEDS BY ELIG CLIN: HCPCS | Performed by: NURSE PRACTITIONER

## 2024-06-19 PROCEDURE — 99214 OFFICE O/P EST MOD 30 MIN: CPT | Performed by: NURSE PRACTITIONER

## 2024-06-19 PROCEDURE — G8417 CALC BMI ABV UP PARAM F/U: HCPCS | Performed by: NURSE PRACTITIONER

## 2024-06-19 ASSESSMENT — ENCOUNTER SYMPTOMS
SHORTNESS OF BREATH: 1
WHEEZING: 0
COUGH: 1
GASTROINTESTINAL NEGATIVE: 1
BACK PAIN: 1
CHEST TIGHTNESS: 0

## 2024-07-15 DIAGNOSIS — G89.4 CHRONIC PAIN SYNDROME: ICD-10-CM

## 2024-07-15 DIAGNOSIS — G89.29 CHRONIC BILATERAL LOW BACK PAIN WITHOUT SCIATICA: ICD-10-CM

## 2024-07-15 DIAGNOSIS — M54.50 CHRONIC BILATERAL LOW BACK PAIN WITHOUT SCIATICA: ICD-10-CM

## 2024-07-15 DIAGNOSIS — M46.1 SI (SACROILIAC) JOINT INFLAMMATION (HCC): ICD-10-CM

## 2024-07-15 DIAGNOSIS — M47.816 SPONDYLOSIS OF LUMBAR REGION WITHOUT MYELOPATHY OR RADICULOPATHY: ICD-10-CM

## 2024-07-15 RX ORDER — HYDROCODONE BITARTRATE AND ACETAMINOPHEN 5; 325 MG/1; MG/1
1 TABLET ORAL EVERY 6 HOURS PRN
Qty: 120 TABLET | Refills: 0 | Status: SHIPPED | OUTPATIENT
Start: 2024-07-16 | End: 2024-08-15

## 2024-08-12 DIAGNOSIS — M46.1 SI (SACROILIAC) JOINT INFLAMMATION (HCC): ICD-10-CM

## 2024-08-12 DIAGNOSIS — M47.816 SPONDYLOSIS OF LUMBAR REGION WITHOUT MYELOPATHY OR RADICULOPATHY: ICD-10-CM

## 2024-08-12 DIAGNOSIS — G89.29 CHRONIC BILATERAL LOW BACK PAIN WITHOUT SCIATICA: ICD-10-CM

## 2024-08-12 DIAGNOSIS — G89.4 CHRONIC PAIN SYNDROME: ICD-10-CM

## 2024-08-12 DIAGNOSIS — M54.50 CHRONIC BILATERAL LOW BACK PAIN WITHOUT SCIATICA: ICD-10-CM

## 2024-08-12 RX ORDER — HYDROCODONE BITARTRATE AND ACETAMINOPHEN 5; 325 MG/1; MG/1
1 TABLET ORAL EVERY 6 HOURS PRN
Qty: 120 TABLET | Refills: 0 | Status: SHIPPED | OUTPATIENT
Start: 2024-08-15 | End: 2024-09-14

## 2024-08-15 ENCOUNTER — HOSPITAL ENCOUNTER (OUTPATIENT)
Age: 74
Discharge: HOME | End: 2024-08-15
Payer: MEDICARE

## 2024-08-15 ENCOUNTER — HOSPITAL ENCOUNTER (OUTPATIENT)
Dept: GENERAL RADIOLOGY | Age: 74
Discharge: HOME | End: 2024-08-15
Payer: MEDICARE

## 2024-08-15 DIAGNOSIS — G89.4 CHRONIC PAIN SYNDROME: ICD-10-CM

## 2024-08-15 DIAGNOSIS — M54.50 CHRONIC BILATERAL LOW BACK PAIN WITHOUT SCIATICA: ICD-10-CM

## 2024-08-15 DIAGNOSIS — M47.816 SPONDYLOSIS OF LUMBAR REGION WITHOUT MYELOPATHY OR RADICULOPATHY: ICD-10-CM

## 2024-08-15 DIAGNOSIS — M48.061 SPINAL STENOSIS OF LUMBAR REGION WITHOUT NEUROGENIC CLAUDICATION: ICD-10-CM

## 2024-08-15 DIAGNOSIS — G89.29 CHRONIC BILATERAL LOW BACK PAIN WITHOUT SCIATICA: ICD-10-CM

## 2024-08-15 DIAGNOSIS — Z85.3 HISTORY OF LEFT BREAST CANCER: ICD-10-CM

## 2024-08-15 DIAGNOSIS — M46.1 SI (SACROILIAC) JOINT INFLAMMATION (HCC): ICD-10-CM

## 2024-08-15 PROCEDURE — 72100 X-RAY EXAM L-S SPINE 2/3 VWS: CPT

## 2024-08-16 ENCOUNTER — TELEPHONE (OUTPATIENT)
Dept: PULMONOLOGY | Age: 74
End: 2024-08-16

## 2024-08-16 NOTE — TELEPHONE ENCOUNTER
Pt called in reference to a CXR order for her next appt on Nov.19th.  I do not see one in her chart.  Your last dictation from May said F/U CT in year. This is not scheduled.  She is trying to avoid going to the hospital.  Said she was there yesterday and there were 30 people in front of her.  Please advise.

## 2024-08-19 ENCOUNTER — OFFICE VISIT (OUTPATIENT)
Dept: PHYSICAL MEDICINE AND REHAB | Age: 74
End: 2024-08-19
Payer: MEDICARE

## 2024-08-19 VITALS
HEIGHT: 59 IN | BODY MASS INDEX: 30.27 KG/M2 | DIASTOLIC BLOOD PRESSURE: 76 MMHG | SYSTOLIC BLOOD PRESSURE: 126 MMHG | WEIGHT: 150.13 LBS

## 2024-08-19 DIAGNOSIS — M80.00XA AGE-RELATED OSTEOPOROSIS WITH CURRENT PATHOLOGICAL FRACTURE, INITIAL ENCOUNTER: ICD-10-CM

## 2024-08-19 DIAGNOSIS — M48.061 SPINAL STENOSIS OF LUMBAR REGION WITHOUT NEUROGENIC CLAUDICATION: ICD-10-CM

## 2024-08-19 DIAGNOSIS — M46.1 SI (SACROILIAC) JOINT INFLAMMATION (HCC): ICD-10-CM

## 2024-08-19 DIAGNOSIS — G89.4 CHRONIC PAIN SYNDROME: ICD-10-CM

## 2024-08-19 DIAGNOSIS — S32.010D CLOSED COMPRESSION FRACTURE OF L1 LUMBAR VERTEBRA WITH ROUTINE HEALING, SUBSEQUENT ENCOUNTER: ICD-10-CM

## 2024-08-19 DIAGNOSIS — M47.816 LUMBAR FACET ARTHROPATHY: ICD-10-CM

## 2024-08-19 DIAGNOSIS — M47.816 SPONDYLOSIS OF LUMBAR REGION WITHOUT MYELOPATHY OR RADICULOPATHY: Primary | ICD-10-CM

## 2024-08-19 DIAGNOSIS — G89.29 CHRONIC BILATERAL LOW BACK PAIN WITHOUT SCIATICA: ICD-10-CM

## 2024-08-19 DIAGNOSIS — M54.50 CHRONIC BILATERAL LOW BACK PAIN WITHOUT SCIATICA: ICD-10-CM

## 2024-08-19 DIAGNOSIS — M25.551 RIGHT HIP PAIN: ICD-10-CM

## 2024-08-19 PROCEDURE — 1090F PRES/ABSN URINE INCON ASSESS: CPT | Performed by: NURSE PRACTITIONER

## 2024-08-19 PROCEDURE — G8399 PT W/DXA RESULTS DOCUMENT: HCPCS | Performed by: NURSE PRACTITIONER

## 2024-08-19 PROCEDURE — 1123F ACP DISCUSS/DSCN MKR DOCD: CPT | Performed by: NURSE PRACTITIONER

## 2024-08-19 PROCEDURE — 3078F DIAST BP <80 MM HG: CPT | Performed by: NURSE PRACTITIONER

## 2024-08-19 PROCEDURE — G9899 SCRN MAM PERF RSLTS DOC: HCPCS | Performed by: NURSE PRACTITIONER

## 2024-08-19 PROCEDURE — 99214 OFFICE O/P EST MOD 30 MIN: CPT | Performed by: NURSE PRACTITIONER

## 2024-08-19 PROCEDURE — 3017F COLORECTAL CA SCREEN DOC REV: CPT | Performed by: NURSE PRACTITIONER

## 2024-08-19 PROCEDURE — G8427 DOCREV CUR MEDS BY ELIG CLIN: HCPCS | Performed by: NURSE PRACTITIONER

## 2024-08-19 PROCEDURE — 3074F SYST BP LT 130 MM HG: CPT | Performed by: NURSE PRACTITIONER

## 2024-08-19 PROCEDURE — 1036F TOBACCO NON-USER: CPT | Performed by: NURSE PRACTITIONER

## 2024-08-19 PROCEDURE — G8417 CALC BMI ABV UP PARAM F/U: HCPCS | Performed by: NURSE PRACTITIONER

## 2024-08-19 ASSESSMENT — ENCOUNTER SYMPTOMS
SHORTNESS OF BREATH: 1
BACK PAIN: 1
COUGH: 1
WHEEZING: 0
CHEST TIGHTNESS: 0
GASTROINTESTINAL NEGATIVE: 1

## 2024-08-19 NOTE — PROGRESS NOTES
compliant     Meds. Prescribed:   No orders of the defined types were placed in this encounter.      Return in about 2 months (around 10/19/2024), or if symptoms worsen or fail to improve, for follow up  for medications.               Electronically signed by CASSANDRA Booth CNP on8/19/2024 at 11:53 AM

## 2024-08-20 NOTE — TELEPHONE ENCOUNTER
Patient reported at last visit that she was completing a chest x-ray for pain management. It appears they completed an XR of lumbar spine. I was wanting a repeat chest x-ray to follow her trace pleural effusion, however if patient is not having s/s of effusion (increased cough, shortness of breath, weight gain, etc.) we can hold off on repeating an x-ray. Thanks!

## 2024-08-30 NOTE — TELEPHONE ENCOUNTER
Called Pt and she is not having any of those symptoms.  If something changes before her F/U w/PFT she will call us.

## 2024-09-09 DIAGNOSIS — G89.4 CHRONIC PAIN SYNDROME: ICD-10-CM

## 2024-09-09 DIAGNOSIS — M47.816 SPONDYLOSIS OF LUMBAR REGION WITHOUT MYELOPATHY OR RADICULOPATHY: ICD-10-CM

## 2024-09-09 DIAGNOSIS — M46.1 SI (SACROILIAC) JOINT INFLAMMATION (HCC): ICD-10-CM

## 2024-09-09 DIAGNOSIS — G89.29 CHRONIC BILATERAL LOW BACK PAIN WITHOUT SCIATICA: ICD-10-CM

## 2024-09-09 DIAGNOSIS — M54.50 CHRONIC BILATERAL LOW BACK PAIN WITHOUT SCIATICA: ICD-10-CM

## 2024-09-09 RX ORDER — HYDROCODONE BITARTRATE AND ACETAMINOPHEN 5; 325 MG/1; MG/1
1 TABLET ORAL EVERY 6 HOURS PRN
Qty: 120 TABLET | Refills: 0 | Status: SHIPPED | OUTPATIENT
Start: 2024-09-14 | End: 2024-10-14

## 2024-09-12 ENCOUNTER — TELEPHONE (OUTPATIENT)
Dept: PHYSICAL MEDICINE AND REHAB | Age: 74
End: 2024-09-12

## 2024-10-08 DIAGNOSIS — M47.816 SPONDYLOSIS OF LUMBAR REGION WITHOUT MYELOPATHY OR RADICULOPATHY: ICD-10-CM

## 2024-10-08 DIAGNOSIS — G89.4 CHRONIC PAIN SYNDROME: ICD-10-CM

## 2024-10-08 DIAGNOSIS — M54.50 CHRONIC BILATERAL LOW BACK PAIN WITHOUT SCIATICA: ICD-10-CM

## 2024-10-08 DIAGNOSIS — M46.1 SI (SACROILIAC) JOINT INFLAMMATION (HCC): ICD-10-CM

## 2024-10-08 DIAGNOSIS — G89.29 CHRONIC BILATERAL LOW BACK PAIN WITHOUT SCIATICA: ICD-10-CM

## 2024-10-08 RX ORDER — HYDROCODONE BITARTRATE AND ACETAMINOPHEN 5; 325 MG/1; MG/1
1 TABLET ORAL EVERY 6 HOURS PRN
Qty: 120 TABLET | Refills: 0 | Status: SHIPPED | OUTPATIENT
Start: 2024-10-13 | End: 2024-11-12

## 2024-10-21 ENCOUNTER — OFFICE VISIT (OUTPATIENT)
Dept: PHYSICAL MEDICINE AND REHAB | Age: 74
End: 2024-10-21
Payer: MEDICARE

## 2024-10-21 VITALS
HEIGHT: 59 IN | DIASTOLIC BLOOD PRESSURE: 72 MMHG | SYSTOLIC BLOOD PRESSURE: 136 MMHG | BODY MASS INDEX: 30.27 KG/M2 | WEIGHT: 150.13 LBS

## 2024-10-21 DIAGNOSIS — S32.010D CLOSED COMPRESSION FRACTURE OF L1 LUMBAR VERTEBRA WITH ROUTINE HEALING, SUBSEQUENT ENCOUNTER: ICD-10-CM

## 2024-10-21 DIAGNOSIS — M19.90 ARTHRITIS: ICD-10-CM

## 2024-10-21 DIAGNOSIS — M48.061 SPINAL STENOSIS OF LUMBAR REGION WITHOUT NEUROGENIC CLAUDICATION: ICD-10-CM

## 2024-10-21 DIAGNOSIS — M46.1 SI (SACROILIAC) JOINT INFLAMMATION (HCC): ICD-10-CM

## 2024-10-21 DIAGNOSIS — M47.816 SPONDYLOSIS OF LUMBAR REGION WITHOUT MYELOPATHY OR RADICULOPATHY: Primary | ICD-10-CM

## 2024-10-21 DIAGNOSIS — G89.4 CHRONIC PAIN SYNDROME: ICD-10-CM

## 2024-10-21 DIAGNOSIS — M80.00XA AGE-RELATED OSTEOPOROSIS WITH CURRENT PATHOLOGICAL FRACTURE, INITIAL ENCOUNTER: ICD-10-CM

## 2024-10-21 DIAGNOSIS — G89.29 CHRONIC BILATERAL LOW BACK PAIN WITHOUT SCIATICA: ICD-10-CM

## 2024-10-21 DIAGNOSIS — M54.50 CHRONIC BILATERAL LOW BACK PAIN WITHOUT SCIATICA: ICD-10-CM

## 2024-10-21 DIAGNOSIS — M47.816 LUMBAR FACET ARTHROPATHY: ICD-10-CM

## 2024-10-21 PROCEDURE — G8484 FLU IMMUNIZE NO ADMIN: HCPCS | Performed by: NURSE PRACTITIONER

## 2024-10-21 PROCEDURE — G8399 PT W/DXA RESULTS DOCUMENT: HCPCS | Performed by: NURSE PRACTITIONER

## 2024-10-21 PROCEDURE — G9899 SCRN MAM PERF RSLTS DOC: HCPCS | Performed by: NURSE PRACTITIONER

## 2024-10-21 PROCEDURE — 1123F ACP DISCUSS/DSCN MKR DOCD: CPT | Performed by: NURSE PRACTITIONER

## 2024-10-21 PROCEDURE — 1090F PRES/ABSN URINE INCON ASSESS: CPT | Performed by: NURSE PRACTITIONER

## 2024-10-21 PROCEDURE — 3078F DIAST BP <80 MM HG: CPT | Performed by: NURSE PRACTITIONER

## 2024-10-21 PROCEDURE — 1036F TOBACCO NON-USER: CPT | Performed by: NURSE PRACTITIONER

## 2024-10-21 PROCEDURE — 3075F SYST BP GE 130 - 139MM HG: CPT | Performed by: NURSE PRACTITIONER

## 2024-10-21 PROCEDURE — 99214 OFFICE O/P EST MOD 30 MIN: CPT | Performed by: NURSE PRACTITIONER

## 2024-10-21 PROCEDURE — 3017F COLORECTAL CA SCREEN DOC REV: CPT | Performed by: NURSE PRACTITIONER

## 2024-10-21 PROCEDURE — G8427 DOCREV CUR MEDS BY ELIG CLIN: HCPCS | Performed by: NURSE PRACTITIONER

## 2024-10-21 PROCEDURE — G8417 CALC BMI ABV UP PARAM F/U: HCPCS | Performed by: NURSE PRACTITIONER

## 2024-10-21 ASSESSMENT — ENCOUNTER SYMPTOMS
WHEEZING: 1
GASTROINTESTINAL NEGATIVE: 1
CHEST TIGHTNESS: 0
COUGH: 1
BACK PAIN: 1
SHORTNESS OF BREATH: 0

## 2024-10-21 NOTE — PROGRESS NOTES
completely sedated.   Pain is tolerable with current medications. Continue Norco 5/325 mg tabs QID prn- filled 10/14/2024. Continue tylenol prn  Ordered voltaren gel QID prn to painful areas on hands   Is compliant     Meds. Prescribed:   Orders Placed This Encounter   Medications    diclofenac sodium (VOLTAREN) 1 % GEL     Sig: Apply 2 g topically 4 times daily as needed for Pain (To painful areas on hands)     Dispense:  350 g     Refill:  0       Return in about 2 months (around 12/21/2024), or if symptoms worsen or fail to improve, for follow up  for medications.               Electronically signed by CASSANDRA Booth CNP on10/21/2024 at 1:40 PM

## 2024-10-28 ENCOUNTER — OFFICE VISIT (OUTPATIENT)
Dept: PULMONOLOGY | Age: 74
End: 2024-10-28

## 2024-10-28 VITALS
DIASTOLIC BLOOD PRESSURE: 70 MMHG | HEART RATE: 121 BPM | WEIGHT: 150.2 LBS | OXYGEN SATURATION: 95 % | BODY MASS INDEX: 30.28 KG/M2 | HEIGHT: 59 IN | SYSTOLIC BLOOD PRESSURE: 173 MMHG | TEMPERATURE: 98.3 F

## 2024-10-28 DIAGNOSIS — E66.9 OBESITY (BMI 30-39.9): ICD-10-CM

## 2024-10-28 DIAGNOSIS — G47.33 OSA TREATED WITH BIPAP: Primary | ICD-10-CM

## 2024-10-28 ASSESSMENT — ENCOUNTER SYMPTOMS
WHEEZING: 0
COUGH: 0
SHORTNESS OF BREATH: 0
GASTROINTESTINAL NEGATIVE: 1
EYES NEGATIVE: 1
ALLERGIC/IMMUNOLOGIC NEGATIVE: 1
RESPIRATORY NEGATIVE: 1

## 2024-10-28 NOTE — PROGRESS NOTES
Allergic/Immunologic: Negative.    Neurological: Negative.    Hematological: Negative.    Psychiatric/Behavioral: Negative.  Negative for sleep disturbance.         Physical Exam:    BMI:  Body mass index is 30.34 kg/m².    Wt Readings from Last 3 Encounters:   10/28/24 68.1 kg (150 lb 3.2 oz)   10/21/24 68.1 kg (150 lb 2.1 oz)   08/19/24 68.1 kg (150 lb 2.1 oz)     Weight stable / unchanged  Vitals: BP (!) 173/70   Pulse (!) 121   Temp 98.3 °F (36.8 °C)   Ht 1.499 m (4' 11\")   Wt 68.1 kg (150 lb 3.2 oz)   SpO2 95% Comment: on 2 lpm of O2.  BMI 30.34 kg/m²       Physical Exam  Vitals and nursing note reviewed.   Constitutional:       Appearance: She is well-developed. She is obese.   HENT:      Head: Normocephalic and atraumatic.   Eyes:      Conjunctiva/sclera: Conjunctivae normal.      Pupils: Pupils are equal, round, and reactive to light.   Neck:      Vascular: No JVD.   Cardiovascular:      Rate and Rhythm: Normal rate and regular rhythm.      Heart sounds: Normal heart sounds. No murmur heard.     No friction rub. No gallop.   Pulmonary:      Effort: Pulmonary effort is normal. No respiratory distress.      Breath sounds: Normal breath sounds. No wheezing or rales.   Abdominal:      General: Bowel sounds are normal.      Palpations: Abdomen is soft.   Musculoskeletal:         General: Normal range of motion.      Cervical back: Normal range of motion and neck supple.   Skin:     General: Skin is warm and dry.      Capillary Refill: Capillary refill takes less than 2 seconds.   Neurological:      Mental Status: She is alert and oriented to person, place, and time.   Psychiatric:         Behavior: Behavior normal.         Thought Content: Thought content normal.         Judgment: Judgment normal.       ASSESSMENT/DIAGNOSIS     Diagnosis Orders   1. PETE treated with BiPAP  DME Order for CPAP as OP      2. Obesity (BMI 30-39.9)             Plan   - Download reviewed and discussed with Patricia and myself

## 2024-11-04 ENCOUNTER — HOSPITAL ENCOUNTER (OUTPATIENT)
Dept: PULMONOLOGY | Age: 74
Discharge: HOME | End: 2024-11-04
Payer: MEDICARE

## 2024-11-04 DIAGNOSIS — Z87.891 PERSONAL HISTORY OF TOBACCO USE: ICD-10-CM

## 2024-11-04 DIAGNOSIS — J43.2 CENTRILOBULAR EMPHYSEMA (HCC): ICD-10-CM

## 2024-11-04 DIAGNOSIS — J98.4 RESTRICTIVE LUNG DISEASE: ICD-10-CM

## 2024-11-04 DIAGNOSIS — J96.11 CHRONIC RESPIRATORY FAILURE WITH HYPOXIA: ICD-10-CM

## 2024-11-04 PROCEDURE — 94729 DIFFUSING CAPACITY: CPT

## 2024-11-04 PROCEDURE — 94010 BREATHING CAPACITY TEST: CPT

## 2024-11-04 PROCEDURE — 94726 PLETHYSMOGRAPHY LUNG VOLUMES: CPT

## 2024-11-11 DIAGNOSIS — M54.50 CHRONIC BILATERAL LOW BACK PAIN WITHOUT SCIATICA: ICD-10-CM

## 2024-11-11 DIAGNOSIS — M47.816 SPONDYLOSIS OF LUMBAR REGION WITHOUT MYELOPATHY OR RADICULOPATHY: ICD-10-CM

## 2024-11-11 DIAGNOSIS — M46.1 SI (SACROILIAC) JOINT INFLAMMATION (HCC): ICD-10-CM

## 2024-11-11 DIAGNOSIS — G89.4 CHRONIC PAIN SYNDROME: ICD-10-CM

## 2024-11-11 DIAGNOSIS — G89.29 CHRONIC BILATERAL LOW BACK PAIN WITHOUT SCIATICA: ICD-10-CM

## 2024-11-11 RX ORDER — HYDROCODONE BITARTRATE AND ACETAMINOPHEN 5; 325 MG/1; MG/1
1 TABLET ORAL EVERY 6 HOURS PRN
Qty: 120 TABLET | Refills: 0 | Status: SHIPPED | OUTPATIENT
Start: 2024-11-13 | End: 2024-12-13

## 2024-11-14 NOTE — PROGRESS NOTES
[place] [facility]    Patient: ROMERO ROJAS, 74 y.o.   : 2024    Patient of mine    Assessment/Plan   1. Centrilobular emphysema (HCC)  -Patient has tried several inhalers in past without benefit or with worsening symptoms. Patient uncertain why Advair was stopped. She is willing to try advair again. Start fluticasone-salmeterol (ADVAIR) 250-50 MCG/ACT AEPB diskus inhaler; Inhale 1 puff into the lungs in the morning and 1 puff in the evening. Patient to call if symptoms are not improved with inhaled therapy   -Continue albuterol inhaler and nebulizer, one or the other, every 6 hours as needed for shortness of breath or wheezing   -Patient declines 6 MWT today  -Reviewed preventative vaccinations    2. Mucopurulent chronic bronchitis (HCC) - increase in chest congestion  -Start guaiFENesin (MUCINEX) 600 MG extended release tablet; Take 1 tablet by mouth 2 times daily     3. Environmental allergies - not at goal  -Resume flonase  -Start loratadine (CLARITIN) 10 MG tablet; Take 1 tablet by mouth daily    4. Personal history of tobacco use with LAD and hx of breast cancer   -Lung screening due May 2024. Obtain with contrast due to LAD and hx of breast cancer    5. Chronic respiratory failure with hypoxia  -Continue supplemental O2 1.5 lpm at rest and when asleep and 3 lpm with exertion     Advised patient to call office with any changes, questions, or concerns regarding respiratory status or issues with prescribed medications    Return in about 6 months (around 2025) for emphysema with CT CHest prior.        Subjective     Chief Complaint   Patient presents with    Follow-up     6 mo pulm f/u with pft 11--24        HPI  Complaints: Shortness of Breath  Onset Duration: Over a year  Exacerbating factors:  walking  Alleviating factors: Rest  Associated symptoms: Cough, Sputum Production (grey), Wheezing \"some\", and Chest Tightness \"some\"  Pertinent negatives:

## 2024-11-19 ENCOUNTER — OFFICE VISIT (OUTPATIENT)
Dept: PULMONOLOGY | Age: 74
End: 2024-11-19
Payer: MEDICARE

## 2024-11-19 VITALS
WEIGHT: 151.2 LBS | BODY MASS INDEX: 30.48 KG/M2 | TEMPERATURE: 97.9 F | SYSTOLIC BLOOD PRESSURE: 134 MMHG | OXYGEN SATURATION: 99 % | HEART RATE: 109 BPM | HEIGHT: 59 IN | DIASTOLIC BLOOD PRESSURE: 68 MMHG

## 2024-11-19 DIAGNOSIS — J41.1 MUCOPURULENT CHRONIC BRONCHITIS (HCC): ICD-10-CM

## 2024-11-19 DIAGNOSIS — Z91.09 ENVIRONMENTAL ALLERGIES: ICD-10-CM

## 2024-11-19 DIAGNOSIS — J96.11 CHRONIC RESPIRATORY FAILURE WITH HYPOXIA: ICD-10-CM

## 2024-11-19 DIAGNOSIS — Z87.891 PERSONAL HISTORY OF TOBACCO USE: ICD-10-CM

## 2024-11-19 DIAGNOSIS — J43.2 CENTRILOBULAR EMPHYSEMA (HCC): Primary | ICD-10-CM

## 2024-11-19 PROCEDURE — G8417 CALC BMI ABV UP PARAM F/U: HCPCS

## 2024-11-19 PROCEDURE — G9899 SCRN MAM PERF RSLTS DOC: HCPCS

## 2024-11-19 PROCEDURE — 99214 OFFICE O/P EST MOD 30 MIN: CPT

## 2024-11-19 PROCEDURE — 3017F COLORECTAL CA SCREEN DOC REV: CPT

## 2024-11-19 PROCEDURE — 3078F DIAST BP <80 MM HG: CPT

## 2024-11-19 PROCEDURE — 3075F SYST BP GE 130 - 139MM HG: CPT

## 2024-11-19 PROCEDURE — 1160F RVW MEDS BY RX/DR IN RCRD: CPT

## 2024-11-19 PROCEDURE — 3023F SPIROM DOC REV: CPT

## 2024-11-19 PROCEDURE — 1123F ACP DISCUSS/DSCN MKR DOCD: CPT

## 2024-11-19 PROCEDURE — G8427 DOCREV CUR MEDS BY ELIG CLIN: HCPCS

## 2024-11-19 PROCEDURE — G8484 FLU IMMUNIZE NO ADMIN: HCPCS

## 2024-11-19 PROCEDURE — 1090F PRES/ABSN URINE INCON ASSESS: CPT

## 2024-11-19 PROCEDURE — G8399 PT W/DXA RESULTS DOCUMENT: HCPCS

## 2024-11-19 PROCEDURE — 1036F TOBACCO NON-USER: CPT

## 2024-11-19 PROCEDURE — 1159F MED LIST DOCD IN RCRD: CPT

## 2024-11-19 RX ORDER — LORATADINE 10 MG/1
10 TABLET ORAL DAILY
Qty: 30 TABLET | Refills: 11 | Status: SHIPPED | OUTPATIENT
Start: 2024-11-19

## 2024-11-19 RX ORDER — FLUTICASONE PROPIONATE AND SALMETEROL 250; 50 UG/1; UG/1
1 POWDER RESPIRATORY (INHALATION) EVERY 12 HOURS
Qty: 60 EACH | Refills: 3 | Status: SHIPPED | OUTPATIENT
Start: 2024-11-19

## 2024-11-19 RX ORDER — GUAIFENESIN 600 MG/1
600 TABLET, EXTENDED RELEASE ORAL 2 TIMES DAILY
Qty: 60 TABLET | Refills: 11 | Status: SHIPPED | OUTPATIENT
Start: 2024-11-19 | End: 2025-11-19

## 2024-11-19 RX ORDER — ALBUTEROL SULFATE 90 UG/1
2 AEROSOL, METERED RESPIRATORY (INHALATION) EVERY 6 HOURS PRN
Qty: 18 G | Refills: 11 | Status: SHIPPED | OUTPATIENT
Start: 2024-11-19

## 2024-11-19 ASSESSMENT — ENCOUNTER SYMPTOMS
CHEST TIGHTNESS: 1
RHINORRHEA: 0
COUGH: 1
WHEEZING: 1
SHORTNESS OF BREATH: 1
SINUS PAIN: 0
SINUS PRESSURE: 0

## 2024-12-09 DIAGNOSIS — G89.29 CHRONIC BILATERAL LOW BACK PAIN WITHOUT SCIATICA: ICD-10-CM

## 2024-12-09 DIAGNOSIS — M46.1 SI (SACROILIAC) JOINT INFLAMMATION (HCC): ICD-10-CM

## 2024-12-09 DIAGNOSIS — M47.816 SPONDYLOSIS OF LUMBAR REGION WITHOUT MYELOPATHY OR RADICULOPATHY: ICD-10-CM

## 2024-12-09 DIAGNOSIS — M54.50 CHRONIC BILATERAL LOW BACK PAIN WITHOUT SCIATICA: ICD-10-CM

## 2024-12-09 DIAGNOSIS — G89.4 CHRONIC PAIN SYNDROME: ICD-10-CM

## 2024-12-10 RX ORDER — HYDROCODONE BITARTRATE AND ACETAMINOPHEN 5; 325 MG/1; MG/1
1 TABLET ORAL EVERY 6 HOURS PRN
Qty: 120 TABLET | Refills: 0 | Status: SHIPPED | OUTPATIENT
Start: 2024-12-13 | End: 2025-01-12

## 2024-12-23 ENCOUNTER — OFFICE VISIT (OUTPATIENT)
Dept: PHYSICAL MEDICINE AND REHAB | Age: 74
End: 2024-12-23
Payer: MEDICARE

## 2024-12-23 VITALS
BODY MASS INDEX: 30.49 KG/M2 | HEIGHT: 59 IN | SYSTOLIC BLOOD PRESSURE: 120 MMHG | WEIGHT: 151.24 LBS | DIASTOLIC BLOOD PRESSURE: 62 MMHG

## 2024-12-23 DIAGNOSIS — G89.4 CHRONIC PAIN SYNDROME: ICD-10-CM

## 2024-12-23 DIAGNOSIS — M54.50 CHRONIC BILATERAL LOW BACK PAIN WITHOUT SCIATICA: ICD-10-CM

## 2024-12-23 DIAGNOSIS — M80.00XA AGE-RELATED OSTEOPOROSIS WITH CURRENT PATHOLOGICAL FRACTURE, INITIAL ENCOUNTER: ICD-10-CM

## 2024-12-23 DIAGNOSIS — G89.29 CHRONIC BILATERAL LOW BACK PAIN WITHOUT SCIATICA: ICD-10-CM

## 2024-12-23 DIAGNOSIS — M48.061 SPINAL STENOSIS OF LUMBAR REGION WITHOUT NEUROGENIC CLAUDICATION: ICD-10-CM

## 2024-12-23 DIAGNOSIS — M25.50 POLYARTHRALGIA: ICD-10-CM

## 2024-12-23 DIAGNOSIS — S32.010D CLOSED COMPRESSION FRACTURE OF L1 LUMBAR VERTEBRA WITH ROUTINE HEALING, SUBSEQUENT ENCOUNTER: ICD-10-CM

## 2024-12-23 DIAGNOSIS — M19.90 ARTHRITIS: ICD-10-CM

## 2024-12-23 DIAGNOSIS — M47.816 SPONDYLOSIS OF LUMBAR REGION WITHOUT MYELOPATHY OR RADICULOPATHY: Primary | ICD-10-CM

## 2024-12-23 DIAGNOSIS — M47.816 LUMBAR FACET ARTHROPATHY: ICD-10-CM

## 2024-12-23 DIAGNOSIS — M46.1 SI (SACROILIAC) JOINT INFLAMMATION (HCC): ICD-10-CM

## 2024-12-23 PROCEDURE — 3017F COLORECTAL CA SCREEN DOC REV: CPT | Performed by: NURSE PRACTITIONER

## 2024-12-23 PROCEDURE — G8417 CALC BMI ABV UP PARAM F/U: HCPCS | Performed by: NURSE PRACTITIONER

## 2024-12-23 PROCEDURE — 3078F DIAST BP <80 MM HG: CPT | Performed by: NURSE PRACTITIONER

## 2024-12-23 PROCEDURE — 3074F SYST BP LT 130 MM HG: CPT | Performed by: NURSE PRACTITIONER

## 2024-12-23 PROCEDURE — 99214 OFFICE O/P EST MOD 30 MIN: CPT | Performed by: NURSE PRACTITIONER

## 2024-12-23 PROCEDURE — 1036F TOBACCO NON-USER: CPT | Performed by: NURSE PRACTITIONER

## 2024-12-23 PROCEDURE — G8427 DOCREV CUR MEDS BY ELIG CLIN: HCPCS | Performed by: NURSE PRACTITIONER

## 2024-12-23 PROCEDURE — G9899 SCRN MAM PERF RSLTS DOC: HCPCS | Performed by: NURSE PRACTITIONER

## 2024-12-23 PROCEDURE — G8484 FLU IMMUNIZE NO ADMIN: HCPCS | Performed by: NURSE PRACTITIONER

## 2024-12-23 PROCEDURE — 1125F AMNT PAIN NOTED PAIN PRSNT: CPT | Performed by: NURSE PRACTITIONER

## 2024-12-23 PROCEDURE — 1159F MED LIST DOCD IN RCRD: CPT | Performed by: NURSE PRACTITIONER

## 2024-12-23 PROCEDURE — 1123F ACP DISCUSS/DSCN MKR DOCD: CPT | Performed by: NURSE PRACTITIONER

## 2024-12-23 PROCEDURE — 1090F PRES/ABSN URINE INCON ASSESS: CPT | Performed by: NURSE PRACTITIONER

## 2024-12-23 PROCEDURE — G8399 PT W/DXA RESULTS DOCUMENT: HCPCS | Performed by: NURSE PRACTITIONER

## 2024-12-23 ASSESSMENT — ENCOUNTER SYMPTOMS
BACK PAIN: 1
SHORTNESS OF BREATH: 0
GASTROINTESTINAL NEGATIVE: 1
COUGH: 1
CHEST TIGHTNESS: 0
WHEEZING: 0

## 2024-12-23 NOTE — PROGRESS NOTES
No edema, erythema, rigidity, spasms, tenderness or bony tenderness. No muscular tenderness. Normal range of motion.      Thoracic back: Normal. No tenderness.      Lumbar back: Tenderness and bony tenderness present. No swelling, edema or spasms. Decreased range of motion. Negative right straight leg raise test and negative left straight leg raise test.        Back:       Right hip: Tenderness and bony tenderness present.      Left hip: Tenderness present. Normal range of motion. Normal strength.      Right upper leg: Tenderness present.      Left upper leg: Tenderness present.      Right knee: Normal.      Left knee: Normal.      Right lower leg: No edema.      Left lower leg: No edema.   Skin:     General: Skin is warm.      Coloration: Skin is not pale.      Findings: No erythema or rash.   Neurological:      General: No focal deficit present.      Mental Status: She is alert and oriented to person, place, and time. She is not disoriented.      Cranial Nerves: No cranial nerve deficit.      Sensory: No sensory deficit.      Motor: Weakness present. No atrophy or abnormal muscle tone.      Coordination: Coordination is intact. Coordination normal.      Gait: Gait normal.      Deep Tendon Reflexes: Reflexes are normal and symmetric.      Comments: Motor 5/5 BUE and  BLE   Psychiatric:         Attention and Perception: She is attentive.         Mood and Affect: Mood normal. Mood is not anxious or depressed. Affect is not labile, blunt, angry or inappropriate.         Speech: Speech normal.         Behavior: Behavior normal. Behavior is not agitated, slowed, aggressive, withdrawn, hyperactive or combative.         Thought Content: Thought content normal. Thought content is not paranoid or delusional. Thought content does not include homicidal or suicidal ideation. Thought content does not include homicidal or suicidal plan.         Cognition and Memory: Memory is not impaired. She does not exhibit impaired recent

## 2025-01-07 DIAGNOSIS — G89.4 CHRONIC PAIN SYNDROME: ICD-10-CM

## 2025-01-07 DIAGNOSIS — M46.1 SI (SACROILIAC) JOINT INFLAMMATION (HCC): ICD-10-CM

## 2025-01-07 DIAGNOSIS — M54.50 CHRONIC BILATERAL LOW BACK PAIN WITHOUT SCIATICA: ICD-10-CM

## 2025-01-07 DIAGNOSIS — M47.816 SPONDYLOSIS OF LUMBAR REGION WITHOUT MYELOPATHY OR RADICULOPATHY: ICD-10-CM

## 2025-01-07 DIAGNOSIS — G89.29 CHRONIC BILATERAL LOW BACK PAIN WITHOUT SCIATICA: ICD-10-CM

## 2025-01-07 RX ORDER — HYDROCODONE BITARTRATE AND ACETAMINOPHEN 5; 325 MG/1; MG/1
1 TABLET ORAL EVERY 6 HOURS PRN
Qty: 120 TABLET | Refills: 0 | Status: SHIPPED | OUTPATIENT
Start: 2025-01-12 | End: 2025-02-11

## 2025-01-20 DIAGNOSIS — Z17.0 MALIGNANT NEOPLASM OF UPPER-OUTER QUADRANT OF LEFT BREAST IN FEMALE, ESTROGEN RECEPTOR POSITIVE (HCC): Primary | ICD-10-CM

## 2025-01-20 DIAGNOSIS — C50.412 MALIGNANT NEOPLASM OF UPPER-OUTER QUADRANT OF LEFT BREAST IN FEMALE, ESTROGEN RECEPTOR POSITIVE (HCC): Primary | ICD-10-CM

## 2025-01-20 RX ORDER — LETROZOLE 2.5 MG/1
2.5 TABLET, FILM COATED ORAL DAILY
Qty: 90 TABLET | Refills: 3 | Status: SHIPPED | OUTPATIENT
Start: 2025-01-20

## 2025-01-20 NOTE — TELEPHONE ENCOUNTER
Patient called in needing a refill of letrozole. It was prescribed in May 2024 for a 1 year supply, however it was transferred from [facility] to [facility] when [facility] closed and [facility] is stating that they don't have an active prescription on file. I let her know that Josselyn recommended that she be seen 6 months after her visit in May 2024 and that I don't see a follow up was ever made. Patient is agreeable to being scheduled to see Dr. Parsons on 2/7/25.

## 2025-02-04 DIAGNOSIS — G89.29 CHRONIC BILATERAL LOW BACK PAIN WITHOUT SCIATICA: ICD-10-CM

## 2025-02-04 DIAGNOSIS — M46.1 SI (SACROILIAC) JOINT INFLAMMATION (HCC): ICD-10-CM

## 2025-02-04 DIAGNOSIS — G89.4 CHRONIC PAIN SYNDROME: ICD-10-CM

## 2025-02-04 DIAGNOSIS — M54.50 CHRONIC BILATERAL LOW BACK PAIN WITHOUT SCIATICA: ICD-10-CM

## 2025-02-04 DIAGNOSIS — M47.816 SPONDYLOSIS OF LUMBAR REGION WITHOUT MYELOPATHY OR RADICULOPATHY: ICD-10-CM

## 2025-02-04 RX ORDER — HYDROCODONE BITARTRATE AND ACETAMINOPHEN 5; 325 MG/1; MG/1
1 TABLET ORAL EVERY 6 HOURS PRN
Qty: 120 TABLET | Refills: 0 | Status: SHIPPED | OUTPATIENT
Start: 2025-02-11 | End: 2025-03-13

## 2025-02-11 ENCOUNTER — TELEPHONE (OUTPATIENT)
Dept: PHYSICAL MEDICINE AND REHAB | Age: 75
End: 2025-02-11

## 2025-02-11 NOTE — TELEPHONE ENCOUNTER
Patricia is calling stating that [facility] [address], told her that her Norco 5-325 mg needs a PA since her insurance has changed.

## 2025-02-14 ENCOUNTER — TELEPHONE (OUTPATIENT)
Dept: PHYSICAL MEDICINE AND REHAB | Age: 75
End: 2025-02-14

## 2025-02-14 NOTE — TELEPHONE ENCOUNTER
PA sent to plan  (Key: [id number])  Available without authorization. The member is able to fill the requested drug at the pharmacy. HYDROcodone-Acetaminophen 5-325MG tablets    Form  [employer] Electronic PA Form

## 2025-02-25 ENCOUNTER — OFFICE VISIT (OUTPATIENT)
Dept: PHYSICAL MEDICINE AND REHAB | Age: 75
End: 2025-02-25
Payer: MEDICARE

## 2025-02-25 VITALS
DIASTOLIC BLOOD PRESSURE: 48 MMHG | BODY MASS INDEX: 30.49 KG/M2 | WEIGHT: 151.24 LBS | HEIGHT: 59 IN | SYSTOLIC BLOOD PRESSURE: 132 MMHG

## 2025-02-25 DIAGNOSIS — M48.061 SPINAL STENOSIS OF LUMBAR REGION WITHOUT NEUROGENIC CLAUDICATION: ICD-10-CM

## 2025-02-25 DIAGNOSIS — M25.50 POLYARTHRALGIA: ICD-10-CM

## 2025-02-25 DIAGNOSIS — G89.4 CHRONIC PAIN SYNDROME: ICD-10-CM

## 2025-02-25 DIAGNOSIS — M46.1 SI (SACROILIAC) JOINT INFLAMMATION: ICD-10-CM

## 2025-02-25 DIAGNOSIS — M47.816 SPONDYLOSIS OF LUMBAR REGION WITHOUT MYELOPATHY OR RADICULOPATHY: Primary | ICD-10-CM

## 2025-02-25 DIAGNOSIS — M54.50 CHRONIC BILATERAL LOW BACK PAIN WITHOUT SCIATICA: ICD-10-CM

## 2025-02-25 DIAGNOSIS — S32.010D CLOSED COMPRESSION FRACTURE OF L1 LUMBAR VERTEBRA WITH ROUTINE HEALING, SUBSEQUENT ENCOUNTER: ICD-10-CM

## 2025-02-25 DIAGNOSIS — M19.90 ARTHRITIS: ICD-10-CM

## 2025-02-25 DIAGNOSIS — G89.29 CHRONIC BILATERAL LOW BACK PAIN WITHOUT SCIATICA: ICD-10-CM

## 2025-02-25 DIAGNOSIS — M47.816 LUMBAR FACET ARTHROPATHY: ICD-10-CM

## 2025-02-25 PROCEDURE — 1125F AMNT PAIN NOTED PAIN PRSNT: CPT | Performed by: NURSE PRACTITIONER

## 2025-02-25 PROCEDURE — 99214 OFFICE O/P EST MOD 30 MIN: CPT | Performed by: NURSE PRACTITIONER

## 2025-02-25 PROCEDURE — 3078F DIAST BP <80 MM HG: CPT | Performed by: NURSE PRACTITIONER

## 2025-02-25 PROCEDURE — 1123F ACP DISCUSS/DSCN MKR DOCD: CPT | Performed by: NURSE PRACTITIONER

## 2025-02-25 PROCEDURE — 1159F MED LIST DOCD IN RCRD: CPT | Performed by: NURSE PRACTITIONER

## 2025-02-25 PROCEDURE — 3075F SYST BP GE 130 - 139MM HG: CPT | Performed by: NURSE PRACTITIONER

## 2025-02-25 ASSESSMENT — ENCOUNTER SYMPTOMS
BACK PAIN: 1
SHORTNESS OF BREATH: 1
GASTROINTESTINAL NEGATIVE: 1
COUGH: 1
CHEST TIGHTNESS: 0
WHEEZING: 0

## 2025-02-25 NOTE — PROGRESS NOTES
Functionality Assessment/Goals Worksheet     On a scale of 0 (Does not Interfere) to 10 (Completely Interferes)     1.  Which number describes how during the past week pain has interfered with           the following:  A.  General Activity:  5  B.  Mood: 6  C.  Walking Ability:  7  D.  Normal Work (Includes both work outside the home and housework):  5  E.  Relations with Other People:   5  F.  Sleep:   5  G.  Enjoyment of Life:   6    2.  Patient Prefers to Take their Pain Medications:     [x]  On a regular basis   []  Only when necessary    []  Does not take pain medications    3.  What are the Patient's Goals/Expectations for Visiting Pain Management?     [x]  Learn about my pain    [x]  Receive Medication   [x]  Physical Therapy     []  Treat Depression   [x]  Receive Injections    []  Treat Sleep   []  Deal with Anxiety and Stress   []  Treat Opoid Dependence/Addiction   []  Other:       HPI:   Patricia Bautista is a 74 y.o. female is here today for    Chief Complaint: Low back pain, joint pains     HPI   2 month follow up. Patricia continues to have pain in her low back aching and burning pain. Has ups and downs depending on activity. \"Walking effects it ore than anything\"    Denies any radicular pain. She has aching pain in legs knees downs that is intermittent, has aching pain in her ankles and hands     Patients states that she has been sick lately and has just recovered the past 4-5 days- aches, cough, fever, chills, shortness of breath with COPD flare- up. Is on 3 liters of oxygen at this time. She feesl her home machine is not helping. She is awaiting to see her pulmonologist.     Current pain medications Norco prn and tylenol prn remains effective in decreasing pain down to a tolerable level. Uses Voltaren gel prn to joints which helps     Pain increases with bending, lifting, twisting , walking, standing, getting up and down, and housework or working at job      Prior Injections:  bilateral L-facet RFA

## 2025-03-07 DIAGNOSIS — M46.1 SI (SACROILIAC) JOINT INFLAMMATION: ICD-10-CM

## 2025-03-07 DIAGNOSIS — G89.4 CHRONIC PAIN SYNDROME: ICD-10-CM

## 2025-03-07 DIAGNOSIS — M54.50 CHRONIC BILATERAL LOW BACK PAIN WITHOUT SCIATICA: ICD-10-CM

## 2025-03-07 DIAGNOSIS — M47.816 SPONDYLOSIS OF LUMBAR REGION WITHOUT MYELOPATHY OR RADICULOPATHY: ICD-10-CM

## 2025-03-07 DIAGNOSIS — G89.29 CHRONIC BILATERAL LOW BACK PAIN WITHOUT SCIATICA: ICD-10-CM

## 2025-03-10 RX ORDER — HYDROCODONE BITARTRATE AND ACETAMINOPHEN 5; 325 MG/1; MG/1
1 TABLET ORAL EVERY 6 HOURS PRN
Qty: 120 TABLET | Refills: 0 | Status: SHIPPED | OUTPATIENT
Start: 2025-03-13 | End: 2025-04-12

## 2025-03-17 ENCOUNTER — HOSPITAL ENCOUNTER (OUTPATIENT)
Dept: INFUSION THERAPY | Age: 75
Discharge: HOME | End: 2025-03-17
Payer: MEDICARE

## 2025-03-17 ENCOUNTER — OFFICE VISIT (OUTPATIENT)
Dept: ONCOLOGY | Age: 75
End: 2025-03-17
Payer: MEDICARE

## 2025-03-17 VITALS
WEIGHT: 153 LBS | HEART RATE: 110 BPM | SYSTOLIC BLOOD PRESSURE: 125 MMHG | OXYGEN SATURATION: 87 % | BODY MASS INDEX: 30.84 KG/M2 | TEMPERATURE: 98 F | DIASTOLIC BLOOD PRESSURE: 56 MMHG | HEIGHT: 59 IN | RESPIRATION RATE: 16 BRPM

## 2025-03-17 VITALS
HEART RATE: 110 BPM | DIASTOLIC BLOOD PRESSURE: 56 MMHG | SYSTOLIC BLOOD PRESSURE: 125 MMHG | RESPIRATION RATE: 16 BRPM | TEMPERATURE: 98 F | OXYGEN SATURATION: 87 %

## 2025-03-17 DIAGNOSIS — C50.412 MALIGNANT NEOPLASM OF UPPER-OUTER QUADRANT OF LEFT BREAST IN FEMALE, ESTROGEN RECEPTOR POSITIVE: ICD-10-CM

## 2025-03-17 DIAGNOSIS — Z17.0 MALIGNANT NEOPLASM OF UPPER-OUTER QUADRANT OF LEFT BREAST IN FEMALE, ESTROGEN RECEPTOR POSITIVE: ICD-10-CM

## 2025-03-17 DIAGNOSIS — C50.412 MALIGNANT NEOPLASM OF UPPER-OUTER QUADRANT OF LEFT BREAST IN FEMALE, ESTROGEN RECEPTOR POSITIVE: Primary | ICD-10-CM

## 2025-03-17 DIAGNOSIS — Z17.0 MALIGNANT NEOPLASM OF UPPER-OUTER QUADRANT OF LEFT BREAST IN FEMALE, ESTROGEN RECEPTOR POSITIVE: Primary | ICD-10-CM

## 2025-03-17 LAB
ALBUMIN SERPL BCG-MCNC: 4 G/DL (ref 3.4–4.9)
ALP SERPL-CCNC: 95 U/L (ref 35–104)
ALT SERPL W/O P-5'-P-CCNC: 12 U/L (ref 10–35)
AST SERPL-CCNC: 18 U/L (ref 10–35)
BASOPHILS ABSOLUTE: 0 THOU/MM3 (ref 0–0.1)
BASOPHILS NFR BLD AUTO: 0 % (ref 0–3)
BILIRUB CONJ SERPL-MCNC: < 0.1 MG/DL (ref 0–0.2)
BILIRUB SERPL-MCNC: < 0.2 MG/DL (ref 0.3–1.2)
BUN BLDP-MCNC: 20 MG/DL (ref 8–26)
CHLORIDE BLD-SCNC: 98 MEQ/L (ref 98–109)
CREAT BLD-MCNC: 1.2 MG/DL (ref 0.5–1.2)
EOSINOPHIL NFR BLD AUTO: 7 % (ref 0–4)
EOSINOPHILS ABSOLUTE: 0.6 THOU/MM3 (ref 0–0.4)
ERYTHROCYTE [DISTWIDTH] IN BLOOD BY AUTOMATED COUNT: 13 % (ref 11.5–14.5)
GFR SERPL CREATININE-BSD FRML MDRD: 47 ML/MIN/1.73M2
GLUCOSE BLD-MCNC: 100 MG/DL (ref 70–108)
HCT VFR BLD AUTO: 37.7 % (ref 37–47)
HGB BLD-MCNC: 11.6 GM/DL (ref 12–16)
IMMATURE GRANULOCYTES %: 1 %
IMMATURE GRANULOCYTES ABSOLUTE: 0.04 THOU/MM3 (ref 0–0.07)
IONIZED CALCIUM, WHOLE BLOOD: 1.29 MMOL/L (ref 1.12–1.32)
LYMPHOCYTES ABSOLUTE: 2.2 THOU/MM3 (ref 1–4.8)
LYMPHOCYTES NFR BLD AUTO: 27 % (ref 15–47)
MCH RBC QN AUTO: 29.4 PG (ref 26–33)
MCHC RBC AUTO-ENTMCNC: 30.8 GM/DL (ref 32.2–35.5)
MCV RBC AUTO: 95 FL (ref 81–99)
MONOCYTES ABSOLUTE: 0.8 THOU/MM3 (ref 0.4–1.3)
MONOCYTES NFR BLD AUTO: 10 % (ref 0–12)
NEUTROPHILS ABSOLUTE: 4.7 THOU/MM3 (ref 1.8–7.7)
NEUTROPHILS NFR BLD AUTO: 56 % (ref 43–75)
PLATELET # BLD AUTO: 329 THOU/MM3 (ref 130–400)
PMV BLD AUTO: 9.7 FL (ref 9.4–12.4)
POTASSIUM BLD-SCNC: 4 MEQ/L (ref 3.5–4.9)
PROT SERPL-MCNC: 7.7 G/DL (ref 6.4–8.3)
RBC # BLD AUTO: 3.95 MILL/MM3 (ref 4.2–5.4)
SODIUM BLD-SCNC: 141 MEQ/L (ref 138–146)
TOTAL CO2, WHOLE BLOOD: 38 MEQ/L (ref 23–33)
WBC # BLD AUTO: 8.4 THOU/MM3 (ref 4.8–10.8)

## 2025-03-17 PROCEDURE — 36415 COLL VENOUS BLD VENIPUNCTURE: CPT

## 2025-03-17 PROCEDURE — 1159F MED LIST DOCD IN RCRD: CPT | Performed by: INTERNAL MEDICINE

## 2025-03-17 PROCEDURE — 99213 OFFICE O/P EST LOW 20 MIN: CPT | Performed by: INTERNAL MEDICINE

## 2025-03-17 PROCEDURE — 1125F AMNT PAIN NOTED PAIN PRSNT: CPT | Performed by: INTERNAL MEDICINE

## 2025-03-17 PROCEDURE — 3078F DIAST BP <80 MM HG: CPT | Performed by: INTERNAL MEDICINE

## 2025-03-17 PROCEDURE — 80076 HEPATIC FUNCTION PANEL: CPT

## 2025-03-17 PROCEDURE — 3074F SYST BP LT 130 MM HG: CPT | Performed by: INTERNAL MEDICINE

## 2025-03-17 PROCEDURE — 99211 OFF/OP EST MAY X REQ PHY/QHP: CPT

## 2025-03-17 PROCEDURE — 80047 BASIC METABLC PNL IONIZED CA: CPT

## 2025-03-17 PROCEDURE — 1123F ACP DISCUSS/DSCN MKR DOCD: CPT | Performed by: INTERNAL MEDICINE

## 2025-03-17 PROCEDURE — 85025 COMPLETE CBC W/AUTO DIFF WBC: CPT

## 2025-03-17 RX ORDER — LETROZOLE 2.5 MG/1
2.5 TABLET, FILM COATED ORAL DAILY
Qty: 90 TABLET | Refills: 3 | Status: SHIPPED | OUTPATIENT
Start: 2025-03-17 | End: 2025-03-17 | Stop reason: SDUPTHER

## 2025-03-17 RX ORDER — LETROZOLE 2.5 MG/1
2.5 TABLET, FILM COATED ORAL DAILY
Qty: 90 TABLET | Refills: 3 | Status: SHIPPED | OUTPATIENT
Start: 2025-03-17

## 2025-03-17 RX ORDER — LETROZOLE 2.5 MG/1
2.5 TABLET, FILM COATED ORAL DAILY
Qty: 90 TABLET | Refills: 3 | Status: CANCELLED | OUTPATIENT
Start: 2025-03-17

## 2025-03-17 NOTE — PROGRESS NOTES
[facility] [address], [address]  [facility] [address]  Dept: [phone]  Dept Fax: [phone] Loc: [phone]      Visit Date:3/17/2025     Patricia Bautista is a 74 y.o. female who presents today for:   Chief Complaint   Patient presents with    Follow-up     Malignant neoplasm of upper-outer quadrant of left breast in female, estrogen receptor positive (HCC)        HPI:   Patricia Bautista is a 74 y.o. female referred to Hematology/Oncology clinic for evaluation of left-sided breast cancer.  Patient initially presented with abnormality in the left breast this was positive mammographically and because of this she underwent a biopsy in the upper outer quadrant.  The biopsy demonstrated intermediate grade invasive ductal carcinoma it was ER positive NC negative HER2/edward negative.  The patient was seen by Dr. Polanco and underwent a left sided lumpectomy.  No sentinel lymph node biopsy was performed.  The final pathology was positive for a 2.4 cm invasive ductal adenocarcinoma that was ER/NC positive HER2/edward negative.  The patient did have an Onco Dx testing performed and this demonstrated recurrence score of 16.  The patient underwent adjuvant radiation therapy completing this in July 2023.  Has been on adjuvant letrozole since then    Status: The patient returns.  She reports she has been doing relatively well.  She states that she has been taking her letrozole daily.  She did have a follow-up mammogram in May 2024.  She also had a CT scan that demonstrated a slightly enlarged mediastinal lymph node.  She tells me that she has followed up with her pulmonologist who did not think she needed a follow-up CT for this.  She does have ongoing follow-up with her pulmonologist for her longstanding history of COPD.  She has a follow-up with her primary care physician.  Denies fevers chills nausea vomiting no headaches no changes in bowel or bladder habits.  Past Medical History:

## 2025-04-06 DIAGNOSIS — M46.1 SI (SACROILIAC) JOINT INFLAMMATION: ICD-10-CM

## 2025-04-06 DIAGNOSIS — G89.29 CHRONIC BILATERAL LOW BACK PAIN WITHOUT SCIATICA: ICD-10-CM

## 2025-04-06 DIAGNOSIS — G89.4 CHRONIC PAIN SYNDROME: ICD-10-CM

## 2025-04-06 DIAGNOSIS — M54.50 CHRONIC BILATERAL LOW BACK PAIN WITHOUT SCIATICA: ICD-10-CM

## 2025-04-06 DIAGNOSIS — M47.816 SPONDYLOSIS OF LUMBAR REGION WITHOUT MYELOPATHY OR RADICULOPATHY: ICD-10-CM

## 2025-04-07 RX ORDER — HYDROCODONE BITARTRATE AND ACETAMINOPHEN 5; 325 MG/1; MG/1
1 TABLET ORAL EVERY 6 HOURS PRN
Qty: 120 TABLET | Refills: 0 | Status: SHIPPED | OUTPATIENT
Start: 2025-04-12 | End: 2025-05-12

## 2025-04-24 ENCOUNTER — OFFICE VISIT (OUTPATIENT)
Dept: PHYSICAL MEDICINE AND REHAB | Age: 75
End: 2025-04-24
Payer: MEDICARE

## 2025-04-24 VITALS
DIASTOLIC BLOOD PRESSURE: 68 MMHG | SYSTOLIC BLOOD PRESSURE: 122 MMHG | HEIGHT: 59 IN | BODY MASS INDEX: 30.84 KG/M2 | WEIGHT: 153 LBS

## 2025-04-24 DIAGNOSIS — G89.29 CHRONIC BILATERAL LOW BACK PAIN WITHOUT SCIATICA: ICD-10-CM

## 2025-04-24 DIAGNOSIS — G89.4 CHRONIC PAIN SYNDROME: ICD-10-CM

## 2025-04-24 DIAGNOSIS — M48.061 SPINAL STENOSIS OF LUMBAR REGION WITHOUT NEUROGENIC CLAUDICATION: ICD-10-CM

## 2025-04-24 DIAGNOSIS — S32.010D CLOSED COMPRESSION FRACTURE OF L1 LUMBAR VERTEBRA WITH ROUTINE HEALING, SUBSEQUENT ENCOUNTER: ICD-10-CM

## 2025-04-24 DIAGNOSIS — M47.816 SPONDYLOSIS OF LUMBAR REGION WITHOUT MYELOPATHY OR RADICULOPATHY: Primary | ICD-10-CM

## 2025-04-24 DIAGNOSIS — M54.50 CHRONIC BILATERAL LOW BACK PAIN WITHOUT SCIATICA: ICD-10-CM

## 2025-04-24 DIAGNOSIS — M47.816 LUMBAR FACET ARTHROPATHY: ICD-10-CM

## 2025-04-24 DIAGNOSIS — M46.1 SI (SACROILIAC) JOINT INFLAMMATION: ICD-10-CM

## 2025-04-24 DIAGNOSIS — M25.50 POLYARTHRALGIA: ICD-10-CM

## 2025-04-24 PROCEDURE — 99214 OFFICE O/P EST MOD 30 MIN: CPT | Performed by: NURSE PRACTITIONER

## 2025-04-24 PROCEDURE — 1123F ACP DISCUSS/DSCN MKR DOCD: CPT | Performed by: NURSE PRACTITIONER

## 2025-04-24 PROCEDURE — 1159F MED LIST DOCD IN RCRD: CPT | Performed by: NURSE PRACTITIONER

## 2025-04-24 PROCEDURE — 1125F AMNT PAIN NOTED PAIN PRSNT: CPT | Performed by: NURSE PRACTITIONER

## 2025-04-24 PROCEDURE — 3074F SYST BP LT 130 MM HG: CPT | Performed by: NURSE PRACTITIONER

## 2025-04-24 PROCEDURE — 3078F DIAST BP <80 MM HG: CPT | Performed by: NURSE PRACTITIONER

## 2025-04-24 ASSESSMENT — ENCOUNTER SYMPTOMS
BACK PAIN: 1
SHORTNESS OF BREATH: 1
GASTROINTESTINAL NEGATIVE: 1
CHEST TIGHTNESS: 0
WHEEZING: 0
COUGH: 1

## 2025-04-24 NOTE — PROGRESS NOTES
Exam  Vitals and nursing note reviewed.   Constitutional:       General: She is not in acute distress.     Appearance: Normal appearance. She is well-developed. She is not diaphoretic.   HENT:      Head: Normocephalic and atraumatic.      Right Ear: External ear normal.      Left Ear: External ear normal.      Nose: Nose normal.      Mouth/Throat:      Mouth: Mucous membranes are dry.      Pharynx: No oropharyngeal exudate.   Eyes:      General: No scleral icterus.        Right eye: No discharge.         Left eye: No discharge.      Conjunctiva/sclera: Conjunctivae normal.      Pupils: Pupils are equal, round, and reactive to light.   Neck:      Thyroid: No thyromegaly.   Cardiovascular:      Rate and Rhythm: Normal rate and regular rhythm.      Pulses: Normal pulses.      Heart sounds: Normal heart sounds. No murmur heard.     No friction rub. No gallop.   Pulmonary:      Effort: Pulmonary effort is normal. No tachypnea or respiratory distress.      Breath sounds: Normal breath sounds. No wheezing or rales.      Comments: On 2 liters of oxygen, diminished but clear   Chest:      Chest wall: No tenderness.   Abdominal:      General: Abdomen is flat. Bowel sounds are normal. There is no distension.      Palpations: Abdomen is soft.      Tenderness: There is no abdominal tenderness. There is no guarding or rebound.   Musculoskeletal:         General: Tenderness present. No swelling.      Right shoulder: No tenderness.      Left shoulder: No tenderness. Normal range of motion.      Right elbow: Normal.      Left elbow: Normal.      Right wrist: No swelling, tenderness or bony tenderness.      Left wrist: Tenderness and bony tenderness present. Decreased range of motion.      Right hand: Bony tenderness present. No tenderness.      Left hand: Bony tenderness present.      Cervical back: Full passive range of motion without pain, normal range of motion and neck supple. No edema, erythema, rigidity, spasms, tenderness or

## 2025-04-29 ENCOUNTER — TELEPHONE (OUTPATIENT)
Dept: ONCOLOGY | Age: 75
End: 2025-04-29

## 2025-04-29 NOTE — TELEPHONE ENCOUNTER
Sharon called and she is trying to schedule a mammogram. Radiology asked her is she wanted a diagnostic or just a regular mammo? She informed me she has had some pain. Can you please advise?

## 2025-04-30 NOTE — TELEPHONE ENCOUNTER
Called Patricia and let her know she would need\should be seen if she is having new pain and to get a diagnostic mammogram. She verbalized she understood but would just like to get the standard one done first and if it shows anything then she will take further precautions .

## 2025-05-08 DIAGNOSIS — M54.50 CHRONIC BILATERAL LOW BACK PAIN WITHOUT SCIATICA: ICD-10-CM

## 2025-05-08 DIAGNOSIS — G89.29 CHRONIC BILATERAL LOW BACK PAIN WITHOUT SCIATICA: ICD-10-CM

## 2025-05-08 DIAGNOSIS — M46.1 SI (SACROILIAC) JOINT INFLAMMATION: ICD-10-CM

## 2025-05-08 DIAGNOSIS — M47.816 SPONDYLOSIS OF LUMBAR REGION WITHOUT MYELOPATHY OR RADICULOPATHY: ICD-10-CM

## 2025-05-08 DIAGNOSIS — G89.4 CHRONIC PAIN SYNDROME: ICD-10-CM

## 2025-05-08 RX ORDER — HYDROCODONE BITARTRATE AND ACETAMINOPHEN 5; 325 MG/1; MG/1
1 TABLET ORAL EVERY 6 HOURS PRN
Qty: 120 TABLET | Refills: 0 | Status: SHIPPED | OUTPATIENT
Start: 2025-05-12 | End: 2025-06-11

## 2025-06-04 DIAGNOSIS — M46.1 SI (SACROILIAC) JOINT INFLAMMATION: ICD-10-CM

## 2025-06-04 DIAGNOSIS — M47.816 SPONDYLOSIS OF LUMBAR REGION WITHOUT MYELOPATHY OR RADICULOPATHY: ICD-10-CM

## 2025-06-04 DIAGNOSIS — G89.4 CHRONIC PAIN SYNDROME: ICD-10-CM

## 2025-06-04 DIAGNOSIS — M54.50 CHRONIC BILATERAL LOW BACK PAIN WITHOUT SCIATICA: ICD-10-CM

## 2025-06-04 DIAGNOSIS — G89.29 CHRONIC BILATERAL LOW BACK PAIN WITHOUT SCIATICA: ICD-10-CM

## 2025-06-04 NOTE — TELEPHONE ENCOUNTER
OARRS reviewed. UDS: + for  Hydrocodone.   Last seen: 4/24/2025. Follow-up:   Future Appointments   Date Time Provider Department Center   6/12/2025 11:20 AM STR CT IMAGING RM1  [facility] OUT EXP STR Rad/Card   6/24/2025 10:20 AM Dave Fleming APRN - CNP N [facility] Pain [facility]   7/7/2025 11:00 AM Georgia Cabrera APRN - CNP N Pulm Med [facility]   10/28/2025 10:20 AM Horacio Barrios APRN - CNP SRPX SLE [facility]

## 2025-06-05 RX ORDER — HYDROCODONE BITARTRATE AND ACETAMINOPHEN 5; 325 MG/1; MG/1
1 TABLET ORAL EVERY 6 HOURS PRN
Qty: 120 TABLET | Refills: 0 | Status: SHIPPED | OUTPATIENT
Start: 2025-06-11 | End: 2025-07-11

## 2025-06-12 ENCOUNTER — RESULTS FOLLOW-UP (OUTPATIENT)
Dept: PULMONOLOGY | Age: 75
End: 2025-06-12

## 2025-06-12 ENCOUNTER — HOSPITAL ENCOUNTER (OUTPATIENT)
Dept: CT IMAGING | Age: 75
Discharge: HOME | End: 2025-06-12
Payer: MEDICARE

## 2025-06-12 DIAGNOSIS — J43.2 CENTRILOBULAR EMPHYSEMA (HCC): ICD-10-CM

## 2025-06-12 DIAGNOSIS — J41.1 MUCOPURULENT CHRONIC BRONCHITIS (HCC): ICD-10-CM

## 2025-06-12 DIAGNOSIS — Z91.09 ENVIRONMENTAL ALLERGIES: ICD-10-CM

## 2025-06-12 DIAGNOSIS — Z87.891 PERSONAL HISTORY OF TOBACCO USE: ICD-10-CM

## 2025-06-12 LAB — POC CREATININE WHOLE BLOOD: 1 MG/DL (ref 0.5–1.2)

## 2025-06-12 PROCEDURE — 71260 CT THORAX DX C+: CPT

## 2025-06-12 PROCEDURE — 6360000004 HC RX CONTRAST MEDICATION

## 2025-06-12 PROCEDURE — 82565 ASSAY OF CREATININE: CPT

## 2025-06-12 RX ORDER — IOPAMIDOL 755 MG/ML
80 INJECTION, SOLUTION INTRAVASCULAR
Status: COMPLETED | OUTPATIENT
Start: 2025-06-12 | End: 2025-06-12

## 2025-06-12 RX ADMIN — IOPAMIDOL 80 ML: 755 INJECTION, SOLUTION INTRAVENOUS at 11:17

## 2025-06-24 ENCOUNTER — OFFICE VISIT (OUTPATIENT)
Dept: PHYSICAL MEDICINE AND REHAB | Age: 75
End: 2025-06-24
Payer: MEDICARE

## 2025-06-24 VITALS
HEIGHT: 59 IN | SYSTOLIC BLOOD PRESSURE: 128 MMHG | DIASTOLIC BLOOD PRESSURE: 78 MMHG | BODY MASS INDEX: 30.84 KG/M2 | WEIGHT: 153 LBS

## 2025-06-24 DIAGNOSIS — M47.816 SPONDYLOSIS OF LUMBAR REGION WITHOUT MYELOPATHY OR RADICULOPATHY: Primary | ICD-10-CM

## 2025-06-24 DIAGNOSIS — S32.010D CLOSED COMPRESSION FRACTURE OF L1 LUMBAR VERTEBRA WITH ROUTINE HEALING, SUBSEQUENT ENCOUNTER: ICD-10-CM

## 2025-06-24 DIAGNOSIS — M48.061 SPINAL STENOSIS OF LUMBAR REGION WITHOUT NEUROGENIC CLAUDICATION: ICD-10-CM

## 2025-06-24 DIAGNOSIS — M47.816 LUMBAR FACET ARTHROPATHY: ICD-10-CM

## 2025-06-24 DIAGNOSIS — M25.50 POLYARTHRALGIA: ICD-10-CM

## 2025-06-24 DIAGNOSIS — M54.50 CHRONIC BILATERAL LOW BACK PAIN WITHOUT SCIATICA: ICD-10-CM

## 2025-06-24 DIAGNOSIS — G89.29 CHRONIC BILATERAL LOW BACK PAIN WITHOUT SCIATICA: ICD-10-CM

## 2025-06-24 DIAGNOSIS — M46.1 SI (SACROILIAC) JOINT INFLAMMATION: ICD-10-CM

## 2025-06-24 DIAGNOSIS — G89.4 CHRONIC PAIN SYNDROME: ICD-10-CM

## 2025-06-24 PROCEDURE — 3074F SYST BP LT 130 MM HG: CPT | Performed by: NURSE PRACTITIONER

## 2025-06-24 PROCEDURE — 3078F DIAST BP <80 MM HG: CPT | Performed by: NURSE PRACTITIONER

## 2025-06-24 PROCEDURE — 1159F MED LIST DOCD IN RCRD: CPT | Performed by: NURSE PRACTITIONER

## 2025-06-24 PROCEDURE — 99214 OFFICE O/P EST MOD 30 MIN: CPT | Performed by: NURSE PRACTITIONER

## 2025-06-24 PROCEDURE — 1123F ACP DISCUSS/DSCN MKR DOCD: CPT | Performed by: NURSE PRACTITIONER

## 2025-06-24 PROCEDURE — 1125F AMNT PAIN NOTED PAIN PRSNT: CPT | Performed by: NURSE PRACTITIONER

## 2025-06-24 ASSESSMENT — ENCOUNTER SYMPTOMS
SHORTNESS OF BREATH: 1
COUGH: 1
WHEEZING: 0
CHEST TIGHTNESS: 0
GASTROINTESTINAL NEGATIVE: 1
BACK PAIN: 1

## 2025-06-24 NOTE — PROGRESS NOTES
did not come today. State she wears her BIPAP a lot at home. Is trying to get back in with her pulmonologist as she states her last 2 appointment was canceled by the provider.     Pain increases with bending, lifting, twisting , walking, standing, getting up and down, and housework or working at job.        Prior Injections:  bilateral L-facet RFA from 6/20/2022 80% relief for over a year.     Radiology:  Lumbar xray:      IMPRESSION:  1. Mild thoracolumbar neck scoliosis. Moderate lumbar levoscoliosis. Curvature  is unchanged from prior study.  2. Moderate degenerative facet arthropathy lower 3 lumbar levels. Diffuse  demineralization of the bones, consistent with osteoporosis.  3. Moderate to space narrowing L3-4 and L4-5. Moderate spondylosis scattered in  the lumbar spine. No acute fracture seen.    Medications reviewed. Patient denies side effects with medications. Patient states she is taking medications as prescribed. Shedenies receiving pain medications from other sources. She denies any ER visits since last visit.    Pain scale with out pain medications or at its worst is 7-8/10.  Pain scale with pain medications or at its best is 3-4/10.  Last dose of Norco was yesterday   Drug screen reviewed from 4/24/2025 and was appropriate  Pill count completed  today and WNL: Yes      The patientis allergic to codeine, doxycycline, lipitor, simvastatin, and chantix [varenicline tartrate].      Subjective:      Review of Systems   Constitutional:  Negative for activity change, appetite change, chills, fatigue, fever and unexpected weight change.   HENT: Negative.     Respiratory:  Positive for cough and shortness of breath. Negative for chest tightness and wheezing.         On 2 liters of oxygen per nasal cannula at this time at home on 3 liters    Cardiovascular: Negative.  Negative for chest pain, palpitations and leg swelling.   Gastrointestinal: Negative.    Genitourinary: Negative.    Musculoskeletal:  Positive

## 2025-06-30 NOTE — PROGRESS NOTES
[facility]    Patient: ROMERO ROJAS, 74 y.o.   : 1950    Patient of mine    Assessment/Plan     Assessment & Plan  1. Emphysema: Chronic.  - Reports shortness of breath and chest tightness. Using albuterol 4-5 times daily and BiPAP machine frequently. Advised to use Advair consistently, twice daily, and to continue using BiPAP machine as needed for symptom relief. Prioritize taking Advair even if it requires a break from the BiPAP machine.    2. Chronic bronchitis: Chronic.  - Reports cough with white mucus production, nasal drainage, and phlegm. Advised to take Mucinex twice daily until symptoms improve. Can continue using albuterol as needed and nebulizer machine for breathing treatments. If phlegm changes color to green, brown, or dark yellow, inform the office immediately.    3. Enlarged lymph nodes: Mediastinal and hilar lymph nodes slightly more prominent compared to previous scan from 2024.  - Given history of breast cancer, biopsy of lymph nodes recommended. Procedure will be done under anesthesia. Inform anesthesiologist about difficulty with nausea with anesthesia. Bring BiPAP machine for potential use post-procedure. Risks of bleeding, infection, and lung collapse discussed. Arrange for a  on the day of the procedure. Biopsy of lymph nodes scheduled for 2025 with Dr. Ramirez. Imaging reviewed with Dr. Ramirez prior to today's appt    4. Allergies.  - Reports nasal drainage and phlegm, likely due to allergies. Advised to take Claritin in the morning and Mucinex twice daily until symptoms improve. Prescriptions for Claritin and Mucinex will be sent to pharmacy.    5. Malignant neoplasm of left breast   - Missed recent mammogram appointment due to breathing issues. Plans to reschedule mammogram to investigate abnormal feeling in breast, despite not feeling a lump.    6. Personal history of tobacco use  - Resume lung screenings

## 2025-07-01 ENCOUNTER — OFFICE VISIT (OUTPATIENT)
Dept: PULMONOLOGY | Age: 75
End: 2025-07-01
Payer: MEDICARE

## 2025-07-01 VITALS
HEART RATE: 84 BPM | HEIGHT: 60 IN | DIASTOLIC BLOOD PRESSURE: 68 MMHG | TEMPERATURE: 98 F | WEIGHT: 158.8 LBS | SYSTOLIC BLOOD PRESSURE: 124 MMHG | BODY MASS INDEX: 31.18 KG/M2 | OXYGEN SATURATION: 96 %

## 2025-07-01 DIAGNOSIS — J30.2 SEASONAL ALLERGIES: ICD-10-CM

## 2025-07-01 DIAGNOSIS — J43.2 CENTRILOBULAR EMPHYSEMA (HCC): Primary | ICD-10-CM

## 2025-07-01 DIAGNOSIS — Z17.0 MALIGNANT NEOPLASM OF UPPER-OUTER QUADRANT OF LEFT BREAST IN FEMALE, ESTROGEN RECEPTOR POSITIVE (HCC): ICD-10-CM

## 2025-07-01 DIAGNOSIS — R59.1 LYMPHADENOPATHY: ICD-10-CM

## 2025-07-01 DIAGNOSIS — C50.412 MALIGNANT NEOPLASM OF UPPER-OUTER QUADRANT OF LEFT BREAST IN FEMALE, ESTROGEN RECEPTOR POSITIVE (HCC): ICD-10-CM

## 2025-07-01 DIAGNOSIS — J96.11 CHRONIC RESPIRATORY FAILURE WITH HYPOXIA (HCC): ICD-10-CM

## 2025-07-01 DIAGNOSIS — J41.1 MUCOPURULENT CHRONIC BRONCHITIS (HCC): ICD-10-CM

## 2025-07-01 DIAGNOSIS — Z87.891 PERSONAL HISTORY OF TOBACCO USE: ICD-10-CM

## 2025-07-01 PROCEDURE — 1123F ACP DISCUSS/DSCN MKR DOCD: CPT

## 2025-07-01 PROCEDURE — 3074F SYST BP LT 130 MM HG: CPT

## 2025-07-01 PROCEDURE — 1159F MED LIST DOCD IN RCRD: CPT

## 2025-07-01 PROCEDURE — 3078F DIAST BP <80 MM HG: CPT

## 2025-07-01 PROCEDURE — 1160F RVW MEDS BY RX/DR IN RCRD: CPT

## 2025-07-01 PROCEDURE — 99214 OFFICE O/P EST MOD 30 MIN: CPT

## 2025-07-01 RX ORDER — LORATADINE 10 MG/1
10 TABLET ORAL DAILY
Qty: 30 TABLET | Refills: 11 | Status: SHIPPED | OUTPATIENT
Start: 2025-07-01

## 2025-07-01 RX ORDER — GUAIFENESIN 600 MG/1
600 TABLET, EXTENDED RELEASE ORAL 2 TIMES DAILY
Qty: 60 TABLET | Refills: 11 | Status: SHIPPED | OUTPATIENT
Start: 2025-07-01 | End: 2026-07-01

## 2025-07-03 RX ORDER — SODIUM CHLORIDE 9 MG/ML
INJECTION, SOLUTION INTRAVENOUS CONTINUOUS
Status: DISCONTINUED | OUTPATIENT
Start: 2025-07-03 | End: 2025-07-07 | Stop reason: HOSPADM

## 2025-07-03 RX ORDER — SODIUM CHLORIDE 9 MG/ML
25 INJECTION, SOLUTION INTRAVENOUS PRN
Status: DISCONTINUED | OUTPATIENT
Start: 2025-07-03 | End: 2025-07-07 | Stop reason: HOSPADM

## 2025-07-03 RX ORDER — SODIUM CHLORIDE 0.9 % (FLUSH) 0.9 %
5-40 SYRINGE (ML) INJECTION PRN
Status: DISCONTINUED | OUTPATIENT
Start: 2025-07-03 | End: 2025-07-07 | Stop reason: HOSPADM

## 2025-07-03 RX ORDER — SODIUM CHLORIDE 0.9 % (FLUSH) 0.9 %
5-40 SYRINGE (ML) INJECTION EVERY 12 HOURS SCHEDULED
Status: DISCONTINUED | OUTPATIENT
Start: 2025-07-03 | End: 2025-07-07 | Stop reason: HOSPADM

## 2025-07-06 DIAGNOSIS — M47.816 SPONDYLOSIS OF LUMBAR REGION WITHOUT MYELOPATHY OR RADICULOPATHY: ICD-10-CM

## 2025-07-06 DIAGNOSIS — G89.29 CHRONIC BILATERAL LOW BACK PAIN WITHOUT SCIATICA: ICD-10-CM

## 2025-07-06 DIAGNOSIS — M46.1 SI (SACROILIAC) JOINT INFLAMMATION: ICD-10-CM

## 2025-07-06 DIAGNOSIS — M54.50 CHRONIC BILATERAL LOW BACK PAIN WITHOUT SCIATICA: ICD-10-CM

## 2025-07-06 DIAGNOSIS — G89.4 CHRONIC PAIN SYNDROME: ICD-10-CM

## 2025-07-07 ENCOUNTER — ANESTHESIA (OUTPATIENT)
Dept: ENDOSCOPY | Age: 75
End: 2025-07-07
Payer: MEDICARE

## 2025-07-07 ENCOUNTER — HOSPITAL ENCOUNTER (OUTPATIENT)
Age: 75
Setting detail: OUTPATIENT SURGERY
Discharge: HOME | End: 2025-07-07
Attending: INTERNAL MEDICINE | Admitting: INTERNAL MEDICINE
Payer: MEDICARE

## 2025-07-07 ENCOUNTER — ANESTHESIA EVENT (OUTPATIENT)
Dept: ENDOSCOPY | Age: 75
End: 2025-07-07
Payer: MEDICARE

## 2025-07-07 VITALS
HEIGHT: 59 IN | TEMPERATURE: 96.8 F | SYSTOLIC BLOOD PRESSURE: 150 MMHG | OXYGEN SATURATION: 96 % | RESPIRATION RATE: 18 BRPM | WEIGHT: 159.4 LBS | DIASTOLIC BLOOD PRESSURE: 68 MMHG | HEART RATE: 107 BPM | BODY MASS INDEX: 32.13 KG/M2

## 2025-07-07 PROCEDURE — 6360000002 HC RX W HCPCS: Performed by: NURSE ANESTHETIST, CERTIFIED REGISTERED

## 2025-07-07 PROCEDURE — 7100000000 HC PACU RECOVERY - FIRST 15 MIN: Performed by: INTERNAL MEDICINE

## 2025-07-07 PROCEDURE — 6370000000 HC RX 637 (ALT 250 FOR IP)

## 2025-07-07 PROCEDURE — 3700000001 HC ADD 15 MINUTES (ANESTHESIA): Performed by: INTERNAL MEDICINE

## 2025-07-07 PROCEDURE — 6370000000 HC RX 637 (ALT 250 FOR IP): Performed by: NURSE ANESTHETIST, CERTIFIED REGISTERED

## 2025-07-07 PROCEDURE — 3700000000 HC ANESTHESIA ATTENDED CARE: Performed by: INTERNAL MEDICINE

## 2025-07-07 PROCEDURE — 88305 TISSUE EXAM BY PATHOLOGIST: CPT

## 2025-07-07 PROCEDURE — C1725 CATH, TRANSLUMIN NON-LASER: HCPCS | Performed by: INTERNAL MEDICINE

## 2025-07-07 PROCEDURE — 88173 CYTOPATH EVAL FNA REPORT: CPT

## 2025-07-07 PROCEDURE — 7100000001 HC PACU RECOVERY - ADDTL 15 MIN: Performed by: INTERNAL MEDICINE

## 2025-07-07 PROCEDURE — 2720000010 HC SURG SUPPLY STERILE: Performed by: INTERNAL MEDICINE

## 2025-07-07 PROCEDURE — 3609020000 HC BRONCHOSCOPY W/EBUS FNA 3/> NODE: Performed by: INTERNAL MEDICINE

## 2025-07-07 PROCEDURE — 2500000003 HC RX 250 WO HCPCS: Performed by: NURSE ANESTHETIST, CERTIFIED REGISTERED

## 2025-07-07 PROCEDURE — 2580000003 HC RX 258: Performed by: INTERNAL MEDICINE

## 2025-07-07 PROCEDURE — 7100000010 HC PHASE II RECOVERY - FIRST 15 MIN: Performed by: INTERNAL MEDICINE

## 2025-07-07 RX ORDER — ONDANSETRON 2 MG/ML
INJECTION INTRAMUSCULAR; INTRAVENOUS
Status: DISCONTINUED | OUTPATIENT
Start: 2025-07-07 | End: 2025-07-07 | Stop reason: SDUPTHER

## 2025-07-07 RX ORDER — SCOPOLAMINE 1 MG/3D
PATCH, EXTENDED RELEASE TRANSDERMAL
Status: DISCONTINUED | OUTPATIENT
Start: 2025-07-07 | End: 2025-07-07 | Stop reason: SDUPTHER

## 2025-07-07 RX ORDER — PROPOFOL 10 MG/ML
INJECTION, EMULSION INTRAVENOUS
Status: DISCONTINUED | OUTPATIENT
Start: 2025-07-07 | End: 2025-07-07 | Stop reason: SDUPTHER

## 2025-07-07 RX ORDER — LIDOCAINE HYDROCHLORIDE 20 MG/ML
INJECTION, SOLUTION EPIDURAL; INFILTRATION; INTRACAUDAL; PERINEURAL
Status: DISCONTINUED | OUTPATIENT
Start: 2025-07-07 | End: 2025-07-07 | Stop reason: SDUPTHER

## 2025-07-07 RX ORDER — DEXAMETHASONE SODIUM PHOSPHATE 4 MG/ML
INJECTION, SOLUTION INTRA-ARTICULAR; INTRALESIONAL; INTRAMUSCULAR; INTRAVENOUS; SOFT TISSUE
Status: DISCONTINUED | OUTPATIENT
Start: 2025-07-07 | End: 2025-07-07 | Stop reason: SDUPTHER

## 2025-07-07 RX ORDER — ROCURONIUM BROMIDE 10 MG/ML
INJECTION, SOLUTION INTRAVENOUS
Status: DISCONTINUED | OUTPATIENT
Start: 2025-07-07 | End: 2025-07-07 | Stop reason: SDUPTHER

## 2025-07-07 RX ORDER — IPRATROPIUM BROMIDE AND ALBUTEROL SULFATE 2.5; .5 MG/3ML; MG/3ML
1 SOLUTION RESPIRATORY (INHALATION) ONCE
Status: COMPLETED | OUTPATIENT
Start: 2025-07-07 | End: 2025-07-07

## 2025-07-07 RX ORDER — IPRATROPIUM BROMIDE AND ALBUTEROL SULFATE 2.5; .5 MG/3ML; MG/3ML
SOLUTION RESPIRATORY (INHALATION)
Status: COMPLETED
Start: 2025-07-07 | End: 2025-07-07

## 2025-07-07 RX ORDER — HYDROCODONE BITARTRATE AND ACETAMINOPHEN 5; 325 MG/1; MG/1
1 TABLET ORAL EVERY 6 HOURS PRN
Qty: 120 TABLET | Refills: 0 | Status: SHIPPED | OUTPATIENT
Start: 2025-07-11 | End: 2025-08-10

## 2025-07-07 RX ADMIN — ONDANSETRON 4 MG: 2 INJECTION, SOLUTION INTRAMUSCULAR; INTRAVENOUS at 14:25

## 2025-07-07 RX ADMIN — SUGAMMADEX 200 MG: 100 INJECTION, SOLUTION INTRAVENOUS at 14:30

## 2025-07-07 RX ADMIN — IPRATROPIUM BROMIDE AND ALBUTEROL SULFATE 1 DOSE: 2.5; .5 SOLUTION RESPIRATORY (INHALATION) at 14:42

## 2025-07-07 RX ADMIN — DEXAMETHASONE SODIUM PHOSPHATE 8 MG: 4 INJECTION, SOLUTION INTRAMUSCULAR; INTRAVENOUS at 13:58

## 2025-07-07 RX ADMIN — SCOPOLAMINE 1 PATCH: 1.5 PATCH, EXTENDED RELEASE TRANSDERMAL at 13:25

## 2025-07-07 RX ADMIN — ROCURONIUM BROMIDE 50 MG: 10 INJECTION, SOLUTION INTRAVENOUS at 13:58

## 2025-07-07 RX ADMIN — PROPOFOL 120 MG: 10 INJECTION, EMULSION INTRAVENOUS at 13:58

## 2025-07-07 RX ADMIN — LIDOCAINE HYDROCHLORIDE 50 MG: 20 INJECTION, SOLUTION EPIDURAL; INFILTRATION; INTRACAUDAL; PERINEURAL at 13:58

## 2025-07-07 RX ADMIN — IPRATROPIUM BROMIDE AND ALBUTEROL SULFATE 1 DOSE: .5; 3 SOLUTION RESPIRATORY (INHALATION) at 14:42

## 2025-07-07 RX ADMIN — SODIUM CHLORIDE: 9 INJECTION, SOLUTION INTRAVENOUS at 13:40

## 2025-07-07 ASSESSMENT — PAIN - FUNCTIONAL ASSESSMENT
PAIN_FUNCTIONAL_ASSESSMENT: 0-10
PAIN_FUNCTIONAL_ASSESSMENT: NONE - DENIES PAIN

## 2025-07-07 ASSESSMENT — PAIN SCALES - GENERAL: PAINLEVEL_OUTOF10: 0

## 2025-07-07 ASSESSMENT — ENCOUNTER SYMPTOMS: DYSPNEA ACTIVITY LEVEL: AFTER AMBULATING 1 FLIGHT OF STAIRS

## 2025-07-07 NOTE — ANESTHESIA PRE PROCEDURE
06/16/2023 12:16 PM    GLUCOSE 100 06/16/2023 12:16 PM    GLUCOSE 99 05/12/2023 09:45 AM    CALCIUM 9.8 06/16/2023 12:16 PM    BILITOT <0.2 03/17/2025 09:22 AM    BILITOT NEGATIVE 03/31/2012 10:25 AM    ALKPHOS 95 03/17/2025 09:22 AM    AST 18 03/17/2025 09:22 AM    ALT 12 03/17/2025 09:22 AM       POC Tests: No results for input(s): \"POCGLU\", \"POCNA\", \"POCK\", \"POCCL\", \"POCBUN\", \"POCHEMO\", \"POCHCT\" in the last 72 hours.    Coags:   Lab Results   Component Value Date/Time    INR 0.91 01/08/2019 06:46 AM    APTT 33.6 01/08/2019 06:46 AM       HCG (If Applicable): No results found for: \"PREGTESTUR\", \"PREGSERUM\", \"HCG\", \"HCGQUANT\"     ABGs: No results found for: \"PHART\", \"PO2ART\", \"LVC7TXT\", \"EMZ8XXA\", \"BEART\", \"D7FFHJSS\"     Type & Screen (If Applicable):  Lab Results   Component Value Date    LABANTI NEG 01/08/2019       Drug/Infectious Status (If Applicable):  No results found for: \"HIV\", \"HEPCAB\"    COVID-19 Screening (If Applicable):   Lab Results   Component Value Date/Time    COVID19 Not Detected 08/19/2020 01:20 PM           Anesthesia Evaluation  Patient summary reviewed and Nursing notes reviewed  Airway: Mallampati: II          Dental:    (+) edentulous and upper dentures      Pulmonary:   (+)     sleep apnea: on CPAP,   decreased breath sounds                               Cardiovascular:    (+) hypertension: moderate, angina: no interval change, CAD:, CHF:, SORTO: after ambulating 1 flight of stairs, hyperlipidemia                  Neuro/Psych:   (+) CVA: no interval change            GI/Hepatic/Renal:   (+) GERD: well controlled          Endo/Other:    (+) DiabetesType II DM, malignancy/cancer.                 Abdominal:             Vascular: negative vascular ROS.         Other Findings: States two heart stents years ago.  Ongoing shortness of breath but recently profoundly increased. Can do a flight of stairs with breaks in activity. Lives with daughter.            Anesthesia Plan      general     ASA 3

## 2025-07-07 NOTE — H&P
Update History & Physical    The patient's History and Physical of July 1, Georgia Ga CNP was reviewed with the patient and I examined the patient. There was no change. The surgical site was confirmed by the patient and me.       Plan: The risks, benefits, expected outcome, and alternative to the recommended procedure have been discussed with the patient. Risks/benefits and alternatives to EBUS with biopsy including risks of severe bleeding/infection <1%. Patient understands and wants to proceed with the procedure.     Electronically signed by JOEL BOYLE DO on 7/7/2025 at 1:32 PM

## 2025-07-07 NOTE — PROGRESS NOTES
Recovery mode. Denies discomfort, passing gas, taking fluids. Dr. Ramirez discussed findings and plan of care with patient and . Discharge instructions provided, understanding verbalized.

## 2025-07-07 NOTE — PROGRESS NOTES
1437 Pt arrives to pacu at this time. Pt is alert. Denies pain, nausea or feeling cold. Pt states feels slightly SOB. Pt has frequent moist cough. Respirs slightly tachy d/t cough. VSS    1442 - Duoneb given.     1457 - Pt states sob has improved. Cough slightly improved. Denies needs. VSS on 3L NC which is patients baseline.     1507 - Pt meets criteria to discharge from pacu at this time. Pt transported to [facility] in stable condition. Daughter brought back to [facility].

## 2025-07-07 NOTE — PROGRESS NOTES
EBUS completed, pt tolerated well. FNA to 5 and 10R lymph node X 6. 2 specimen jar labeled and sent to lab. Scope .

## 2025-07-07 NOTE — PROGRESS NOTES
Patricia admitted to endo for EBUS with DR. Ramirez. Consent form signed and verified with pt. Allergies reviewed with pt. Pt daughter Asia at bedside.

## 2025-07-07 NOTE — PROCEDURES
Bronchoscopy Procedure Note    Date of Operation: 7/7/2025    Pre-op Diagnosis: Lymphadenopathy    Post-op Diagnosis: same    Surgeon: JOEL BOYLE DO    Assistants: Nano Herzog CNP    Anesthesia: General endotracheal anesthesia    Operation: Flexible fiberoptic bronchoscopy, robotic bronchoscopy with biopsy, FNA and mini-bal with endobronchial ultrasound with biopsy    Findings: see below    Specimen: see below    Estimated Blood Loss: Minimal    Drains: N/A    Complications: none apparent    Indications and History:  The patient is a 74 y.o. female with lymphadenopathy.  The risks, benefits, complications, treatment options and expected outcomes were discussed with the patient.  The possibilities of reaction to medication, pulmonary aspiration, perforation of a viscus, bleeding, failure to diagnose a condition and creating a complication requiring transfusion or operation were discussed with the patient who freely signed the consent.      Description of Procedure:  The patient was seen in the Holding Room and the site of surgery properly noted/marked.  The patient was taken to endo 14, identified as Patricia Bautista and the procedure verified as Flexible Fiberoptic Bronchoscopy.  A Time Out was held and the above information confirmed.     Details of Procedure: Time out was performed. Patient was positioned in appropriate position. After achieving adequate sedation the EBUS was placed through the ET tube and advanced to the tracheal kaylee. Bronchoscope was then advanced into the right main stem bronchus and inspection of the right upper lobe, right middle lobe and right lower lobe was performed. The bronchoscope was then withdrawn to the tracheal kaylee. The bronchoscope was then advanced into the left main stem bronchus and the left upper lobe, left lingular and left lower lobe segments were inspected. The EBUS scope was then changed to ultrasound mode. Following biopsy of amendable lymph node stations

## 2025-07-08 NOTE — ANESTHESIA POSTPROCEDURE EVALUATION
Department of Anesthesiology  Postprocedure Note    Patient: Patricia Bautista  MRN: [id number]  YOB: 1950  Date of evaluation: 7/8/2025    Procedure Summary       Date: 07/07/25 Room / Location: [place] [address] / [facility]    Anesthesia Start: 1351 Anesthesia Stop: 1439    Procedure: BRONCHOSCOPY ENDOBRONCHIAL ULTRASOUND FINE NEEDLE ASPIRATION Diagnosis:       Centrilobular emphysema (HCC)      (Centrilobular emphysema (HCC) [J43.2])    Surgeons: Ronnie Ramirez DO Responsible Provider: Ottoniel Rao DO    Anesthesia Type: general ASA Status: 3            Anesthesia Type: No value filed.    Eryn Phase I: Eryn Score: 9    Eryn Phase II: Eryn Score: 9    Anesthesia Post Evaluation    Patient location during evaluation: PACU  Patient participation: complete - patient participated  Level of consciousness: awake and alert  Pain score: 1  Airway patency: patent  Nausea & Vomiting: no nausea and no vomiting  Cardiovascular status: hemodynamically stable and blood pressure returned to baseline  Respiratory status: spontaneous ventilation, room air and acceptable  Hydration status: stable  Pain management: adequate and satisfactory to patient        No notable events documented.

## 2025-07-09 ENCOUNTER — TELEPHONE (OUTPATIENT)
Dept: PULMONOLOGY | Age: 75
End: 2025-07-09

## 2025-07-09 DIAGNOSIS — Z87.891 PERSONAL HISTORY OF TOBACCO USE: ICD-10-CM

## 2025-07-09 DIAGNOSIS — Z17.0 MALIGNANT NEOPLASM OF UPPER-OUTER QUADRANT OF LEFT BREAST IN FEMALE, ESTROGEN RECEPTOR POSITIVE (HCC): ICD-10-CM

## 2025-07-09 DIAGNOSIS — C50.412 MALIGNANT NEOPLASM OF UPPER-OUTER QUADRANT OF LEFT BREAST IN FEMALE, ESTROGEN RECEPTOR POSITIVE (HCC): ICD-10-CM

## 2025-07-09 DIAGNOSIS — J43.2 CENTRILOBULAR EMPHYSEMA (HCC): Primary | ICD-10-CM

## 2025-07-09 DIAGNOSIS — R59.1 LYMPHADENOPATHY: ICD-10-CM

## 2025-07-09 NOTE — TELEPHONE ENCOUNTER
Patient cancelled appt today. Advised patient regarding biopsy result, see below. Patient verbalized understanding. Advised repeat CT CHest W Contrast in 3-6 months. Patient agreeable to 6 months. Order placed.      Lymph node, 5 and 10R, FNA:    Negative for malignancy    Lymphoid samples     Staff please schedule her CT CHest with contrast in 6 months and clinic follow up to review testing. She prefers around 10AM. She will check [employer] for date and time of appt and imaging. Thanks!

## 2025-08-04 DIAGNOSIS — M47.816 SPONDYLOSIS OF LUMBAR REGION WITHOUT MYELOPATHY OR RADICULOPATHY: ICD-10-CM

## 2025-08-04 DIAGNOSIS — G89.4 CHRONIC PAIN SYNDROME: ICD-10-CM

## 2025-08-04 DIAGNOSIS — M54.50 CHRONIC BILATERAL LOW BACK PAIN WITHOUT SCIATICA: ICD-10-CM

## 2025-08-04 DIAGNOSIS — G89.29 CHRONIC BILATERAL LOW BACK PAIN WITHOUT SCIATICA: ICD-10-CM

## 2025-08-04 DIAGNOSIS — M46.1 SI (SACROILIAC) JOINT INFLAMMATION: ICD-10-CM

## 2025-08-04 RX ORDER — HYDROCODONE BITARTRATE AND ACETAMINOPHEN 5; 325 MG/1; MG/1
1 TABLET ORAL EVERY 6 HOURS PRN
Qty: 120 TABLET | Refills: 0 | Status: SHIPPED | OUTPATIENT
Start: 2025-08-10 | End: 2025-09-09

## 2025-08-19 ENCOUNTER — PATIENT MESSAGE (OUTPATIENT)
Dept: PULMONOLOGY | Age: 75
End: 2025-08-19

## 2025-08-19 DIAGNOSIS — J44.1 COPD EXACERBATION (HCC): Primary | ICD-10-CM

## 2025-08-19 RX ORDER — PREDNISONE 20 MG/1
40 TABLET ORAL DAILY
Qty: 10 TABLET | Refills: 0 | Status: SHIPPED | OUTPATIENT
Start: 2025-08-19 | End: 2025-08-24

## 2025-08-25 ENCOUNTER — OFFICE VISIT (OUTPATIENT)
Dept: PHYSICAL MEDICINE AND REHAB | Age: 75
End: 2025-08-25
Payer: MEDICARE

## 2025-08-25 VITALS
DIASTOLIC BLOOD PRESSURE: 56 MMHG | SYSTOLIC BLOOD PRESSURE: 132 MMHG | RESPIRATION RATE: 20 BRPM | OXYGEN SATURATION: 97 % | WEIGHT: 159 LBS | HEIGHT: 59 IN | HEART RATE: 71 BPM | BODY MASS INDEX: 32.05 KG/M2

## 2025-08-25 DIAGNOSIS — M48.061 SPINAL STENOSIS OF LUMBAR REGION WITHOUT NEUROGENIC CLAUDICATION: ICD-10-CM

## 2025-08-25 DIAGNOSIS — M47.816 LUMBAR FACET ARTHROPATHY: ICD-10-CM

## 2025-08-25 DIAGNOSIS — M46.1 SI (SACROILIAC) JOINT INFLAMMATION: ICD-10-CM

## 2025-08-25 DIAGNOSIS — M47.816 SPONDYLOSIS OF LUMBAR REGION WITHOUT MYELOPATHY OR RADICULOPATHY: Primary | ICD-10-CM

## 2025-08-25 DIAGNOSIS — G89.4 CHRONIC PAIN SYNDROME: ICD-10-CM

## 2025-08-25 DIAGNOSIS — M54.50 CHRONIC BILATERAL LOW BACK PAIN WITHOUT SCIATICA: ICD-10-CM

## 2025-08-25 DIAGNOSIS — S32.010D CLOSED COMPRESSION FRACTURE OF L1 LUMBAR VERTEBRA WITH ROUTINE HEALING, SUBSEQUENT ENCOUNTER: ICD-10-CM

## 2025-08-25 DIAGNOSIS — M25.50 POLYARTHRALGIA: ICD-10-CM

## 2025-08-25 DIAGNOSIS — G89.29 CHRONIC BILATERAL LOW BACK PAIN WITHOUT SCIATICA: ICD-10-CM

## 2025-08-25 PROCEDURE — 3078F DIAST BP <80 MM HG: CPT | Performed by: NURSE PRACTITIONER

## 2025-08-25 PROCEDURE — 3075F SYST BP GE 130 - 139MM HG: CPT | Performed by: NURSE PRACTITIONER

## 2025-08-25 PROCEDURE — 99214 OFFICE O/P EST MOD 30 MIN: CPT | Performed by: NURSE PRACTITIONER

## 2025-08-25 PROCEDURE — 1125F AMNT PAIN NOTED PAIN PRSNT: CPT | Performed by: NURSE PRACTITIONER

## 2025-08-25 PROCEDURE — 1123F ACP DISCUSS/DSCN MKR DOCD: CPT | Performed by: NURSE PRACTITIONER

## 2025-08-25 PROCEDURE — 1159F MED LIST DOCD IN RCRD: CPT | Performed by: NURSE PRACTITIONER

## 2025-08-25 RX ORDER — PREDNISONE 20 MG/1
20 TABLET ORAL DAILY
COMMUNITY
Start: 2025-08-22 | End: 2025-08-27

## 2025-08-25 ASSESSMENT — ENCOUNTER SYMPTOMS
GASTROINTESTINAL NEGATIVE: 1
APNEA: 1
CHEST TIGHTNESS: 0
BACK PAIN: 1
SHORTNESS OF BREATH: 0
WHEEZING: 0
COUGH: 1

## 2025-09-02 DIAGNOSIS — G89.4 CHRONIC PAIN SYNDROME: ICD-10-CM

## 2025-09-02 DIAGNOSIS — G89.29 CHRONIC BILATERAL LOW BACK PAIN WITHOUT SCIATICA: ICD-10-CM

## 2025-09-02 DIAGNOSIS — M47.816 SPONDYLOSIS OF LUMBAR REGION WITHOUT MYELOPATHY OR RADICULOPATHY: ICD-10-CM

## 2025-09-02 DIAGNOSIS — M46.1 SI (SACROILIAC) JOINT INFLAMMATION: ICD-10-CM

## 2025-09-02 DIAGNOSIS — M54.50 CHRONIC BILATERAL LOW BACK PAIN WITHOUT SCIATICA: ICD-10-CM

## 2025-09-03 RX ORDER — HYDROCODONE BITARTRATE AND ACETAMINOPHEN 5; 325 MG/1; MG/1
1 TABLET ORAL EVERY 6 HOURS PRN
Qty: 120 TABLET | Refills: 0 | Status: SHIPPED | OUTPATIENT
Start: 2025-09-09 | End: 2025-10-09

## (undated) DEVICE — 6 ML SYRINGE LUER-LOCK TIP: Brand: MONOJECT

## (undated) DEVICE — TOWEL,OR,DSP,ST,BLUE,DLX,4/PK,20PK/CS: Brand: MEDLINE

## (undated) DEVICE — SYRINGE MED 5ML STD CLR PLAS LUERLOCK TIP N CTRL DISP

## (undated) DEVICE — SHEET,DRAPE,3/4,53X77,STERILE: Brand: MEDLINE

## (undated) DEVICE — NEEDLE SYR 18GA L1.5IN RED PLAS HUB S STL BLNT FILL W/O

## (undated) DEVICE — GLOVE SURG SZ 65 THK91MIL LTX FREE SYN POLYISOPRENE

## (undated) DEVICE — TOWEL,OR,DSP,ST,BLUE,STD,4/PK,20PK/CS: Brand: MEDLINE

## (undated) DEVICE — SYRINGE MED 10ML LUERLOCK TIP W/O SFTY DISP

## (undated) DEVICE — GLOVE ORANGE PI 7 1/2   MSG9075

## (undated) DEVICE — PENCIL SMK EVAC ALL IN 1 DSGN ENH VISIBILITY IMPROVED AIR

## (undated) DEVICE — GLOVE ORANGE PI 7   MSG9070

## (undated) DEVICE — NEEDLE SPNL 22GA L3.5IN BLK HUB S STL REG WALL FIT STYL W/

## (undated) DEVICE — SUTURE MCRYL SZ 4-0 L27IN ABSRB UD L19MM PS-2 1/2 CIR PRIM Y426H

## (undated) DEVICE — Device

## (undated) DEVICE — HYPODERMIC SAFETY NEEDLE: Brand: MAGELLAN

## (undated) DEVICE — SPECIMEN ORIENTATION CHARMS, SIX DISTINCTLY SHAPED STERILE 10MM CHARMS: Brand: MARGINMAP

## (undated) DEVICE — CANNULA RF 20 GAUGEX100MM 10MM

## (undated) DEVICE — GAUZE,SPONGE,4"X4",12PLY,STERILE,LF,2'S: Brand: MEDLINE

## (undated) DEVICE — 3 ML SYRINGE LUER-LOCK TIP: Brand: MONOJECT

## (undated) DEVICE — SUTURE VCRL + SZ 2-0 L27IN ABSRB WHT SH 1/2 CIR TAPERCUT VCP417H

## (undated) DEVICE — Device: Brand: BALLOON

## (undated) DEVICE — NEEDLE ASPIR 21GA L700MM US GUID TREAT DST END FOR EFFICIENT

## (undated) DEVICE — SUTURE VCRL + SZ 3-0 L27IN ABSRB UD L26MM SH 1/2 CIR VCP416H

## (undated) DEVICE — GAUZE SPONGES,USP TYPE VII GAUZE, 12 PLY: Brand: CURITY

## (undated) DEVICE — KIT RF 4MM ACT TIP 17GA 75MM MULT PRB PROC COMPONENTS MULT

## (undated) DEVICE — ADHESIVE SKIN CLSR 0.7ML TOP DERMBND ADV

## (undated) DEVICE — NEEDLE SPNL 22 GAX5 IN HUB QNCKE FUNNEL SHP STYL BLK SPINCAN

## (undated) DEVICE — CHLORAPREP 26ML CLEAR

## (undated) DEVICE — BREAST HERNIA: Brand: MEDLINE INDUSTRIES, INC.

## (undated) DEVICE — GOWN,SIRUS,NON REINFRCD,LARGE,SET IN SL: Brand: MEDLINE

## (undated) DEVICE — ZINACTIVE USE 2537982 PAD GRND FOR RF PAIN MGMT DISP

## (undated) DEVICE — APPLIER LIG CLP M L11IN TI STR RNG HNDL FOR 20 CLP DISP

## (undated) DEVICE — LABEL MED DRUG CUST

## (undated) DEVICE — Z DISCONTINUED USE 2272117 DRAPE SURG 3 QTR N INVASIVE 2 LAYR DISP